# Patient Record
Sex: FEMALE | Race: WHITE | NOT HISPANIC OR LATINO | Employment: OTHER | URBAN - METROPOLITAN AREA
[De-identification: names, ages, dates, MRNs, and addresses within clinical notes are randomized per-mention and may not be internally consistent; named-entity substitution may affect disease eponyms.]

---

## 2017-01-16 ENCOUNTER — ALLSCRIPTS OFFICE VISIT (OUTPATIENT)
Dept: OTHER | Facility: OTHER | Age: 77
End: 2017-01-16

## 2018-01-13 VITALS
HEIGHT: 62 IN | TEMPERATURE: 97.4 F | SYSTOLIC BLOOD PRESSURE: 118 MMHG | DIASTOLIC BLOOD PRESSURE: 70 MMHG | WEIGHT: 195 LBS | BODY MASS INDEX: 35.88 KG/M2

## 2018-01-16 DIAGNOSIS — E55.9 VITAMIN D DEFICIENCY: ICD-10-CM

## 2018-01-16 DIAGNOSIS — E78.00 PURE HYPERCHOLESTEROLEMIA: ICD-10-CM

## 2018-01-16 DIAGNOSIS — Z00.00 ENCOUNTER FOR GENERAL ADULT MEDICAL EXAMINATION WITHOUT ABNORMAL FINDINGS: ICD-10-CM

## 2018-01-16 DIAGNOSIS — I10 ESSENTIAL (PRIMARY) HYPERTENSION: ICD-10-CM

## 2018-01-24 RX ORDER — FENOFIBRIC ACID 135 MG/1
1 CAPSULE, DELAYED RELEASE ORAL DAILY
COMMUNITY
Start: 2016-09-21 | End: 2018-04-06 | Stop reason: SDUPTHER

## 2018-01-24 RX ORDER — SPIRONOLACTONE 50 MG/1
1 TABLET, FILM COATED ORAL DAILY
COMMUNITY
Start: 2016-11-23 | End: 2018-02-05 | Stop reason: SDUPTHER

## 2018-01-24 RX ORDER — BUPROPION HYDROCHLORIDE 150 MG/1
1 TABLET ORAL DAILY
COMMUNITY
Start: 2016-08-03 | End: 2018-03-09 | Stop reason: SDUPTHER

## 2018-02-05 DIAGNOSIS — I10 ESSENTIAL HYPERTENSION: Primary | ICD-10-CM

## 2018-02-05 RX ORDER — SPIRONOLACTONE 50 MG/1
TABLET, FILM COATED ORAL
Qty: 30 TABLET | Refills: 0 | Status: SHIPPED | OUTPATIENT
Start: 2018-02-05 | End: 2018-03-09 | Stop reason: SDUPTHER

## 2018-03-06 ENCOUNTER — OFFICE VISIT (OUTPATIENT)
Dept: FAMILY MEDICINE CLINIC | Facility: CLINIC | Age: 78
End: 2018-03-06
Payer: COMMERCIAL

## 2018-03-06 VITALS
SYSTOLIC BLOOD PRESSURE: 144 MMHG | HEART RATE: 76 BPM | TEMPERATURE: 96.9 F | DIASTOLIC BLOOD PRESSURE: 84 MMHG | BODY MASS INDEX: 36.95 KG/M2 | OXYGEN SATURATION: 97 % | WEIGHT: 202 LBS

## 2018-03-06 DIAGNOSIS — Z00.00 MEDICARE ANNUAL WELLNESS VISIT, SUBSEQUENT: Primary | ICD-10-CM

## 2018-03-06 DIAGNOSIS — N64.4 BREAST PAIN, LEFT: ICD-10-CM

## 2018-03-06 DIAGNOSIS — M25.561 RIGHT KNEE PAIN, UNSPECIFIED CHRONICITY: ICD-10-CM

## 2018-03-06 DIAGNOSIS — E78.00 HYPERCHOLESTEROLEMIA: ICD-10-CM

## 2018-03-06 DIAGNOSIS — M54.41 ACUTE RIGHT-SIDED LOW BACK PAIN WITH RIGHT-SIDED SCIATICA: ICD-10-CM

## 2018-03-06 DIAGNOSIS — I10 ESSENTIAL HYPERTENSION: Primary | ICD-10-CM

## 2018-03-06 PROCEDURE — G0439 PPPS, SUBSEQ VISIT: HCPCS | Performed by: FAMILY MEDICINE

## 2018-03-06 PROCEDURE — 1101F PT FALLS ASSESS-DOCD LE1/YR: CPT | Performed by: FAMILY MEDICINE

## 2018-03-06 PROCEDURE — 99214 OFFICE O/P EST MOD 30 MIN: CPT | Performed by: FAMILY MEDICINE

## 2018-03-06 NOTE — LETTER
March 6, 2018     Patient: Santhosh Mar   YOB: 1940   Date of Visit: 3/6/2018       To Whom It May Concern:     Ms Eliu Khan is currently under my care  She has been diagnosed with an incontinence disorder  Due to her insurance she doesn't receive government aid to pay for her incontinence products  If you have any questions or concerns, please don't hesitate to call           Sincerely,        Ana Juárez DO        CC: No Recipients

## 2018-03-06 NOTE — PROGRESS NOTES
Assessment/Plan:    No problem-specific Assessment & Plan notes found for this encounter  Diagnoses and all orders for this visit:    Essential hypertension    Acute right-sided low back pain with right-sided sciatica  Comments:  pt refuses PT at this time    Breast pain, left  -     Ambulatory referral to Obstetrics / Gynecology; Future    Right knee pain, unspecified chronicity  -     XR knee 3 vw right non injury; Future    Hypercholesterolemia  Comments:  pt counseled on diet and exercise          Subjective:      Patient ID: Ismael Campos is a 69 y o  female  Pt complains of back pain radiating to the right leg to the right knee that started 1 month ago, the leg feels like it is going to give out, pt also complains of left breast burning pain, pt has a history of breast reduction, it started about 1 and 1/2 weeks ago, pt does not get mammograms and refuses them, there is no rash      Hypertension   Pertinent negatives include no chest pain, palpitations or shortness of breath  The following portions of the patient's history were reviewed and updated as appropriate: allergies, current medications, past family history, past medical history, past social history, past surgical history and problem list     Review of Systems   Constitutional: Negative for chills, fatigue and fever  HENT: Negative  Eyes: Negative  Respiratory: Negative for shortness of breath and wheezing  Cardiovascular: Negative for chest pain and palpitations  Gastrointestinal: Negative for abdominal pain, diarrhea, nausea and vomiting  Endocrine: Negative  Genitourinary: Negative for dysuria  Musculoskeletal: Negative for arthralgias and myalgias  Skin: Negative  Allergic/Immunologic: Negative  Neurological: Negative for seizures and syncope  Hematological: Negative for adenopathy  Psychiatric/Behavioral: Negative            Objective:      /84   Pulse 76   Temp (!) 96 9 °F (36 1 °C)   Wt 91 6 kg (202 lb)   SpO2 97%   BMI 36 95 kg/m²          Physical Exam   Constitutional: She is oriented to person, place, and time  She appears well-developed and well-nourished  No distress  HENT:   Head: Normocephalic and atraumatic  Right Ear: External ear normal    Left Ear: External ear normal    Nose: Nose normal    Mouth/Throat: Oropharynx is clear and moist    Eyes: Conjunctivae and EOM are normal  Pupils are equal, round, and reactive to light  No scleral icterus  Neck: Normal range of motion  Neck supple  Cardiovascular: Normal rate, regular rhythm and normal heart sounds  No murmur heard  Pulmonary/Chest: Effort normal and breath sounds normal  No stridor  No respiratory distress  She has no wheezes  She has no rales  Abdominal: Soft  Bowel sounds are normal  She exhibits no distension and no mass  There is no tenderness  There is no rebound and no guarding  Musculoskeletal: Normal range of motion  She exhibits no edema  Lymphadenopathy:     She has no cervical adenopathy  Neurological: She is alert and oriented to person, place, and time  She has normal reflexes  She exhibits normal muscle tone  Skin: Skin is warm and dry  No rash noted  She is not diaphoretic  No erythema  No pallor  Psychiatric: She has a normal mood and affect  Her behavior is normal  Judgment and thought content normal    Nursing note and vitals reviewed

## 2018-03-06 NOTE — PROGRESS NOTES
HPI:  Pascale Tavera is a 69 y o  female here for her Subsequent Wellness Visit  There is no problem list on file for this patient  Past Medical History:   Diagnosis Date    Esophageal stricture     Factor V Leiden mutation ([address] )     LAST ASSESSED: 8/3/16    Hypertension      Past Surgical History:   Procedure Laterality Date    CATARACT EXTRACTION, BILATERAL Bilateral     REDUCTION MAMMAPLASTY Bilateral     TONSILLECTOMY AND ADENOIDECTOMY      TUBAL LIGATION Bilateral      Family History   Problem Relation Age of Onset    Hypertension Mother     Cancer Father      History   Smoking Status    Former Smoker    Quit date: 1988   Smokeless Tobacco    Never Used     History   Alcohol Use No      History   Drug use: Unknown     There were no vitals taken for this visit  Current Outpatient Prescriptions   Medication Sig Dispense Refill    buPROPion (WELLBUTRIN XL) 150 mg 24 hr tablet Take 1 tablet by mouth daily      Choline Fenofibrate (FENOFIBRIC ACID) 135 MG CPDR Take 1 capsule by mouth daily      spironolactone (ALDACTONE) 50 mg tablet take 1 tablet by mouth once daily 30 tablet 0     No current facility-administered medications for this visit  Allergies   Allergen Reactions    Montelukast      Other reaction(s): Depression  Category: Adverse Reaction;     Nsaids     Pravastatin      Category: Adverse Reaction;     Simvastatin      Category:  Adverse Reaction;      Immunization History   Administered Date(s) Administered    Influenza Split High Dose Preservative Free IM 10/10/2016       Patient Care Team:  Mercedes Gomes DO as PCP - General      Medicare Screening Tests and Risk Assessments:  AWCHANDU Clinical     ISAR:       Once in a Lifetime Medicare Screening:       Medicare Screening Tests and Risk Assessment:   AAA Risk Assessment    Osteoporosis Risk Assessment    HIV Risk Assessment        Drug and Alcohol Use:   Tobacco use    Tobacco use duration    Tobacco Cessation Readiness    Alcohol use    Alcohol Treatment Readiness   Illicit Drug Use        Diet & Exercise:   Diet   How many servings a day of the following:   Exercise        Cognitive Impairment Screening:   Cognitive Impairment Screening        Functional Ability/Level of Safety:   Hearing    Hearing Impairment Assessment    Current Activities    Help needed with the folllowing:    ADL    Fall Risk   Injury History       Home Safety:   Home Safety Risk Factors       Advanced Directives:   Advanced Directives    Living Will:  No Durable POA for healthcare:  No   Advanced directive:  No    Patient's End of Life Decisions        Urinary Incontinence:       Glaucoma:            Provider Screening    No data filed      Physical Exam   Constitutional: She appears well-developed and well-nourished  No distress  Skin: She is not diaphoretic  Nursing note and vitals reviewed  No exam data present  Reviewed Updated [facility] Prior Wellness Visits:   Last Medicare wellness visit information was reviewed, patient interviewed , no change since last AWVyes  Last Medicare wellness visit information was reviewed, patient interviewed and updates made to the record today yes    Assessment and Plan:  1   Medicare annual wellness visit, subsequent         Health Maintenance Due   Topic Date Due    DTaP,Tdap,and Td Vaccines (1 - Tdap) 05/03/1948    Depression Screening PHQ-9  05/03/1953    PNEUMOCOCCAL POLYSACCHARIDE VACCINE AGE 65 AND OVER  05/03/2006    GLAUCOMA SCREENING 67+ YR  05/03/2008    INFLUENZA VACCINE  09/01/2017

## 2018-03-09 DIAGNOSIS — I10 ESSENTIAL HYPERTENSION: ICD-10-CM

## 2018-03-09 DIAGNOSIS — F32.9 SINGLE CURRENT EPISODE OF MAJOR DEPRESSIVE DISORDER, UNSPECIFIED DEPRESSION EPISODE SEVERITY: Primary | ICD-10-CM

## 2018-03-09 RX ORDER — BUPROPION HYDROCHLORIDE 150 MG/1
TABLET ORAL
Qty: 90 TABLET | Refills: 3 | Status: SHIPPED | OUTPATIENT
Start: 2018-03-09 | End: 2019-03-18 | Stop reason: SDUPTHER

## 2018-03-09 RX ORDER — SPIRONOLACTONE 50 MG/1
TABLET, FILM COATED ORAL
Qty: 30 TABLET | Refills: 0 | Status: SHIPPED | OUTPATIENT
Start: 2018-03-09 | End: 2018-04-06 | Stop reason: SDUPTHER

## 2018-03-27 ENCOUNTER — OFFICE VISIT (OUTPATIENT)
Dept: FAMILY MEDICINE CLINIC | Facility: CLINIC | Age: 78
End: 2018-03-27
Payer: COMMERCIAL

## 2018-03-27 VITALS
TEMPERATURE: 97.8 F | WEIGHT: 203 LBS | HEIGHT: 62 IN | HEART RATE: 71 BPM | OXYGEN SATURATION: 95 % | BODY MASS INDEX: 37.36 KG/M2 | DIASTOLIC BLOOD PRESSURE: 82 MMHG | SYSTOLIC BLOOD PRESSURE: 140 MMHG

## 2018-03-27 DIAGNOSIS — H57.12 EYE PAIN, LEFT: ICD-10-CM

## 2018-03-27 DIAGNOSIS — H00.015 HORDEOLUM EXTERNUM OF LEFT LOWER EYELID: ICD-10-CM

## 2018-03-27 DIAGNOSIS — M25.561 RIGHT KNEE PAIN, UNSPECIFIED CHRONICITY: Primary | ICD-10-CM

## 2018-03-27 DIAGNOSIS — N64.4 BREAST PAIN: ICD-10-CM

## 2018-03-27 PROCEDURE — 3008F BODY MASS INDEX DOCD: CPT | Performed by: FAMILY MEDICINE

## 2018-03-27 PROCEDURE — 99214 OFFICE O/P EST MOD 30 MIN: CPT | Performed by: FAMILY MEDICINE

## 2018-03-27 NOTE — PROGRESS NOTES
Assessment/Plan:    No problem-specific Assessment & Plan notes found for this encounter  Diagnoses and all orders for this visit:    Right knee pain, unspecified chronicity  Comments:  pt does not want referral to ortho to consider knee injections, the pain is improving    Eye pain, left    Breast pain  Comments:  pt opts not to have an MRI of her breast    Hordeolum externum of left lower eyelid  Comments:  warm compresses    Other orders  -     Cetirizine HCl 10 MG TBDP; Take by mouth          Subjective:      Patient ID: Elfego Benavides is a 69 y o  female  Follow up for right knee pain, pt had an x-ray, pt states the knee is improved since last visit follow up for left breast pain, pt saw the specialist who feels it is irritated nerve ending she has no mass, pt states she feels like something is in her left eye, it started yesterday, vision is slightly blurry but no redness or swelling        The following portions of the patient's history were reviewed and updated as appropriate: allergies, current medications, past family history, past medical history, past social history, past surgical history and problem list     Review of Systems   Constitutional: Negative for chills and fever  Skin: Negative for rash  Objective:      /82 (BP Location: Left arm, Patient Position: Sitting, Cuff Size: Adult)   Pulse 71   Temp 97 8 °F (36 6 °C) (Tympanic)   Ht 5' 2" (1 575 m)   Wt 92 1 kg (203 lb)   SpO2 95%   BMI 37 13 kg/m²          Physical Exam   Constitutional: She is oriented to person, place, and time  She appears well-developed and well-nourished  No distress  Eyes: Conjunctivae and EOM are normal  Pupils are equal, round, and reactive to light  Right eye exhibits no discharge  Left eye exhibits no discharge  No scleral icterus  Left lower medial lid sty   Neck: Normal range of motion  Neck supple  Cardiovascular: Normal rate, regular rhythm and normal heart sounds      No murmur heard   Pulmonary/Chest: Breath sounds normal  No respiratory distress  She has no wheezes  She has no rales  Musculoskeletal:        Right knee: She exhibits no effusion  Lymphadenopathy:     She has no cervical adenopathy  Neurological: She is alert and oriented to person, place, and time  She exhibits normal muscle tone  Skin: Skin is warm and dry  No rash noted  She is not diaphoretic  No erythema  No pallor  Psychiatric: She has a normal mood and affect  Her behavior is normal  Judgment and thought content normal    Nursing note and vitals reviewed  Right Knee Exam     Tenderness   The patient is experiencing tenderness in the medial joint line  Range of Motion   Extension: normal   Flexion: normal     Muscle Strength     The patient has normal right knee strength      Tests   Varus: negative  Valgus: negative    Other   Erythema: absent  Other tests: no effusion present

## 2018-04-06 DIAGNOSIS — E78.1 HYPERTRIGLYCERIDEMIA: Primary | ICD-10-CM

## 2018-04-06 DIAGNOSIS — I10 ESSENTIAL HYPERTENSION: ICD-10-CM

## 2018-04-06 RX ORDER — SPIRONOLACTONE 50 MG/1
TABLET, FILM COATED ORAL
Qty: 30 TABLET | Refills: 0 | Status: SHIPPED | OUTPATIENT
Start: 2018-04-06 | End: 2018-05-05 | Stop reason: SDUPTHER

## 2018-05-05 DIAGNOSIS — I10 ESSENTIAL HYPERTENSION: ICD-10-CM

## 2018-05-07 RX ORDER — SPIRONOLACTONE 50 MG/1
TABLET, FILM COATED ORAL
Qty: 30 TABLET | Refills: 0 | Status: SHIPPED | OUTPATIENT
Start: 2018-05-07 | End: 2018-06-05 | Stop reason: SDUPTHER

## 2018-06-05 DIAGNOSIS — I10 ESSENTIAL HYPERTENSION: ICD-10-CM

## 2018-06-06 RX ORDER — SPIRONOLACTONE 25 MG/1
TABLET ORAL
Qty: 60 TABLET | Refills: 0 | Status: SHIPPED | OUTPATIENT
Start: 2018-06-06 | End: 2018-07-08 | Stop reason: SDUPTHER

## 2018-07-08 DIAGNOSIS — I10 ESSENTIAL HYPERTENSION: ICD-10-CM

## 2018-07-09 RX ORDER — SPIRONOLACTONE 25 MG/1
TABLET ORAL
Qty: 60 TABLET | Refills: 0 | Status: SHIPPED | OUTPATIENT
Start: 2018-07-09 | End: 2018-08-09 | Stop reason: SDUPTHER

## 2018-08-09 DIAGNOSIS — I10 ESSENTIAL HYPERTENSION: ICD-10-CM

## 2018-08-10 RX ORDER — SPIRONOLACTONE 25 MG/1
TABLET ORAL
Qty: 60 TABLET | Refills: 0 | Status: SHIPPED | OUTPATIENT
Start: 2018-08-10 | End: 2018-09-09 | Stop reason: SDUPTHER

## 2018-09-09 DIAGNOSIS — I10 ESSENTIAL HYPERTENSION: ICD-10-CM

## 2018-09-10 RX ORDER — SPIRONOLACTONE 25 MG/1
TABLET ORAL
Qty: 60 TABLET | Refills: 0 | Status: SHIPPED | OUTPATIENT
Start: 2018-09-10 | End: 2018-10-08 | Stop reason: SDUPTHER

## 2018-10-08 DIAGNOSIS — E78.1 HYPERTRIGLYCERIDEMIA: ICD-10-CM

## 2018-10-08 DIAGNOSIS — I10 ESSENTIAL HYPERTENSION: ICD-10-CM

## 2018-10-08 RX ORDER — FENOFIBRIC ACID 135 MG/1
CAPSULE, DELAYED RELEASE ORAL
Qty: 30 CAPSULE | Refills: 5 | Status: SHIPPED | OUTPATIENT
Start: 2018-10-08 | End: 2019-04-18 | Stop reason: SDUPTHER

## 2018-10-08 RX ORDER — SPIRONOLACTONE 25 MG/1
TABLET ORAL
Qty: 60 TABLET | Refills: 0 | Status: SHIPPED | OUTPATIENT
Start: 2018-10-08 | End: 2018-11-10 | Stop reason: SDUPTHER

## 2018-10-17 ENCOUNTER — OFFICE VISIT (OUTPATIENT)
Dept: FAMILY MEDICINE CLINIC | Facility: CLINIC | Age: 78
End: 2018-10-17
Payer: COMMERCIAL

## 2018-10-17 VITALS
HEIGHT: 62 IN | DIASTOLIC BLOOD PRESSURE: 80 MMHG | SYSTOLIC BLOOD PRESSURE: 140 MMHG | BODY MASS INDEX: 36.91 KG/M2 | TEMPERATURE: 98.3 F | WEIGHT: 200.6 LBS

## 2018-10-17 DIAGNOSIS — I10 ESSENTIAL HYPERTENSION: Primary | ICD-10-CM

## 2018-10-17 DIAGNOSIS — F32.A DEPRESSION, UNSPECIFIED DEPRESSION TYPE: ICD-10-CM

## 2018-10-17 DIAGNOSIS — R32 URINARY INCONTINENCE, UNSPECIFIED TYPE: Primary | ICD-10-CM

## 2018-10-17 DIAGNOSIS — Z96.641 HISTORY OF RIGHT HIP REPLACEMENT: ICD-10-CM

## 2018-10-17 PROCEDURE — 3008F BODY MASS INDEX DOCD: CPT | Performed by: FAMILY MEDICINE

## 2018-10-17 PROCEDURE — 99213 OFFICE O/P EST LOW 20 MIN: CPT | Performed by: FAMILY MEDICINE

## 2018-10-17 PROCEDURE — 1036F TOBACCO NON-USER: CPT | Performed by: FAMILY MEDICINE

## 2018-10-17 NOTE — PROGRESS NOTES
Assessment/Plan:    No problem-specific Assessment & Plan notes found for this encounter  Diagnoses and all orders for this visit:    Essential hypertension  -     CBC and differential; Future  -     Comprehensive metabolic panel; Future  -     Lipid panel; Future  -     TSH, 3rd generation with Free T4 reflex; Future    Depression, unspecified depression type  Comments:  no change in the medication          Subjective:      Patient ID: Radha Pastor is a 69 y o  female  Follow up for depression pt is doing well on wellbutrin      Hypertension   This is a chronic problem  The current episode started more than 1 year ago  Pertinent negatives include no chest pain, headaches, palpitations or shortness of breath  There are no associated agents to hypertension  Risk factors for coronary artery disease include post-menopausal state and sedentary lifestyle  Past treatments include diuretics  The current treatment provides moderate improvement  Compliance problems include diet and exercise  The following portions of the patient's history were reviewed and updated as appropriate: allergies, current medications, past family history, past medical history, past social history, past surgical history and problem list     Review of Systems   Eyes: Negative for visual disturbance  Respiratory: Negative for shortness of breath and wheezing  Cardiovascular: Negative for chest pain and palpitations  Neurological: Negative for headaches  Objective:      /80 (BP Location: Right arm, Patient Position: Sitting, Cuff Size: Adult)   Temp 98 3 °F (36 8 °C) (Tympanic)   Ht 5' 2" (1 575 m)   Wt 91 kg (200 lb 9 6 oz)   BMI 36 69 kg/m²          Physical Exam   Constitutional: She is oriented to person, place, and time  She appears well-developed and well-nourished  No distress  HENT:   Head: Normocephalic and atraumatic  Eyes: Pupils are equal, round, and reactive to light   Conjunctivae and EOM are normal  No scleral icterus  Neck: Normal range of motion  Neck supple  Cardiovascular: Normal rate, regular rhythm and normal heart sounds  No murmur heard  Pulmonary/Chest: Effort normal and breath sounds normal  No respiratory distress  She has no wheezes  She has no rales  Abdominal: Soft  Bowel sounds are normal  She exhibits no distension and no mass  There is no tenderness  There is no rebound and no guarding  Musculoskeletal: She exhibits no edema  Lymphadenopathy:     She has no cervical adenopathy  Neurological: She is alert and oriented to person, place, and time  She exhibits normal muscle tone  Skin: Skin is warm and dry  No rash noted  She is not diaphoretic  No erythema  No pallor  Psychiatric: She has a normal mood and affect  Her behavior is normal  Judgment and thought content normal    Nursing note and vitals reviewed

## 2018-11-10 DIAGNOSIS — I10 ESSENTIAL HYPERTENSION: ICD-10-CM

## 2018-11-12 RX ORDER — SPIRONOLACTONE 25 MG/1
TABLET ORAL
Qty: 60 TABLET | Refills: 0 | Status: SHIPPED | OUTPATIENT
Start: 2018-11-12 | End: 2018-11-14 | Stop reason: SDUPTHER

## 2018-11-14 DIAGNOSIS — I10 ESSENTIAL HYPERTENSION: ICD-10-CM

## 2018-11-14 RX ORDER — SPIRONOLACTONE 25 MG/1
50 TABLET ORAL DAILY
Qty: 60 TABLET | Refills: 5 | Status: SHIPPED | OUTPATIENT
Start: 2018-11-14 | End: 2019-06-21 | Stop reason: SDUPTHER

## 2019-03-18 DIAGNOSIS — F32.9 CURRENT EPISODE OF MAJOR DEPRESSIVE DISORDER WITHOUT PRIOR EPISODE: ICD-10-CM

## 2019-03-18 RX ORDER — BUPROPION HYDROCHLORIDE 150 MG/1
TABLET ORAL
Qty: 90 TABLET | Refills: 3 | Status: SHIPPED | OUTPATIENT
Start: 2019-03-18 | End: 2020-04-07

## 2019-04-18 DIAGNOSIS — E78.1 HYPERTRIGLYCERIDEMIA: ICD-10-CM

## 2019-06-21 DIAGNOSIS — I10 ESSENTIAL HYPERTENSION: ICD-10-CM

## 2019-06-21 RX ORDER — SPIRONOLACTONE 25 MG/1
TABLET ORAL
Qty: 60 TABLET | Refills: 5 | Status: SHIPPED | OUTPATIENT
Start: 2019-06-21 | End: 2020-01-06

## 2019-10-15 ENCOUNTER — APPOINTMENT (OUTPATIENT)
Dept: LAB | Facility: HOSPITAL | Age: 79
End: 2019-10-15
Payer: COMMERCIAL

## 2019-10-15 DIAGNOSIS — I10 ESSENTIAL HYPERTENSION: ICD-10-CM

## 2019-10-15 LAB
ALBUMIN SERPL BCP-MCNC: 4.3 G/DL (ref 3.5–5)
ALP SERPL-CCNC: 53 U/L (ref 46–116)
ALT SERPL W P-5'-P-CCNC: 21 U/L (ref 12–78)
ANION GAP SERPL CALCULATED.3IONS-SCNC: 9 MMOL/L (ref 4–13)
AST SERPL W P-5'-P-CCNC: 18 U/L (ref 5–45)
BASOPHILS # BLD AUTO: 0.07 THOUSANDS/ΜL (ref 0–0.1)
BASOPHILS NFR BLD AUTO: 1 % (ref 0–1)
BILIRUB SERPL-MCNC: 0.42 MG/DL (ref 0.2–1)
BUN SERPL-MCNC: 31 MG/DL (ref 5–25)
CALCIUM SERPL-MCNC: 9.7 MG/DL (ref 8.3–10.1)
CHLORIDE SERPL-SCNC: 111 MMOL/L (ref 100–108)
CHOLEST SERPL-MCNC: 189 MG/DL (ref 50–200)
CO2 SERPL-SCNC: 24 MMOL/L (ref 21–32)
CREAT SERPL-MCNC: 1.18 MG/DL (ref 0.6–1.3)
EOSINOPHIL # BLD AUTO: 0.21 THOUSAND/ΜL (ref 0–0.61)
EOSINOPHIL NFR BLD AUTO: 3 % (ref 0–6)
ERYTHROCYTE [DISTWIDTH] IN BLOOD BY AUTOMATED COUNT: 13.5 % (ref 11.6–15.1)
GFR SERPL CREATININE-BSD FRML MDRD: 44 ML/MIN/1.73SQ M
GLUCOSE P FAST SERPL-MCNC: 94 MG/DL (ref 65–99)
HCT VFR BLD AUTO: 41.9 % (ref 34.8–46.1)
HDLC SERPL-MCNC: 71 MG/DL (ref 40–60)
HGB BLD-MCNC: 12.8 G/DL (ref 11.5–15.4)
IMM GRANULOCYTES # BLD AUTO: 0.04 THOUSAND/UL (ref 0–0.2)
IMM GRANULOCYTES NFR BLD AUTO: 1 % (ref 0–2)
LDLC SERPL CALC-MCNC: 97 MG/DL (ref 0–100)
LYMPHOCYTES # BLD AUTO: 1.64 THOUSANDS/ΜL (ref 0.6–4.47)
LYMPHOCYTES NFR BLD AUTO: 25 % (ref 14–44)
MCH RBC QN AUTO: 28.8 PG (ref 26.8–34.3)
MCHC RBC AUTO-ENTMCNC: 30.5 G/DL (ref 31.4–37.4)
MCV RBC AUTO: 94 FL (ref 82–98)
MONOCYTES # BLD AUTO: 0.54 THOUSAND/ΜL (ref 0.17–1.22)
MONOCYTES NFR BLD AUTO: 8 % (ref 4–12)
NEUTROPHILS # BLD AUTO: 4 THOUSANDS/ΜL (ref 1.85–7.62)
NEUTS SEG NFR BLD AUTO: 62 % (ref 43–75)
NONHDLC SERPL-MCNC: 118 MG/DL
NRBC BLD AUTO-RTO: 0 /100 WBCS
PLATELET # BLD AUTO: 272 THOUSANDS/UL (ref 149–390)
PMV BLD AUTO: 9.5 FL (ref 8.9–12.7)
POTASSIUM SERPL-SCNC: 4.3 MMOL/L (ref 3.5–5.3)
PROT SERPL-MCNC: 7.6 G/DL (ref 6.4–8.2)
RBC # BLD AUTO: 4.44 MILLION/UL (ref 3.81–5.12)
SODIUM SERPL-SCNC: 144 MMOL/L (ref 136–145)
TRIGL SERPL-MCNC: 106 MG/DL
TSH SERPL DL<=0.05 MIU/L-ACNC: 2.3 UIU/ML (ref 0.36–3.74)
WBC # BLD AUTO: 6.5 THOUSAND/UL (ref 4.31–10.16)

## 2019-10-15 PROCEDURE — 80061 LIPID PANEL: CPT

## 2019-10-15 PROCEDURE — 80053 COMPREHEN METABOLIC PANEL: CPT

## 2019-10-15 PROCEDURE — 85025 COMPLETE CBC W/AUTO DIFF WBC: CPT

## 2019-10-15 PROCEDURE — 84443 ASSAY THYROID STIM HORMONE: CPT

## 2019-10-15 PROCEDURE — 36415 COLL VENOUS BLD VENIPUNCTURE: CPT

## 2019-10-28 DIAGNOSIS — E78.1 HYPERTRIGLYCERIDEMIA: ICD-10-CM

## 2019-10-29 RX ORDER — FENOFIBRIC ACID 135 MG/1
CAPSULE, DELAYED RELEASE ORAL
Qty: 30 CAPSULE | Refills: 5 | Status: SHIPPED | OUTPATIENT
Start: 2019-10-29 | End: 2020-05-06

## 2019-10-31 ENCOUNTER — OFFICE VISIT (OUTPATIENT)
Dept: FAMILY MEDICINE CLINIC | Facility: CLINIC | Age: 79
End: 2019-10-31
Payer: COMMERCIAL

## 2019-10-31 VITALS
BODY MASS INDEX: 35.26 KG/M2 | SYSTOLIC BLOOD PRESSURE: 136 MMHG | DIASTOLIC BLOOD PRESSURE: 68 MMHG | WEIGHT: 199 LBS | HEIGHT: 63 IN | TEMPERATURE: 97.7 F | RESPIRATION RATE: 18 BRPM | HEART RATE: 78 BPM | OXYGEN SATURATION: 95 %

## 2019-10-31 DIAGNOSIS — E66.01 CLASS 2 SEVERE OBESITY DUE TO EXCESS CALORIES WITH SERIOUS COMORBIDITY AND BODY MASS INDEX (BMI) OF 35.0 TO 35.9 IN ADULT (HCC): ICD-10-CM

## 2019-10-31 DIAGNOSIS — Z00.00 MEDICARE ANNUAL WELLNESS VISIT, SUBSEQUENT: Primary | ICD-10-CM

## 2019-10-31 DIAGNOSIS — R94.4 DECREASED CALCULATED GFR: ICD-10-CM

## 2019-10-31 DIAGNOSIS — I10 ESSENTIAL HYPERTENSION: ICD-10-CM

## 2019-10-31 DIAGNOSIS — F32.A DEPRESSION, UNSPECIFIED DEPRESSION TYPE: ICD-10-CM

## 2019-10-31 PROCEDURE — 99214 OFFICE O/P EST MOD 30 MIN: CPT | Performed by: FAMILY MEDICINE

## 2019-10-31 PROCEDURE — 3075F SYST BP GE 130 - 139MM HG: CPT | Performed by: FAMILY MEDICINE

## 2019-10-31 PROCEDURE — 1101F PT FALLS ASSESS-DOCD LE1/YR: CPT | Performed by: FAMILY MEDICINE

## 2019-10-31 PROCEDURE — G0439 PPPS, SUBSEQ VISIT: HCPCS | Performed by: FAMILY MEDICINE

## 2019-10-31 PROCEDURE — 3078F DIAST BP <80 MM HG: CPT | Performed by: FAMILY MEDICINE

## 2019-10-31 NOTE — PROGRESS NOTES
Assessment and Plan:     Problem List Items Addressed This Visit     None      Visit Diagnoses     Medicare annual wellness visit, subsequent    -  Primary           Preventive health issues were discussed with patient, and age appropriate screening tests were ordered as noted in patient's After Visit Summary  Personalized health advice and appropriate referrals for health education or preventive services given if needed, as noted in patient's After Visit Summary  History of Present Illness:     Patient presents for Medicare Annual Wellness visit    Patient Care Team:  DO aiden Brunner PCP - General     Problem List:     There is no problem list on file for this patient  Past Medical and Surgical History:     Past Medical History:   Diagnosis Date    Esophageal stricture     Factor V Leiden mutation [address] [address] )     LAST ASSESSED: 8/3/16    Hypertension      Past Surgical History:   Procedure Laterality Date    CATARACT EXTRACTION, BILATERAL Bilateral     REDUCTION MAMMAPLASTY Bilateral     TONSILLECTOMY AND ADENOIDECTOMY      TUBAL LIGATION Bilateral       Family History:     Family History   Problem Relation Age of Onset    Hypertension Mother     Cancer Father       Social History:     Social History     Socioeconomic History    Marital status:       Spouse name: None    Number of children: 4    Years of education: None    Highest education level: None   Occupational History    Occupation: PRIOR OCCUPATION       Comment: RETIRED   Social Needs    Financial resource strain: None    Food insecurity:     Worry: None     Inability: None    Transportation needs:     Medical: None     Non-medical: None   Tobacco Use    Smoking status: Former Smoker     Last attempt to quit:      Years since quittin 8    Smokeless tobacco: Never Used   Substance and Sexual Activity    Alcohol use: No    Drug use: None    Sexual activity: None   Lifestyle    Physical activity: Days per week: None     Minutes per session: None    Stress: None   Relationships    Social connections:     Talks on phone: None     Gets together: None     Attends Yarsanism service: None     Active member of club or organization: None     Attends meetings of clubs or organizations: None     Relationship status: None    Intimate partner violence:     Fear of current or ex partner: None     Emotionally abused: None     Physically abused: None     Forced sexual activity: None   Other Topics Concern    None   Social History Narrative    None       Medications and Allergies:     Current Outpatient Medications   Medication Sig Dispense Refill    buPROPion (WELLBUTRIN XL) 150 mg 24 hr tablet take 1 tablet by mouth once daily 90 tablet 3    Cetirizine HCl 10 MG TBDP Take by mouth      Choline Fenofibrate (FENOFIBRIC ACID) 135 MG CPDR take 1 capsule by mouth once daily 30 capsule 5    spironolactone (ALDACTONE) 25 mg tablet take 2 tablets by mouth once daily 60 tablet 5     No current facility-administered medications for this visit  Allergies   Allergen Reactions    Aspirin     Montelukast      Other reaction(s): Depression  Category: Adverse Reaction; Other reaction(s): Depression  Category: Adverse Reaction;     Nsaids     Pravastatin      Category: Adverse Reaction;     Simvastatin      Category: Adverse Reaction;     Tolmetin       Immunizations:     Immunization History   Administered Date(s) Administered    INFLUENZA 10/05/2015, 10/10/2016, 10/21/2016, 09/15/2017, 10/15/2018    influenza, trivalent, adjuvanted 10/03/2019      Health Maintenance: There are no preventive care reminders to display for this patient        Topic Date Due    DTaP,Tdap,and Td Vaccines (1 - Tdap) 05/03/1962    Pneumococcal Vaccine: 65+ Years (1 of 2 - PCV13) 05/03/2006      Medicare Health Risk Assessment:     /68   Pulse 78   Temp 97 7 °F (36 5 °C) (Tympanic)   Resp 18   Ht 5' 3" (1 6 m)   Wt 90 3 kg (199 lb)   SpO2 95%   BMI 35 25 kg/m²      Anju Newman is here for her Subsequent Wellness visit  Last Medicare Wellness visit information reviewed, patient interviewed and updates made to the record today  Health Risk Assessment:   Patient rates overall health as good  Patient feels that their physical health rating is slightly better  Eyesight was rated as slightly worse  Hearing was rated as same  Patient feels that their emotional and mental health rating is slightly better  Pain experienced in the last 7 days has been some  Patient's pain rating has been 6/10  Patient states that she has experienced no weight loss or gain in last 6 months  Depression Screening:   PHQ-2 Score: 0      Fall Risk Screening: In the past year, patient has experienced: no history of falling in past year      Urinary Incontinence Screening:   Patient has leaked urine accidently in the last six months  Home Safety:  Patient has trouble with stairs inside or outside of their home  Patient has working smoke alarms and has working carbon monoxide detector  Home safety hazards include: none  Nutrition:   Current diet is Regular and Low Carb  Medications:   Patient is currently taking over-the-counter supplements  OTC medications include: multivitamin  Patient is able to manage medications  Activities of Daily Living (ADLs)/Instrumental Activities of Daily Living (IADLs):   Walk and transfer into and out of bed and chair?: Yes  Dress and groom yourself?: Yes    Bathe or shower yourself?: Yes    Feed yourself? Yes  Do your laundry/housekeeping?: Yes  Manage your money, pay your bills and track your expenses?: Yes  Make your own meals?: Yes    Do your own shopping?: Yes    Previous Hospitalizations:   Any hospitalizations or ED visits within the last 12 months?: No      Advance Care Planning:   Living will: Yes    Advanced directive: Yes    Advanced directive counseling given: Yes    Five wishes given:  Yes End of Life Decisions reviewed with patient: No    Provider agrees with end of life decisions: No      Cognitive Screening:   Provider or family/friend/caregiver concerned regarding cognition?: No    PREVENTIVE SCREENINGS      Cardiovascular Screening:    General: Screening Not Indicated and History Lipid Disorder      Diabetes Screening:     General: Screening Current      Cervical Cancer Screening:    General: Screening Not Indicated      Lung Cancer Screening:     General: Screening Not Indicated      Buffy Souza DO

## 2019-10-31 NOTE — PROGRESS NOTES
Assessment/Plan:    No problem-specific Assessment & Plan notes found for this encounter  Diagnoses and all orders for this visit:    Medicare annual wellness visit, subsequent    Depression, unspecified depression type    Essential hypertension    Class 2 severe obesity due to excess calories with serious comorbidity and body mass index (BMI) of 35 0 to 35 9 in adult [facility])  Comments:  pt counseled on diet and exercise          PHQ-9 Depression Screening    PHQ-9:    Frequency of the following problems over the past two weeks:       Little interest or pleasure in doing things:  0 - not at all  Feeling down, depressed, or hopeless:  0 - not at all  PHQ-2 Score:  0            Subjective:      Patient ID: Carl Anthony is a 67 y o  female  Hypertension   This is a chronic problem  The current episode started more than 1 year ago  The problem is unchanged  The problem is controlled  Pertinent negatives include no chest pain, palpitations or shortness of breath  There are no associated agents to hypertension  Risk factors for coronary artery disease include obesity, post-menopausal state and sedentary lifestyle  Past treatments include diuretics  The current treatment provides moderate improvement  Compliance problems include exercise and diet  The following portions of the patient's history were reviewed and updated as appropriate: allergies, current medications, past family history, past medical history, past social history, past surgical history and problem list     Review of Systems   Constitutional: Negative for chills, fatigue and fever  HENT: Negative  Eyes: Negative  Respiratory: Negative for shortness of breath and wheezing  Cardiovascular: Negative for chest pain and palpitations  Gastrointestinal: Negative for abdominal pain, blood in stool, constipation, diarrhea, nausea and vomiting  Endocrine: Negative  Genitourinary: Negative for difficulty urinating and dysuria  Musculoskeletal: Negative for arthralgias and myalgias  Skin: Negative  Allergic/Immunologic: Negative  Neurological: Negative for seizures and syncope  Hematological: Negative for adenopathy  Psychiatric/Behavioral: Negative  BMI Counseling: Body mass index is 35 25 kg/m²  The BMI is above normal  Nutrition recommendations include reducing portion sizes and 3-5 servings of fruits/vegetables daily  Exercise recommendations include moderate aerobic physical activity for 150 minutes/week and exercising 3-5 times per week  Objective:    /68   Pulse 78   Temp 97 7 °F (36 5 °C) (Tympanic)   Resp 18   Ht 5' 3" (1 6 m)   Wt 90 3 kg (199 lb)   SpO2 95%   BMI 35 25 kg/m²      Physical Exam   Constitutional: She is oriented to person, place, and time  She appears well-developed and well-nourished  HENT:   Head: Normocephalic and atraumatic  Right Ear: External ear normal    Left Ear: External ear normal    Nose: Nose normal    Mouth/Throat: Oropharynx is clear and moist    Eyes: Pupils are equal, round, and reactive to light  Conjunctivae and EOM are normal    Neck: Normal range of motion  Neck supple  Cardiovascular: Normal rate, regular rhythm and normal heart sounds  No murmur heard  Pulmonary/Chest: Effort normal and breath sounds normal  No respiratory distress  She has no wheezes  She has no rales  Abdominal: Soft  Bowel sounds are normal  She exhibits no distension and no mass  There is no tenderness  There is no rebound and no guarding  Musculoskeletal: Normal range of motion  She exhibits no edema  Neurological: She is alert and oriented to person, place, and time  She has normal reflexes  She exhibits normal muscle tone  Skin: Skin is warm and dry  Psychiatric: She has a normal mood and affect  Her behavior is normal  Judgment and thought content normal    Nursing note and vitals reviewed

## 2019-10-31 NOTE — PATIENT INSTRUCTIONS
Medicare Preventive Visit Patient Instructions  Thank you for completing your Welcome to Medicare Visit or Medicare Annual Wellness Visit today  Your next wellness visit will be due in one year (10/31/2020)  The screening/preventive services that you may require over the next 5-10 years are detailed below  Some tests may not apply to you based off risk factors and/or age  Screening tests ordered at today's visit but not completed yet may show as past due  Also, please note that scanned in results may not display below  Preventive Screenings:  Service Recommendations Previous Testing/Comments   Colorectal Cancer Screening  * Colonoscopy    * Fecal Occult Blood Test (FOBT)/Fecal Immunochemical Test (FIT)  * Fecal DNA/Cologuard Test  * Flexible Sigmoidoscopy Age: 54-65 years old   Colonoscopy: every 10 years (may be performed more frequently if at higher risk)  OR  FOBT/FIT: every 1 year  OR  Cologuard: every 3 years  OR  Sigmoidoscopy: every 5 years  Screening may be recommended earlier than age 48 if at higher risk for colorectal cancer  Also, an individualized decision between you and your healthcare provider will decide whether screening between the ages of 74-80 would be appropriate  Colonoscopy: Not on file  FOBT/FIT: Not on file  Cologuard: Not on file  Sigmoidoscopy: Not on file         Breast Cancer Screening Age: 36 years old  Frequency: every 1-2 years  Not required if history of left and right mastectomy Mammogram: Not on file       Cervical Cancer Screening Between the ages of 21-29, pap smear recommended once every 3 years  Between the ages of 33-67, can perform pap smear with HPV co-testing every 5 years     Recommendations may differ for women with a history of total hysterectomy, cervical cancer, or abnormal pap smears in past  Pap Smear: Not on file    Screening Not Indicated   Hepatitis C Screening Once for adults born [facility]  More frequently in patients at high risk for Hepatitis C Hep C Antibody: Not on file       Diabetes Screening 1-2 times per year if you're at risk for diabetes or have pre-diabetes Fasting glucose: 94 mg/dL   A1C: No results in last 5 years    Screening Current   Cholesterol Screening Once every 5 years if you don't have a lipid disorder  May order more often based on risk factors  Lipid panel: 10/15/2019    Screening Not Indicated  History Lipid Disorder     Other Preventive Screenings Covered by Medicare:  1  Abdominal Aortic Aneurysm (AAA) Screening: covered once if your at risk  You're considered to be at risk if you have a family history of AAA  2  Lung Cancer Screening: covers low dose CT scan once per year if you meet all of the following conditions: (1) Age 50-69; (2) No signs or symptoms of lung cancer; (3) Current smoker or have quit smoking within the last 15 years; (4) You have a tobacco smoking history of at least 30 pack years (packs per day multiplied by number of years you smoked); (5) You get a written order from a healthcare provider  3  Glaucoma Screening: covered annually if you're considered high risk: (1) You have diabetes OR (2) Family history of glaucoma OR (3)  aged 48 and older OR (3)  American aged 72 and older  3  Osteoporosis Screening: covered every 2 years if you meet one of the following conditions: (1) You're estrogen deficient and at risk for osteoporosis based off medical history and other findings; (2) Have a vertebral abnormality; (3) On glucocorticoid therapy for more than 3 months; (4) Have primary hyperparathyroidism; (5) On osteoporosis medications and need to assess response to drug therapy  · Last bone density test (DXA Scan): Not on file  5  HIV Screening: covered annually if you're between the age of 12-76  Also covered annually if you are younger than 13 and older than 72 with risk factors for HIV infection   For pregnant patients, it is covered up to 3 times per pregnancy  Immunizations:  Immunization Recommendations   Influenza Vaccine Annual influenza vaccination during flu season is recommended for all persons aged >= 6 months who do not have contraindications   Pneumococcal Vaccine (Prevnar and Pneumovax)  * Prevnar = PCV13  * Pneumovax = PPSV23   Adults 25-60 years old: 1-3 doses may be recommended based on certain risk factors  Adults 72 years old: Prevnar (PCV13) vaccine recommended followed by Pneumovax (PPSV23) vaccine  If already received PPSV23 since turning 65, then PCV13 recommended at least one year after PPSV23 dose  Hepatitis B Vaccine 3 dose series if at intermediate or high risk (ex: diabetes, end stage renal disease, liver disease)   Tetanus (Td) Vaccine - COST NOT COVERED BY MEDICARE PART B Following completion of primary series, a booster dose should be given every 10 years to maintain immunity against tetanus  Td may also be given as tetanus wound prophylaxis  Tdap Vaccine - COST NOT COVERED BY MEDICARE PART B Recommended at least once for all adults  For pregnant patients, recommended with each pregnancy  Shingles Vaccine (Shingrix) - COST NOT COVERED BY MEDICARE PART B  2 shot series recommended in those aged 48 and above     Health Maintenance Due:  There are no preventive care reminders to display for this patient  Immunizations Due:      Topic Date Due    DTaP,Tdap,and Td Vaccines (1 - Tdap) 05/03/1961    Pneumococcal Vaccine: 65+ Years (1 of 2 - PCV13) 05/03/2006     Advance Directives   What are advance directives? Advance directives are legal documents that state your wishes and plans for medical care  These plans are made ahead of time in case you lose your ability to make decisions for yourself  Advance directives can apply to any medical decision, such as the treatments you want, and if you want to donate organs  What are the types of advance directives?   There are many types of advance directives, and each state has rules about how to use them  You may choose a combination of any of the following:  · Living will: This is a written record of the treatment you want  You can also choose which treatments you do not want, which to limit, and which to stop at a certain time  This includes surgery, medicine, IV fluid, and tube feedings  · Durable power of  for healthcare [facility]): This is a written record that states who you want to make healthcare choices for you when you are unable to make them for yourself  This person, called a proxy, is usually a family member or a friend  You may choose more than 1 proxy  · Do not resuscitate (DNR) order:  A DNR order is used in case your heart stops beating or you stop breathing  It is a request not to have certain forms of treatment, such as CPR  A DNR order may be included in other types of advance directives  · Medical directive: This covers the care that you want if you are in a coma, near death, or unable to make decisions for yourself  You can list the treatments you want for each condition  Treatment may include pain medicine, surgery, blood transfusions, dialysis, IV or tube feedings, and a ventilator (breathing machine)  · Values history: This document has questions about your views, beliefs, and how you feel and think about life  This information can help others choose the care that you would choose  Why are advance directives important? An advance directive helps you control your care  Although spoken wishes may be used, it is better to have your wishes written down  Spoken wishes can be misunderstood, or not followed  Treatments may be given even if you do not want them  An advance directive may make it easier for your family to make difficult choices about your care  Urinary Incontinence   Urinary incontinence (UI)  is when you lose control of your bladder  UI develops because your bladder cannot store or empty urine properly   The 3 most common types of UI are stress incontinence, urge incontinence, or both  Medicines:   · May be given to help strengthen your bladder control  Report any side effects of medication to your healthcare provider  Do pelvic muscle exercises often:  Your pelvic muscles help you stop urinating  Squeeze these muscles tight for 5 seconds, then relax for 5 seconds  Gradually work up to squeezing for 10 seconds  Do 3 sets of 15 repetitions a day, or as directed  This will help strengthen your pelvic muscles and improve bladder control  Train your bladder:  Go to the bathroom at set times, such as every 2 hours, even if you do not feel the urge to go  You can also try to hold your urine when you feel the urge to go  For example, hold your urine for 5 minutes when you feel the urge to go  As that becomes easier, hold your urine for 10 minutes  Self-care:   · Keep a UI record  Write down how often you leak urine and how much you leak  Make a note of what you were doing when you leaked urine  · Drink liquids as directed  You may need to limit the amount of liquid you drink to help control your urine leakage  Do not drink any liquid right before you go to bed  Limit or do not have drinks that contain caffeine or alcohol  · Prevent constipation  Eat a variety of high-fiber foods  Good examples are high-fiber cereals, beans, vegetables, and whole-grain breads  Walking is the best way to trigger your intestines to have a bowel movement  · Exercise regularly and maintain a healthy weight  Weight loss and exercise will decrease pressure on your bladder and help you control your leakage  · Use a catheter as directed  to help empty your bladder  A catheter is a tiny, plastic tube that is put into your bladder to drain your urine  · Go to behavior therapy as directed  Behavior therapy may be used to help you learn to control your urge to urinate      Weight Management   Why it is important to manage your weight:  Being overweight increases your risk of health conditions such as heart disease, high blood pressure, type 2 diabetes, and certain types of cancer  It can also increase your risk for osteoarthritis, sleep apnea, and other respiratory problems  Aim for a slow, steady weight loss  Even a small amount of weight loss can lower your risk of health problems  How to lose weight safely:  A safe and healthy way to lose weight is to eat fewer calories and get regular exercise  You can lose up about 1 pound a week by decreasing the number of calories you eat by 500 calories each day  Healthy meal plan for weight management:  A healthy meal plan includes a variety of foods, contains fewer calories, and helps you stay healthy  A healthy meal plan includes the following:  · Eat whole-grain foods more often  A healthy meal plan should contain fiber  Fiber is the part of grains, fruits, and vegetables that is not broken down by your body  Whole-grain foods are healthy and provide extra fiber in your diet  Some examples of whole-grain foods are whole-wheat breads and pastas, oatmeal, brown rice, and bulgur  · Eat a variety of vegetables every day  Include dark, leafy greens such as spinach, kale, rodger greens, and mustard greens  Eat yellow and orange vegetables such as carrots, sweet potatoes, and winter squash  · Eat a variety of fruits every day  Choose fresh or canned fruit (canned in its own juice or light syrup) instead of juice  Fruit juice has very little or no fiber  · Eat low-fat dairy foods  Drink fat-free (skim) milk or 1% milk  Eat fat-free yogurt and low-fat cottage cheese  Try low-fat cheeses such as mozzarella and other reduced-fat cheeses  · Choose meat and other protein foods that are low in fat  Choose beans or other legumes such as split peas or lentils  Choose fish, skinless poultry (chicken or turkey), or lean cuts of red meat (beef or pork)  Before you cook meat or poultry, cut off any visible fat  · Use less fat and oil    Try baking foods instead of frying them  Add less fat, such as margarine, sour cream, regular salad dressing and mayonnaise to foods  Eat fewer high-fat foods  Some examples of high-fat foods include french fries, doughnuts, ice cream, and cakes  · Eat fewer sweets  Limit foods and drinks that are high in sugar  This includes candy, cookies, regular soda, and sweetened drinks  Exercise:  Exercise at least 30 minutes per day on most days of the week  Some examples of exercise include walking, biking, dancing, and swimming  You can also fit in more physical activity by taking the stairs instead of the elevator or parking farther away from stores  Ask your healthcare provider about the best exercise plan for you  © Copyright [employer] 2018 Information is for End User's use only and may not be sold, redistributed or otherwise used for commercial purposes   All illustrations and images included in CareNotes® are the copyrighted property of A D A M , Inc  or [address] [place] [address]

## 2019-12-12 ENCOUNTER — TELEPHONE (OUTPATIENT)
Dept: FAMILY MEDICINE CLINIC | Facility: CLINIC | Age: 79
End: 2019-12-12

## 2019-12-12 DIAGNOSIS — R26.2 AMBULATORY DYSFUNCTION: Primary | ICD-10-CM

## 2019-12-12 NOTE — TELEPHONE ENCOUNTER
PATIENT CALLED IN REQUESTING A RX FOR WHEELCHAIR   DUE TO CAN NOT WALK FAR DISTANCE              PLEASE FAX TO : [id number]

## 2020-01-02 ENCOUNTER — CONSULT (OUTPATIENT)
Dept: NEPHROLOGY | Facility: CLINIC | Age: 80
End: 2020-01-02
Payer: COMMERCIAL

## 2020-01-02 VITALS
HEART RATE: 69 BPM | HEIGHT: 62 IN | DIASTOLIC BLOOD PRESSURE: 78 MMHG | WEIGHT: 188.4 LBS | SYSTOLIC BLOOD PRESSURE: 152 MMHG | RESPIRATION RATE: 18 BRPM | BODY MASS INDEX: 34.67 KG/M2

## 2020-01-02 DIAGNOSIS — N18.30 CKD (CHRONIC KIDNEY DISEASE) STAGE 3, GFR 30-59 ML/MIN (HCC): Primary | ICD-10-CM

## 2020-01-02 DIAGNOSIS — R94.4 DECREASED CALCULATED GFR: ICD-10-CM

## 2020-01-02 DIAGNOSIS — E55.9 VITAMIN D DEFICIENCY: ICD-10-CM

## 2020-01-02 DIAGNOSIS — I12.9 BENIGN HYPERTENSION WITH CKD (CHRONIC KIDNEY DISEASE) STAGE III (HCC): ICD-10-CM

## 2020-01-02 DIAGNOSIS — N18.30 BENIGN HYPERTENSION WITH CKD (CHRONIC KIDNEY DISEASE) STAGE III (HCC): ICD-10-CM

## 2020-01-02 PROCEDURE — 99204 OFFICE O/P NEW MOD 45 MIN: CPT | Performed by: INTERNAL MEDICINE

## 2020-01-02 PROCEDURE — 1160F RVW MEDS BY RX/DR IN RCRD: CPT | Performed by: INTERNAL MEDICINE

## 2020-01-02 RX ORDER — MELATONIN
1000 DAILY
COMMUNITY
End: 2022-01-12 | Stop reason: ALTCHOICE

## 2020-01-02 NOTE — PATIENT INSTRUCTIONS
Low-Sodium Diet   WHAT YOU NEED TO KNOW:   A low-sodium diet limits foods that are high in sodium (salt)  You will need to follow a low-sodium diet if you have high blood pressure, kidney disease, or heart failure  You may also need to follow this diet if you have a condition that is causing your body to retain (hold) extra fluid  You may need to limit the amount of sodium you eat to 1,500 mg  Ask your healthcare provider how much sodium you can have each day  DISCHARGE INSTRUCTIONS:   How to use food labels to choose foods that are low in sodium:  Read food labels to find the amount of sodium they contain  The amount of sodium is listed in milligrams (mg)  The % Daily Value (DV) column tells you how much of your daily needs are met by 1 serving of the food for each nutrient listed  Choose foods that have less than 5% of the DV of sodium  These foods are considered low in sodium  Foods that have 20% or more of the DV of sodium are considered high in sodium  Some food labels may also list any of the following terms that tell you about the sodium content in the food:  · Sodium-free:  Less than 5 mg in each serving    · Very low sodium:  35 mg of sodium or less in each serving    · Low sodium:  140 mg of sodium or less in each serving    · Reduced sodium: At least 25% less sodium in each serving than the regular type    · Light in sodium:  50% less sodium in each serving    · Unsalted or no added salt:  No extra salt is added during processing (the food may still contain sodium)  Foods to avoid:  Salty foods are high in sodium   You should avoid the following:  · Processed foods:      ¨ Mixes for cornbread, biscuits, cake, and pudding     ¨ Instant foods, such as potatoes, cereals, noodles, and rice     ¨ Packaged foods, such as bread stuffing, rice and pasta mixes, snack dip mixes, and macaroni and cheese     ¨ Canned foods, such as canned vegetables, soups, broths, sauces, and vegetable or tomato juice    ¨ Snack foods, such as salted chips, popcorn, pretzels, pork rinds, salted crackers, and salted nuts    ¨ Frozen foods, such as dinners, entrees, vegetables with sauces, and breaded meats    ¨ Sauerkraut, pickled vegetables, and other foods prepared in brine    · Meats and cheeses:      ¨ Smoked or cured meat, such as corned beef, dejesus, ham, hot dogs, and sausage    ¨ Canned meats or spreads, such as potted meats, sardines, anchovies, and imitation seafood    ¨ Deli or lunch meats, such as bologna, ham, turkey, and roast beef    ¨ Processed cheese, such as American cheese and cheese spreads    · Condiments, sauces, and seasonings:      ¨ Salt (¼ teaspoon of salt contains 575 mg of sodium)    ¨ Seasonings made with salt, such as garlic salt, celery salt, onion salt, and seasoned salt    ¨ Regular soy sauce, barbecue sauce, teriyaki sauce, steak sauce, Worcestershire sauce, and most flavored vinegars    ¨ Canned gravy and mixes     ¨ Regular condiments, such as mustard, ketchup, and salad dressings    ¨ Pickles and olives    ¨ Meat tenderizers and monosodium glutamate (MSG)  Foods to include:  Read the food label to find the amount of sodium in each serving  · Bread and cereal:  Try to choose breads with less than 80 mg of sodium per serving  ¨ Bread, roll, kinga, tortilla, or unsalted crackers  ¨ Ready-to-eat cereals with less than 5% DV of sodium (examples include shredded wheat and puffed rice)    ¨ Pasta    · Vegetables and fruits:      ¨ Unsalted fresh, frozen, or canned vegetables    ¨ Fresh, frozen, or canned fruits    ¨ Fruit juice    · Dairy:  One serving has about 150 mg of sodium  ¨ Milk, all types    ¨ Yogurt    ¨ Hard cheese, such as cheddar, Swiss, [employer], or mozzarella    · Meat and other protein foods:  Some raw meats may have added sodium       ¨ Plain meats, fish, and poultry     ¨ Egg    · Other foods:      ¨ Homemade pudding    ¨ Unsalted nuts, popcorn, or pretzels    ¨ Unsalted butter or margarine  Ways to decrease sodium:   · Add spices and herbs to foods instead of salt during cooking  Use salt-free seasonings to add flavor to foods  Examples include onion powder, garlic powder, basil, manzano powder, paprika, and parsley  Try lemon or lime juice or vinegar to give foods a tart flavor  Use hot peppers, pepper, or cayenne pepper to add a spicy flavor to foods  · Do not keep a salt shaker at your kitchen table  This may help keep you from adding salt to food at the table  It may take time to get used to enjoying the natural flavor of food instead of adding salt  Talk to your healthcare provider before you use salt substitutes  Some salt substitutes have a high amount of potassium and need to be avoided if you have kidney disease  · Choose low-sodium foods at restaurants  Meals from restaurants are often high in sodium  Some restaurants have nutrition information on the menu that tells you the amount of sodium in their foods  If possible, ask for your food to be prepared with less, or no salt  · Shop for unsalted or low-sodium foods and snacks at the grocery store  Examples include unsalted or low-sodium broths, soups, and canned vegetables  Choose fresh or frozen vegetables instead  Choose unsalted nuts or seeds or fresh fruits or vegetables as snacks  Read food labels and choose salt-free, very low-sodium, or low-sodium foods  © 2017 [address] Information is for End User's use only and may not be sold, redistributed or otherwise used for commercial purposes  All illustrations and images included in CareNotes® are the copyrighted property of A D A M , Inc  or Ferny Mcclendon  The above information is an  only  It is not intended as medical advice for individual conditions or treatments  Talk to your doctor, nurse or pharmacist before following any medical regimen to see if it is safe and effective for you

## 2020-01-04 DIAGNOSIS — I10 ESSENTIAL HYPERTENSION: ICD-10-CM

## 2020-01-06 RX ORDER — SPIRONOLACTONE 25 MG/1
TABLET ORAL
Qty: 60 TABLET | Refills: 0 | Status: SHIPPED | OUTPATIENT
Start: 2020-01-06 | End: 2020-02-10

## 2020-02-07 DIAGNOSIS — I10 ESSENTIAL HYPERTENSION: ICD-10-CM

## 2020-02-10 RX ORDER — SPIRONOLACTONE 25 MG/1
TABLET ORAL
Qty: 60 TABLET | Refills: 0 | Status: SHIPPED | OUTPATIENT
Start: 2020-02-10 | End: 2020-03-11

## 2020-03-06 DIAGNOSIS — I10 ESSENTIAL HYPERTENSION: ICD-10-CM

## 2020-03-09 RX ORDER — SPIRONOLACTONE 25 MG/1
TABLET ORAL
Qty: 60 TABLET | Refills: 0 | OUTPATIENT
Start: 2020-03-09

## 2020-03-11 DIAGNOSIS — I10 ESSENTIAL HYPERTENSION: ICD-10-CM

## 2020-03-11 RX ORDER — SPIRONOLACTONE 25 MG/1
TABLET ORAL
Qty: 60 TABLET | Refills: 3 | Status: SHIPPED | OUTPATIENT
Start: 2020-03-11 | End: 2020-07-02

## 2020-04-07 DIAGNOSIS — F32.9 CURRENT EPISODE OF MAJOR DEPRESSIVE DISORDER WITHOUT PRIOR EPISODE: ICD-10-CM

## 2020-04-07 RX ORDER — BUPROPION HYDROCHLORIDE 150 MG/1
TABLET ORAL
Qty: 90 TABLET | Refills: 3 | Status: SHIPPED | OUTPATIENT
Start: 2020-04-07 | End: 2021-04-06

## 2020-05-06 DIAGNOSIS — E78.1 HYPERTRIGLYCERIDEMIA: ICD-10-CM

## 2020-05-06 RX ORDER — FENOFIBRIC ACID 135 MG/1
CAPSULE, DELAYED RELEASE ORAL
Qty: 30 CAPSULE | Refills: 5 | Status: SHIPPED | OUTPATIENT
Start: 2020-05-06 | End: 2020-11-06

## 2020-06-02 ENCOUNTER — APPOINTMENT (OUTPATIENT)
Dept: LAB | Facility: CLINIC | Age: 80
End: 2020-06-02
Payer: COMMERCIAL

## 2020-06-02 ENCOUNTER — TELEPHONE (OUTPATIENT)
Dept: NEPHROLOGY | Facility: CLINIC | Age: 80
End: 2020-06-02

## 2020-06-02 DIAGNOSIS — N18.30 CKD (CHRONIC KIDNEY DISEASE) STAGE 3, GFR 30-59 ML/MIN (HCC): ICD-10-CM

## 2020-06-02 DIAGNOSIS — E55.9 VITAMIN D DEFICIENCY: ICD-10-CM

## 2020-06-02 LAB
25(OH)D3 SERPL-MCNC: 18.9 NG/ML (ref 30–100)
ANION GAP SERPL CALCULATED.3IONS-SCNC: 4 MMOL/L (ref 4–13)
BILIRUB UR QL STRIP: NEGATIVE
BUN SERPL-MCNC: 32 MG/DL (ref 5–25)
CALCIUM SERPL-MCNC: 9.4 MG/DL (ref 8.3–10.1)
CHLORIDE SERPL-SCNC: 107 MMOL/L (ref 100–108)
CLARITY UR: CLEAR
CO2 SERPL-SCNC: 27 MMOL/L (ref 21–32)
COLOR UR: YELLOW
CREAT SERPL-MCNC: 1.1 MG/DL (ref 0.6–1.3)
CREAT UR-MCNC: 62.7 MG/DL
ERYTHROCYTE [DISTWIDTH] IN BLOOD BY AUTOMATED COUNT: 14.2 % (ref 11.6–15.1)
GFR SERPL CREATININE-BSD FRML MDRD: 48 ML/MIN/1.73SQ M
GLUCOSE P FAST SERPL-MCNC: 93 MG/DL (ref 65–99)
GLUCOSE UR STRIP-MCNC: NEGATIVE MG/DL
HCT VFR BLD AUTO: 39.9 % (ref 34.8–46.1)
HGB BLD-MCNC: 12.2 G/DL (ref 11.5–15.4)
HGB UR QL STRIP.AUTO: NEGATIVE
KETONES UR STRIP-MCNC: NEGATIVE MG/DL
LEUKOCYTE ESTERASE UR QL STRIP: NEGATIVE
MAGNESIUM SERPL-MCNC: 2.1 MG/DL (ref 1.6–2.6)
MCH RBC QN AUTO: 29 PG (ref 26.8–34.3)
MCHC RBC AUTO-ENTMCNC: 30.6 G/DL (ref 31.4–37.4)
MCV RBC AUTO: 95 FL (ref 82–98)
MICROALBUMIN UR-MCNC: 30.4 MG/L (ref 0–20)
MICROALBUMIN/CREAT 24H UR: 48 MG/G CREATININE (ref 0–30)
NITRITE UR QL STRIP: NEGATIVE
PH UR STRIP.AUTO: 6 [PH]
PHOSPHATE SERPL-MCNC: 3.2 MG/DL (ref 2.3–4.1)
PLATELET # BLD AUTO: 371 THOUSANDS/UL (ref 149–390)
PMV BLD AUTO: 9.5 FL (ref 8.9–12.7)
POTASSIUM SERPL-SCNC: 4.3 MMOL/L (ref 3.5–5.3)
PROT UR STRIP-MCNC: NEGATIVE MG/DL
RBC # BLD AUTO: 4.2 MILLION/UL (ref 3.81–5.12)
SODIUM SERPL-SCNC: 138 MMOL/L (ref 136–145)
SP GR UR STRIP.AUTO: 1.02 (ref 1–1.03)
URATE SERPL-MCNC: 5.7 MG/DL (ref 2–6.8)
UROBILINOGEN UR QL STRIP.AUTO: 0.2 E.U./DL
WBC # BLD AUTO: 6.28 THOUSAND/UL (ref 4.31–10.16)

## 2020-06-02 PROCEDURE — 83735 ASSAY OF MAGNESIUM: CPT

## 2020-06-02 PROCEDURE — 85027 COMPLETE CBC AUTOMATED: CPT

## 2020-06-02 PROCEDURE — 81003 URINALYSIS AUTO W/O SCOPE: CPT

## 2020-06-02 PROCEDURE — 84100 ASSAY OF PHOSPHORUS: CPT

## 2020-06-02 PROCEDURE — 84550 ASSAY OF BLOOD/URIC ACID: CPT

## 2020-06-02 PROCEDURE — 36415 COLL VENOUS BLD VENIPUNCTURE: CPT

## 2020-06-02 PROCEDURE — 82570 ASSAY OF URINE CREATININE: CPT

## 2020-06-02 PROCEDURE — 80048 BASIC METABOLIC PNL TOTAL CA: CPT

## 2020-06-02 PROCEDURE — 82043 UR ALBUMIN QUANTITATIVE: CPT

## 2020-06-02 PROCEDURE — 82306 VITAMIN D 25 HYDROXY: CPT

## 2020-07-02 DIAGNOSIS — I10 ESSENTIAL HYPERTENSION: ICD-10-CM

## 2020-07-02 RX ORDER — SPIRONOLACTONE 25 MG/1
TABLET ORAL
Qty: 60 TABLET | Refills: 3 | Status: SHIPPED | OUTPATIENT
Start: 2020-07-02 | End: 2020-11-06

## 2020-07-16 ENCOUNTER — OFFICE VISIT (OUTPATIENT)
Dept: FAMILY MEDICINE CLINIC | Facility: CLINIC | Age: 80
End: 2020-07-16
Payer: COMMERCIAL

## 2020-07-16 VITALS
DIASTOLIC BLOOD PRESSURE: 70 MMHG | HEIGHT: 62 IN | TEMPERATURE: 97.9 F | OXYGEN SATURATION: 98 % | BODY MASS INDEX: 35.66 KG/M2 | WEIGHT: 193.8 LBS | SYSTOLIC BLOOD PRESSURE: 110 MMHG | HEART RATE: 78 BPM

## 2020-07-16 DIAGNOSIS — N18.30 BENIGN HYPERTENSION WITH CKD (CHRONIC KIDNEY DISEASE) STAGE III (HCC): Primary | ICD-10-CM

## 2020-07-16 DIAGNOSIS — Z23 ENCOUNTER FOR IMMUNIZATION: ICD-10-CM

## 2020-07-16 DIAGNOSIS — I12.9 BENIGN HYPERTENSION WITH CKD (CHRONIC KIDNEY DISEASE) STAGE III (HCC): Primary | ICD-10-CM

## 2020-07-16 PROCEDURE — 99213 OFFICE O/P EST LOW 20 MIN: CPT | Performed by: FAMILY MEDICINE

## 2020-07-16 PROCEDURE — 3078F DIAST BP <80 MM HG: CPT | Performed by: FAMILY MEDICINE

## 2020-07-16 PROCEDURE — 1160F RVW MEDS BY RX/DR IN RCRD: CPT | Performed by: FAMILY MEDICINE

## 2020-07-16 PROCEDURE — 3074F SYST BP LT 130 MM HG: CPT | Performed by: FAMILY MEDICINE

## 2020-07-16 PROCEDURE — 1036F TOBACCO NON-USER: CPT | Performed by: FAMILY MEDICINE

## 2020-07-16 NOTE — PROGRESS NOTES
Assessment/Plan:    No problem-specific Assessment & Plan notes found for this encounter  Diagnoses and all orders for this visit:    Benign hypertension with CKD (chronic kidney disease) stage III (HCC)  -     CBC and differential; Future  -     Comprehensive metabolic panel; Future  -     Lipid panel; Future  -     TSH, 3rd generation with Free T4 reflex; Future    Encounter for immunization  -     PNEUMOCOCCAL CONJUGATE VACCINE 13-VALENT GREATER THAN 6 MONTHS          PHQ-9 Depression Screening    PHQ-9:    Frequency of the following problems over the past two weeks:       Little interest or pleasure in doing things:  0 - not at all  Feeling down, depressed, or hopeless:  0 - not at all  PHQ-2 Score:  0        BMI Counseling: Body mass index is 35 45 kg/m²  The BMI is above normal  Nutrition recommendations include reducing portion sizes and 3-5 servings of fruits/vegetables daily  Exercise recommendations include moderate aerobic physical activity for 150 minutes/week and exercising 3-5 times per week  Subjective:      Patient ID: Tim Payer is a 79 y o  female  Hypertension   This is a chronic problem  The current episode started more than 1 year ago  The problem is unchanged  The problem is controlled  Pertinent negatives include no chest pain, palpitations or shortness of breath  The following portions of the patient's history were reviewed and updated as appropriate: allergies, current medications, past family history, past medical history, past social history, past surgical history and problem list     Review of Systems   Respiratory: Negative for shortness of breath and wheezing  Cardiovascular: Negative for chest pain and palpitations  Objective:    /70   Pulse 78   Temp 97 9 °F (36 6 °C)   Ht 5' 2" (1 575 m)   Wt 87 9 kg (193 lb 12 8 oz)   SpO2 98%   BMI 35 45 kg/m²      Physical Exam   Constitutional: She is oriented to person, place, and time   She appears well-developed and well-nourished  No distress  HENT:   Head: Normocephalic and atraumatic  Eyes: Pupils are equal, round, and reactive to light  Conjunctivae and EOM are normal  No scleral icterus  Neck: Normal range of motion  Neck supple  Cardiovascular: Normal rate, regular rhythm and normal heart sounds  No murmur heard  Pulmonary/Chest: Effort normal and breath sounds normal  No respiratory distress  She has no wheezes  She has no rales  Abdominal: Soft  Bowel sounds are normal  She exhibits no distension and no mass  There is no tenderness  There is no rebound and no guarding  Musculoskeletal: She exhibits no edema  Lymphadenopathy:     She has no cervical adenopathy  Neurological: She is alert and oriented to person, place, and time  Skin: Skin is warm and dry  She is not diaphoretic  Psychiatric: She has a normal mood and affect  Her behavior is normal  Judgment and thought content normal    Nursing note and vitals reviewed

## 2020-11-06 DIAGNOSIS — E78.1 HYPERTRIGLYCERIDEMIA: ICD-10-CM

## 2020-11-06 DIAGNOSIS — I10 ESSENTIAL HYPERTENSION: ICD-10-CM

## 2020-11-06 RX ORDER — FENOFIBRIC ACID 135 MG/1
CAPSULE, DELAYED RELEASE ORAL
Qty: 30 CAPSULE | Refills: 5 | Status: SHIPPED | OUTPATIENT
Start: 2020-11-06 | End: 2021-05-10

## 2020-11-06 RX ORDER — SPIRONOLACTONE 25 MG/1
TABLET ORAL
Qty: 60 TABLET | Refills: 3 | Status: SHIPPED | OUTPATIENT
Start: 2020-11-06 | End: 2021-03-10

## 2020-12-15 ENCOUNTER — LAB (OUTPATIENT)
Dept: LAB | Facility: CLINIC | Age: 80
End: 2020-12-15
Payer: COMMERCIAL

## 2020-12-15 DIAGNOSIS — I12.9 BENIGN HYPERTENSION WITH CKD (CHRONIC KIDNEY DISEASE) STAGE III (HCC): ICD-10-CM

## 2020-12-15 DIAGNOSIS — N18.30 BENIGN HYPERTENSION WITH CKD (CHRONIC KIDNEY DISEASE) STAGE III (HCC): ICD-10-CM

## 2020-12-15 LAB
ALBUMIN SERPL BCP-MCNC: 3.9 G/DL (ref 3.5–5)
ALP SERPL-CCNC: 56 U/L (ref 46–116)
ALT SERPL W P-5'-P-CCNC: 17 U/L (ref 12–78)
ANION GAP SERPL CALCULATED.3IONS-SCNC: 6 MMOL/L (ref 4–13)
AST SERPL W P-5'-P-CCNC: 18 U/L (ref 5–45)
BASOPHILS # BLD AUTO: 0.05 THOUSANDS/ΜL (ref 0–0.1)
BASOPHILS NFR BLD AUTO: 1 % (ref 0–1)
BILIRUB SERPL-MCNC: 0.47 MG/DL (ref 0.2–1)
BUN SERPL-MCNC: 37 MG/DL (ref 5–25)
CALCIUM SERPL-MCNC: 9.8 MG/DL (ref 8.3–10.1)
CHLORIDE SERPL-SCNC: 108 MMOL/L (ref 100–108)
CHOLEST SERPL-MCNC: 196 MG/DL (ref 50–200)
CO2 SERPL-SCNC: 25 MMOL/L (ref 21–32)
CREAT SERPL-MCNC: 1.23 MG/DL (ref 0.6–1.3)
EOSINOPHIL # BLD AUTO: 0.22 THOUSAND/ΜL (ref 0–0.61)
EOSINOPHIL NFR BLD AUTO: 4 % (ref 0–6)
ERYTHROCYTE [DISTWIDTH] IN BLOOD BY AUTOMATED COUNT: 13.4 % (ref 11.6–15.1)
GFR SERPL CREATININE-BSD FRML MDRD: 42 ML/MIN/1.73SQ M
GLUCOSE P FAST SERPL-MCNC: 82 MG/DL (ref 65–99)
HCT VFR BLD AUTO: 40.4 % (ref 34.8–46.1)
HDLC SERPL-MCNC: 71 MG/DL
HGB BLD-MCNC: 12.5 G/DL (ref 11.5–15.4)
IMM GRANULOCYTES # BLD AUTO: 0.04 THOUSAND/UL (ref 0–0.2)
IMM GRANULOCYTES NFR BLD AUTO: 1 % (ref 0–2)
LDLC SERPL CALC-MCNC: 105 MG/DL (ref 0–100)
LYMPHOCYTES # BLD AUTO: 1.55 THOUSANDS/ΜL (ref 0.6–4.47)
LYMPHOCYTES NFR BLD AUTO: 25 % (ref 14–44)
MCH RBC QN AUTO: 28.9 PG (ref 26.8–34.3)
MCHC RBC AUTO-ENTMCNC: 30.9 G/DL (ref 31.4–37.4)
MCV RBC AUTO: 93 FL (ref 82–98)
MONOCYTES # BLD AUTO: 0.62 THOUSAND/ΜL (ref 0.17–1.22)
MONOCYTES NFR BLD AUTO: 10 % (ref 4–12)
NEUTROPHILS # BLD AUTO: 3.71 THOUSANDS/ΜL (ref 1.85–7.62)
NEUTS SEG NFR BLD AUTO: 59 % (ref 43–75)
NONHDLC SERPL-MCNC: 125 MG/DL
NRBC BLD AUTO-RTO: 0 /100 WBCS
PLATELET # BLD AUTO: 382 THOUSANDS/UL (ref 149–390)
PMV BLD AUTO: 9.1 FL (ref 8.9–12.7)
POTASSIUM SERPL-SCNC: 4.2 MMOL/L (ref 3.5–5.3)
PROT SERPL-MCNC: 7.4 G/DL (ref 6.4–8.2)
RBC # BLD AUTO: 4.33 MILLION/UL (ref 3.81–5.12)
SODIUM SERPL-SCNC: 139 MMOL/L (ref 136–145)
T4 FREE SERPL-MCNC: 1.17 NG/DL (ref 0.76–1.46)
TRIGL SERPL-MCNC: 101 MG/DL
TSH SERPL DL<=0.05 MIU/L-ACNC: 5.17 UIU/ML (ref 0.36–3.74)
WBC # BLD AUTO: 6.19 THOUSAND/UL (ref 4.31–10.16)

## 2020-12-15 PROCEDURE — 85025 COMPLETE CBC W/AUTO DIFF WBC: CPT

## 2020-12-15 PROCEDURE — 80061 LIPID PANEL: CPT

## 2020-12-15 PROCEDURE — 36415 COLL VENOUS BLD VENIPUNCTURE: CPT

## 2020-12-15 PROCEDURE — 80053 COMPREHEN METABOLIC PANEL: CPT

## 2020-12-15 PROCEDURE — 84443 ASSAY THYROID STIM HORMONE: CPT

## 2020-12-15 PROCEDURE — 84439 ASSAY OF FREE THYROXINE: CPT

## 2020-12-18 ENCOUNTER — OFFICE VISIT (OUTPATIENT)
Dept: FAMILY MEDICINE CLINIC | Facility: CLINIC | Age: 80
End: 2020-12-18
Payer: COMMERCIAL

## 2020-12-18 VITALS
WEIGHT: 198.4 LBS | DIASTOLIC BLOOD PRESSURE: 74 MMHG | HEIGHT: 62 IN | BODY MASS INDEX: 36.51 KG/M2 | OXYGEN SATURATION: 94 % | TEMPERATURE: 96.1 F | SYSTOLIC BLOOD PRESSURE: 128 MMHG | HEART RATE: 65 BPM

## 2020-12-18 DIAGNOSIS — E78.00 HYPERCHOLESTEROLEMIA: ICD-10-CM

## 2020-12-18 DIAGNOSIS — Z00.00 MEDICARE ANNUAL WELLNESS VISIT, SUBSEQUENT: Primary | ICD-10-CM

## 2020-12-18 DIAGNOSIS — I10 ESSENTIAL HYPERTENSION: ICD-10-CM

## 2020-12-18 DIAGNOSIS — N18.32 STAGE 3B CHRONIC KIDNEY DISEASE (HCC): ICD-10-CM

## 2020-12-18 DIAGNOSIS — R26.2 AMBULATORY DYSFUNCTION: ICD-10-CM

## 2020-12-18 DIAGNOSIS — Z23 ENCOUNTER FOR IMMUNIZATION: ICD-10-CM

## 2020-12-18 PROCEDURE — 3074F SYST BP LT 130 MM HG: CPT | Performed by: FAMILY MEDICINE

## 2020-12-18 PROCEDURE — 3725F SCREEN DEPRESSION PERFORMED: CPT | Performed by: FAMILY MEDICINE

## 2020-12-18 PROCEDURE — 3078F DIAST BP <80 MM HG: CPT | Performed by: FAMILY MEDICINE

## 2020-12-18 PROCEDURE — G0439 PPPS, SUBSEQ VISIT: HCPCS | Performed by: FAMILY MEDICINE

## 2020-12-18 PROCEDURE — 1036F TOBACCO NON-USER: CPT | Performed by: FAMILY MEDICINE

## 2020-12-18 PROCEDURE — 1170F FXNL STATUS ASSESSED: CPT | Performed by: FAMILY MEDICINE

## 2020-12-18 PROCEDURE — 99214 OFFICE O/P EST MOD 30 MIN: CPT | Performed by: FAMILY MEDICINE

## 2020-12-18 PROCEDURE — 1160F RVW MEDS BY RX/DR IN RCRD: CPT | Performed by: FAMILY MEDICINE

## 2020-12-18 PROCEDURE — 1125F AMNT PAIN NOTED PAIN PRSNT: CPT | Performed by: FAMILY MEDICINE

## 2021-01-20 DIAGNOSIS — Z23 ENCOUNTER FOR IMMUNIZATION: ICD-10-CM

## 2021-01-26 ENCOUNTER — IMMUNIZATIONS (OUTPATIENT)
Dept: FAMILY MEDICINE CLINIC | Facility: HOSPITAL | Age: 81
End: 2021-01-26

## 2021-01-26 DIAGNOSIS — Z23 ENCOUNTER FOR IMMUNIZATION: Primary | ICD-10-CM

## 2021-01-26 PROCEDURE — 91301 SARS-COV-2 / COVID-19 MRNA VACCINE (MODERNA) 100 MCG: CPT

## 2021-01-26 PROCEDURE — 0011A SARS-COV-2 / COVID-19 MRNA VACCINE (MODERNA) 100 MCG: CPT

## 2021-03-01 ENCOUNTER — IMMUNIZATIONS (OUTPATIENT)
Dept: FAMILY MEDICINE CLINIC | Facility: HOSPITAL | Age: 81
End: 2021-03-01

## 2021-03-01 DIAGNOSIS — Z23 ENCOUNTER FOR IMMUNIZATION: Primary | ICD-10-CM

## 2021-03-01 PROCEDURE — 91301 SARS-COV-2 / COVID-19 MRNA VACCINE (MODERNA) 100 MCG: CPT

## 2021-03-01 PROCEDURE — 0012A SARS-COV-2 / COVID-19 MRNA VACCINE (MODERNA) 100 MCG: CPT

## 2021-03-10 DIAGNOSIS — I10 ESSENTIAL HYPERTENSION: ICD-10-CM

## 2021-03-10 RX ORDER — SPIRONOLACTONE 25 MG/1
TABLET ORAL
Qty: 60 TABLET | Refills: 3 | Status: SHIPPED | OUTPATIENT
Start: 2021-03-10 | End: 2021-07-06

## 2021-04-02 DIAGNOSIS — F32.9 CURRENT EPISODE OF MAJOR DEPRESSIVE DISORDER WITHOUT PRIOR EPISODE: ICD-10-CM

## 2021-04-06 RX ORDER — BUPROPION HYDROCHLORIDE 150 MG/1
TABLET ORAL
Qty: 90 TABLET | Refills: 3 | Status: SHIPPED | OUTPATIENT
Start: 2021-04-06 | End: 2022-04-04 | Stop reason: SDUPTHER

## 2021-05-07 DIAGNOSIS — E78.1 HYPERTRIGLYCERIDEMIA: ICD-10-CM

## 2021-05-10 RX ORDER — FENOFIBRIC ACID 135 MG/1
CAPSULE, DELAYED RELEASE ORAL
Qty: 30 CAPSULE | Refills: 5 | Status: SHIPPED | OUTPATIENT
Start: 2021-05-10 | End: 2021-11-11 | Stop reason: SDUPTHER

## 2021-07-06 DIAGNOSIS — I10 ESSENTIAL HYPERTENSION: ICD-10-CM

## 2021-07-06 RX ORDER — SPIRONOLACTONE 25 MG/1
TABLET ORAL
Qty: 60 TABLET | Refills: 3 | Status: SHIPPED | OUTPATIENT
Start: 2021-07-06 | End: 2021-11-08

## 2021-07-22 ENCOUNTER — OFFICE VISIT (OUTPATIENT)
Dept: FAMILY MEDICINE CLINIC | Facility: CLINIC | Age: 81
End: 2021-07-22
Payer: COMMERCIAL

## 2021-07-22 VITALS
HEIGHT: 62 IN | OXYGEN SATURATION: 97 % | SYSTOLIC BLOOD PRESSURE: 122 MMHG | TEMPERATURE: 97.2 F | HEART RATE: 72 BPM | WEIGHT: 203.19 LBS | BODY MASS INDEX: 37.39 KG/M2 | DIASTOLIC BLOOD PRESSURE: 70 MMHG

## 2021-07-22 DIAGNOSIS — E66.01 CLASS 2 SEVERE OBESITY DUE TO EXCESS CALORIES WITH SERIOUS COMORBIDITY AND BODY MASS INDEX (BMI) OF 35.0 TO 35.9 IN ADULT (HCC): ICD-10-CM

## 2021-07-22 DIAGNOSIS — D68.51 FACTOR V LEIDEN MUTATION (HCC): ICD-10-CM

## 2021-07-22 DIAGNOSIS — J31.0 CHRONIC RHINITIS: ICD-10-CM

## 2021-07-22 DIAGNOSIS — N18.32 STAGE 3B CHRONIC KIDNEY DISEASE (HCC): ICD-10-CM

## 2021-07-22 DIAGNOSIS — R42 DIZZINESS: Primary | ICD-10-CM

## 2021-07-22 DIAGNOSIS — R42 VERTIGO: ICD-10-CM

## 2021-07-22 PROBLEM — E66.812 CLASS 2 SEVERE OBESITY DUE TO EXCESS CALORIES WITH SERIOUS COMORBIDITY AND BODY MASS INDEX (BMI) OF 35.0 TO 35.9 IN ADULT (HCC): Status: ACTIVE | Noted: 2021-07-22

## 2021-07-22 PROCEDURE — 3725F SCREEN DEPRESSION PERFORMED: CPT | Performed by: FAMILY MEDICINE

## 2021-07-22 PROCEDURE — 99214 OFFICE O/P EST MOD 30 MIN: CPT | Performed by: FAMILY MEDICINE

## 2021-07-22 PROCEDURE — 1160F RVW MEDS BY RX/DR IN RCRD: CPT | Performed by: FAMILY MEDICINE

## 2021-07-22 PROCEDURE — 1036F TOBACCO NON-USER: CPT | Performed by: FAMILY MEDICINE

## 2021-07-22 RX ORDER — IPRATROPIUM BROMIDE 42 UG/1
2 SPRAY, METERED NASAL 4 TIMES DAILY
Qty: 15 ML | Refills: 1 | Status: SHIPPED | OUTPATIENT
Start: 2021-07-22 | End: 2021-10-25

## 2021-07-22 NOTE — PROGRESS NOTES
Assessment/Plan:    No problem-specific Assessment & Plan notes found for this encounter  Diagnoses and all orders for this visit:    Dizziness  -     CT head wo contrast; Future  -     Ambulatory Referral to Otolaryngology; Future  -     VAS carotid complete study; Future    Chronic rhinitis  -     Ambulatory Referral to Otolaryngology; Future  -     ipratropium (ATROVENT) 0 06 % nasal spray; 2 sprays into each nostril 4 (four) times a day    Vertigo  -     Ambulatory Referral to Otolaryngology; Future  -     VAS carotid complete study; Future    Class 2 severe obesity due to excess calories with serious comorbidity and body mass index (BMI) of 35 0 to 35 9 in adult [facility])    Factor V Leiden mutation ([facility] )    Stage 3b chronic kidney disease ([facility] )        Patient Instructions   I suspect the vertiginous symptoms are all sinus related and I have referred to ENT  I will also check a CT of the brain to rule out ischemic event although unlikely based on hx and PE  There is nothing on hx of PE to suggest a cardiac etiology  Check U/S carotid arteries  F/U 2 weeks, sooner if needed, go to the ER if symptoms worsen or persist      PHQ-9 Depression Screening    PHQ-9:   Frequency of the following problems over the past two weeks:      Little interest or pleasure in doing things: 1 - several days  Feeling down, depressed, or hopeless: 1 - several days  PHQ-2 Score: 2        BMI Counseling: Body mass index is 37 16 kg/m²  The BMI is above normal  Nutrition recommendations include reducing portion sizes, decreasing overall calorie intake, 3-5 servings of fruits/vegetables daily, reducing fast food intake, consuming healthier snacks, decreasing soda and/or juice intake and moderation in carbohydrate intake  Exercise recommendations include vigorous aerobic physical activity for 75 minutes/week and exercising 3-5 times per week  Subjective:      Patient ID: Vamshi Welsh is a [de-identified] y o  female      Patient presents with complaint of dizziness - sensation of the room spinning when lying on her back and she rolls to the right  She does not experience symptoms when rolling to the right  This has occurred the last 3 nights and never prior to this  No dizziness when she stands up or changes position  She reports bad sinus congestion and headaches for several years and has seen allergy and ENT  She notes the right side of her face is always full with sinus congestion and pressure  No worse the past 3 nights  She awakens each day with significant mucus that she has to spit out  She never has any difficulty with the left side of her face  She reports being on antihistamines and every nasal spray there is  No chest pain or shortness of breath  BP and heart rate are normal   She has been on prednisone which has also not helped, singulair has not helped either  The last she was evaluated for this appears to be back in 2015  No palpitations, no syncope  The dizziness episode lasts 3-4 seconds and spontaneously resolves  No change in or worsening of sinus symptoms  No headaches, no ear pain  She rolls to the right twice and it happens both times  She has had a CT of the sinuses in 2011 and 2015  She has no difficulty with balance or gait, no symptoms with bending forward and standing up  No weakness, no drooping of the face  Patient notes by 3pm her sinus congestion and drainage is the worst and thickest  She notes 3 am and 3 pm are the worst times for the congestion and drainage  The following portions of the patient's history were reviewed and updated as appropriate: allergies, current medications, past family history, past medical history, past social history, past surgical history and problem list     Review of Systems   Constitutional: Negative for chills, fatigue and fever  HENT: Positive for congestion, postnasal drip, rhinorrhea, sinus pressure and sinus pain   Negative for ear discharge, ear pain, facial swelling, hearing loss, sneezing, sore throat, tinnitus and voice change  Eyes: Negative  Respiratory: Negative  Cardiovascular: Negative  Negative for chest pain, palpitations and leg swelling  Gastrointestinal: Negative  Negative for abdominal pain, diarrhea, nausea and vomiting  Musculoskeletal: Negative  Negative for neck pain  Allergic/Immunologic: Negative for environmental allergies  Neurological: Positive for dizziness  Negative for tremors, syncope, facial asymmetry, speech difficulty, weakness, light-headedness, numbness and headaches  Psychiatric/Behavioral: Negative for dysphoric mood  The patient is not nervous/anxious  Objective:    /70 (BP Location: Left arm, Patient Position: Sitting, Cuff Size: Standard)   Pulse 72   Temp (!) 97 2 °F (36 2 °C) (Tympanic)   Ht 5' 2" (1 575 m)   Wt 92 2 kg (203 lb 3 oz)   SpO2 97%   BMI 37 16 kg/m²      Physical Exam  Constitutional:       General: She is not in acute distress  Appearance: Normal appearance  She is not ill-appearing or toxic-appearing  HENT:      Head: Normocephalic and atraumatic  Right Ear: Tympanic membrane normal       Left Ear: Tympanic membrane and ear canal normal       Nose: Congestion and rhinorrhea present  Comments: Intranasal congestion R>L     Mouth/Throat:      Mouth: Mucous membranes are moist    Eyes:      Extraocular Movements: Extraocular movements intact  Conjunctiva/sclera: Conjunctivae normal       Pupils: Pupils are equal, round, and reactive to light  Comments: No nystagmus   Neck:      Vascular: No carotid bruit  Cardiovascular:      Rate and Rhythm: Normal rate and regular rhythm  Pulses: Normal pulses  Heart sounds: Normal heart sounds  No murmur heard  Pulmonary:      Effort: Pulmonary effort is normal       Breath sounds: Normal breath sounds  No wheezing  Abdominal:      General: Abdomen is flat  Musculoskeletal:      Cervical back: Neck supple   No rigidity  Lymphadenopathy:      Cervical: No cervical adenopathy  Neurological:      General: No focal deficit present  Mental Status: She is alert and oriented to person, place, and time  Cranial Nerves: No cranial nerve deficit  Sensory: No sensory deficit  Motor: No weakness  Gait: Gait normal       Comments: Turning head to the right while lying down reproduces dizziness   + mikey - Litzy Mason   Psychiatric:         Mood and Affect: Mood normal          Behavior: Behavior normal          Judgment: Judgment normal

## 2021-07-22 NOTE — PATIENT INSTRUCTIONS
I suspect the vertiginous symptoms are all sinus related and I have referred to ENT  I will also check a CT of the brain to rule out ischemic event although unlikely based on hx and PE  There is nothing on hx of PE to suggest a cardiac etiology    Check U/S carotid arteries  F/U 2 weeks, sooner if needed, go to the ER if symptoms worsen or persist

## 2021-07-30 ENCOUNTER — RA CDI HCC (OUTPATIENT)
Dept: OTHER | Facility: HOSPITAL | Age: 81
End: 2021-07-30

## 2021-07-30 NOTE — PROGRESS NOTES
[facility]  [facility]          Chart reviewed, no opportunity found: CHART REVIEWED, NO OPPORTUNITY FOUND                     Patients insurance company: [employer]

## 2021-08-05 ENCOUNTER — TELEPHONE (OUTPATIENT)
Dept: FAMILY MEDICINE CLINIC | Facility: CLINIC | Age: 81
End: 2021-08-05

## 2021-08-05 NOTE — TELEPHONE ENCOUNTER
Patient called reporting that Medications have been working for her, the CT was not covered so it was cancelled and now the patient is wondering if she has to proceed with the VAS study       I advised the patient that it was a different study and if it was ordered then you would more than likely still want this completed but she wanted me to ask anyway

## 2021-08-06 ENCOUNTER — HOSPITAL ENCOUNTER (OUTPATIENT)
Dept: NON INVASIVE DIAGNOSTICS | Facility: HOSPITAL | Age: 81
Discharge: HOME | End: 2021-08-06
Payer: COMMERCIAL

## 2021-08-06 ENCOUNTER — APPOINTMENT (OUTPATIENT)
Dept: CT IMAGING | Facility: HOSPITAL | Age: 81
End: 2021-08-06
Payer: COMMERCIAL

## 2021-08-06 DIAGNOSIS — R42 VERTIGO: ICD-10-CM

## 2021-08-06 DIAGNOSIS — R42 DIZZINESS: ICD-10-CM

## 2021-08-06 PROCEDURE — 93880 EXTRACRANIAL BILAT STUDY: CPT

## 2021-08-07 PROCEDURE — 93880 EXTRACRANIAL BILAT STUDY: CPT | Performed by: SURGERY

## 2021-08-12 ENCOUNTER — OFFICE VISIT (OUTPATIENT)
Dept: FAMILY MEDICINE CLINIC | Facility: CLINIC | Age: 81
End: 2021-08-12
Payer: COMMERCIAL

## 2021-08-12 VITALS
WEIGHT: 204.2 LBS | SYSTOLIC BLOOD PRESSURE: 124 MMHG | OXYGEN SATURATION: 97 % | HEIGHT: 62 IN | HEART RATE: 77 BPM | DIASTOLIC BLOOD PRESSURE: 70 MMHG | BODY MASS INDEX: 37.58 KG/M2 | TEMPERATURE: 96.6 F

## 2021-08-12 DIAGNOSIS — E55.9 VITAMIN D DEFICIENCY: ICD-10-CM

## 2021-08-12 DIAGNOSIS — R42 DIZZINESS: ICD-10-CM

## 2021-08-12 DIAGNOSIS — E78.5 HYPERLIPIDEMIA, UNSPECIFIED HYPERLIPIDEMIA TYPE: ICD-10-CM

## 2021-08-12 DIAGNOSIS — J31.0 CHRONIC RHINITIS: Primary | ICD-10-CM

## 2021-08-12 DIAGNOSIS — M72.0 DUPUYTREN'S CONTRACTURE OF BOTH HANDS: ICD-10-CM

## 2021-08-12 DIAGNOSIS — R26.2 AMBULATORY DYSFUNCTION: ICD-10-CM

## 2021-08-12 PROCEDURE — 1160F RVW MEDS BY RX/DR IN RCRD: CPT | Performed by: FAMILY MEDICINE

## 2021-08-12 PROCEDURE — 3725F SCREEN DEPRESSION PERFORMED: CPT | Performed by: FAMILY MEDICINE

## 2021-08-12 PROCEDURE — 99214 OFFICE O/P EST MOD 30 MIN: CPT | Performed by: FAMILY MEDICINE

## 2021-08-12 PROCEDURE — 1036F TOBACCO NON-USER: CPT | Performed by: FAMILY MEDICINE

## 2021-08-12 NOTE — PROGRESS NOTES
Assessment/Plan:    No problem-specific Assessment & Plan notes found for this encounter  Diagnoses and all orders for this visit:    Chronic rhinitis  Comments:  continue the atrovent nasal spray  F/U ENT as referred at last visit    Dizziness  Comments:  Symptoms resolved  Orders:  -     Comprehensive metabolic panel; Future  -     CBC and differential; Future  -     TSH, 3rd generation with Free T4 reflex; Future    Ambulatory dysfunction  Comments:  Patient requests new rolling walker    Dupuytren's contracture of both hands  Comments:  Does not wish to pursue ortho eval at this time    Hyperlipidemia, unspecified hyperlipidemia type  -     Lipid panel; Future    Vitamin D deficiency  -     Vitamin D 25 hydroxy; Future          PHQ-9 Depression Screening    PHQ-9:   Frequency of the following problems over the past two weeks:      Little interest or pleasure in doing things: 0 - not at all  Feeling down, depressed, or hopeless: 1 - several days  PHQ-2 Score: 1            Subjective:      Patient ID: Nai Peralta is a [de-identified] y o  female  Patient reports feeling better with the atrovent nasal spray and feels it's the best spray she has used thus far  She continues with symptoms of significant nasal congestion but its improved  She has not seen ENT yet as she needs a ride  Her CT of the brain was denied by insurance - they want her to be evaluated by ENT first    Her carotid U/S showed <50% stenosis bilaterally  She reports no further dizziness since starting the nasal spray  She also has contractures on the palms of both hands  She also would like a new rolling walker, she will let us know the name  The following portions of the patient's history were reviewed and updated as appropriate: allergies, current medications, past family history, past medical history, past social history, past surgical history and problem list     Review of Systems   Constitutional: Negative for chills, fatigue and fever  HENT: Positive for congestion, postnasal drip, rhinorrhea and sore throat  Eyes: Negative  Respiratory: Negative  Negative for chest tightness, shortness of breath and wheezing  Cardiovascular: Negative for chest pain, palpitations and leg swelling  Neurological: Negative for dizziness, light-headedness and headaches  Objective:    /70 (BP Location: Left arm, Patient Position: Sitting)   Pulse 77   Temp (!) 96 6 °F (35 9 °C) (Tympanic)   Ht 5' 2" (1 575 m)   Wt 92 6 kg (204 lb 3 2 oz)   SpO2 97%   BMI 37 35 kg/m²      Physical Exam  Constitutional:       General: She is not in acute distress  Appearance: Normal appearance  She is normal weight  She is not ill-appearing or toxic-appearing  HENT:      Head: Normocephalic and atraumatic  Right Ear: Tympanic membrane and ear canal normal       Left Ear: Tympanic membrane and ear canal normal       Nose: Congestion present  Mouth/Throat:      Mouth: Mucous membranes are moist    Eyes:      Conjunctiva/sclera: Conjunctivae normal       Pupils: Pupils are equal, round, and reactive to light  Cardiovascular:      Rate and Rhythm: Normal rate and regular rhythm  Pulses: Normal pulses  Heart sounds: Normal heart sounds  No murmur heard  Pulmonary:      Effort: Pulmonary effort is normal  No respiratory distress  Breath sounds: Normal breath sounds  No wheezing  Musculoskeletal:      Cervical back: Neck supple  No tenderness  Comments: Bilateral palmar contractures   Lymphadenopathy:      Cervical: No cervical adenopathy  Neurological:      General: No focal deficit present  Mental Status: She is alert and oriented to person, place, and time  Cranial Nerves: No cranial nerve deficit  Sensory: No sensory deficit  Motor: No weakness  Psychiatric:         Mood and Affect: Mood normal          Behavior: Behavior normal          Thought Content:  Thought content normal  Judgment: Judgment normal

## 2021-09-29 ENCOUNTER — TELEPHONE (OUTPATIENT)
Dept: FAMILY MEDICINE CLINIC | Facility: CLINIC | Age: 81
End: 2021-09-29

## 2021-09-29 DIAGNOSIS — R26.2 AMBULATORY DYSFUNCTION: Primary | ICD-10-CM

## 2021-09-29 NOTE — TELEPHONE ENCOUNTER
calling to check on her walker order from [facility], as hers is broken and the wheel is falling off

## 2021-10-25 DIAGNOSIS — J31.0 CHRONIC RHINITIS: ICD-10-CM

## 2021-10-25 RX ORDER — IPRATROPIUM BROMIDE 42 UG/1
SPRAY, METERED NASAL
Qty: 15 ML | Refills: 1 | Status: SHIPPED | OUTPATIENT
Start: 2021-10-25 | End: 2022-01-06

## 2021-11-11 DIAGNOSIS — E78.1 HYPERTRIGLYCERIDEMIA: ICD-10-CM

## 2021-11-11 RX ORDER — FENOFIBRIC ACID 135 MG/1
1 CAPSULE, DELAYED RELEASE ORAL DAILY
Qty: 90 CAPSULE | Refills: 0 | Status: SHIPPED | OUTPATIENT
Start: 2021-11-11 | End: 2022-02-21

## 2021-11-22 ENCOUNTER — OFFICE VISIT (OUTPATIENT)
Dept: FAMILY MEDICINE CLINIC | Facility: CLINIC | Age: 81
End: 2021-11-22
Payer: COMMERCIAL

## 2021-11-22 VITALS
HEART RATE: 76 BPM | TEMPERATURE: 97 F | WEIGHT: 204.38 LBS | SYSTOLIC BLOOD PRESSURE: 132 MMHG | HEIGHT: 62 IN | BODY MASS INDEX: 37.61 KG/M2 | OXYGEN SATURATION: 98 % | DIASTOLIC BLOOD PRESSURE: 72 MMHG

## 2021-11-22 DIAGNOSIS — M17.11 UNILATERAL PRIMARY OSTEOARTHRITIS, RIGHT KNEE: Primary | ICD-10-CM

## 2021-11-22 DIAGNOSIS — M77.02 MEDIAL EPICONDYLITIS OF LEFT ELBOW: ICD-10-CM

## 2021-11-22 DIAGNOSIS — Z15.89 HLA B27 (HLA B27 POSITIVE): ICD-10-CM

## 2021-11-22 PROCEDURE — 99213 OFFICE O/P EST LOW 20 MIN: CPT | Performed by: FAMILY MEDICINE

## 2021-11-22 PROCEDURE — 3725F SCREEN DEPRESSION PERFORMED: CPT | Performed by: FAMILY MEDICINE

## 2021-11-22 PROCEDURE — 1160F RVW MEDS BY RX/DR IN RCRD: CPT | Performed by: FAMILY MEDICINE

## 2021-11-22 PROCEDURE — 1036F TOBACCO NON-USER: CPT | Performed by: FAMILY MEDICINE

## 2021-11-22 RX ORDER — PREDNISONE 10 MG/1
TABLET ORAL
Qty: 21 TABLET | Refills: 0 | Status: SHIPPED | OUTPATIENT
Start: 2021-11-22 | End: 2022-01-11 | Stop reason: ALTCHOICE

## 2021-11-23 ENCOUNTER — APPOINTMENT (OUTPATIENT)
Dept: RADIOLOGY | Facility: CLINIC | Age: 81
End: 2021-11-23
Payer: COMMERCIAL

## 2021-11-23 ENCOUNTER — APPOINTMENT (OUTPATIENT)
Dept: LAB | Facility: CLINIC | Age: 81
End: 2021-11-23
Payer: COMMERCIAL

## 2021-11-23 DIAGNOSIS — Z15.89 HLA B27 (HLA B27 POSITIVE): ICD-10-CM

## 2021-11-23 DIAGNOSIS — M17.11 UNILATERAL PRIMARY OSTEOARTHRITIS, RIGHT KNEE: ICD-10-CM

## 2021-11-23 PROCEDURE — 36415 COLL VENOUS BLD VENIPUNCTURE: CPT

## 2021-11-23 PROCEDURE — 73562 X-RAY EXAM OF KNEE 3: CPT

## 2021-12-02 LAB — HLA-B27 QL NAA+PROBE: NEGATIVE

## 2021-12-13 ENCOUNTER — RA CDI HCC (OUTPATIENT)
Dept: OTHER | Facility: HOSPITAL | Age: 81
End: 2021-12-13

## 2021-12-20 PROBLEM — F33.9 MAJOR DEPRESSIVE DISORDER, RECURRENT, UNSPECIFIED (HCC): Status: ACTIVE | Noted: 2021-12-20

## 2021-12-30 ENCOUNTER — APPOINTMENT (OUTPATIENT)
Dept: LAB | Facility: CLINIC | Age: 81
End: 2021-12-30
Payer: COMMERCIAL

## 2021-12-30 DIAGNOSIS — E78.5 HYPERLIPIDEMIA, UNSPECIFIED HYPERLIPIDEMIA TYPE: ICD-10-CM

## 2021-12-30 DIAGNOSIS — E55.9 VITAMIN D DEFICIENCY: ICD-10-CM

## 2021-12-30 DIAGNOSIS — R42 DIZZINESS: ICD-10-CM

## 2021-12-30 LAB
25(OH)D3 SERPL-MCNC: 14.4 NG/ML (ref 30–100)
ALBUMIN SERPL BCP-MCNC: 3.7 G/DL (ref 3.5–5)
ALP SERPL-CCNC: 60 U/L (ref 46–116)
ALT SERPL W P-5'-P-CCNC: 23 U/L (ref 12–78)
ANION GAP SERPL CALCULATED.3IONS-SCNC: 4 MMOL/L (ref 4–13)
AST SERPL W P-5'-P-CCNC: 16 U/L (ref 5–45)
BASOPHILS # BLD AUTO: 0.08 THOUSANDS/ΜL (ref 0–0.1)
BASOPHILS NFR BLD AUTO: 1 % (ref 0–1)
BILIRUB SERPL-MCNC: 0.4 MG/DL (ref 0.2–1)
BUN SERPL-MCNC: 34 MG/DL (ref 5–25)
CALCIUM SERPL-MCNC: 10.2 MG/DL (ref 8.3–10.1)
CHLORIDE SERPL-SCNC: 110 MMOL/L (ref 100–108)
CHOLEST SERPL-MCNC: 218 MG/DL
CO2 SERPL-SCNC: 23 MMOL/L (ref 21–32)
CREAT SERPL-MCNC: 1.36 MG/DL (ref 0.6–1.3)
EOSINOPHIL # BLD AUTO: 0.21 THOUSAND/ΜL (ref 0–0.61)
EOSINOPHIL NFR BLD AUTO: 3 % (ref 0–6)
ERYTHROCYTE [DISTWIDTH] IN BLOOD BY AUTOMATED COUNT: 13.9 % (ref 11.6–15.1)
GFR SERPL CREATININE-BSD FRML MDRD: 36 ML/MIN/1.73SQ M
GLUCOSE P FAST SERPL-MCNC: 112 MG/DL (ref 65–99)
HCT VFR BLD AUTO: 39.6 % (ref 34.8–46.1)
HDLC SERPL-MCNC: 59 MG/DL
HGB BLD-MCNC: 12.3 G/DL (ref 11.5–15.4)
IMM GRANULOCYTES # BLD AUTO: 0.04 THOUSAND/UL (ref 0–0.2)
IMM GRANULOCYTES NFR BLD AUTO: 1 % (ref 0–2)
LDLC SERPL CALC-MCNC: 129 MG/DL (ref 0–100)
LYMPHOCYTES # BLD AUTO: 1.44 THOUSANDS/ΜL (ref 0.6–4.47)
LYMPHOCYTES NFR BLD AUTO: 23 % (ref 14–44)
MCH RBC QN AUTO: 28.9 PG (ref 26.8–34.3)
MCHC RBC AUTO-ENTMCNC: 31.1 G/DL (ref 31.4–37.4)
MCV RBC AUTO: 93 FL (ref 82–98)
MONOCYTES # BLD AUTO: 0.64 THOUSAND/ΜL (ref 0.17–1.22)
MONOCYTES NFR BLD AUTO: 10 % (ref 4–12)
NEUTROPHILS # BLD AUTO: 4 THOUSANDS/ΜL (ref 1.85–7.62)
NEUTS SEG NFR BLD AUTO: 62 % (ref 43–75)
NONHDLC SERPL-MCNC: 159 MG/DL
NRBC BLD AUTO-RTO: 0 /100 WBCS
PLATELET # BLD AUTO: 455 THOUSANDS/UL (ref 149–390)
PMV BLD AUTO: 9 FL (ref 8.9–12.7)
POTASSIUM SERPL-SCNC: 4.8 MMOL/L (ref 3.5–5.3)
PROT SERPL-MCNC: 7.5 G/DL (ref 6.4–8.2)
RBC # BLD AUTO: 4.25 MILLION/UL (ref 3.81–5.12)
SODIUM SERPL-SCNC: 137 MMOL/L (ref 136–145)
T4 FREE SERPL-MCNC: 1.2 NG/DL (ref 0.76–1.46)
TRIGL SERPL-MCNC: 152 MG/DL
TSH SERPL DL<=0.05 MIU/L-ACNC: 4.85 UIU/ML (ref 0.36–3.74)
WBC # BLD AUTO: 6.41 THOUSAND/UL (ref 4.31–10.16)

## 2021-12-30 PROCEDURE — 36415 COLL VENOUS BLD VENIPUNCTURE: CPT

## 2021-12-30 PROCEDURE — 80061 LIPID PANEL: CPT

## 2021-12-30 PROCEDURE — 84443 ASSAY THYROID STIM HORMONE: CPT

## 2021-12-30 PROCEDURE — 82306 VITAMIN D 25 HYDROXY: CPT

## 2021-12-30 PROCEDURE — 84439 ASSAY OF FREE THYROXINE: CPT

## 2021-12-30 PROCEDURE — 85025 COMPLETE CBC W/AUTO DIFF WBC: CPT

## 2021-12-30 PROCEDURE — 80053 COMPREHEN METABOLIC PANEL: CPT

## 2022-01-03 ENCOUNTER — RA CDI HCC (OUTPATIENT)
Dept: OTHER | Facility: HOSPITAL | Age: 82
End: 2022-01-03

## 2022-01-03 NOTE — PROGRESS NOTES
[facility] [employer]  coding opportunities    Did not use D47 3, E66 01, F33 9      Number of diagnosis code(s) already on the problem list added to FYI fla     Chart Reviewed * (Number of) Inbasket suggestions sent to Provider: 1            Number of suggestions used: 2      Number of suggestions NOT actually used: 3     Patients insurance Mobile Complete: [address] [place] [address]     Visit status: Patient arrived for their scheduled appointment     Provider never responded to [facility] [employer]  coding request     [facility] [employer]  coding opportunities     DX: D47 3 Essential thrombocythemia  Do platelets results indicate thrombocythemia?     DX: R32 08 Factor V Leiden mutation-on problem list  DX: N18 30 CKD (chronic kidney disease)- on problem list  DX: E66 01 Class 2 severe obesity due to excess calories with serious comorbidity and body mass index (BMI) of 35 0 to 35 9 in adult- on problem list  DX: F33 9 Major depressive disorder, recurrent, unspecified- on problem list       Number of diagnosis code(s) already on the problem list added to FYI fla     Chart Reviewed * (Number of) Inbasket suggestions sent to Provider: 1                  Patients insurance company: [address] [place] [address]

## 2022-01-06 DIAGNOSIS — J31.0 CHRONIC RHINITIS: ICD-10-CM

## 2022-01-06 RX ORDER — IPRATROPIUM BROMIDE 42 UG/1
SPRAY, METERED NASAL
Qty: 15 ML | Refills: 1 | Status: SHIPPED | OUTPATIENT
Start: 2022-01-06 | End: 2022-03-21

## 2022-01-11 PROBLEM — E78.1 HYPERTRIGLYCERIDEMIA: Status: ACTIVE | Noted: 2022-01-11

## 2022-01-12 ENCOUNTER — OFFICE VISIT (OUTPATIENT)
Dept: FAMILY MEDICINE CLINIC | Facility: CLINIC | Age: 82
End: 2022-01-12
Payer: COMMERCIAL

## 2022-01-12 VITALS
SYSTOLIC BLOOD PRESSURE: 122 MMHG | DIASTOLIC BLOOD PRESSURE: 62 MMHG | HEIGHT: 62 IN | HEART RATE: 57 BPM | BODY MASS INDEX: 37.17 KG/M2 | WEIGHT: 202 LBS | TEMPERATURE: 97.1 F | OXYGEN SATURATION: 100 %

## 2022-01-12 DIAGNOSIS — E55.9 VITAMIN D DEFICIENCY: ICD-10-CM

## 2022-01-12 DIAGNOSIS — N18.30 BENIGN HYPERTENSION WITH CKD (CHRONIC KIDNEY DISEASE) STAGE III (HCC): ICD-10-CM

## 2022-01-12 DIAGNOSIS — E78.1 HYPERTRIGLYCERIDEMIA: ICD-10-CM

## 2022-01-12 DIAGNOSIS — Z23 ENCOUNTER FOR IMMUNIZATION: ICD-10-CM

## 2022-01-12 DIAGNOSIS — D68.51 FACTOR V LEIDEN MUTATION (HCC): ICD-10-CM

## 2022-01-12 DIAGNOSIS — I12.9 BENIGN HYPERTENSION WITH CKD (CHRONIC KIDNEY DISEASE) STAGE III (HCC): ICD-10-CM

## 2022-01-12 DIAGNOSIS — E03.8 SUBCLINICAL HYPOTHYROIDISM: ICD-10-CM

## 2022-01-12 DIAGNOSIS — Z00.00 ENCOUNTER FOR SUBSEQUENT ANNUAL WELLNESS VISIT (AWV) IN MEDICARE PATIENT: Primary | ICD-10-CM

## 2022-01-12 DIAGNOSIS — J31.0 CHRONIC RHINITIS: ICD-10-CM

## 2022-01-12 PROCEDURE — G0439 PPPS, SUBSEQ VISIT: HCPCS | Performed by: FAMILY MEDICINE

## 2022-01-12 PROCEDURE — 1170F FXNL STATUS ASSESSED: CPT | Performed by: FAMILY MEDICINE

## 2022-01-12 PROCEDURE — 1160F RVW MEDS BY RX/DR IN RCRD: CPT | Performed by: FAMILY MEDICINE

## 2022-01-12 PROCEDURE — 3288F FALL RISK ASSESSMENT DOCD: CPT | Performed by: FAMILY MEDICINE

## 2022-01-12 PROCEDURE — 99214 OFFICE O/P EST MOD 30 MIN: CPT | Performed by: FAMILY MEDICINE

## 2022-01-12 PROCEDURE — 3725F SCREEN DEPRESSION PERFORMED: CPT | Performed by: FAMILY MEDICINE

## 2022-01-12 PROCEDURE — 1125F AMNT PAIN NOTED PAIN PRSNT: CPT | Performed by: FAMILY MEDICINE

## 2022-01-12 PROCEDURE — 1036F TOBACCO NON-USER: CPT | Performed by: FAMILY MEDICINE

## 2022-01-12 RX ORDER — ERGOCALCIFEROL 1.25 MG/1
50000 CAPSULE ORAL WEEKLY
Qty: 4 CAPSULE | Refills: 5 | Status: SHIPPED | OUTPATIENT
Start: 2022-01-12 | End: 2022-06-27

## 2022-01-12 NOTE — PROGRESS NOTES
Assessment and Plan:     Problem List Items Addressed This Visit        Endocrine    Subclinical hypothyroidism       Cardiovascular and Mediastinum    Benign hypertension with CKD (chronic kidney disease) stage III ([facility])       Other    Vitamin D deficiency    Hypertriglyceridemia      Other Visit Diagnoses     Encounter for subsequent annual wellness visit (AWV) in Medicare patient    -  Primary    Encounter for immunization            BMI Counseling: Body mass index is 36 95 kg/m²  The BMI is above normal  Nutrition recommendations include decreasing portion sizes, encouraging healthy choices of fruits and vegetables, decreasing fast food intake, consuming healthier snacks, limiting drinks that contain sugar and moderation in carbohydrate intake  Exercise recommendations include vigorous physical activity 75 minutes/week and exercising 3-5 times per week  Rationale for BMI follow-up plan is due to patient being overweight or obese  Preventive health issues were discussed with patient, and age appropriate screening tests were ordered as noted in patient's After Visit Summary  Personalized health advice and appropriate referrals for health education or preventive services given if needed, as noted in patient's After Visit Summary       History of Present Illness:     Patient presents for Medicare Annual Wellness visit    Patient Care Team:  Wan Bautista DO as PCP - General (Family Medicine)  Henry Lunsford DO as PCP - [address] [place] [address] (RTE)  [facility]     Problem List:     Patient Active Problem List   Diagnosis    CKD (chronic kidney disease) stage 3, GFR 30-59 ml/min ([facility])    Benign hypertension with CKD (chronic kidney disease) stage III ([facility] )    Vitamin D deficiency    Medicare annual wellness visit, subsequent    Dizziness    Chronic rhinitis    Class 2 severe obesity due to excess calories with serious comorbidity and body mass index (BMI) of 35 0 to 35 9 in adult [facility])    Factor V Leiden mutation ([facility] )    Major depressive disorder, recurrent, unspecified ([facility] )    Hypertriglyceridemia    Subclinical hypothyroidism      Past Medical and Surgical History:     Past Medical History:   Diagnosis Date    Esophageal stricture     Factor V Leiden mutation ([facility] )     LAST ASSESSED: 8/3/16    Hypertension      Past Surgical History:   Procedure Laterality Date    CATARACT EXTRACTION, BILATERAL Bilateral     REDUCTION MAMMAPLASTY Bilateral     TONSILLECTOMY AND ADENOIDECTOMY      TUBAL LIGATION Bilateral       Family History:     Family History   Problem Relation Age of Onset    Hypertension Mother     Cancer Father       Social History:     Social History     Socioeconomic History    Marital status:       Spouse name: None    Number of children: 3    Years of education: None    Highest education level: None   Occupational History    Occupation: PRIOR OCCUPATION       Comment: RETIRED   Tobacco Use    Smoking status: Former Smoker     Quit date:      Years since quittin 0    Smokeless tobacco: Never Used   Substance and Sexual Activity    Alcohol use: No    Drug use: None    Sexual activity: None   Other Topics Concern    None   Social History Narrative    None     Social Determinants of Health     Financial Resource Strain: Not on file   Food Insecurity: Not on file   Transportation Needs: Not on file   Physical Activity: Not on file   Stress: Not on file   Social Connections: Not on file   Intimate Partner Violence: Not on file   Housing Stability: Not on file      Medications and Allergies:     Current Outpatient Medications   Medication Sig Dispense Refill    buPROPion (WELLBUTRIN XL) 150 mg 24 hr tablet take 1 tablet by mouth once daily 90 tablet 3    Choline Fenofibrate (Fenofibric Acid) 135 MG CPDR Take 1 capsule (135 mg total) by mouth daily 90 capsule 0    ipratropium (ATROVENT) 0 06 % nasal spray instill 2 sprays into each nostril four times a day 15 mL 1    spironolactone (ALDACTONE) 25 mg tablet take 2 tablets by mouth daily 180 tablet 3     No current facility-administered medications for this visit  Allergies   Allergen Reactions    Aspirin     Montelukast      Other reaction(s): Depression  Category: Adverse Reaction; Other reaction(s): Depression  Category: Adverse Reaction;     Nsaids     Pravastatin      Category: Adverse Reaction;     Simvastatin      Category: Adverse Reaction;     Tolmetin       Immunizations:     Immunization History   Administered Date(s) Administered    COVID-19 MODERNA VACC 0 5 ML IM 01/26/2021, 03/01/2021    INFLUENZA 10/05/2015, 10/10/2016, 10/21/2016, 09/15/2017, 10/15/2018, 11/01/2020, 09/20/2021    influenza, trivalent, adjuvanted 10/03/2019      Health Maintenance: There are no preventive care reminders to display for this patient  Topic Date Due    DTaP,Tdap,and Td Vaccines (1 - Tdap) Never done    COVID-19 Vaccine (3 - Booster for Moderna series) 09/01/2021      Medicare Health Risk Assessment:     /62 (BP Location: Left arm, Patient Position: Sitting, Cuff Size: Standard)   Pulse 57   Temp (!) 97 1 °F (36 2 °C) (Tympanic)   Ht 5' 2" (1 575 m)   Wt 91 6 kg (202 lb)   SpO2 100%   BMI 36 95 kg/m²      Kevin Medina is here for her Subsequent Wellness visit  Health Risk Assessment:   Patient rates overall health as poor  Patient feels that their physical health rating is slightly worse  Patient is satisfied with their life  Eyesight was rated as slightly worse  Hearing was rated as slightly worse  Patient feels that their emotional and mental health rating is same  Patients states they are sometimes angry  Patient states they are never, rarely unusually tired/fatigued  Pain experienced in the last 7 days has been a lot  Patient's pain rating has been 6/10  Patient states that she has experienced no weight loss or gain in last 6 months  Depression Screening:   PHQ-9 Score: 0      Fall Risk Screening: In the past year, patient has experienced: no history of falling in past year      Urinary Incontinence Screening:   Patient has leaked urine accidently in the last six months  Home Safety:  Patient has trouble with stairs inside or outside of their home  Patient has working smoke alarms and has no working carbon monoxide detector  Home safety hazards include: none  Nutrition:   Current diet is Regular  Medications:   Patient is not currently taking any over-the-counter supplements  Patient is able to manage medications  Activities of Daily Living (ADLs)/Instrumental Activities of Daily Living (IADLs):   Walk and transfer into and out of bed and chair?: Yes  Dress and groom yourself?: Yes    Bathe or shower yourself?: Yes    Feed yourself?  Yes  Do your laundry/housekeeping?: Yes  Manage your money, pay your bills and track your expenses?: Yes  Make your own meals?: Yes    Do your own shopping?: Yes    Previous Hospitalizations:   Any hospitalizations or ED visits within the last 12 months?: No      Advance Care Planning:   Living will: No    Durable POA for healthcare: No    Five wishes given: Yes    End of Life Decisions reviewed with patient: No    Provider agrees with end of life decisions: No      Comments: Discussed need for  living will and POA    PREVENTIVE SCREENINGS      Cardiovascular Screening:    General: Screening Not Indicated and History Lipid Disorder      Diabetes Screening:     General: Screening Current      Colorectal Cancer Screening:     General: Screening Not Indicated and Screening Current      Breast Cancer Screening:     General: Screening Current      Cervical Cancer Screening:    General: Screening Not Indicated      Osteoporosis Screening:    General: Screening Not Indicated      Abdominal Aortic Aneurysm (AAA) Screening:        General: Screening Not Indicated      Lung Cancer Screening:     General: Screening Not Indicated      Hepatitis C Screening:    General: Screening Current    Screening, Brief Intervention, and Referral to Treatment (SBIRT)    Screening  Typical number of drinks in a day: 0  Typical number of drinks in a week: 0  Interpretation: Low risk drinking behavior      Single Item Drug Screening:  How often have you used an illegal drug (including marijuana) or a prescription medication for non-medical reasons in the past year? never    Single Item Drug Screen Score: 0  Interpretation: Negative screen for possible drug use disorder      Zach Winter, DO

## 2022-01-12 NOTE — PATIENT INSTRUCTIONS
Medicare Preventive Visit Patient Instructions  Thank you for completing your Welcome to Medicare Visit or Medicare Annual Wellness Visit today  Your next wellness visit will be due in one year (1/13/2023)  The screening/preventive services that you may require over the next 5-10 years are detailed below  Some tests may not apply to you based off risk factors and/or age  Screening tests ordered at today's visit but not completed yet may show as past due  Also, please note that scanned in results may not display below  Preventive Screenings:  Service Recommendations Previous Testing/Comments   Colorectal Cancer Screening  * Colonoscopy    * Fecal Occult Blood Test (FOBT)/Fecal Immunochemical Test (FIT)  * Fecal DNA/Cologuard Test  * Flexible Sigmoidoscopy Age: 54-65 years old   Colonoscopy: every 10 years (may be performed more frequently if at higher risk)  OR  FOBT/FIT: every 1 year  OR  Cologuard: every 3 years  OR  Sigmoidoscopy: every 5 years  Screening may be recommended earlier than age 48 if at higher risk for colorectal cancer  Also, an individualized decision between you and your healthcare provider will decide whether screening between the ages of 74-80 would be appropriate  Colonoscopy: Not on file  FOBT/FIT: Not on file  Cologuard: Not on file  Sigmoidoscopy: Not on file    Screening Not Indicated  Screening Current     Breast Cancer Screening Age: 36 years old  Frequency: every 1-2 years  Not required if history of left and right mastectomy Mammogram: Not on file    Screening Current   Cervical Cancer Screening Between the ages of 21-29, pap smear recommended once every 3 years  Between the ages of 33-67, can perform pap smear with HPV co-testing every 5 years     Recommendations may differ for women with a history of total hysterectomy, cervical cancer, or abnormal pap smears in past  Pap Smear: Not on file    Screening Not Indicated   Hepatitis C Screening Once for adults born between 1945 and 1965  More frequently in patients at high risk for Hepatitis C Hep C Antibody: Not on file    Screening Current   Diabetes Screening 1-2 times per year if you're at risk for diabetes or have pre-diabetes Fasting glucose: 112 mg/dL   A1C: No results in last 5 years    Screening Current   Cholesterol Screening Once every 5 years if you don't have a lipid disorder  May order more often based on risk factors  Lipid panel: 12/30/2021    Screening Not Indicated  History Lipid Disorder     Other Preventive Screenings Covered by Medicare:  1  Abdominal Aortic Aneurysm (AAA) Screening: covered once if your at risk  You're considered to be at risk if you have a family history of AAA  2  Lung Cancer Screening: covers low dose CT scan once per year if you meet all of the following conditions: (1) Age 50-69; (2) No signs or symptoms of lung cancer; (3) Current smoker or have quit smoking within the last 15 years; (4) You have a tobacco smoking history of at least 30 pack years (packs per day multiplied by number of years you smoked); (5) You get a written order from a healthcare provider  3  Glaucoma Screening: covered annually if you're considered high risk: (1) You have diabetes OR (2) Family history of glaucoma OR (3)  aged 48 and older OR (3)  American aged 72 and older  3  Osteoporosis Screening: covered every 2 years if you meet one of the following conditions: (1) You're estrogen deficient and at risk for osteoporosis based off medical history and other findings; (2) Have a vertebral abnormality; (3) On glucocorticoid therapy for more than 3 months; (4) Have primary hyperparathyroidism; (5) On osteoporosis medications and need to assess response to drug therapy  · Last bone density test (DXA Scan): Not on file  5  HIV Screening: covered annually if you're between the age of 12-76  Also covered annually if you are younger than 13 and older than 72 with risk factors for HIV infection   For pregnant patients, it is covered up to 3 times per pregnancy  Immunizations:  Immunization Recommendations   Influenza Vaccine Annual influenza vaccination during flu season is recommended for all persons aged >= 6 months who do not have contraindications   Pneumococcal Vaccine (Prevnar and Pneumovax)  * Prevnar = PCV13  * Pneumovax = PPSV23   Adults 25-60 years old: 1-3 doses may be recommended based on certain risk factors  Adults 72 years old: Prevnar (PCV13) vaccine recommended followed by Pneumovax (PPSV23) vaccine  If already received PPSV23 since turning 65, then PCV13 recommended at least one year after PPSV23 dose  Hepatitis B Vaccine 3 dose series if at intermediate or high risk (ex: diabetes, end stage renal disease, liver disease)   Tetanus (Td) Vaccine - COST NOT COVERED BY MEDICARE PART B Following completion of primary series, a booster dose should be given every 10 years to maintain immunity against tetanus  Td may also be given as tetanus wound prophylaxis  Tdap Vaccine - COST NOT COVERED BY MEDICARE PART B Recommended at least once for all adults  For pregnant patients, recommended with each pregnancy  Shingles Vaccine (Shingrix) - COST NOT COVERED BY MEDICARE PART B  2 shot series recommended in those aged 48 and above     Health Maintenance Due:  There are no preventive care reminders to display for this patient  Immunizations Due:      Topic Date Due    DTaP,Tdap,and Td Vaccines (1 - Tdap) Never done    COVID-19 Vaccine (3 - Booster for Moderna series) 09/01/2021     Advance Directives   What are advance directives? Advance directives are legal documents that state your wishes and plans for medical care  These plans are made ahead of time in case you lose your ability to make decisions for yourself  Advance directives can apply to any medical decision, such as the treatments you want, and if you want to donate organs  What are the types of advance directives?   There are many types of advance directives, and each state has rules about how to use them  You may choose a combination of any of the following:  · Living will: This is a written record of the treatment you want  You can also choose which treatments you do not want, which to limit, and which to stop at a certain time  This includes surgery, medicine, IV fluid, and tube feedings  · Durable power of  for healthcare [facility]): This is a written record that states who you want to make healthcare choices for you when you are unable to make them for yourself  This person, called a proxy, is usually a family member or a friend  You may choose more than 1 proxy  · Do not resuscitate (DNR) order:  A DNR order is used in case your heart stops beating or you stop breathing  It is a request not to have certain forms of treatment, such as CPR  A DNR order may be included in other types of advance directives  · Medical directive: This covers the care that you want if you are in a coma, near death, or unable to make decisions for yourself  You can list the treatments you want for each condition  Treatment may include pain medicine, surgery, blood transfusions, dialysis, IV or tube feedings, and a ventilator (breathing machine)  · Values history: This document has questions about your views, beliefs, and how you feel and think about life  This information can help others choose the care that you would choose  Why are advance directives important? An advance directive helps you control your care  Although spoken wishes may be used, it is better to have your wishes written down  Spoken wishes can be misunderstood, or not followed  Treatments may be given even if you do not want them  An advance directive may make it easier for your family to make difficult choices about your care  Urinary Incontinence   Urinary incontinence (UI)  is when you lose control of your bladder   UI develops because your bladder cannot store or empty urine properly  The 3 most common types of UI are stress incontinence, urge incontinence, or both  Medicines:   · May be given to help strengthen your bladder control  Report any side effects of medication to your healthcare provider  Do pelvic muscle exercises often:  Your pelvic muscles help you stop urinating  Squeeze these muscles tight for 5 seconds, then relax for 5 seconds  Gradually work up to squeezing for 10 seconds  Do 3 sets of 15 repetitions a day, or as directed  This will help strengthen your pelvic muscles and improve bladder control  Train your bladder:  Go to the bathroom at set times, such as every 2 hours, even if you do not feel the urge to go  You can also try to hold your urine when you feel the urge to go  For example, hold your urine for 5 minutes when you feel the urge to go  As that becomes easier, hold your urine for 10 minutes  Self-care:   · Keep a UI record  Write down how often you leak urine and how much you leak  Make a note of what you were doing when you leaked urine  · Drink liquids as directed  You may need to limit the amount of liquid you drink to help control your urine leakage  Do not drink any liquid right before you go to bed  Limit or do not have drinks that contain caffeine or alcohol  · Prevent constipation  Eat a variety of high-fiber foods  Good examples are high-fiber cereals, beans, vegetables, and whole-grain breads  Walking is the best way to trigger your intestines to have a bowel movement  · Exercise regularly and maintain a healthy weight  Weight loss and exercise will decrease pressure on your bladder and help you control your leakage  · Use a catheter as directed  to help empty your bladder  A catheter is a tiny, plastic tube that is put into your bladder to drain your urine  · Go to behavior therapy as directed  Behavior therapy may be used to help you learn to control your urge to urinate      Weight Management   Why it is important to manage your weight:  Being overweight increases your risk of health conditions such as heart disease, high blood pressure, type 2 diabetes, and certain types of cancer  It can also increase your risk for osteoarthritis, sleep apnea, and other respiratory problems  Aim for a slow, steady weight loss  Even a small amount of weight loss can lower your risk of health problems  How to lose weight safely:  A safe and healthy way to lose weight is to eat fewer calories and get regular exercise  You can lose up about 1 pound a week by decreasing the number of calories you eat by 500 calories each day  Healthy meal plan for weight management:  A healthy meal plan includes a variety of foods, contains fewer calories, and helps you stay healthy  A healthy meal plan includes the following:  · Eat whole-grain foods more often  A healthy meal plan should contain fiber  Fiber is the part of grains, fruits, and vegetables that is not broken down by your body  Whole-grain foods are healthy and provide extra fiber in your diet  Some examples of whole-grain foods are whole-wheat breads and pastas, oatmeal, brown rice, and bulgur  · Eat a variety of vegetables every day  Include dark, leafy greens such as spinach, kale, rodger greens, and mustard greens  Eat yellow and orange vegetables such as carrots, sweet potatoes, and winter squash  · Eat a variety of fruits every day  Choose fresh or canned fruit (canned in its own juice or light syrup) instead of juice  Fruit juice has very little or no fiber  · Eat low-fat dairy foods  Drink fat-free (skim) milk or 1% milk  Eat fat-free yogurt and low-fat cottage cheese  Try low-fat cheeses such as mozzarella and other reduced-fat cheeses  · Choose meat and other protein foods that are low in fat  Choose beans or other legumes such as split peas or lentils  Choose fish, skinless poultry (chicken or turkey), or lean cuts of red meat (beef or pork)   Before you cook meat or poultry, cut off any visible fat  · Use less fat and oil  Try baking foods instead of frying them  Add less fat, such as margarine, sour cream, regular salad dressing and mayonnaise to foods  Eat fewer high-fat foods  Some examples of high-fat foods include french fries, doughnuts, ice cream, and cakes  · Eat fewer sweets  Limit foods and drinks that are high in sugar  This includes candy, cookies, regular soda, and sweetened drinks  Exercise:  Exercise at least 30 minutes per day on most days of the week  Some examples of exercise include walking, biking, dancing, and swimming  You can also fit in more physical activity by taking the stairs instead of the elevator or parking farther away from stores  Ask your healthcare provider about the best exercise plan for you  © Copyright [employer] 2018 Information is for End User's use only and may not be sold, redistributed or otherwise used for commercial purposes  All illustrations and images included in CareNotes® are the copyrighted property of A D A M , Inc  or Domenica Garg       Will observe lipids and renal function and re-check in 4m as these elevations are not the norm for [place]   If elevated at next visit in 4m will intervene

## 2022-01-12 NOTE — PROGRESS NOTES
Assessment/Plan:    No problem-specific Assessment & Plan notes found for this encounter  Diagnoses and all orders for this visit:    Encounter for subsequent annual wellness visit (AWV) in Medicare patient    Benign hypertension with CKD (chronic kidney disease) stage III (RUST 75 )  Comments:  Slight worsening of renal function   Check urine micro/creatinine ratio  Orders:  -     Comprehensive metabolic panel; Future  -     Microalbumin / creatinine urine ratio    Vitamin D deficiency  Comments:  D/C otc Vit D 1,000 units and start 50,000 weekly  Orders:  -     ergocalciferol (VITAMIN D2) 50,000 units; Take 1 capsule (50,000 Units total) by mouth once a week    Subclinical hypothyroidism  Comments:  Asymptomatic, continue to observe    Hypertriglyceridemia  -     Comprehensive metabolic panel; Future  -     Lipid panel; Future    Encounter for immunization    Factor V Leiden mutation (RUST 75 )    Chronic rhinitis  Comments:  Improved on on an otc mucus care          PHQ-2/9 Depression Screening    Little interest or pleasure in doing things: 0 - not at all  Feeling down, depressed, or hopeless: 0 - not at all  Trouble falling or staying asleep, or sleeping too much: 0 - not at all  Feeling tired or having little energy: 0 - not at all  Poor appetite or overeatin - not at all  Feeling bad about yourself - or that you are a failure or have let yourself or your family down: 0 - not at all  Trouble concentrating on things, such as reading the newspaper or watching television: 0 - not at all  Moving or speaking so slowly that other people could have noticed  Or the opposite - being so fidgety or restless that you have been moving around a lot more than usual: 0 - not at all  Thoughts that you would be better off dead, or of hurting yourself in some way: 0 - not at all  PHQ-9 Score: 0   PHQ-9 Interpretation: No or Minimal depression             Subjective:      Patient ID: Dakota Plan is a [de-identified] y o  female  Otilia Julian presents for follow-up of her chronic conditions, overall she feels well  I did review her labs with her, her vitamin-D decreased from 18 9-14 2  She states she does admit she does not take the 1000 unit over-the-counter vitamin-D regularly  I did advise her given her low value she does need to take the higher dose once weekly  Her total cholesterol increased from 196-218, triglycerides increased from 101 - 152 despite being on fenofibrate, LDL increased from 105-129  Her fasting blood sugar is 112 which is the highest it has been for some time, I suspect these elevations are due to eating differently with increased concentrated sweets over the holidays  I would not intervene at this time until we repeat her labs in 4 months given the isolated elevations  Her renal function did worsen slightly from a creatinine of 1 23-1 36, again this will be repeated in 4 months and I will check a urine microalbumin creatinine ratio      The following portions of the patient's history were reviewed and updated as appropriate: allergies, current medications, past family history, past medical history, past social history, past surgical history and problem list     Review of Systems   Constitutional: Negative for chills, fatigue and fever  HENT: Negative  Eyes: Negative  Negative for visual disturbance  Respiratory: Negative for cough, chest tightness, shortness of breath and wheezing  Cardiovascular: Negative for chest pain, palpitations and leg swelling  Gastrointestinal: Negative for abdominal pain, blood in stool, constipation, diarrhea, nausea and vomiting  Endocrine: Negative  Negative for cold intolerance  Genitourinary: Negative for difficulty urinating, dysuria, frequency, hematuria and urgency  Musculoskeletal: Negative for arthralgias and myalgias  Skin: Negative  Allergic/Immunologic: Negative      Neurological: Negative for dizziness, seizures, syncope, light-headedness and headaches  Hematological: Negative for adenopathy  Psychiatric/Behavioral: Negative  Negative for dysphoric mood  The patient is not nervous/anxious  Objective:    /62 (BP Location: Left arm, Patient Position: Sitting, Cuff Size: Standard)   Pulse 57   Temp (!) 97 1 °F (36 2 °C) (Tympanic)   Ht 5' 2" (1 575 m)   Wt 91 6 kg (202 lb)   SpO2 100%   BMI 36 95 kg/m²      Physical Exam  Vitals and nursing note reviewed  Constitutional:       General: She is not in acute distress  Appearance: Normal appearance  She is well-developed  She is not ill-appearing, toxic-appearing or diaphoretic  HENT:      Head: Normocephalic and atraumatic  Right Ear: Tympanic membrane, ear canal and external ear normal  There is no impacted cerumen  Left Ear: Tympanic membrane, ear canal and external ear normal  There is no impacted cerumen  Nose: Nose normal  No congestion or rhinorrhea  Mouth/Throat:      Mouth: Mucous membranes are moist       Pharynx: Oropharynx is clear  No oropharyngeal exudate or posterior oropharyngeal erythema  Eyes:      General: No scleral icterus  Right eye: No discharge  Left eye: No discharge  Conjunctiva/sclera: Conjunctivae normal       Pupils: Pupils are equal, round, and reactive to light  Cardiovascular:      Rate and Rhythm: Normal rate and regular rhythm  Pulses: Normal pulses  Heart sounds: Normal heart sounds  No murmur heard  Pulmonary:      Effort: Pulmonary effort is normal  No respiratory distress  Breath sounds: Normal breath sounds  No wheezing, rhonchi or rales  Abdominal:      General: Bowel sounds are normal  There is no distension  Palpations: Abdomen is soft  There is no mass  Tenderness: There is no abdominal tenderness  There is no guarding or rebound  Musculoskeletal:         General: Normal range of motion  Cervical back: Normal range of motion and neck supple   No rigidity  Right lower leg: No edema  Left lower leg: No edema  Lymphadenopathy:      Cervical: No cervical adenopathy  Skin:     General: Skin is warm and dry  Findings: No rash  Neurological:      General: No focal deficit present  Mental Status: She is alert and oriented to person, place, and time  Sensory: No sensory deficit  Motor: No weakness or abnormal muscle tone  Coordination: Coordination normal       Gait: Gait normal       Deep Tendon Reflexes: Reflexes are normal and symmetric  Psychiatric:         Mood and Affect: Mood normal          Behavior: Behavior normal          Thought Content:  Thought content normal          Judgment: Judgment normal

## 2022-02-07 ENCOUNTER — TELEPHONE (OUTPATIENT)
Dept: FAMILY MEDICINE CLINIC | Facility: CLINIC | Age: 82
End: 2022-02-07

## 2022-02-07 NOTE — TELEPHONE ENCOUNTER
Patient called stating she is wearing a fit bit and it is showing her that her heart rate is 131, she isn't complaining of any symptoms going along with it and states that she is just sitting not exerting any excessive energy,     Please advise, would you like patient in for an appointment? Anything for her to do at home?

## 2022-02-11 NOTE — TELEPHONE ENCOUNTER
Spoke with patient she feels good, no other symptoms associated with the elevated reading and it has not happened since, told to patient if she has any further issues let us know and we will have her evaluated

## 2022-03-21 DIAGNOSIS — J31.0 CHRONIC RHINITIS: ICD-10-CM

## 2022-03-21 RX ORDER — IPRATROPIUM BROMIDE 42 UG/1
SPRAY, METERED NASAL
Qty: 15 ML | Refills: 1 | Status: SHIPPED | OUTPATIENT
Start: 2022-03-21

## 2022-04-04 DIAGNOSIS — F32.9 CURRENT EPISODE OF MAJOR DEPRESSIVE DISORDER WITHOUT PRIOR EPISODE: ICD-10-CM

## 2022-04-05 RX ORDER — BUPROPION HYDROCHLORIDE 150 MG/1
150 TABLET ORAL DAILY
Qty: 90 TABLET | Refills: 3 | Status: SHIPPED | OUTPATIENT
Start: 2022-04-05

## 2022-05-12 ENCOUNTER — RA CDI HCC (OUTPATIENT)
Dept: OTHER | Facility: HOSPITAL | Age: 82
End: 2022-05-12

## 2022-05-12 NOTE — PROGRESS NOTES
[facility] 76  coding opportunities       Chart reviewed, no opportunity found:   [address]        Patients Insurance     Medicare Insurance: [employer]

## 2022-05-13 ENCOUNTER — APPOINTMENT (OUTPATIENT)
Dept: LAB | Facility: CLINIC | Age: 82
End: 2022-05-13
Payer: COMMERCIAL

## 2022-05-13 DIAGNOSIS — I12.9 BENIGN HYPERTENSION WITH CKD (CHRONIC KIDNEY DISEASE) STAGE III (HCC): ICD-10-CM

## 2022-05-13 DIAGNOSIS — N18.30 BENIGN HYPERTENSION WITH CKD (CHRONIC KIDNEY DISEASE) STAGE III (HCC): ICD-10-CM

## 2022-05-13 DIAGNOSIS — E78.1 HYPERTRIGLYCERIDEMIA: ICD-10-CM

## 2022-05-13 LAB
ALBUMIN SERPL BCP-MCNC: 3.7 G/DL (ref 3.5–5)
ALP SERPL-CCNC: 64 U/L (ref 46–116)
ALT SERPL W P-5'-P-CCNC: 24 U/L (ref 12–78)
ANION GAP SERPL CALCULATED.3IONS-SCNC: 4 MMOL/L (ref 4–13)
AST SERPL W P-5'-P-CCNC: 21 U/L (ref 5–45)
BILIRUB SERPL-MCNC: 0.35 MG/DL (ref 0.2–1)
BUN SERPL-MCNC: 40 MG/DL (ref 5–25)
CALCIUM SERPL-MCNC: 9.9 MG/DL (ref 8.3–10.1)
CHLORIDE SERPL-SCNC: 112 MMOL/L (ref 100–108)
CHOLEST SERPL-MCNC: 187 MG/DL
CO2 SERPL-SCNC: 23 MMOL/L (ref 21–32)
CREAT SERPL-MCNC: 1.32 MG/DL (ref 0.6–1.3)
CREAT UR-MCNC: 84.8 MG/DL
GFR SERPL CREATININE-BSD FRML MDRD: 37 ML/MIN/1.73SQ M
GLUCOSE P FAST SERPL-MCNC: 104 MG/DL (ref 65–99)
HDLC SERPL-MCNC: 69 MG/DL
LDLC SERPL CALC-MCNC: 98 MG/DL (ref 0–100)
MICROALBUMIN UR-MCNC: 74.2 MG/L (ref 0–20)
MICROALBUMIN/CREAT 24H UR: 88 MG/G CREATININE (ref 0–30)
NONHDLC SERPL-MCNC: 118 MG/DL
POTASSIUM SERPL-SCNC: 4.6 MMOL/L (ref 3.5–5.3)
PROT SERPL-MCNC: 7.7 G/DL (ref 6.4–8.2)
SODIUM SERPL-SCNC: 139 MMOL/L (ref 136–145)
TRIGL SERPL-MCNC: 100 MG/DL

## 2022-05-13 PROCEDURE — 82043 UR ALBUMIN QUANTITATIVE: CPT | Performed by: FAMILY MEDICINE

## 2022-05-13 PROCEDURE — 82570 ASSAY OF URINE CREATININE: CPT | Performed by: FAMILY MEDICINE

## 2022-05-13 PROCEDURE — 80053 COMPREHEN METABOLIC PANEL: CPT

## 2022-05-13 PROCEDURE — 80061 LIPID PANEL: CPT

## 2022-05-13 PROCEDURE — 36415 COLL VENOUS BLD VENIPUNCTURE: CPT

## 2022-05-18 ENCOUNTER — OFFICE VISIT (OUTPATIENT)
Dept: FAMILY MEDICINE CLINIC | Facility: CLINIC | Age: 82
End: 2022-05-18
Payer: COMMERCIAL

## 2022-05-18 VITALS
DIASTOLIC BLOOD PRESSURE: 60 MMHG | TEMPERATURE: 98.4 F | BODY MASS INDEX: 36.83 KG/M2 | SYSTOLIC BLOOD PRESSURE: 126 MMHG | HEIGHT: 62 IN | WEIGHT: 200.13 LBS | HEART RATE: 76 BPM | OXYGEN SATURATION: 98 %

## 2022-05-18 DIAGNOSIS — E55.9 VITAMIN D DEFICIENCY: ICD-10-CM

## 2022-05-18 DIAGNOSIS — E78.1 HYPERTRIGLYCERIDEMIA: ICD-10-CM

## 2022-05-18 DIAGNOSIS — N18.30 BENIGN HYPERTENSION WITH CKD (CHRONIC KIDNEY DISEASE) STAGE III (HCC): Primary | ICD-10-CM

## 2022-05-18 DIAGNOSIS — R80.9 MICROALBUMINURIA: ICD-10-CM

## 2022-05-18 DIAGNOSIS — J30.2 SEASONAL ALLERGIC RHINITIS, UNSPECIFIED TRIGGER: ICD-10-CM

## 2022-05-18 DIAGNOSIS — I12.9 BENIGN HYPERTENSION WITH CKD (CHRONIC KIDNEY DISEASE) STAGE III (HCC): Primary | ICD-10-CM

## 2022-05-18 DIAGNOSIS — Z78.0 ASYMPTOMATIC MENOPAUSAL STATE: ICD-10-CM

## 2022-05-18 DIAGNOSIS — E03.8 SUBCLINICAL HYPOTHYROIDISM: ICD-10-CM

## 2022-05-18 PROCEDURE — 1036F TOBACCO NON-USER: CPT | Performed by: FAMILY MEDICINE

## 2022-05-18 PROCEDURE — 1160F RVW MEDS BY RX/DR IN RCRD: CPT | Performed by: FAMILY MEDICINE

## 2022-05-18 PROCEDURE — 99214 OFFICE O/P EST MOD 30 MIN: CPT | Performed by: FAMILY MEDICINE

## 2022-05-18 PROCEDURE — 3725F SCREEN DEPRESSION PERFORMED: CPT | Performed by: FAMILY MEDICINE

## 2022-05-18 RX ORDER — AZELASTINE 1 MG/ML
1 SPRAY, METERED NASAL 2 TIMES DAILY
Qty: 1 ML | Refills: 5 | Status: SHIPPED | OUTPATIENT
Start: 2022-05-18

## 2022-05-18 NOTE — PROGRESS NOTES
Assessment/Plan:    No problem-specific Assessment & Plan notes found for this encounter  Diagnoses and all orders for this visit:    Benign hypertension with CKD (chronic kidney disease) stage III ([facility] [address] )  Comments:  Referred to Nephrology for worsening micro albuminuria and worsening microalbumin to creatinine ratio with stable GFR  Orders:  -     Ambulatory Referral to Nephrology; Future  -     US kidney and bladder with pvr; Future  -     Comprehensive metabolic panel; Future    Hypertriglyceridemia  Comments:  Much improved  Orders:  -     CBC and differential; Future  -     Lipid panel; Future    Microalbuminuria  Comments:  Referred to Nephrology  Orders:  -     Ambulatory Referral to Nephrology; Future  -     US kidney and bladder with pvr; Future    Subclinical hypothyroidism  Comments:  Free T4 continues to be normal at 1 20  Orders:  -     TSH, 3rd generation with Free T4 reflex; Future    Asymptomatic menopausal state  -     DXA bone density spine hip and pelvis; Future    Vitamin D deficiency  -     Vitamin D 25 hydroxy; Future    Seasonal allergic rhinitis, unspecified trigger  Comments:  Difficulty controlling nasal secretions for years, will add astelin to atrovent  Orders:  -     azelastine (ASTELIN) 0 1 % nasal spray; 1 spray into each nostril in the morning and 1 spray in the evening  Use in each nostril as directed            PHQ-2/9 Depression Screening    Little interest or pleasure in doing things: 1 - several days  Feeling down, depressed, or hopeless: 1 - several days  Trouble falling or staying asleep, or sleeping too much: 1 - several days  Feeling tired or having little energy: 1 - several days  Poor appetite or overeatin - several days  Feeling bad about yourself - or that you are a failure or have let yourself or your family down: 0 - not at all  Trouble concentrating on things, such as reading the newspaper or watching television: 0 - not at all  Moving or speaking so slowly that other people could have noticed  Or the opposite - being so fidgety or restless that you have been moving around a lot more than usual: 0 - not at all  Thoughts that you would be better off dead, or of hurting yourself in some way: 0 - not at all  PHQ-9 Score: 5   PHQ-9 Interpretation: Mild depression             Subjective:      Patient ID: Sharon Moraels is a 81 y o  female  Patient presents for four-month follow-up, overall she feels well  I did review her labs with her, her total cholesterol is 187 down from 218, triglycerides 100 down from 152, HDL improved to 69 from 59, LDL down to 98 from 129  Her CMP shows stable electrolytes and liver functions, her creatinine is stable at 1 3 to down from 1 36, GFR is 37 was 36 last time however her urine microalbumin is 74 2 and microalbumin/creatinine ratio is 88, both these values are worse indicating worsening renal function and increasing proteinuria  Her blood pressure is stable  The following portions of the patient's history were reviewed and updated as appropriate: allergies, current medications, past family history, past medical history, past social history, past surgical history and problem list     Review of Systems   Constitutional: Negative for activity change, appetite change, chills, fatigue and fever  HENT: Positive for congestion, postnasal drip and rhinorrhea  Negative for sneezing and sore throat  Eyes: Negative for photophobia, pain, redness and visual disturbance  Respiratory: Negative for cough, chest tightness, shortness of breath and wheezing  Cardiovascular: Negative for chest pain, palpitations and leg swelling  Gastrointestinal: Negative for abdominal pain, constipation, diarrhea, nausea and vomiting  Endocrine: Negative for cold intolerance and heat intolerance  Genitourinary: Negative for dysuria, flank pain, frequency, hematuria and menstrual problem     Musculoskeletal: Negative for arthralgias, gait problem, joint swelling and myalgias  Neurological: Negative for dizziness, syncope, weakness, light-headedness and headaches  Psychiatric/Behavioral: Negative for agitation, behavioral problems, dysphoric mood and suicidal ideas  The patient is not nervous/anxious  Objective:    /60 (BP Location: Left arm, Patient Position: Sitting, Cuff Size: Large)   Pulse 76   Temp 98 4 °F (36 9 °C) (Tympanic)   Ht 5' 2" (1 575 m)   Wt 90 8 kg (200 lb 2 oz)   SpO2 98%   BMI 36 60 kg/m²      Physical Exam  Vitals and nursing note reviewed  Constitutional:       General: She is not in acute distress  Appearance: Normal appearance  She is not ill-appearing or diaphoretic  HENT:      Head: Normocephalic and atraumatic  Ears:      Comments: Soft systolic murmur  Eyes:      Conjunctiva/sclera: Conjunctivae normal    Cardiovascular:      Rate and Rhythm: Normal rate and regular rhythm  Heart sounds: Murmur heard  Pulmonary:      Effort: Pulmonary effort is normal  No respiratory distress  Breath sounds: Normal breath sounds  No wheezing, rhonchi or rales  Abdominal:      General: There is no distension  Palpations: Abdomen is soft  There is no mass  Tenderness: There is no abdominal tenderness  There is no guarding or rebound  Musculoskeletal:      Cervical back: Normal range of motion and neck supple  No rigidity  Right lower leg: No edema  Left lower leg: No edema  Lymphadenopathy:      Cervical: No cervical adenopathy  Skin:     General: Skin is warm and dry  Neurological:      General: No focal deficit present  Mental Status: She is alert and oriented to person, place, and time  Psychiatric:         Mood and Affect: Mood normal          Behavior: Behavior normal          Thought Content:  Thought content normal          Judgment: Judgment normal

## 2022-05-20 ENCOUNTER — TELEPHONE (OUTPATIENT)
Dept: NEPHROLOGY | Facility: CLINIC | Age: 82
End: 2022-05-20

## 2022-05-20 NOTE — TELEPHONE ENCOUNTER
Spoke with Patient and schedule appointment for 08/04 with Dr Naz Best in the [facility] [place]

## 2022-05-26 ENCOUNTER — TELEPHONE (OUTPATIENT)
Dept: FAMILY MEDICINE CLINIC | Facility: CLINIC | Age: 82
End: 2022-05-26

## 2022-05-26 NOTE — TELEPHONE ENCOUNTER
Pt would like to know if she should still be taking vitamin D or if she should be switching over to the OTC ones  Please advise

## 2022-06-26 DIAGNOSIS — E55.9 VITAMIN D DEFICIENCY: ICD-10-CM

## 2022-06-27 RX ORDER — ERGOCALCIFEROL 1.25 MG/1
CAPSULE ORAL
Qty: 4 CAPSULE | Refills: 5 | Status: SHIPPED | OUTPATIENT
Start: 2022-06-27

## 2022-07-25 ENCOUNTER — HOSPITAL ENCOUNTER (OUTPATIENT)
Dept: ULTRASOUND IMAGING | Facility: HOSPITAL | Age: 82
Discharge: HOME | End: 2022-07-25
Payer: COMMERCIAL

## 2022-07-25 DIAGNOSIS — N18.30 BENIGN HYPERTENSION WITH CKD (CHRONIC KIDNEY DISEASE) STAGE III (HCC): ICD-10-CM

## 2022-07-25 DIAGNOSIS — I12.9 BENIGN HYPERTENSION WITH CKD (CHRONIC KIDNEY DISEASE) STAGE III (HCC): ICD-10-CM

## 2022-07-25 DIAGNOSIS — R80.9 MICROALBUMINURIA: ICD-10-CM

## 2022-07-25 PROCEDURE — 76770 US EXAM ABDO BACK WALL COMP: CPT

## 2022-07-27 ENCOUNTER — TELEPHONE (OUTPATIENT)
Dept: NEPHROLOGY | Facility: CLINIC | Age: 82
End: 2022-07-27

## 2022-07-27 DIAGNOSIS — E55.9 VITAMIN D DEFICIENCY: ICD-10-CM

## 2022-07-27 DIAGNOSIS — N18.30 BENIGN HYPERTENSION WITH CKD (CHRONIC KIDNEY DISEASE) STAGE III (HCC): ICD-10-CM

## 2022-07-27 DIAGNOSIS — I12.9 BENIGN HYPERTENSION WITH CKD (CHRONIC KIDNEY DISEASE) STAGE III (HCC): ICD-10-CM

## 2022-07-27 NOTE — TELEPHONE ENCOUNTER
Called and spoke with [employer] to complete their bloodwork prior to their appointment on 08/04/22 with Dr Louie Hernandez at the [facility]

## 2022-08-03 ENCOUNTER — TELEPHONE (OUTPATIENT)
Dept: NEPHROLOGY | Facility: CLINIC | Age: 82
End: 2022-08-03

## 2022-08-03 NOTE — TELEPHONE ENCOUNTER
Appointment Confirmation   Person confirmed appointment with  If not patient, name of the person Patient    Date and time of appointment 8/4 9:00    Patient acknowledged and will be at appointment? yes    Did you advise the patient that they will need a urine sample if they are a new patient?  N/A    Did you advise the patient to bring their current medications for verification? (including any OTC) Yes    Additional Information

## 2022-08-04 ENCOUNTER — OFFICE VISIT (OUTPATIENT)
Dept: NEPHROLOGY | Facility: CLINIC | Age: 82
End: 2022-08-04
Payer: COMMERCIAL

## 2022-08-04 VITALS
DIASTOLIC BLOOD PRESSURE: 74 MMHG | HEIGHT: 62 IN | HEART RATE: 67 BPM | WEIGHT: 200.1 LBS | OXYGEN SATURATION: 98 % | SYSTOLIC BLOOD PRESSURE: 126 MMHG | BODY MASS INDEX: 36.82 KG/M2

## 2022-08-04 DIAGNOSIS — N18.30 BENIGN HYPERTENSION WITH CKD (CHRONIC KIDNEY DISEASE) STAGE III (HCC): Primary | ICD-10-CM

## 2022-08-04 DIAGNOSIS — R80.9 MICROALBUMINURIA: ICD-10-CM

## 2022-08-04 DIAGNOSIS — E55.9 VITAMIN D DEFICIENCY: ICD-10-CM

## 2022-08-04 DIAGNOSIS — I12.9 BENIGN HYPERTENSION WITH CKD (CHRONIC KIDNEY DISEASE) STAGE III (HCC): Primary | ICD-10-CM

## 2022-08-04 DIAGNOSIS — N18.32 STAGE 3B CHRONIC KIDNEY DISEASE (HCC): ICD-10-CM

## 2022-08-04 PROCEDURE — 1160F RVW MEDS BY RX/DR IN RCRD: CPT | Performed by: INTERNAL MEDICINE

## 2022-08-04 PROCEDURE — 99214 OFFICE O/P EST MOD 30 MIN: CPT | Performed by: INTERNAL MEDICINE

## 2022-08-04 RX ORDER — CETIRIZINE HYDROCHLORIDE 10 MG/1
10 TABLET ORAL DAILY
COMMUNITY

## 2022-08-04 NOTE — PROGRESS NOTES
NEPHROLOGY OUTPATIENT PROGRESS NOTE   Simran Barragan 81 y o  female MRN: [id number]  DATE: 8/4/2022  Reason for visit:   Chief Complaint   Patient presents with    Follow-up    Chronic Kidney Disease     ASSESSMENT and PLAN:  CKD stage IIIB, baseline creatinine 1 3 since late 2021, prior 1 1 to 1 2 going back to 2019  -last creatinine 1 3 in May 2022 relatively stable at recent baseline  May have slow progression of CKD  -CKD suspected to be secondary to long-term hypertension causing hypertensive arteriosclerosis, age-related nephron loss  -UA in 2020 bland without hematuria or proteinuria  -repeat BMP, urine microalbumin/creatinine ratio before next visit  -avoid NSAIDs  -renal ultrasound in July 2022 shows normal size kidneys, no hydronephrosis or stones  Minimal cortical thinning in both kidneys  Overall echogenicity  Also concern for lobulated renal contour on right kidney? Normal variant versus multifocal scarring   -recommended to drink plenty of water to stay hydrated     Hypertension  -she was initially started on Aldactone 50 mg daily due to hypertension along with hirsutism issues  -she remains on Aldactone for last 17 years  She does not check BP at home although BP has been well controlled at PCP office  -BP much improved on repeat check in the office today  -recommended to check BP on regular basis at home and call back if BP remains persistently greater than 135/85   -she also has significant salt intake in her diet and add extra salt  Again recommended to decrease salt intake and follow low-salt diet       Microalbuminuria, last urine microalbumin/creatinine ratio 88 mg in May 2022   -suspect in the setting of underlying hypertension, obesity can cause secondary FSGS  -continue to monitor, if worsening, may consider adding ACE-inhibitor   -recommended regular exercise, weight loss     Vitamin-D deficiency  -last vitamin-D level 14 in December 2021    -repeat vitamin-D level before next visit    currently remains on vitamin-D 62126 units weekly by PCP       Prior history of gout 15 years ago, no recent gout flare-up    Diagnoses and all orders for this visit:    Vitamin D deficiency  -     Vitamin D 25 hydroxy; Future    Benign hypertension with CKD (chronic kidney disease) stage III ([facility])  Comments:  Referred to Nephrology for worsening micro albuminuria and worsening microalbumin to creatinine ratio with stable GFR  Orders:  -     Ambulatory Referral to Nephrology  -     Basic metabolic panel; Future  -     CBC; Future  -     PTH, intact; Future  -     Phosphorus; Future  -     Microalbumin / creatinine urine ratio; Future  -     Vitamin D 25 hydroxy; Future  -     Magnesium; Future    Microalbuminuria  Comments:  Referred to Nephrology  Orders:  -     Ambulatory Referral to Nephrology  -     Microalbumin / creatinine urine ratio; Future    Stage 3b chronic kidney disease ([facility] [address] )  -     Basic metabolic panel; Future  -     CBC; Future  -     PTH, intact; Future  -     Phosphorus; Future  -     Microalbumin / creatinine urine ratio; Future  -     Vitamin D 25 hydroxy; Future  -     Magnesium; Future    Other orders  -     cetirizine (ZyrTEC) 10 mg tablet; Take 10 mg by mouth daily  -     NON FORMULARY; Osteomatrix 2x a day          SUBJECTIVE / HPI:  Duran Haider is a 81y o  year old female with medical issues of hypertension for 17 years, depression, urinary incontinence, factor five Leiden deficiency, CKD stage 3 with baseline 1 3 since late 2021, prior 1 1 to 1 2, remote history of gout who presents for regular follow-up of CKD  Patient lost follow-up for almost two years  May have some progression of CKD  Last creatinine stable 1 3  She denies any recent NSAID use  Denies any lightheadedness or dizziness  denies any new urinary complaint  Has gained about 10 lb over last two years  Denies any chest pain or shortness of breath    No nausea or vomiting      Patient denies any obvious history of kidney disease in the past   No history of autoimmune conditions as per patient  She remains on stable dose of Aldactone for more than 15 years  She has upper arm machine at home although does not check BP on regular basis    REVIEW OF SYSTEMS:  More than 10 point review of systems were obtained and discussed in length with the patient  Complete review of systems were negative / unremarkable except mentioned above  PHYSICAL EXAM:  Vitals:    08/04/22 0857 08/04/22 0922 08/04/22 0923   BP: 150/88 128/76 126/74   BP Location: Left arm     Patient Position: Sitting     Cuff Size: Adult     Pulse: 67     SpO2: 98%     Weight: 90 8 kg (200 lb 1 6 oz)     Height: 5' 2" (1 575 m)       Body mass index is 36 6 kg/m²  Physical Exam  Vitals reviewed  Constitutional:       Appearance: She is well-developed  HENT:      Head: Normocephalic and atraumatic  Right Ear: External ear normal       Left Ear: External ear normal    Eyes:      Conjunctiva/sclera: Conjunctivae normal    Cardiovascular:      Comments: S1, S2 present  Pulmonary:      Effort: Pulmonary effort is normal       Breath sounds: Normal breath sounds  No wheezing or rales  Abdominal:      General: Bowel sounds are normal  There is no distension  Palpations: Abdomen is soft  Tenderness: There is no abdominal tenderness  Musculoskeletal:         General: No deformity  Right lower leg: No edema  Left lower leg: No edema  Lymphadenopathy:      Cervical: No cervical adenopathy  Skin:     Findings: No rash  Neurological:      Mental Status: She is alert and oriented to person, place, and time     Psychiatric:         Behavior: Behavior normal          PAST MEDICAL HISTORY:  Past Medical History:   Diagnosis Date    Esophageal stricture     Factor V Leiden mutation ([facility] [address] )     LAST ASSESSED: 8/3/16    Hypertension        PAST SURGICAL HISTORY:  Past Surgical History:   Procedure Laterality Date    CATARACT EXTRACTION, BILATERAL Bilateral     REDUCTION MAMMAPLASTY Bilateral     TONSILLECTOMY AND ADENOIDECTOMY      TUBAL LIGATION Bilateral        SOCIAL HISTORY:  Social History     Substance and Sexual Activity   Alcohol Use No     Social History     Substance and Sexual Activity   Drug Use Not on file     Social History     Tobacco Use   Smoking Status Former Smoker    Quit date: 80    Years since quittin 6   Smokeless Tobacco Never Used       FAMILY HISTORY:  Family History   Problem Relation Age of Onset    Hypertension Mother     Cancer Father        MEDICATIONS:    Current Outpatient Medications:     buPROPion (WELLBUTRIN XL) 150 mg 24 hr tablet, Take 1 tablet (150 mg total) by mouth daily, Disp: 90 tablet, Rfl: 3    cetirizine (ZyrTEC) 10 mg tablet, Take 10 mg by mouth daily, Disp: , Rfl:     Choline Fenofibrate (Fenofibric Acid) 135 MG CPDR, take 1 capsule by mouth once daily, Disp: 90 capsule, Rfl: 1    ergocalciferol (VITAMIN D2) 50,000 units, take 1 capsule by mouth every week, Disp: 4 capsule, Rfl: 5    NON FORMULARY, Osteomatrix 2x a day, Disp: , Rfl:     spironolactone (ALDACTONE) 25 mg tablet, take 2 tablets by mouth daily, Disp: 180 tablet, Rfl: 3    azelastine (ASTELIN) 0 1 % nasal spray, 1 spray into each nostril in the morning and 1 spray in the evening   Use in each nostril as directed , Disp: 1 mL, Rfl: 5    ipratropium (ATROVENT) 0 06 % nasal spray, instill 2 sprays into each nostril four times a day (Patient not taking: Reported on 2022), Disp: 15 mL, Rfl: 1    Lab Results:   Results for orders placed or performed in visit on 22   Comprehensive metabolic panel   Result Value Ref Range    Sodium 139 136 - 145 mmol/L    Potassium 4 6 3 5 - 5 3 mmol/L    Chloride 112 (H) 100 - 108 mmol/L    CO2 23 21 - 32 mmol/L    ANION GAP 4 4 - 13 mmol/L    BUN 40 (H) 5 - 25 mg/dL    Creatinine 1 32 (H) 0 60 - 1 30 mg/dL    Glucose, Fasting 104 (H) 65 - 99 mg/dL    Calcium 9 9 8 3 - 10 1 mg/dL    AST 21 5 - 45 U/L    ALT 24 12 - 78 U/L    Alkaline Phosphatase 64 46 - 116 U/L    Total Protein 7 7 6 4 - 8 2 g/dL    Albumin 3 7 3 5 - 5 0 g/dL    Total Bilirubin 0 35 0 20 - 1 00 mg/dL    eGFR 37 ml/min/1 73sq m   Lipid panel   Result Value Ref Range    Cholesterol 187 See Comment mg/dL    Triglycerides 100 See Comment mg/dL    HDL, Direct 69 >=50 mg/dL    LDL Calculated 98 0 - 100 mg/dL    Non-HDL-Chol (CHOL-HDL) 118 mg/dl

## 2022-08-06 DIAGNOSIS — E78.1 HYPERTRIGLYCERIDEMIA: ICD-10-CM

## 2022-08-08 RX ORDER — FENOFIBRIC ACID 135 MG/1
CAPSULE, DELAYED RELEASE ORAL
Qty: 90 CAPSULE | Refills: 1 | Status: SHIPPED | OUTPATIENT
Start: 2022-08-08

## 2022-08-18 ENCOUNTER — OFFICE VISIT (OUTPATIENT)
Dept: URGENT CARE | Facility: CLINIC | Age: 82
End: 2022-08-18
Payer: COMMERCIAL

## 2022-08-18 ENCOUNTER — APPOINTMENT (OUTPATIENT)
Dept: RADIOLOGY | Facility: HOSPITAL | Age: 82
DRG: 309 | End: 2022-08-18
Payer: COMMERCIAL

## 2022-08-18 ENCOUNTER — APPOINTMENT (OUTPATIENT)
Dept: ULTRASOUND IMAGING | Facility: HOSPITAL | Age: 82
End: 2022-08-18
Payer: COMMERCIAL

## 2022-08-18 ENCOUNTER — HOSPITAL ENCOUNTER (OUTPATIENT)
Dept: BONE DENSITY | Facility: HOSPITAL | Age: 82
Discharge: HOME | End: 2022-08-18
Payer: COMMERCIAL

## 2022-08-18 ENCOUNTER — HOSPITAL ENCOUNTER (INPATIENT)
Facility: HOSPITAL | Age: 82
LOS: 1 days | Discharge: HOME | DRG: 309 | End: 2022-08-20
Attending: EMERGENCY MEDICINE | Admitting: INTERNAL MEDICINE
Payer: COMMERCIAL

## 2022-08-18 VITALS
HEART RATE: 130 BPM | HEIGHT: 61 IN | RESPIRATION RATE: 18 BRPM | OXYGEN SATURATION: 98 % | SYSTOLIC BLOOD PRESSURE: 133 MMHG | DIASTOLIC BLOOD PRESSURE: 95 MMHG | WEIGHT: 202.1 LBS | TEMPERATURE: 98.1 F | BODY MASS INDEX: 38.16 KG/M2

## 2022-08-18 DIAGNOSIS — R00.2 PALPITATIONS: ICD-10-CM

## 2022-08-18 DIAGNOSIS — D68.51 FACTOR V LEIDEN MUTATION (HCC): ICD-10-CM

## 2022-08-18 DIAGNOSIS — I48.91 ATRIAL FIBRILLATION WITH RVR (HCC): Primary | ICD-10-CM

## 2022-08-18 DIAGNOSIS — I48.91 NEW ONSET A-FIB (HCC): Primary | ICD-10-CM

## 2022-08-18 DIAGNOSIS — Z78.0 ASYMPTOMATIC MENOPAUSAL STATE: ICD-10-CM

## 2022-08-18 PROBLEM — E66.9 OBESITY (BMI 30-39.9): Status: ACTIVE | Noted: 2021-07-22

## 2022-08-18 LAB
2HR DELTA HS TROPONIN: -6 NG/L
4HR DELTA HS TROPONIN: 0 NG/L
ANION GAP SERPL CALCULATED.3IONS-SCNC: 11 MMOL/L (ref 4–13)
APTT PPP: 28 SECONDS (ref 23–37)
BASOPHILS # BLD AUTO: 0.06 THOUSANDS/ΜL (ref 0–0.1)
BASOPHILS NFR BLD AUTO: 1 % (ref 0–1)
BUN SERPL-MCNC: 37 MG/DL (ref 5–25)
CALCIUM SERPL-MCNC: 9.8 MG/DL (ref 8.4–10.2)
CARDIAC TROPONIN I PNL SERPL HS: 30 NG/L
CARDIAC TROPONIN I PNL SERPL HS: 36 NG/L
CARDIAC TROPONIN I PNL SERPL HS: 36 NG/L
CHLORIDE SERPL-SCNC: 103 MMOL/L (ref 96–108)
CO2 SERPL-SCNC: 23 MMOL/L (ref 21–32)
CREAT SERPL-MCNC: 1.36 MG/DL (ref 0.6–1.3)
EOSINOPHIL # BLD AUTO: 0.19 THOUSAND/ΜL (ref 0–0.61)
EOSINOPHIL NFR BLD AUTO: 3 % (ref 0–6)
ERYTHROCYTE [DISTWIDTH] IN BLOOD BY AUTOMATED COUNT: 13.3 % (ref 11.6–15.1)
GFR SERPL CREATININE-BSD FRML MDRD: 36 ML/MIN/1.73SQ M
GLUCOSE SERPL-MCNC: 108 MG/DL (ref 65–140)
HCT VFR BLD AUTO: 40.9 % (ref 34.8–46.1)
HGB BLD-MCNC: 12.6 G/DL (ref 11.5–15.4)
IMM GRANULOCYTES # BLD AUTO: 0.04 THOUSAND/UL (ref 0–0.2)
IMM GRANULOCYTES NFR BLD AUTO: 1 % (ref 0–2)
INR PPP: 1.03 (ref 0.84–1.19)
LYMPHOCYTES # BLD AUTO: 1.6 THOUSANDS/ΜL (ref 0.6–4.47)
LYMPHOCYTES NFR BLD AUTO: 21 % (ref 14–44)
MCH RBC QN AUTO: 28.8 PG (ref 26.8–34.3)
MCHC RBC AUTO-ENTMCNC: 30.8 G/DL (ref 31.4–37.4)
MCV RBC AUTO: 94 FL (ref 82–98)
MONOCYTES # BLD AUTO: 0.59 THOUSAND/ΜL (ref 0.17–1.22)
MONOCYTES NFR BLD AUTO: 8 % (ref 4–12)
NEUTROPHILS # BLD AUTO: 5.07 THOUSANDS/ΜL (ref 1.85–7.62)
NEUTS SEG NFR BLD AUTO: 66 % (ref 43–75)
NRBC BLD AUTO-RTO: 0 /100 WBCS
PLATELET # BLD AUTO: 391 THOUSANDS/UL (ref 149–390)
PMV BLD AUTO: 8.8 FL (ref 8.9–12.7)
POTASSIUM SERPL-SCNC: 4.5 MMOL/L (ref 3.5–5.3)
PROTHROMBIN TIME: 13.5 SECONDS (ref 11.6–14.5)
RBC # BLD AUTO: 4.37 MILLION/UL (ref 3.81–5.12)
SODIUM SERPL-SCNC: 137 MMOL/L (ref 135–147)
TSH SERPL DL<=0.05 MIU/L-ACNC: 3.76 UIU/ML (ref 0.45–4.5)
WBC # BLD AUTO: 7.55 THOUSAND/UL (ref 4.31–10.16)

## 2022-08-18 PROCEDURE — 99291 CRITICAL CARE FIRST HOUR: CPT | Performed by: EMERGENCY MEDICINE

## 2022-08-18 PROCEDURE — 80048 BASIC METABOLIC PNL TOTAL CA: CPT | Performed by: EMERGENCY MEDICINE

## 2022-08-18 PROCEDURE — 85730 THROMBOPLASTIN TIME PARTIAL: CPT | Performed by: EMERGENCY MEDICINE

## 2022-08-18 PROCEDURE — 77080 DXA BONE DENSITY AXIAL: CPT

## 2022-08-18 PROCEDURE — 93005 ELECTROCARDIOGRAM TRACING: CPT

## 2022-08-18 PROCEDURE — 85025 COMPLETE CBC W/AUTO DIFF WBC: CPT | Performed by: EMERGENCY MEDICINE

## 2022-08-18 PROCEDURE — 96365 THER/PROPH/DIAG IV INF INIT: CPT

## 2022-08-18 PROCEDURE — 84484 ASSAY OF TROPONIN QUANT: CPT | Performed by: INTERNAL MEDICINE

## 2022-08-18 PROCEDURE — 71045 X-RAY EXAM CHEST 1 VIEW: CPT

## 2022-08-18 PROCEDURE — 99285 EMERGENCY DEPT VISIT HI MDM: CPT

## 2022-08-18 PROCEDURE — 84484 ASSAY OF TROPONIN QUANT: CPT | Performed by: EMERGENCY MEDICINE

## 2022-08-18 PROCEDURE — 85610 PROTHROMBIN TIME: CPT | Performed by: EMERGENCY MEDICINE

## 2022-08-18 PROCEDURE — 36415 COLL VENOUS BLD VENIPUNCTURE: CPT | Performed by: EMERGENCY MEDICINE

## 2022-08-18 PROCEDURE — S9088 SERVICES PROVIDED IN URGENT: HCPCS | Performed by: PHYSICIAN ASSISTANT

## 2022-08-18 PROCEDURE — 99220 PR INITIAL OBSERVATION CARE/DAY 70 MINUTES: CPT | Performed by: INTERNAL MEDICINE

## 2022-08-18 PROCEDURE — 96374 THER/PROPH/DIAG INJ IV PUSH: CPT

## 2022-08-18 PROCEDURE — 99213 OFFICE O/P EST LOW 20 MIN: CPT | Performed by: PHYSICIAN ASSISTANT

## 2022-08-18 PROCEDURE — 84443 ASSAY THYROID STIM HORMONE: CPT | Performed by: EMERGENCY MEDICINE

## 2022-08-18 RX ORDER — ONDANSETRON 2 MG/ML
4 INJECTION INTRAMUSCULAR; INTRAVENOUS EVERY 6 HOURS PRN
Status: DISCONTINUED | OUTPATIENT
Start: 2022-08-18 | End: 2022-08-20 | Stop reason: HOSPADM

## 2022-08-18 RX ORDER — LORATADINE 10 MG/1
10 TABLET ORAL DAILY
Status: DISCONTINUED | OUTPATIENT
Start: 2022-08-19 | End: 2022-08-20 | Stop reason: HOSPADM

## 2022-08-18 RX ORDER — ENOXAPARIN SODIUM 100 MG/ML
40 INJECTION SUBCUTANEOUS DAILY
Status: DISCONTINUED | OUTPATIENT
Start: 2022-08-19 | End: 2022-08-19

## 2022-08-18 RX ORDER — ACETAMINOPHEN 325 MG/1
650 TABLET ORAL EVERY 6 HOURS PRN
Status: DISCONTINUED | OUTPATIENT
Start: 2022-08-18 | End: 2022-08-20 | Stop reason: HOSPADM

## 2022-08-18 RX ORDER — BUPROPION HYDROCHLORIDE 150 MG/1
150 TABLET ORAL DAILY
Status: DISCONTINUED | OUTPATIENT
Start: 2022-08-19 | End: 2022-08-20 | Stop reason: HOSPADM

## 2022-08-18 RX ORDER — DILTIAZEM HYDROCHLORIDE 5 MG/ML
10 INJECTION INTRAVENOUS ONCE
Status: COMPLETED | OUTPATIENT
Start: 2022-08-18 | End: 2022-08-18

## 2022-08-18 RX ADMIN — DILTIAZEM HYDROCHLORIDE 2.5 MG/HR: 5 INJECTION INTRAVENOUS at 16:51

## 2022-08-18 RX ADMIN — DILTIAZEM HYDROCHLORIDE 10 MG: 5 INJECTION, SOLUTION INTRAVENOUS at 15:42

## 2022-08-18 RX ADMIN — Medication 2.5 MG/HR: at 20:52

## 2022-08-18 RX ADMIN — METOPROLOL TARTRATE 25 MG: 25 TABLET, FILM COATED ORAL at 20:50

## 2022-08-18 RX ADMIN — METOPROLOL TARTRATE 25 MG: 25 TABLET, FILM COATED ORAL at 16:24

## 2022-08-18 NOTE — NURSING NOTE
Arrived via litter on monitor and room air accompanied by ED RN  Ambulated to bed with contact guard  Gait steady  Alert and oriented  Moves well with some limitation due to habitus  No focal deficits noted  Heart tones clear with palpable pulses  No edema  Skin is warm, dry and intact  Lungs are clear with respirations easy and unlabored  Abdomen is obese but unremarkable  Voiding clear yellow in small amounts  IV sites intact

## 2022-08-18 NOTE — ASSESSMENT & PLAN NOTE
· Patient states that she has had a tendency to bleed in the past and was told she does not need blood thinners  · No history of blood clots  · Continue outpatient follow-up with PCP and Hematology as needed

## 2022-08-18 NOTE — ED NOTES
Hold cardizem infusion at this time as hr 80-95 at this time       Ramiro Isabel, ENEIDA  08/18/22 9892

## 2022-08-18 NOTE — ED NOTES
Patient showing aflutter on monitor  EKG completed and given to Dr Maikel Brady, Inpatient tiger text sent to [facility]       Katrina Goss RN  08/18/22 1800

## 2022-08-18 NOTE — ASSESSMENT & PLAN NOTE
· Patient was at urgent care and found to be in rapid AFib, sent to the ER for further evaluation  · Started on Cardizem drip, will continue and titrate as tolerated  · Metoprolol 25 mg twice daily initiated  · Will trend troponins  · Check echo  · Cardiology consult  · Monitor in step-down level 2

## 2022-08-18 NOTE — ASSESSMENT & PLAN NOTE
· BP currently stable  · Patient is typically on spironolactone at home, will hold while patient is on Cardizem drip  · Metoprolol 25 mg twice daily added to control patient's AFib

## 2022-08-18 NOTE — H&P
[address] 1940, 81 y o  female MRN: [id number]  Unit/Bed#: ED 28 Encounter: [id number]  Primary Care Provider: Shelton Mcneil DO   Date and time admitted to hospital: 8/18/2022  3:21 PM    * Atrial fibrillation with rapid ventricular response [facility])  Assessment & Plan  · Patient was at urgent care and found to be in rapid AFib, sent to the ER for further evaluation  · Started on Cardizem drip, will continue and titrate as tolerated  · Metoprolol 25 mg twice daily initiated  · Will trend troponins  · Check echo  · Cardiology consult  · Monitor in step-down level 2    Major depressive disorder, recurrent, unspecified ([facility] [address] )  Assessment & Plan  · Continue Wellbutrin    Factor V Leiden mutation [facility])  Assessment & Plan  · Patient states that she has had a tendency to bleed in the past and was told she does not need blood thinners  · No history of blood clots  · Continue outpatient follow-up with PCP and Hematology as needed    Obesity (BMI 30-39  9)  Assessment & Plan  Continue outpatient follow-up with PCP  Possible referral to outpatient weight loss program    Chronic rhinitis  Assessment & Plan  Loratadine substituted for patient's home Zyrtec    Benign hypertension with CKD (chronic kidney disease) stage III ([facility])  Assessment & Plan  · BP currently stable  · Patient is typically on spironolactone at home, will hold while patient is on Cardizem drip  · Metoprolol 25 mg twice daily added to control patient's AFib      VTE Prophylaxis: Enoxaparin (Lovenox)  Code Status:  Full code  POLST: There is no POLST form on file for this patient (pre-hospital)  Discussion with family:  Spoke with patient's daughter at bedside regarding plan of care    Anticipated Length of Stay:  Patient will be admitted on an Observation basis with an anticipated length of stay of  < 2 midnights     Justification for Hospital Stay:  Rapid AFib    Chief Complaint:   Abnormal heart rhythm    History of Present Illness:    Shavonne Blanco is a 81 y o  female who presents with abnormal heart rhythm  Patient states that she was at home watching TV when she noticed her Apple watch flag in for heart rate in the 170s  Watch told her she may have atrial fibrillation  Patient went with her daughter to urgent care where she was found to be in rapid AFib and sent to the ER  In the ER patient was started on Cardizem drip given Cardizem bolus and oral metoprolol  Patient denies any palpitations, chest pain, or dizziness  Patient states she had an episode 8 months ago which resolved on its own patient did not seek medical attention    Review of Systems:  Review of Systems   All other systems reviewed and are negative  Past Medical and Surgical History:   Past Medical History:   Diagnosis Date    Esophageal stricture     [facility] ([facility] )     LAST ASSESSED: 8/3/16    Hypertension        Past Surgical History:   Procedure Laterality Date    CATARACT EXTRACTION, BILATERAL Bilateral     REDUCTION MAMMAPLASTY Bilateral     TONSILLECTOMY AND ADENOIDECTOMY      TUBAL LIGATION Bilateral        Meds/Allergies:  Prior to Admission medications    Medication Sig Start Date End Date Taking? Authorizing Provider   azelastine (ASTELIN) 0 1 % nasal spray 1 spray into each nostril in the morning and 1 spray in the evening  Use in each nostril as directed   5/18/22   Arlene Barragan, DO   buPROPion (WELLBUTRIN XL) 150 mg 24 hr tablet Take 1 tablet (150 mg total) by mouth daily 4/5/22   Esacecilia Barragan, DO   cetirizine (ZyrTEC) 10 mg tablet Take 10 mg by mouth daily    Historical Provider, MD Luo Fenofibrate (Fenofibric Acid) 135 MG CPDR take 1 capsule by mouth once daily 8/8/22   Esacecilia Barragan, DO   ergocalciferol (VITAMIN D2) 50,000 units take 1 capsule by mouth every week 6/27/22   Esacecilia Barragan, DO   ipratropium (ATROVENT) 0 06 % nasal spray instill 2 sprays into each nostril four times a day  Patient not taking: No sig reported 3/21/22   Sylwia Lee DO   NON FORMULARY Osteomatrix 2x a day  Patient not taking: Reported on 2022    Historical Provider, MD   spironolactone (ALDACTONE) 25 mg tablet take 2 tablets by mouth daily 21   Sylwia Lee DO     I have reviewed home medications with patient personally  Allergies: Allergies   Allergen Reactions    Aspirin     Montelukast      Other reaction(s): Depression  Category: Adverse Reaction; Other reaction(s): Depression  Category: Adverse Reaction;     Nsaids     Pravastatin      Category: Adverse Reaction;     Simvastatin      Category: Adverse Reaction;     Tolmetin        Social History:  Marital Status:    Occupation:  None  Patient Pre-hospital Living Situation:  Lives alone  Patient Pre-hospital Level of Mobility:  Ambulates with walker  Patient Pre-hospital Diet Restrictions:  None  Substance Use History:   Social History     Substance and Sexual Activity   Alcohol Use No     Social History     Tobacco Use   Smoking Status Former Smoker    Quit date:     Years since quittin 6   Smokeless Tobacco Never Used     Social History     Substance and Sexual Activity   Drug Use Never       Family History:  I have reviewed the patients family history    Physical Exam:   Vitals:   Blood Pressure: 143/67 (22 1745)  Pulse: 79 (22 1745)  Temperature: 98 6 °F (37 °C) (22 1533)  Temp Source: Tympanic (22 1533)  Respirations: 18 (22 1745)  Height: 5' 1" (154 9 cm) (22 1533)  Weight - Scale: 93 kg (205 lb) (22 1533)  SpO2: 97 % (22 1745)    Physical Exam  Constitutional:       General: She is not in acute distress  Appearance: She is obese  HENT:      Head: Normocephalic and atraumatic  Nose: Nose normal       Mouth/Throat:      Mouth: Mucous membranes are moist    Eyes:      Extraocular Movements: Extraocular movements intact        Conjunctiva/sclera: Conjunctivae normal    Cardiovascular:      Rate and Rhythm: Rhythm irregular  Pulmonary:      Effort: Pulmonary effort is normal  No respiratory distress  Abdominal:      Palpations: Abdomen is soft  Tenderness: There is no abdominal tenderness  Musculoskeletal:         General: No swelling  Normal range of motion  Cervical back: Normal range of motion and neck supple  Right lower leg: No edema  Left lower leg: No edema  Skin:     General: Skin is warm and dry  Neurological:      General: No focal deficit present  Mental Status: She is alert  Mental status is at baseline  Cranial Nerves: No cranial nerve deficit  Psychiatric:         Mood and Affect: Mood normal          Behavior: Behavior normal          Additional Data:   Lab Results: I have personally reviewed pertinent reports  Results from last 7 days   Lab Units 08/18/22  1542   WBC Thousand/uL 7 55   HEMOGLOBIN g/dL 12 6   HEMATOCRIT % 40 9   PLATELETS Thousands/uL 391*   NEUTROS PCT % 66   LYMPHS PCT % 21   MONOS PCT % 8   EOS PCT % 3     Results from last 7 days   Lab Units 08/18/22  1542   SODIUM mmol/L 137   POTASSIUM mmol/L 4 5   CHLORIDE mmol/L 103   CO2 mmol/L 23   BUN mg/dL 37*   CREATININE mg/dL 1 36*   CALCIUM mg/dL 9 8     Results from last 7 days   Lab Units 08/18/22  1542   INR  1 03               Imaging: I have personally reviewed pertinent reports  XR chest 1 view portable    (Results Pending)     Epic Records Reviewed: Yes     ** Please Note: This note has been constructed using a voice recognition system   **

## 2022-08-18 NOTE — PROGRESS NOTES
[address] [facility]      NAME: Bienvenido Conner is a 81 y o  female  : 1940    MRN: [id number]  DATE: 2022  TIME: 2:59 PM    Assessment and Plan   New onset a-fib ([address] ) [I48 91]  1  New onset a-fib ([address] )     2  Palpitations  ECG 12 lead       Patient Instructions   EKG showing Afib with rapid ventricular response  Recommended ambulance transfer but patient's daughter wants to drive via private vehicle  Reviewed risks of driving via private vehicle with patient and she would still like to defer ambulance  Patient's daughter to drive to [place] ER for further tx  Copy of EKG provided to patient to give to ER staff  Proceed to ER  Chief Complaint     Chief Complaint   Patient presents with    Heart Problem     Heart palpitations started this morning  History of Present Illness   Bienvenido Conner presents to the clinic with daughter c/o    Denies any chest pain or SOB  Not on any blood thinner  She reports her watch told her that her pulse was 177 this AM and has been high all morning  NO numbness or tingling  Heart Problem  This is a new (occassional palpitations) problem  The current episode started today (this AM)  The problem occurs constantly  The problem has been unchanged  Pertinent negatives include no abdominal pain, chest pain, chills, congestion, coughing, diaphoresis, fatigue, fever, headaches or rash  Nothing aggravates the symptoms  She has tried nothing for the symptoms  The treatment provided no relief  Review of Systems   Review of Systems   Constitutional: Negative for chills, diaphoresis, fatigue and fever  HENT: Negative for congestion, ear discharge, ear pain and facial swelling  Eyes: Negative for photophobia, pain, discharge, redness, itching and visual disturbance  Respiratory: Negative for apnea, cough, chest tightness, shortness of breath and wheezing  Cardiovascular: Positive for palpitations  Negative for chest pain     Gastrointestinal: Negative for abdominal pain  Skin: Negative for color change, rash and wound  Neurological: Negative for dizziness and headaches  Hematological: Negative for adenopathy  Current Medications     Long-Term Medications   Medication Sig Dispense Refill    buPROPion (WELLBUTRIN XL) 150 mg 24 hr tablet Take 1 tablet (150 mg total) by mouth daily 90 tablet 3    Choline Fenofibrate (Fenofibric Acid) 135 MG CPDR take 1 capsule by mouth once daily 90 capsule 1    ergocalciferol (VITAMIN D2) 50,000 units take 1 capsule by mouth every week 4 capsule 5    spironolactone (ALDACTONE) 25 mg tablet take 2 tablets by mouth daily 180 tablet 3    azelastine (ASTELIN) 0 1 % nasal spray 1 spray into each nostril in the morning and 1 spray in the evening  Use in each nostril as directed  1 mL 5    ipratropium (ATROVENT) 0 06 % nasal spray instill 2 sprays into each nostril four times a day (Patient not taking: No sig reported) 15 mL 1       Current Allergies     Allergies as of 08/18/2022 - Reviewed 08/18/2022   Allergen Reaction Noted    Aspirin  03/20/2018    Montelukast  03/25/2016    Nsaids  03/25/2016    Pravastatin  03/25/2016    Simvastatin  03/25/2016    Tolmetin  03/25/2016            The following portions of the patient's history were reviewed and updated as appropriate: allergies, current medications, past family history, past medical history, past social history, past surgical history and problem list   Past Medical History:   Diagnosis Date    Esophageal stricture     Factor V Leiden mutation ([facility] [address] )     LAST ASSESSED: 8/3/16    Hypertension      Past Surgical History:   Procedure Laterality Date    CATARACT EXTRACTION, BILATERAL Bilateral     REDUCTION MAMMAPLASTY Bilateral     TONSILLECTOMY AND ADENOIDECTOMY      TUBAL LIGATION Bilateral      Social History     Socioeconomic History    Marital status:       Spouse name: Not on file    Number of children: 4    Years of education: Not on file    Highest education level: Not on file   Occupational History    Occupation: PRIOR OCCUPATION       Comment: RETIRED   Tobacco Use    Smoking status: Former Smoker     Quit date:      Years since quittin 6    Smokeless tobacco: Never Used   Vaping Use    Vaping Use: Never used   Substance and Sexual Activity    Alcohol use: No    Drug use: Never    Sexual activity: Not on file   Other Topics Concern    Not on file   Social History Narrative    Not on file     Social Determinants of Health     Financial Resource Strain: Not on file   Food Insecurity: Not on file   Transportation Needs: Not on file   Physical Activity: Not on file   Stress: Not on file   Social Connections: Not on file   Intimate Partner Violence: Not on file   Housing Stability: Not on file       Objective   /95   Pulse (!) 130   Temp 98 1 °F (36 7 °C)   Resp 18   Ht 5' 1" (1 549 m)   Wt 91 7 kg (202 lb 1 6 oz)   SpO2 98%   BMI 38 19 kg/m²      Physical Exam     Physical Exam  Vitals and nursing note reviewed  Constitutional:       General: She is not in acute distress  Appearance: She is well-developed  She is not diaphoretic  HENT:      Head: Normocephalic and atraumatic  Right Ear: External ear normal       Left Ear: External ear normal       Nose: Nose normal    Eyes:      General: No scleral icterus  Right eye: No discharge  Left eye: No discharge  Conjunctiva/sclera: Conjunctivae normal    Cardiovascular:      Rate and Rhythm: Tachycardia present  Rhythm irregularly irregular  Heart sounds: Normal heart sounds  No murmur heard  No friction rub  No gallop  Pulmonary:      Effort: Pulmonary effort is normal  No respiratory distress  Breath sounds: Normal breath sounds  No decreased breath sounds, wheezing, rhonchi or rales  Skin:     General: Skin is warm and dry  Coloration: Skin is not pale  Findings: No erythema or rash  Neurological:      Mental Status: She is alert and oriented to person, place, and time  Psychiatric:         Behavior: Behavior normal          Thought Content:  Thought content normal          Judgment: Judgment normal          Charlie Bravo PA-C

## 2022-08-18 NOTE — ED PROVIDER NOTES
History  Chief Complaint   Patient presents with    Abnormal ECG     80 yo female presenting to the ed for new onset Afib  States she feels well and noticed her apple watch was saying her HR was 177  States she went to urgent care where they did an ekg where she was noted to be in afib  Denies recent illness, fevers, sore throat, cough, CP, sob, abdominal pain, N/V/D/C, dysuria, hematuria, medication changes  Denies any history of atrial fibrillation in the past            Prior to Admission Medications   Prescriptions Last Dose Informant Patient Reported? Taking? Choline Fenofibrate (Fenofibric Acid) 135 MG CPDR   No No   Sig: take 1 capsule by mouth once daily   NON FORMULARY   Yes No   Sig: Osteomatrix 2x a day   Patient not taking: Reported on 2022   azelastine (ASTELIN) 0 1 % nasal spray   No No   Si spray into each nostril in the morning and 1 spray in the evening  Use in each nostril as directed     buPROPion (WELLBUTRIN XL) 150 mg 24 hr tablet   No No   Sig: Take 1 tablet (150 mg total) by mouth daily   cetirizine (ZyrTEC) 10 mg tablet   Yes No   Sig: Take 10 mg by mouth daily   ergocalciferol (VITAMIN D2) 50,000 units   No No   Sig: take 1 capsule by mouth every week   ipratropium (ATROVENT) 0 06 % nasal spray   No No   Sig: instill 2 sprays into each nostril four times a day   Patient not taking: No sig reported   spironolactone (ALDACTONE) 25 mg tablet  Self No No   Sig: take 2 tablets by mouth daily      Facility-Administered Medications: None       Past Medical History:   Diagnosis Date    Esophageal stricture     Factor V Leiden mutation ([facility] [address] )     LAST ASSESSED: 8/3/16    Hypertension        Past Surgical History:   Procedure Laterality Date    CATARACT EXTRACTION, BILATERAL Bilateral     REDUCTION MAMMAPLASTY Bilateral     TONSILLECTOMY AND ADENOIDECTOMY      TUBAL LIGATION Bilateral        Family History   Problem Relation Age of Onset    Hypertension Mother     Cancer Father I have reviewed and agree with the history as documented  E-Cigarette/Vaping    E-Cigarette Use Never User      E-Cigarette/Vaping Substances    Nicotine No     THC No     CBD No     Flavoring No     Other No     Unknown No      Social History     Tobacco Use    Smoking status: Former Smoker     Quit date:      Years since quittin 6    Smokeless tobacco: Never Used   Vaping Use    Vaping Use: Never used   Substance Use Topics    Alcohol use: No    Drug use: Never       Review of Systems   Constitutional: Negative for activity change, appetite change, chills, diaphoresis, fatigue and fever  HENT: Negative for congestion, nosebleeds, postnasal drip, rhinorrhea, sinus pressure, sinus pain, sneezing and sore throat  Eyes: Negative for redness and visual disturbance  Respiratory: Negative for apnea, cough, chest tightness, shortness of breath, wheezing and stridor  Cardiovascular: Negative for chest pain, palpitations and leg swelling  Gastrointestinal: Negative for abdominal distention, abdominal pain, blood in stool, constipation, diarrhea, nausea and vomiting  Genitourinary: Negative for difficulty urinating, dysuria, flank pain, frequency, hematuria and urgency  Musculoskeletal: Negative for arthralgias, back pain, gait problem, joint swelling, myalgias, neck pain and neck stiffness  Skin: Negative for color change, pallor, rash and wound  Neurological: Negative for dizziness, syncope, facial asymmetry, weakness, light-headedness, numbness and headaches  Psychiatric/Behavioral: Negative for confusion and decreased concentration  The patient is not nervous/anxious  Physical Exam  Physical Exam  Vitals and nursing note reviewed  Constitutional:       General: She is not in acute distress  Appearance: She is well-developed  She is not diaphoretic  HENT:      Head: Normocephalic and atraumatic        Right Ear: External ear normal       Left Ear: External ear normal       Nose: Nose normal    Eyes:      General: No scleral icterus  Right eye: No discharge  Left eye: No discharge  Conjunctiva/sclera: Conjunctivae normal       Pupils: Pupils are equal, round, and reactive to light  Neck:      Vascular: No JVD  Trachea: No tracheal deviation  Cardiovascular:      Rate and Rhythm: Tachycardia present  Rhythm irregular  Heart sounds: Normal heart sounds  No murmur heard  No friction rub  No gallop  Pulmonary:      Effort: Pulmonary effort is normal  No respiratory distress  Breath sounds: Normal breath sounds  No stridor  No wheezing or rales  Abdominal:      General: Bowel sounds are normal  There is no distension  Palpations: Abdomen is soft  There is no mass  Tenderness: There is no abdominal tenderness  There is no guarding  Musculoskeletal:         General: No tenderness or deformity  Normal range of motion  Cervical back: Normal range of motion and neck supple  Skin:     General: Skin is warm and dry  Coloration: Skin is not pale  Findings: No erythema or rash  Neurological:      Mental Status: She is alert and oriented to person, place, and time  Cranial Nerves: No cranial nerve deficit  Sensory: No sensory deficit  Motor: No abnormal muscle tone  Psychiatric:         Behavior: Behavior normal          Thought Content:  Thought content normal          Judgment: Judgment normal          Vital Signs  ED Triage Vitals [08/18/22 1533]   Temperature Pulse Respirations Blood Pressure SpO2   98 6 °F (37 °C) (!) 152 18 146/87 98 %      Temp Source Heart Rate Source Patient Position - Orthostatic VS BP Location FiO2 (%)   Tympanic Monitor Sitting Left arm --      Pain Score       No Pain           Vitals:    08/18/22 1715 08/18/22 1729 08/18/22 1730 08/18/22 1745   BP:  151/73  143/67   Pulse: 80 81 83 79   Patient Position - Orthostatic VS:  Sitting  Sitting         Visual Acuity      ED Medications  Medications   buPROPion (WELLBUTRIN XL) 24 hr tablet 150 mg (has no administration in time range)   loratadine (CLARITIN) tablet 10 mg (has no administration in time range)   acetaminophen (TYLENOL) tablet 650 mg (has no administration in time range)   ondansetron (ZOFRAN) injection 4 mg (has no administration in time range)   enoxaparin (LOVENOX) subcutaneous injection 40 mg (has no administration in time range)   metoprolol tartrate (LOPRESSOR) tablet 25 mg (has no administration in time range)   diltiazem (CARDIZEM) 125 mg in sodium chloride 0 9 % 125 mL infusion (2 5 mg/hr Intravenous New Bag 8/18/22 1651)   diltiazem (CARDIZEM) injection 10 mg (10 mg Intravenous Given 8/18/22 1542)   metoprolol tartrate (LOPRESSOR) tablet 25 mg (25 mg Oral Given 8/18/22 1624)       Diagnostic Studies  Results Reviewed     Procedure Component Value Units Date/Time    HS Troponin I 2hr [[id number]] Collected: 08/18/22 1739    Lab Status:  In process Specimen: Blood from Arm, Right Updated: 08/18/22 4923    Basic metabolic panel [[id number]]  (Abnormal) Collected: 08/18/22 1542    Lab Status: Final result Specimen: Blood from Arm, Right Updated: 08/18/22 1625     Sodium 137 mmol/L      Potassium 4 5 mmol/L      Chloride 103 mmol/L      CO2 23 mmol/L      ANION GAP 11 mmol/L      BUN 37 mg/dL      Creatinine 1 36 mg/dL      Glucose 108 mg/dL      Calcium 9 8 mg/dL      eGFR 36 ml/min/1 73sq m     Narrative:      Isai guidelines for Chronic Kidney Disease (CKD):     Stage 1 with normal or high GFR (GFR > 90 mL/min/1 73 square meters)    Stage 2 Mild CKD (GFR = 60-89 mL/min/1 73 square meters)    Stage 3A Moderate CKD (GFR = 45-59 mL/min/1 73 square meters)    Stage 3B Moderate CKD (GFR = 30-44 mL/min/1 73 square meters)    Stage 4 Severe CKD (GFR = 15-29 mL/min/1 73 square meters)    Stage 5 End Stage CKD (GFR <15 mL/min/1 73 square meters)  Note: GFR calculation is accurate only with a steady state creatinine    TSH, 3rd generation with Free T4 reflex [[id number]]  (Normal) Collected: 08/18/22 1542    Lab Status: Final result Specimen: Blood from Arm, Right Updated: 08/18/22 1625     TSH 3RD GENERATON 3 759 uIU/mL     Narrative:      Patients undergoing fluorescein dye angiography may retain small amounts of fluorescein in the body for 48-72 hours post procedure  Samples containing fluorescein can produce falsely depressed TSH values  If the patient had this procedure,a specimen should be resubmitted post fluorescein clearance        HS Troponin 0hr (reflex protocol) [[id number]]  (Normal) Collected: 08/18/22 1542    Lab Status: Final result Specimen: Blood from Arm, Right Updated: 08/18/22 1617     hs TnI 0hr 36 ng/L     HS Troponin I 4hr [[id number]]     Lab Status: No result Specimen: Blood     Protime-INR [[id number]]  (Normal) Collected: 08/18/22 1542    Lab Status: Final result Specimen: Blood from Arm, Right Updated: 08/18/22 1606     Protime 13 5 seconds      INR 1 03    APTT [[id number]]  (Normal) Collected: 08/18/22 1542    Lab Status: Final result Specimen: Blood from Arm, Right Updated: 08/18/22 1606     PTT 28 seconds     CBC and differential [[id number]]  (Abnormal) Collected: 08/18/22 1542    Lab Status: Final result Specimen: Blood from Arm, Right Updated: 08/18/22 1553     WBC 7 55 Thousand/uL      RBC 4 37 Million/uL      Hemoglobin 12 6 g/dL      Hematocrit 40 9 %      MCV 94 fL      MCH 28 8 pg      MCHC 30 8 g/dL      RDW 13 3 %      MPV 8 8 fL      Platelets 000 Thousands/uL      nRBC 0 /100 WBCs      Neutrophils Relative 66 %      Immat GRANS % 1 %      Lymphocytes Relative 21 %      Monocytes Relative 8 %      Eosinophils Relative 3 %      Basophils Relative 1 %      Neutrophils Absolute 5 07 Thousands/µL      Immature Grans Absolute 0 04 Thousand/uL      Lymphocytes Absolute 1 60 Thousands/µL      Monocytes Absolute 0 59 Thousand/µL      Eosinophils Absolute 0 19 Thousand/µL      Basophils Absolute 0 06 Thousands/µL                  XR chest 1 view portable    (Results Pending)              Procedures  ECG 12 Lead Documentation Only    Date/Time: 8/18/2022 4:04 PM  Performed by: Tala Starks DO  Authorized by: Tala Starks DO     Interpretation:     Interpretation: abnormal    Rate:     ECG rate:  129    ECG rate assessment: tachycardic    Rhythm:     Rhythm: atrial fibrillation    Ectopy:     Ectopy: none    QRS:     QRS axis:  Normal    QRS intervals:  Normal  Conduction:     Conduction: normal    ST segments:     ST segments:  Normal  T waves:     T waves: normal    CriticalCare Time  Performed by: Tala Starks DO  Authorized by: Tala Starks DO     Critical care provider statement:     Critical care time (minutes):  45    Critical care time was exclusive of:  Separately billable procedures and treating other patients    Critical care was necessary to treat or prevent imminent or life-threatening deterioration of the following conditions:  Cardiac failure and circulatory failure    Critical care was time spent personally by me on the following activities:  Blood draw for specimens, obtaining history from patient or surrogate, development of treatment plan with patient or surrogate, discussions with consultants, evaluation of patient's response to treatment, examination of patient, review of old charts, re-evaluation of patient's condition, ordering and review of radiographic studies, ordering and review of laboratory studies, ordering and performing treatments and interventions and interpretation of cardiac output measurements    I assumed direction of critical care for this patient from another provider in my specialty: no               ED Course  ED Course as of 08/18/22 1754   Thu Aug 18, 2022   1624 Discussed case and EKG with Dr Abimbola Lorenzo of Cardiology who is recommending 25 mg Metoprolol PO at this time in addition to Cardizem IV  HEART Risk Score    Flowsheet Row Most Recent Value   Heart Score Risk Calculator    History 0 Filed at: 08/18/2022 1753   ECG 1 Filed at: 08/18/2022 1753   Age 2 Filed at: 08/18/2022 1753   Risk Factors 1 Filed at: 08/18/2022 1753   Troponin 2 Filed at: 08/18/2022 1753   HEART Score 6 Filed at: 08/18/2022 1753                        SBIRT 22yo+    Flowsheet Row Most Recent Value   SBIRT (25 yo +)    In order to provide better care to our patients, we are screening all of our patients for alcohol and drug use  Would it be okay to ask you these screening questions? Yes Filed at: 08/18/2022 1535   Initial Alcohol Screen: US AUDIT-C     1  How often do you have a drink containing alcohol? 0 Filed at: 08/18/2022 1535   2  How many drinks containing alcohol do you have on a typical day you are drinking? 0 Filed at: 08/18/2022 1535   3a  Male UNDER 65: How often do you have five or more drinks on one occasion? 0 Filed at: 08/18/2022 1535   3b  FEMALE Any Age, or MALE 65+: How often do you have 4 or more drinks on one occassion? 0 Filed at: 08/18/2022 1535   Audit-C Score 0 Filed at: 08/18/2022 1535   MELVIN: How many times in the past year have you    Used an illegal drug or used a prescription medication for non-medical reasons? Never Filed at: 08/18/2022 1535                    MDM  Number of Diagnoses or Management Options  [facility] )  Diagnosis management comments: 80 yo female presenting to the ed for new onset atrial fibrillation with RVR with Hrs 150s-170s  Cardizem 10mg bolus  After 10 mg bolus patient's heart rate began to come back up  Started on Cardizem infusion  Also placed on p o  25 mg metoprolol tartrate at the recommendation of Cardiology  Patient admitted with Cardiology consultation        Disposition  Final diagnoses:   Atrial fibrillation with RVR ([facility] [address] )     Time reflects when diagnosis was documented in both MDM as applicable and the Disposition within this note     Time User Action Codes Description Comment    8/18/2022  4:23 PM Ivin Chew Add [T19 08] Atrial fibrillation with RVR [facility])       ED Disposition     ED Disposition   Admit    Condition   Stable    Date/Time   u Aug 18, 2022  5:29 PM    Comment   Case was discussed with GIOVANNI and the patient's admission status was agreed to be Admission Status: observation status to the service of Dr Rosy Chapa  Follow-up Information    None         Patient's Medications   Discharge Prescriptions    No medications on file       No discharge procedures on file      PDMP Review     None          ED Provider  Electronically Signed by           Celena Tinsley DO  08/18/22 1750 65.7 56.6

## 2022-08-19 ENCOUNTER — APPOINTMENT (OUTPATIENT)
Dept: NON INVASIVE DIAGNOSTICS | Facility: HOSPITAL | Age: 82
DRG: 309 | End: 2022-08-19
Payer: COMMERCIAL

## 2022-08-19 LAB
ALBUMIN SERPL BCP-MCNC: 3.9 G/DL (ref 3.5–5)
ALP SERPL-CCNC: 43 U/L (ref 34–104)
ALT SERPL W P-5'-P-CCNC: 9 U/L (ref 7–52)
ANION GAP SERPL CALCULATED.3IONS-SCNC: 9 MMOL/L (ref 4–13)
AORTIC ROOT: 3.6 CM
AORTIC VALVE MEAN VELOCITY: 6.3 M/S
APICAL FOUR CHAMBER EJECTION FRACTION: 54 %
AST SERPL W P-5'-P-CCNC: 13 U/L (ref 13–39)
AV LVOT MEAN GRADIENT: 1 MMHG
AV LVOT PEAK GRADIENT: 2 MMHG
AV MEAN GRADIENT: 2 MMHG
AV PEAK GRADIENT: 4 MMHG
AV REGURGITATION PRESSURE HALF TIME: 731 MS
AV VELOCITY RATIO: 0.73
BILIRUB SERPL-MCNC: 0.39 MG/DL (ref 0.2–1)
BUN SERPL-MCNC: 31 MG/DL (ref 5–25)
CA-I BLD-SCNC: 1.15 MMOL/L (ref 1.12–1.32)
CALCIUM SERPL-MCNC: 9.3 MG/DL (ref 8.4–10.2)
CHLORIDE SERPL-SCNC: 106 MMOL/L (ref 96–108)
CO2 SERPL-SCNC: 24 MMOL/L (ref 21–32)
CREAT SERPL-MCNC: 1.21 MG/DL (ref 0.6–1.3)
DOP CALC AO PEAK VEL: 0.95 M/S
DOP CALC AO VTI: 22.43 CM
DOP CALC LVOT PEAK VEL VTI: 14 CM
DOP CALC LVOT PEAK VEL: 0.69 M/S
DOP CALC RVOT PEAK VEL: 0.68 M/S
DOP CALC RVOT VTI: 16.38 CM
ERYTHROCYTE [DISTWIDTH] IN BLOOD BY AUTOMATED COUNT: 13.3 % (ref 11.6–15.1)
FRACTIONAL SHORTENING: 36 (ref 28–44)
GFR SERPL CREATININE-BSD FRML MDRD: 42 ML/MIN/1.73SQ M
GLUCOSE SERPL-MCNC: 104 MG/DL (ref 65–140)
HCT VFR BLD AUTO: 38.5 % (ref 34.8–46.1)
HGB BLD-MCNC: 12.4 G/DL (ref 11.5–15.4)
INTERVENTRICULAR SEPTUM IN DIASTOLE (PARASTERNAL SHORT AXIS VIEW): 0.8 CM
INTERVENTRICULAR SEPTUM: 0.8 CM (ref 0.6–1.1)
LAAS-AP2: 22.8 CM2
LAAS-AP4: 21.7 CM2
LEFT ATRIUM SIZE: 3.5 CM
LEFT INTERNAL DIMENSION IN SYSTOLE: 3.2 CM (ref 2.1–4)
LEFT VENTRICULAR INTERNAL DIMENSION IN DIASTOLE: 5 CM (ref 3.5–6)
LEFT VENTRICULAR POSTERIOR WALL IN END DIASTOLE: 0.9 CM
LEFT VENTRICULAR STROKE VOLUME: 75 ML
LVSV (TEICH): 75 ML
MAGNESIUM SERPL-MCNC: 1.7 MG/DL (ref 1.9–2.7)
MCH RBC QN AUTO: 29.8 PG (ref 26.8–34.3)
MCHC RBC AUTO-ENTMCNC: 32.2 G/DL (ref 31.4–37.4)
MCV RBC AUTO: 93 FL (ref 82–98)
MV E'TISSUE VEL-SEP: 11 CM/S
PHOSPHATE SERPL-MCNC: 3.4 MG/DL (ref 2.3–4.1)
PLATELET # BLD AUTO: 375 THOUSANDS/UL (ref 149–390)
PMV BLD AUTO: 9 FL (ref 8.9–12.7)
POTASSIUM SERPL-SCNC: 4.1 MMOL/L (ref 3.5–5.3)
PROT SERPL-MCNC: 6.7 G/DL (ref 6.4–8.4)
RBC # BLD AUTO: 4.16 MILLION/UL (ref 3.81–5.12)
RIGHT VENTRICLE ID DIMENSION: 2.7 CM
SL CV AV DECELERATION TIME RETROGRADE: 2519 MS
SL CV AV PEAK GRADIENT RETROGRADE: 54 MMHG
SL CV LEFT ATRIUM LENGTH A2C: 5.5 CM
SL CV LV EF: 55
SL CV PED ECHO LEFT VENTRICLE DIASTOLIC VOLUME (MOD BIPLANE) 2D: 117 ML
SL CV PED ECHO LEFT VENTRICLE SYSTOLIC VOLUME (MOD BIPLANE) 2D: 42 ML
SL CV RVOT MAX GRADIENT: 2 MMHG
SL CV RVOT MEAN GRADIENT: 1 MMHG
SL CV RVOT VMEAN: 0.49 M/S
SODIUM SERPL-SCNC: 139 MMOL/L (ref 135–147)
TR MAX PG: 24 MMHG
TR PEAK VELOCITY: 2.4 M/S
TRICUSPID VALVE PEAK REGURGITATION VELOCITY: 2.43 M/S
WBC # BLD AUTO: 6.84 THOUSAND/UL (ref 4.31–10.16)

## 2022-08-19 PROCEDURE — 85027 COMPLETE CBC AUTOMATED: CPT | Performed by: NURSE PRACTITIONER

## 2022-08-19 PROCEDURE — 99232 SBSQ HOSP IP/OBS MODERATE 35: CPT | Performed by: INTERNAL MEDICINE

## 2022-08-19 PROCEDURE — 80053 COMPREHEN METABOLIC PANEL: CPT | Performed by: NURSE PRACTITIONER

## 2022-08-19 PROCEDURE — 93306 TTE W/DOPPLER COMPLETE: CPT

## 2022-08-19 PROCEDURE — 84100 ASSAY OF PHOSPHORUS: CPT | Performed by: NURSE PRACTITIONER

## 2022-08-19 PROCEDURE — 83735 ASSAY OF MAGNESIUM: CPT | Performed by: NURSE PRACTITIONER

## 2022-08-19 PROCEDURE — 99222 1ST HOSP IP/OBS MODERATE 55: CPT | Performed by: INTERNAL MEDICINE

## 2022-08-19 PROCEDURE — 82330 ASSAY OF CALCIUM: CPT | Performed by: NURSE PRACTITIONER

## 2022-08-19 PROCEDURE — 93306 TTE W/DOPPLER COMPLETE: CPT | Performed by: INTERNAL MEDICINE

## 2022-08-19 RX ORDER — DILTIAZEM HYDROCHLORIDE 180 MG/1
180 CAPSULE, COATED, EXTENDED RELEASE ORAL DAILY
Status: DISCONTINUED | OUTPATIENT
Start: 2022-08-19 | End: 2022-08-20 | Stop reason: HOSPADM

## 2022-08-19 RX ORDER — MAGNESIUM SULFATE HEPTAHYDRATE 40 MG/ML
2 INJECTION, SOLUTION INTRAVENOUS ONCE
Status: COMPLETED | OUTPATIENT
Start: 2022-08-19 | End: 2022-08-19

## 2022-08-19 RX ADMIN — BUPROPION HYDROCHLORIDE 150 MG: 150 TABLET, EXTENDED RELEASE ORAL at 09:38

## 2022-08-19 RX ADMIN — APIXABAN 5 MG: 5 TABLET, FILM COATED ORAL at 10:53

## 2022-08-19 RX ADMIN — MAGNESIUM SULFATE HEPTAHYDRATE 2 G: 40 INJECTION, SOLUTION INTRAVENOUS at 12:17

## 2022-08-19 RX ADMIN — LORATADINE 10 MG: 10 TABLET ORAL at 09:38

## 2022-08-19 RX ADMIN — DILTIAZEM HYDROCHLORIDE 180 MG: 180 CAPSULE, COATED, EXTENDED RELEASE ORAL at 09:39

## 2022-08-19 RX ADMIN — APIXABAN 5 MG: 5 TABLET, FILM COATED ORAL at 16:52

## 2022-08-19 NOTE — ASSESSMENT & PLAN NOTE
· Patient states that she has had a tendency to bleed in the past and was told she does not need blood thinners  · No history of blood clots  · Eliquis initiated for patient's Afib  · Outpatient follow-up with Hematology  · Will monitor for any signs of bleeding

## 2022-08-19 NOTE — UTILIZATION REVIEW
HELGA 8/18/22 @ 1 CONVERTED TO INPATIENT [email or web address] DUE TO NEW ONSET AFIB WITH RVR REQUIRING MEDICATION ADJUSTMENT, CARDIOLOGY CONSULTATION   Initial Clinical Review    Admission: Date/Time/Statement:   Admission Orders (From admission, onward)     Ordered        08/19/22 1540  Inpatient Admission  Once                      Orders Placed This Encounter   Procedures    Inpatient Admission     Standing Status:   Standing     Number of Occurrences:   1     Order Specific Question:   Level of Care     Answer:   Med Surg [16]     Order Specific Question:   Estimated length of stay     Answer:   More than 2 Midnights     Order Specific Question:   Certification     Answer:   I certify that inpatient services are medically necessary for this patient for a duration of greater than two midnights  See H&P and MD Progress Notes for additional information about the patient's course of treatment  ED Arrival Information     Expected   -    Arrival   8/18/2022 15:07    Acuity   Emergent            Means of arrival   Walk-In    Escorted by   Family Member    Service   Hospitalist    Admission type   Emergency            Arrival complaint   afib,            Chief Complaint   Patient presents with    Abnormal ECG       Initial Presentation: 81 y o  female to the ED from urgent care with complaints of palpitations, found to be in afib with RVR  Admitted to CRITICAL CARE step down under observation then converted to inpatient  for afib with RVR  Arrives with heart rate in 150s  Lungs are clear  GIven Cardizem IV bolus and started on Cardizem drip in the ED  Cardiology consult  Check ECHO  Date: 8/19   Day 2:     Cardiology consult: New onset afib with RVR  Rate appears to be controlled currently  Start on po cardizem  DC metoprolol  Starte Eliquis  MOnitor tele closely  8/20 Update:  Heart rate improved  Titrated off Cardizem drip  Continue with Eliquis        ED Triage Vitals [08/18/22 1533] Temperature Pulse Respirations Blood Pressure SpO2   98 6 °F (37 °C) (!) 152 18 146/87 98 %      Temp Source Heart Rate Source Patient Position - Orthostatic VS BP Location FiO2 (%)   Tympanic Monitor Sitting Left arm --      Pain Score       No Pain          Wt Readings from Last 1 Encounters:   08/20/22 88 kg (194 lb 0 1 oz)     Additional Vital Signs:   Date/Time Temp Pulse Resp BP MAP (mmHg) SpO2 O2 Device Patient Position - Orthostatic VS   08/20/22 0929 -- -- -- 142/66 -- -- -- --   08/20/22 0700 96 8 °F (36 °C) Abnormal  92 46 Abnormal  124/59 85 97 % None (Room air) Lying   08/20/22 0600 -- 108 Abnormal  43 Abnormal  -- -- 97 % -- --   08/20/22 0500 -- 81 18 -- -- 93 % -- --   08/20/22 0400 -- 83 24 Abnormal  -- -- 96 % -- --   08/20/22 0300 -- 78 31 Abnormal  -- -- 96 % -- --   08/20/22 0200 -- 81 23 Abnormal  -- -- 97 % -- --   08/20/22 0100 -- 77 21 -- -- 97 % -- --   08/20/22 0000 -- 82 23 Abnormal  -- -- 96 %         Date/Time Temp Pulse Resp BP MAP (mmHg) SpO2 O2 Device Patient Position - Orthostatic VS   08/19/22 0742 -- -- -- 141/65 93 -- -- Lying   08/19/22 0701 97 9 °F (36 6 °C) 88 23 Abnormal  128/70 -- 97 % -- --   08/19/22 0600 -- 77 20 -- -- 96 % -- --   08/19/22 0500 -- 75 28 Abnormal  -- -- 95 % -- --   08/19/22 0400 96 3 °F (35 7 °C) Abnormal  80 23 Abnormal  136/64 92 94 % -- --   08/19/22 0300 -- 75 25 Abnormal  -- -- 97 % -- --   08/19/22 0200 -- 78 26 Abnormal  -- -- 96 % -- --   08/19/22 0100 -- 75 31 Abnormal  -- -- 97 % -- --   08/19/22 0000 96 4 °F (35 8 °C) Abnormal  75 20 134/60 86 97 % -- --   08/18/22 2300 -- 75 32 Abnormal  -- -- 96 % -- --   08/18/22 2200 -- 77 25 Abnormal  -- -- 96 % -- --   08/18/22 2100 -- 80 29 Abnormal  -- -- 96 % -- --   08/18/22 2050 -- 89 -- 119/57 -- -- -- --   08/18/22 2000 96 7 °F (35 9 °C) Abnormal  78 27 Abnormal  119/57 81 98 % -- --   08/18/22 1900 -- 82 34 Abnormal  125/78 94 96 % -- --   08/18/22 1804 -- 79 18 131/60 86 98 % None (Room air) Sitting   08/18/22 1800 -- 82 18 143/67 96 98 % None (Room air) Sitting   08/18/22 1745 -- 79 18 143/67 -- 97 % None (Room air) Sitting   08/18/22 1730 -- 83 -- -- -- 96 % -- --   08/18/22 1729 -- 81 18 151/73 -- 98 % None (Room air) Sitting   08/18/22 1715 -- 80 -- -- -- 97 % -- --   08/18/22 1702 -- 85 18 -- -- 98 % None (Room air) Sitting   08/18/22 1700 -- 90 18 154/67 97 98 % -- --   08/18/22 1659 -- 104 18 -- -- 97 % -- --   08/18/22 1651 -- 115 Abnormal  18 137/62 -- 98 % None (Room air) Sitting   08/18/22 1645 -- 119 Abnormal  -- -- -- -- -- --   08/18/22 1644 -- 105 -- -- -- -- -- --   08/18/22 1630 -- 98 18 -- -- 98 % -- --   08/18/22 1619 -- 90 18 131/75 -- 98 % None (Room air) Sitting   08/18/22 1604 -- 95 18 135/64 89 98 % None (Room air) Sitting   08/18/22 1600 -- 86 18 146/64 92 99 % None (Room air) Sitting   08/18/22 1548 -- 88 18 -- -- 97 % None (Room air) Sitting   08/18/22 1533 98 6 °F (37 °C) 152 Abnormal  18 146/87 -- 98 % None (Room air)        Pertinent Labs/Diagnostic Test Results:   8/19 ECHO:   Left Ventricle: Left ventricular cavity size is normal  Wall thickness is mildly increased  The left ventricular ejection fraction is 55%  Systolic function is normal  Wall motion is normal  Unable to assess diastolic function due to atrial fibrillation    Left Atrium: The atrium is mildly dilated    Aortic Valve: There is mild to moderate regurgitation  There is aortic sclerosis    Mitral Valve: There is mild to moderate regurgitation    Tricuspid Valve: There is mild to moderate regurgitation    8/18 EKG:  Interpretation:     Interpretation: abnormal     Rate:     ECG rate:  129     ECG rate assessment: tachycardic     Rhythm:     Rhythm: atrial fibrillation     Ectopy:     Ectopy: none     QRS:     QRS axis:  Normal     QRS intervals:  Normal   Conduction:     Conduction: normal     ST segments:     ST segments:  Normal   T waves:     T waves: normal      XR chest 1 view portable   Final Result by Mandi Falk MD (08/18 2142)      No acute cardiopulmonary disease                    Workstation performed: [id number]               Results from last 7 days   Lab Units 08/19/22  0751 08/18/22  1542   WBC Thousand/uL 6 84 7 55   HEMOGLOBIN g/dL 12 4 12 6   HEMATOCRIT % 38 5 40 9   PLATELETS Thousands/uL 375 391*   NEUTROS ABS Thousands/µL  --  5 07         Results from last 7 days   Lab Units 08/19/22  0751 08/18/22  1542   SODIUM mmol/L 139 137   POTASSIUM mmol/L 4 1 4 5   CHLORIDE mmol/L 106 103   CO2 mmol/L 24 23   ANION GAP mmol/L 9 11   BUN mg/dL 31* 37*   CREATININE mg/dL 1 21 1 36*   EGFR ml/min/1 73sq m 42 36   CALCIUM mg/dL 9 3 9 8   CALCIUM, IONIZED mmol/L 1 15  --    MAGNESIUM mg/dL 1 7*  --    PHOSPHORUS mg/dL 3 4  --      Results from last 7 days   Lab Units 08/19/22  0751   AST U/L 13   ALT U/L 9   ALK PHOS U/L 43   TOTAL PROTEIN g/dL 6 7   ALBUMIN g/dL 3 9   TOTAL BILIRUBIN mg/dL 0 39         Results from last 7 days   Lab Units 08/19/22  0751 08/18/22  1542   GLUCOSE RANDOM mg/dL 104 108         Results from last 7 days   Lab Units 08/18/22 2005 08/18/22  1739 08/18/22  1542   HS TNI 0HR ng/L  --   --  36   HS TNI 2HR ng/L  --  30  --    HSTNI D2 ng/L  --  -6  --    HS TNI 4HR ng/L 36  --   --    HSTNI D4 ng/L 0  --   --          Results from last 7 days   Lab Units 08/18/22  1542   PROTIME seconds 13 5   INR  1 03   PTT seconds 28     Results from last 7 days   Lab Units 08/18/22  1542   TSH 3RD GENERATON uIU/mL 3 759       ED Treatment:   Medication Administration from 08/18/2022 1507 to 08/18/2022 1818       Date/Time Order Dose Route Action     08/18/2022 1651 diltiazem (CARDIZEM) 125 mg in sodium chloride 0 9 % 125 mL infusion 2 5 mg/hr Intravenous New Bag     08/18/2022 1542 diltiazem (CARDIZEM) injection 10 mg 10 mg Intravenous Given     08/18/2022 1624 metoprolol tartrate (LOPRESSOR) tablet 25 mg 25 mg Oral Given        Past Medical History:   Diagnosis Date    Esophageal stricture     Factor V Leiden mutation ([facility] [address] )     LAST ASSESSED: 8/3/16    Hypertension        Admitting Diagnosis: Abnormal ECG [R94 31]  Atrial fibrillation with RVR (HCC) [I48 91]  Age/Sex: 81 y o  female  Admission Orders:  Ambulate  SCDs  Tele   Up with assist    Scheduled Medications:  Last 24 Hours Medication List:          Medication Dose Route Frequency    acetaminophen  650 mg Oral Q6H PRN    apixaban  5 mg Oral BID    buPROPion  150 mg Oral Daily    diltiazem  180 mg Oral Daily    loratadine  10 mg Oral Daily    ondansetron  4 mg Intravenous Q6H PRN        IP CONSULT TO CARDIOLOGY  IP CONSULT TO CARDIOLOGY    Network Utilization Review Department  ATTENTION: Please call with any questions or concerns to [phone] and carefully listen to the prompts so that you are directed to the right person  All voicemails are confidential   Cheryl Wood all requests for admission clinical reviews, approved or denied determinations and any other requests to dedicated fax number below belonging to the campus where the patient is receiving treatment   List of dedicated fax numbers for the Facilities:  [address] [place] [address] DENIALS (Administrative/Medical Necessity) [phone]   [address] (Maternity/NICU/Pediatrics) [phone]   [address]  [address] [facility] [place] [address] [id number] [place] [address] [address] [facility] [facility] [phone]

## 2022-08-19 NOTE — NURSING NOTE
Alert and oriented  Denies pain  Moves fair with some difficulty bending at the hip  Ambulates with contact guard and rolling walker  Tolerates activity well  Heart tones clear with palpable pulses  Lungs are decreased with few scattered crackles to bases that clear after being out of bed  Abdomen large and soft with good bowel sounds  No BM  Voids clear yellow in small amounts  IV sites intact

## 2022-08-19 NOTE — CASE MANAGEMENT
Case Management Assessment & Discharge Planning Note    Patient name Cristhian Carrasco  Location ICU 12/ICU  MRN [id number]  : 1940 Date 2022       Current Admission Date: 2022  Current Admission Diagnosis:Atrial fibrillation with rapid ventricular response [facility])   Patient Active Problem List    Diagnosis Date Noted    Atrial fibrillation with rapid ventricular response ([facility] ) 2022    Microalbuminuria 2022    Subclinical hypothyroidism 2022    Hypertriglyceridemia 2022    Major depressive disorder, recurrent, unspecified ([place] [address] ) 2021    Dizziness 2021    Chronic rhinitis 2021    Obesity (BMI 30-39 9) 2021    Factor V Leiden mutation ([place] [address] ) 2021    Medicare annual wellness visit, subsequent 2020    CKD (chronic kidney disease) stage 3, GFR 30-59 ml/min ([place] [address] ) 2020    Benign hypertension with CKD (chronic kidney disease) stage III ([place] [address] ) 2020    Vitamin D deficiency 2020      LOS (days): 0  Geometric Mean LOS (GMLOS) (days):   Days to GMLOS:     OBJECTIVE:     Current admission status: Observation    Preferred Pharmacy:   [address] [place] [address]  , [place] [address] [place] [address]  Phone: [phone] Fax: [address] [place] [address]  Phone: [phone] Fax: [phone]    Primary Care Provider: Roseanna Pickens DO    Primary Insurance: Nixon Rizzo [facility]  Secondary Insurance:     ASSESSMENT:  Taty Landa Proxies    There are no active Health Care Proxies on file         Advance Directives  Does patient have a Health Care POA?: No  Was patient offered paperwork?: Yes (Has paperwork at home)  Does patient currently have a Health Care decision maker?: Yes, please see Health Care Proxy section  Does patient have Advance Directives?: No  Was patient offered paperwork?: Yes (Has paperwork at home)  Primary Contact: Elyse Rey [address]    Readmission Root Cause  30 Day Readmission: No    Patient Information  Admitted from[de-identified] Home  Mental Status: Alert  During Assessment patient was accompanied by: Not accompanied during assessment  Assessment information provided by[de-identified] Patient  Primary Caregiver: Self  Support Systems: Daughter  [place] of Residence: [address] do you live in?: [address] entry access options   Select all that apply : Ramp, Elevator  Type of Current Residence: Apartment  Floor Level: 1  Upon entering residence, is there a bedroom on the main floor (no further steps)?: Yes  Upon entering residence, is there a bathroom on the main floor (no further steps)?: Yes  In the last 12 months, was there a time when you were not able to pay the mortgage or rent on time?: No  In the last 12 months, how many places have you lived?: 1  In the last 12 months, was there a time when you did not have a steady place to sleep or slept in a shelter (including now)?: No  Homeless/housing insecurity resource given?: N/A  Living Arrangements: Lives Alone  Is patient a ?: No    Activities of Daily Living Prior to Admission  Functional Status: Independent  Completes ADLs independently?: Yes  Ambulates independently?: Yes  Does patient use assisted devices?: Yes  Assisted Devices (DME) used: Rollator  Does patient currently own DME?: Yes  What DME does the patient currently own?: Bedside Commode, Straight Cane, Rollator, Other (Comment) (Life alert)  Does patient have a history of Outpatient Therapy (PT/OT)?: No  Does the patient have a history of Short-Term Rehab?: No  Does patient have a history of HHC?: Yes  Does patient currently have [address]?: No    Patient Information Continued  Income Source: Pension/[employer]  Does patient have prescription coverage?: Yes  Within the past 12 months, you worried that your food would run out before you got the money to buy more : Never true  Within the past 12 months, the food you bought just didn't last and you didn't have money to get more : Never true  Food insecurity resource given?: N/A  Does patient receive dialysis treatments?: No  Does patient have a history of substance abuse?: No  Does patient have a history of Mental Health Diagnosis?: No    PHQ 2/9 Screening   Reviewed PHQ 2/9 Depression Screening Score?: No    Means of Transportation  Means of Transport to Appts[de-identified] Family transport  In the past 12 months, has lack of transportation kept you from medical appointments or from getting medications?: No  In the past 12 months, has lack of transportation kept you from meetings, work, or from getting things needed for daily living?: No  Was application for public transport provided?: N/A    DISCHARGE DETAILS:    Discharge planning discussed with[de-identified] [facility]         Is the patient interested in [facility] at discharge?: No    DME Referral Provided  Referral made for DME?: No  Would you like to participate in our [address] service program?  : No - Declined    Treatment Team Recommendation: Home  Discharge Destination Plan[de-identified] Home  Transport at Discharge : Family

## 2022-08-19 NOTE — ASSESSMENT & PLAN NOTE
Lab Results   Component Value Date    EGFR 42 08/19/2022    EGFR 36 08/18/2022    EGFR 37 05/13/2022    CREATININE 1 21 08/19/2022    CREATININE 1 36 (H) 08/18/2022    CREATININE 1 32 (H) 05/13/2022     -continue manage on current medical therapy with uptitration  -if patient's blood pressure is not controlled with diltiazem could consider re-initiation of low-dose spironolactone if patient's renal function tolerates

## 2022-08-19 NOTE — ASSESSMENT & PLAN NOTE
· BP currently stable  · Patient is typically on spironolactone at home, will resume as tolerated  · Cardizem 180 mg daily

## 2022-08-19 NOTE — PROGRESS NOTES
[address]  Progress Note - Mahad Handley 1940, 81 y o  female MRN: [id number]  Unit/Bed#: ICU 12-01 Encounter: [id number]  Primary Care Provider: Gato Chappell DO   Date and time admitted to hospital: 8/18/2022  3:21 PM    * Atrial fibrillation with rapid ventricular response ([facility] )  Assessment & Plan  · Patient has been titrated off Cardizem drip  · Cardizem 180 mg added today per Cardiology  · Echo performed, please see results  · Cardiology following  · Eliquis started for stroke prophylaxis  · If patient remains rate controlled likely discharge home tomorrow    Major depressive disorder, recurrent, unspecified ([facility] )  Assessment & Plan  · Continue Wellbutrin    Factor V Leiden mutation ([facility] )  Assessment & Plan  · Patient states that she has had a tendency to bleed in the past and was told she does not need blood thinners  · No history of blood clots  · Eliquis initiated for patient's Afib  · Outpatient follow-up with Hematology  · Will monitor for any signs of bleeding    Obesity (BMI 30-39  9)  Assessment & Plan  Continue outpatient follow-up with PCP  Possible referral to outpatient weight loss program    Chronic rhinitis  Assessment & Plan  Loratadine substituted for patient's home Zyrtec    Benign hypertension with CKD (chronic kidney disease) stage III ([facility])  Assessment & Plan  · BP currently stable  · Patient is typically on spironolactone at home, will resume as tolerated  · Cardizem 180 mg daily      VTE Prophylaxis:  Apixaban (Eliquis)    Patient Centered Rounds: I have performed bedside rounds with nursing staff today      Discussions with Specialists or Other Care Team Provider: yes  Education and Discussions with Family / Patient:  Spoke with family at bedside regarding plan of care    Current Length of Stay: 0 day(s)    Current Patient Status: Inpatient   Certification Statement: The patient will continue to require additional inpatient hospital stay due to Atrial fibrillation    Discharge Plan:  Likely discharge home tomorrow    Code Status: Level 1 - Full Code    Subjective:   No overnight events noted  Patient titrated off Cardizem drip  Denies any chest pain or shortness of breath  Patient ambulating well  Tolerating diet    Objective:     Vitals:   Temp (24hrs), Av 8 °F (36 °C), Min:96 3 °F (35 7 °C), Max:97 9 °F (36 6 °C)    Temp:  [96 3 °F (35 7 °C)-97 9 °F (36 6 °C)] 97 9 °F (36 6 °C)  HR:  [] 98  Resp:  [18-42] 39  BP: (119-156)/(55-78) 130/69  SpO2:  [94 %-99 %] 96 %  Body mass index is 36 66 kg/m²  Input and Output Summary (last 24 hours): Intake/Output Summary (Last 24 hours) at 2022 1548  Last data filed at 2022 1000  Gross per 24 hour   Intake 337 88 ml   Output 700 ml   Net -362 12 ml       Physical Exam:   Physical Exam  Constitutional:       General: She is not in acute distress  Appearance: She is obese  HENT:      Head: Normocephalic and atraumatic  Nose: Nose normal       Mouth/Throat:      Mouth: Mucous membranes are moist    Eyes:      Extraocular Movements: Extraocular movements intact  Conjunctiva/sclera: Conjunctivae normal    Cardiovascular:      Rate and Rhythm: Rhythm irregular  Pulmonary:      Effort: Pulmonary effort is normal  No respiratory distress  Abdominal:      Palpations: Abdomen is soft  Tenderness: There is no abdominal tenderness  Musculoskeletal:         General: Normal range of motion  Cervical back: Normal range of motion and neck supple  Skin:     General: Skin is warm and dry  Neurological:      General: No focal deficit present  Mental Status: She is alert  Mental status is at baseline  Cranial Nerves: No cranial nerve deficit     Psychiatric:         Mood and Affect: Mood normal          Behavior: Behavior normal          Additional Data:     Labs:    Results from last 7 days   Lab Units 22  0751 22  1542   WBC Thousand/uL 6 84 7 55 HEMOGLOBIN g/dL 12 4 12 6   HEMATOCRIT % 38 5 40 9   PLATELETS Thousands/uL 375 391*   NEUTROS PCT %  --  66   LYMPHS PCT %  --  21   MONOS PCT %  --  8   EOS PCT %  --  3     Results from last 7 days   Lab Units 08/19/22  0751   SODIUM mmol/L 139   POTASSIUM mmol/L 4 1   CHLORIDE mmol/L 106   CO2 mmol/L 24   BUN mg/dL 31*   CREATININE mg/dL 1 21   CALCIUM mg/dL 9 3   ALK PHOS U/L 43   ALT U/L 9   AST U/L 13     Results from last 7 days   Lab Units 08/18/22  1542   INR  1 03               * I Have Reviewed All Lab Data Listed Above  * Additional Pertinent Lab Tests Reviewed: [facility]  Reviewed    Imaging:  Imaging Reports Reviewed Today Include:  No new imaging    Recent Cultures (last 7 days):           Last 24 Hours Medication List:   Current Facility-Administered Medications   Medication Dose Route Frequency Provider Last Rate    acetaminophen  650 mg Oral Q6H PRN Delfin Kumar MD      apixaban  5 mg Oral BID Mark Metcalf,       buPROPion  150 mg Oral Daily Delfin Kumar MD      diltiazem  180 mg Oral Daily Mark Metcalf DO      loratadine  10 mg Oral Daily Delfin Kumar MD      ondansetron  4 mg Intravenous Q6H PRN Delfin Kumar MD          Today, Patient Was Seen By: Delfin Kumar MD    ** Please Note: Dictation voice to text software may have been used in the creation of this document   **

## 2022-08-19 NOTE — ASSESSMENT & PLAN NOTE
--counseled patient on dietary lifestyle modifications including weight loss with BMI goal less than 29

## 2022-08-19 NOTE — ASSESSMENT & PLAN NOTE
-newly diagnosed  -she appears rate controlled at this time will transition patient to diltiazem 180 mg daily  -will discontinue metoprolol tartrate to avoid distress significant bradycardia multiple AV kaila blocking agents as patient tolerated and appear better rate controlled on calcium channel blockers   -after discussion with patient and daughter in room patient is agreeable to initiate oral anticoagulation Eliquis 5 mg daily in the setting of elevated CHADS2 Vasc score of 4 despite history of epistaxis    Will also recommend Hematology weighing in on overall long-term safety as patient had noted issues with factor 5 line in bleeding with NSAID therapy however had tolerated Lovenox in the past   -as patient snores will need outpatient sleep evaluation for evaluation of obstructive sleep apnea  -counseled patient on dietary lifestyle modifications including weight loss with goal BMI less than 29

## 2022-08-19 NOTE — CONSULTS
[address] [facility] 1940, 81 y o  female MRN: [id number]  Unit/Bed#: ICU 12-01 Encounter: [id number]  Primary Care Provider: Aretha Guidry DO   Date and time admitted to hospital: 8/18/2022  3:21 PM    Inpatient consult to Cardiology  Consult performed by: Cecilia Perez DO  Consult ordered by: Tobe Kayser, MD    Inpatient consult to Cardiology  Consult performed by: Cecilia Perez DO  Consult ordered by: Tobe Kayser, MD          Factor V Leiden mutation [facility])  Assessment & Plan  -recommend Hematology evaluation    Obesity (BMI 30-39  9)  Assessment & Plan  --counseled patient on dietary lifestyle modifications including weight loss with BMI goal less than 29    Benign hypertension with CKD (chronic kidney disease) stage III [facility])  Assessment & Plan  Lab Results   Component Value Date    EGFR 42 08/19/2022    EGFR 36 08/18/2022    EGFR 37 05/13/2022    CREATININE 1 21 08/19/2022    CREATININE 1 36 (H) 08/18/2022    CREATININE 1 32 (H) 05/13/2022     -continue manage on current medical therapy with uptitration  -if patient's blood pressure is not controlled with diltiazem could consider re-initiation of low-dose spironolactone if patient's renal function tolerates  * Atrial fibrillation with rapid ventricular response (H[facility])  Assessment & Plan  -newly diagnosed  -she appears rate controlled at this time will transition patient to diltiazem 180 mg daily  -will discontinue metoprolol tartrate to avoid distress significant bradycardia multiple AV kaila blocking agents as patient tolerated and appear better rate controlled on calcium channel blockers   -after discussion with patient and daughter in room patient is agreeable to initiate oral anticoagulation Eliquis 5 mg daily in the setting of elevated CHADS2 Vasc score of 4 despite history of epistaxis    Will also recommend Hematology weighing in on overall long-term safety as patient had noted issues with factor 5 line in bleeding with NSAID therapy however had tolerated Lovenox in the past   -as patient snores will need outpatient sleep evaluation for evaluation of obstructive sleep apnea  -counseled patient on dietary lifestyle modifications including weight loss with goal BMI less than 29      Other summary comments:   -will attempt rate control strategy as patient denies any active symptoms and is currently rate controlled on telemetry  Will transition patient to oral diltiazem will discontinue metoprolol therapy along with discontinuation of diltiazem infusion  Patient notes she has not had significant nose bleeds apart from being on ibuprofen in the past in large quantity and states that she had tolerated post knee repair Lovenox injections at home  Will initiate Eliquis 5 mg twice daily  - continue monitor patient on telemetry at this time to determine if patient is rate controlled on oral regimen   -would recommend electrolyte monitoring repletion with goal potassium 4 0, magnesium 2 0  -patient will need outpatient monitor and sleep medicine evaluation  HPI: Saray Tang is a 81y o  year old female with obesity, hypertension, chronic rhinitis, major depressive disorder, factor 5 Leiden mutation who presented to the hospital 08/18/2022 her Apple watch telling her she was in atrial fibrillation rapid ventricular response heart rates up to as high as 170 beats per minute  Patient was relatively asymptomatic from this  She was seen evaluated in the emergency department started on IV Cardizem infusion with or metoprolol and rates became better controlled  -currently this morning she denies any chest pain, palpitations, lightheadedness or dizziness, loss of consciousness or shortness of breath and overall is feeling well        EKG:   -rate controlled atrial fibrillation on telemetry    MOST  RECENT CARDIAC IMAGING:   -transthoracic echocardiogram 08/19/2022 showing left ventricular systolic function normal estimated LVEF 55% with mild-to-moderate aortic regurgitation, aortic sclerosis, mild-to-moderate mitral regurgitation, mild-to-moderate tricuspid regurgitation  Review of Systems:   Review of Systems   Constitutional: Negative for chills, diaphoresis, fatigue and fever  HENT: Negative for trouble swallowing and voice change  Eyes: Negative for pain and redness  Respiratory: Negative for shortness of breath and wheezing  Cardiovascular: Negative for chest pain, palpitations and leg swelling  Gastrointestinal: Negative for abdominal pain, anal bleeding, blood in stool, constipation, diarrhea, nausea and vomiting  Genitourinary: Negative for dysuria  Musculoskeletal: Negative for neck pain and neck stiffness  Skin: Negative for rash  Neurological: Negative for dizziness, syncope, light-headedness and headaches  Psychiatric/Behavioral: Negative for agitation and confusion  All other systems reviewed and are negative          Historical Information   Past Medical History:   Diagnosis Date    Esophageal stricture     [facility]     LAST ASSESSED: 8/3/16    Hypertension      Past Surgical History:   Procedure Laterality Date    CATARACT EXTRACTION, BILATERAL Bilateral     REDUCTION MAMMAPLASTY Bilateral     TONSILLECTOMY AND ADENOIDECTOMY      TUBAL LIGATION Bilateral      Social History     Substance and Sexual Activity   Alcohol Use Never     Social History     Substance and Sexual Activity   Drug Use Never     Social History     Tobacco Use   Smoking Status Former Smoker    Quit date:     Years since quittin 6   Smokeless Tobacco Never Used       Family History:   Family History   Problem Relation Age of Onset    Hypertension Mother     Cancer Father        Meds/Allergies   all current active meds have been reviewed  Medications Prior to Admission   Medication    buPROPion (WELLBUTRIN XL) 150 mg 24 hr tablet    cetirizine (ZyrTEC) 10 mg tablet    Choline Fenofibrate (Fenofibric Acid) 135 MG CPDR    ergocalciferol (VITAMIN D2) 50,000 units    spironolactone (ALDACTONE) 25 mg tablet    azelastine (ASTELIN) 0 1 % nasal spray    ipratropium (ATROVENT) 0 06 % nasal spray    NON FORMULARY       Allergies   Allergen Reactions    Aspirin     Montelukast      Other reaction(s): Depression  Category: Adverse Reaction; Other reaction(s): Depression  Category: Adverse Reaction;     Nsaids     Pravastatin      Category: Adverse Reaction;     Simvastatin      Category: Adverse Reaction;     Tolmetin        Objective   Vitals: Blood pressure 156/55, pulse 88, temperature 97 9 °F (36 6 °C), temperature source Tympanic, resp  rate (!) 23, height 5' 1" (1 549 m), weight 88 kg (194 lb), SpO2 97 %  , Body mass index is 36 66 kg/m² ,   Orthostatic Blood Pressures    Flowsheet Row Most Recent Value   Blood Pressure 156/55 filed at 08/19/2022 1056   Patient Position - Orthostatic VS Lying filed at 08/19/2022 6377          Systolic (29ELL), LOO:528 , Min:119 , QJB:209     Diastolic (66CAQ), EAY:16, Min:55, Max:95        Physical Exam:  Physical Exam  Vitals reviewed  Constitutional:       General: She is not in acute distress  Appearance: She is obese  She is not diaphoretic  HENT:      Head: Normocephalic and atraumatic  Eyes:      General:         Right eye: No discharge  Left eye: No discharge  Neck:      Comments: Trachea midline, neck obese difficult to assess JVD  Cardiovascular:      Heart sounds: No friction rub  Comments: Irregularly irregular rate and rhythm  Pulmonary:      Effort: Pulmonary effort is normal  No respiratory distress  Breath sounds: No wheezing or rhonchi  Chest:      Chest wall: No tenderness  Abdominal:      General: Bowel sounds are normal       Palpations: Abdomen is soft  Tenderness: There is no abdominal tenderness  There is no rebound     Musculoskeletal:      Right lower leg: No edema  Left lower leg: No edema  Skin:     General: Skin is warm and dry  Neurological:      Mental Status: She is alert  Comments: Awake, alert, able to answer questions appropriately, able move extremities bilaterally     Psychiatric:         Mood and Affect: Mood normal          Behavior: Behavior normal            Lab Results:     Troponins:    Results from last 7 days   Lab Units 08/18/22 2005 08/18/22  1739 08/18/22  1542   HS TNI 0HR ng/L  --   --  36   HS TNI 2HR ng/L  --  30  --    HSTNI D2 ng/L  --  -6  --    HS TNI 4HR ng/L 36  --   --    HSTNI D4 ng/L 0  --   --      BNP:       CBC :   Results from last 7 days   Lab Units 08/19/22  0751 08/18/22  1542   WBC Thousand/uL 6 84 7 55   HEMOGLOBIN g/dL 12 4 12 6   HEMATOCRIT % 38 5 40 9   MCV fL 93 94   PLATELETS Thousands/uL 375 391*     TSH:     CMP:   Results from last 7 days   Lab Units 08/19/22  0751 08/18/22  1542   POTASSIUM mmol/L 4 1 4 5   CHLORIDE mmol/L 106 103   CO2 mmol/L 24 23   BUN mg/dL 31* 37*   CREATININE mg/dL 1 21 1 36*   AST U/L 13  --    ALT U/L 9  --    EGFR ml/min/1 73sq m 42 36     Lipid Profile:     Coags:   Results from last 7 days   Lab Units 08/18/22  1542   INR  1 03

## 2022-08-19 NOTE — PLAN OF CARE
Problem: Potential for Falls  Goal: Patient will remain free of falls  Description: INTERVENTIONS:  - Educate patient/family on patient safety including physical limitations  - Instruct patient to call for assistance with activity   - Consult OT/PT to assist with strengthening/mobility   - Keep Call bell within reach  - Keep bed low and locked with side rails adjusted as appropriate  - Keep care items and personal belongings within reach  - Initiate and maintain comfort rounds  - Make Fall Risk Sign visible to staff  - Offer Toileting every 2 Hours, in advance of need  - Apply yellow socks and bracelet for high fall risk patients  - Consider moving patient to room near nurses station  Outcome: Progressing     Problem: MOBILITY - ADULT  Goal: Maintain or return to baseline ADL function  Description: INTERVENTIONS:  -  Assess patient's ability to carry out ADLs; assess patient's baseline for ADL function and identify physical deficits which impact ability to perform ADLs (bathing, care of mouth/teeth, toileting, grooming, dressing, etc )  - Assess/evaluate cause of self-care deficits   - Assess range of motion  - Assess patient's mobility; develop plan if impaired  - Assess patient's need for assistive devices and provide as appropriate  - Encourage maximum independence but intervene and supervise when necessary  - Involve family in performance of ADLs  - Assess for home care needs following discharge   - Consider OT consult to assist with ADL evaluation and planning for discharge  - Provide patient education as appropriate  Outcome: Progressing  Goal: Maintains/Returns to pre admission functional level  Description: INTERVENTIONS:  - Perform BMAT or MOVE assessment daily    - Set and communicate daily mobility goal to care team and patient/family/caregiver  - Collaborate with rehabilitation services on mobility goals if consulted  - Perform Range of Motion 3 times a day  - Reposition patient every 3 hours    - Dangle patient 3 times a day  - Stand patient 3 times a day  - Ambulate patient 3 times a day  - Out of bed to chair 3 times a day   - Out of bed for meals 3 times a day  - Out of bed for toileting  - Record patient progress and toleration of activity level   Outcome: Progressing     Problem: Prexisting or High Potential for Compromised Skin Integrity  Goal: Skin integrity is maintained or improved  Description: INTERVENTIONS:  - Identify patients at risk for skin breakdown  - Assess and monitor skin integrity  - Assess and monitor nutrition and hydration status  - Monitor labs   - Assess for incontinence   - Turn and reposition patient  - Assist with mobility/ambulation  - Relieve pressure over bony prominences  - Avoid friction and shearing  - Provide appropriate hygiene as needed including keeping skin clean and dry  - Evaluate need for skin moisturizer/barrier cream  - Collaborate with interdisciplinary team   - Patient/family teaching  - Consider wound care consult   Outcome: Progressing     Problem: CARDIOVASCULAR - ADULT  Goal: Maintains optimal cardiac output and hemodynamic stability  Description: INTERVENTIONS:  - Monitor I/O, vital signs and rhythm  - Monitor for S/S and trends of decreased cardiac output  - Administer and titrate ordered vasoactive medications to optimize hemodynamic stability  - Assess quality of pulses, skin color and temperature  - Assess for signs of decreased coronary artery perfusion  - Instruct patient to report change in severity of symptoms  Outcome: Progressing  Goal: Absence of cardiac dysrhythmias or at baseline rhythm  Description: INTERVENTIONS:  - Continuous cardiac monitoring, vital signs, obtain 12 lead EKG if ordered  - Administer antiarrhythmic and heart rate control medications as ordered  - Monitor electrolytes and administer replacement therapy as ordered  Outcome: Progressing

## 2022-08-19 NOTE — NURSING NOTE
Pt c/o sudden bleeding to right forearm IV site  Cannula removed and pressure applied for 2 minutes with no further bleeding  Dressing applied  Will monitor

## 2022-08-20 VITALS
OXYGEN SATURATION: 97 % | BODY MASS INDEX: 36.63 KG/M2 | HEART RATE: 92 BPM | HEIGHT: 61 IN | DIASTOLIC BLOOD PRESSURE: 66 MMHG | RESPIRATION RATE: 46 BRPM | SYSTOLIC BLOOD PRESSURE: 142 MMHG | TEMPERATURE: 96.8 F | WEIGHT: 194 LBS

## 2022-08-20 LAB
ATRIAL RATE: 300 BPM
ATRIAL RATE: 340 BPM
ATRIAL RATE: 65 BPM
QRS AXIS: -10 DEGREES
QRS AXIS: -11 DEGREES
QRS AXIS: -14 DEGREES
QRSD INTERVAL: 76 MS
QRSD INTERVAL: 78 MS
QRSD INTERVAL: 80 MS
QT INTERVAL: 296 MS
QT INTERVAL: 300 MS
QT INTERVAL: 354 MS
QTC INTERVAL: 403 MS
QTC INTERVAL: 433 MS
QTC INTERVAL: 439 MS
T WAVE AXIS: 24 DEGREES
T WAVE AXIS: 41 DEGREES
T WAVE AXIS: 47 DEGREES
VENTRICULAR RATE: 129 BPM
VENTRICULAR RATE: 129 BPM
VENTRICULAR RATE: 78 BPM

## 2022-08-20 PROCEDURE — 93010 ELECTROCARDIOGRAM REPORT: CPT | Performed by: INTERNAL MEDICINE

## 2022-08-20 PROCEDURE — 99239 HOSP IP/OBS DSCHRG MGMT >30: CPT | Performed by: INTERNAL MEDICINE

## 2022-08-20 RX ORDER — DILTIAZEM HYDROCHLORIDE 180 MG/1
180 CAPSULE, COATED, EXTENDED RELEASE ORAL DAILY
Qty: 30 CAPSULE | Refills: 0 | Status: SHIPPED | OUTPATIENT
Start: 2022-08-21 | End: 2022-09-19 | Stop reason: SDUPTHER

## 2022-08-20 RX ADMIN — BUPROPION HYDROCHLORIDE 150 MG: 150 TABLET, EXTENDED RELEASE ORAL at 09:29

## 2022-08-20 RX ADMIN — APIXABAN 5 MG: 5 TABLET, FILM COATED ORAL at 09:29

## 2022-08-20 RX ADMIN — LORATADINE 10 MG: 10 TABLET ORAL at 09:29

## 2022-08-20 RX ADMIN — DILTIAZEM HYDROCHLORIDE 180 MG: 180 CAPSULE, COATED, EXTENDED RELEASE ORAL at 09:29

## 2022-08-20 NOTE — ASSESSMENT & PLAN NOTE
Continue outpatient follow-up with PCP    Possible referral to outpatient weight loss program   Outpatient sleep study

## 2022-08-20 NOTE — PROGRESS NOTES
Cardiology Progress Note - Catalina Boyce 81 y o  female MRN: [id number]    Unit/Bed#: ICU 12-01 Encounter: [id number]          Subjective:   Patient seen and examined  No overnight events or complaints this AM     Hospital Course: Catalina Boyce is a 81y o  year old female with a history of obesity, HTN, MDD who presented to [facility] on 8/18/22 as her Apple iWatch was telling her she was possibly in Atrial Fibrillation with RVR (HR in the 170's)  Patient was fairly asymptomatic however  Rates controlled in the ED with IV metoprolol and Cardizem ggt  Vitals: Blood pressure 124/59, pulse 92, temperature (!) 96 8 °F (36 °C), temperature source Tympanic, resp  rate (!) 46, height 5' 1" (1 549 m), weight 88 kg (194 lb 0 1 oz), SpO2 97 %  , Body mass index is 36 66 kg/m² ,   Orthostatic Blood Pressures    Flowsheet Row Most Recent Value   Blood Pressure 124/59 filed at 08/20/2022 0700   Patient Position - Orthostatic VS Lying filed at 08/20/2022 0700            Intake/Output Summary (Last 24 hours) at 8/20/2022 0902  Last data filed at 8/20/2022 0601  Gross per 24 hour   Intake 140 ml   Output 2 ml   Net 138 ml       Review of System:  Review of system was conducted and was negative except for as stated in the subjective course      Physical Exam:    GEN: Catalina Boyce appears well, alert and oriented x 3, pleasant and cooperative   HEENT:  Normocephalic, atraumatic, anicteric, moist mucous membranes  NECK: no JVD  HEART: irreg rhythm, irreg rate, normal S1 and S2, no murmurs, clicks, gallops or rubs   LUNGS: Clear to auscultation bilaterally; no wheezes, rales, or rhonchi; respiration nonlabored on RA  ABDOMEN:  Normoactive bowel sounds, soft, no tenderness, no distention  EXTREMITIES: peripheral pulses palpable; no edema  NEURO: no gross focal findings; cranial nerves grossly intact   SKIN:  Dry, intact, warm to touch      Current Facility-Administered Medications:     acetaminophen (TYLENOL) tablet 650 mg, 650 mg, Oral, Q6H PRN, Latrice Amado MD    apixaban [facility]) tablet 5 mg, 5 mg, Oral, BID, Arvil Susana, DO, 5 mg at 08/19/22 1652    buPROPion (WELLBUTRIN XL) 24 hr tablet 150 mg, 150 mg, Oral, Daily, Latrice Amado MD, 150 mg at 08/19/22 0233    diltiazem (CARDIZEM CD) 24 hr capsule 180 mg, 180 mg, Oral, Daily, Arvil Susana, DO, 180 mg at 08/19/22 5901    loratadine (CLARITIN) tablet 10 mg, 10 mg, Oral, Daily, Latrice Amado MD, 10 mg at 08/19/22 0938    ondansetron (ZOFRAN) injection 4 mg, 4 mg, Intravenous, Q6H PRN, Latrice Amado MD    Labs & Results:  Results from last 7 days   Lab Units 08/18/22 2005 08/18/22  1739 08/18/22  1542   HS TNI 0HR ng/L  --   --  36   HS TNI 2HR ng/L  --  30  --    HS TNI 4HR ng/L 36  --   --          Results from last 7 days   Lab Units 08/19/22  0751 08/18/22  1542   POTASSIUM mmol/L 4 1 4 5   CO2 mmol/L 24 23   CHLORIDE mmol/L 106 103   BUN mg/dL 31* 37*   CREATININE mg/dL 1 21 1 36*     Results from last 7 days   Lab Units 08/19/22  0751 08/18/22  1542   HEMOGLOBIN g/dL 12 4 12 6   HEMATOCRIT % 38 5 40 9   PLATELETS Thousands/uL 375 391*           Telemetry:   Personally reviewed by Julisa Grider MD:     VTE Prophylaxis: Heparin        Assessment:  Principal Problem:    Atrial fibrillation with rapid ventricular response (HCC)  Active Problems:    Benign hypertension with CKD (chronic kidney disease) stage III (HCC)    Chronic rhinitis    Obesity (BMI 30-39  9)    Factor V Leiden mutation ([facility] )    Major depressive disorder, recurrent, unspecified ([facility] )        1  New Onset Atrial Fibrillation  --> [id number]: At least 4  --> TTE (8/16/22): EF 55%  [facility]  Mild LA dilation  Moderate AI/MR/TR  2  Hypertension  3  History of Factor V Leiden Mutation with prior epistaxis episodes  4   Obesity (BMI 37)    Plan:  - c/w Eliquis 5mg PO BID  - c/w Cardizem 180mg PO Daily  - Outpatient Zio Patch  - Outpatient sleep study  - Can be discharged from Cardiology perspective, will arrange o/p follow up and have office contact patient with date and time  Thank you for involving us in the care of your patient  Elle Payan MD  Cardiology      [facility] Everywhere records reviewed:     ** Please Note: Fluency DirectDictation voice to text software may have been used in the creation of this document   **

## 2022-08-20 NOTE — CASE MANAGEMENT
Case Management Progress Note    Patient name Darshan Blackwell  Location ICU 12/ICU  MRN [id number]  : 1940 Date 2022       LOS (days): 1  Geometric Mean LOS (GMLOS) (days):   Days to GMLOS:        OBJECTIVE:        Current admission status: Inpatient  Preferred Pharmacy:   [id number] [address], [address]  [address] [facility] [address]  Phone: [phone] Fax: [phone] [address] [facility] [address] [address] [place] [address]  Phone: [phone] Fax: [phone]    Primary Care Provider: Nelson Ferreira DO    Primary Insurance: Clarice Khan [facility]  Secondary Insurance:     PROGRESS NOTE:    Call to [facility] to price check Eliquis and not able to get anyone in the pharmacy  Met with patient and dtr at bedside and discussed same  Provided with Eliquis 30 day free coupon

## 2022-08-20 NOTE — DISCHARGE SUMMARY
[address]  Discharge- Bienvenido Conner 1940, 81 y o  female MRN: [id number]  Unit/Bed#: ICU 12-01 Encounter: [id number]  Primary Care Provider: Antonio Yates DO   Date and time admitted to hospital: 8/18/2022  3:21 PM    * Atrial fibrillation with rapid ventricular response ([facility] )  Assessment & Plan  · Patient has been titrated off Cardizem drip  · Currently rate controlled  · Cardizem 180 mg daily, prescription sent to pharmacy  · Echo performed, please see results  · Cardiology following  · Eliquis started for stroke prophylaxis  · Patient stable for discharge  Outpatient follow-up with cardiology    Major depressive disorder, recurrent, unspecified ([facility] )  Assessment & Plan  · Continue Wellbutrin    Factor V Leiden mutation [facility])  Assessment & Plan  · Patient states that she has had a tendency to bleed in the past and was told she does not need blood thinners  · No history of blood clots  · Eliquis initiated for patient's Afib  · Outpatient follow-up with Hematology  · Patient has been bleeding at sites of IV insertion however no spontaneous bleeding  Patient counseled on risks of bleeding while on blood thinner  Patient and daughter understand    Obesity (BMI 30-39  9)  Assessment & Plan  Continue outpatient follow-up with PCP    Possible referral to outpatient weight loss program   Outpatient sleep study    Chronic rhinitis  Assessment & Plan  Loratadine substituted for patient's home Zyrtec    Benign hypertension with CKD (chronic kidney disease) stage III ([facility])  Assessment & Plan  · BP currently stable  · Patient is typically on spironolactone at home, resume on discharge  · Cardizem 180 mg daily      Discharging Physician / Practitioner: Mini Rios MD  PCP: Antonio Yates DO  Admission Date:   Admission Orders (From admission, onward)     Ordered        08/19/22 1540  Inpatient Admission  Once            08/18/22 1728  Place in Observation  Once Discharge Date: 08/20/22    Medical Problems             Resolved Problems  Date Reviewed: 8/4/2022   None                 Consultations During Hospital Stay:  · Cardiology    Procedures Performed:   · None    Significant Findings / Test Results:   · Rapid AFib    Incidental Findings:   · None     Test Results Pending at Discharge (will require follow up): · None     Outpatient Tests Requested:  · Routine labs with PCP as outpatient  Possible referral for outpatient sleep study    Complications:     None    Reason for Admission:  Rapid AFib    Hospital Course: Indu Delvalle is a 81 y o  female patient who originally presented to the hospital on 8/18/2022 due to abnormal heart rhythm  Patient found to be in rapid AFib in the ER  Patient given Cardizem bolus and oral metoprolol  Cardiology was consulted  Patient was initiated on Cardizem drip and admitted to step-down level 2  Patient remained stable and was titrated off Cardizem drip and initiated on oral Cardizem per Cardiology recommendation  Eliquis was also initiated per Cardiology  Patient remained stable off Cardizem drip and on oral Cardizem  Patient denies any chest pain, shortness a breath, or palpitations today  Patient tolerating diet  Patient discharged home with outpatient cardiology follow-up  Also referred to Hematology for history of factor 5 Leiden    Please see above list of diagnoses and related plan for additional information       Condition at Discharge: stable     Discharge Day Visit / Exam:     Subjective:  No complaints at this time    Vitals: Blood Pressure: 142/66 (08/20/22 0929)  Pulse: 92 (08/20/22 0700)  Temperature: (!) 96 8 °F (36 °C) (08/20/22 0700)  Temp Source: Tympanic (08/20/22 0700)  Respirations:  20 (08/20/22 0700)  Height: 5' 1" (154 9 cm) (08/19/22 0701)  Weight - Scale: 88 kg (194 lb 0 1 oz) (08/20/22 0600)  SpO2: 97 % (08/20/22 0700)     Exam:   Physical Exam  Constitutional:       General: She is not in acute distress  Appearance: She is obese  HENT:      Head: Normocephalic and atraumatic  Nose: Nose normal       Mouth/Throat:      Mouth: Mucous membranes are moist    Eyes:      Extraocular Movements: Extraocular movements intact  Conjunctiva/sclera: Conjunctivae normal    Cardiovascular:      Rate and Rhythm: Rhythm irregular  Pulmonary:      Effort: Pulmonary effort is normal  No respiratory distress  Abdominal:      Palpations: Abdomen is soft  Tenderness: There is no abdominal tenderness  Musculoskeletal:         General: Normal range of motion  Cervical back: Normal range of motion and neck supple  Skin:     General: Skin is warm and dry  Neurological:      General: No focal deficit present  Mental Status: She is alert  Cranial Nerves: No cranial nerve deficit  Psychiatric:         Mood and Affect: Mood normal          Behavior: Behavior normal          Discussion with Family:  Spoke with patient's daughter at bedside regarding plan of care/discharge plan    Discharge instructions/Information to patient and family:   See after visit summary for information provided to patient and family  Provisions for Follow-Up Care:  See after visit summary for information related to follow-up care and any pertinent home health orders  Disposition:     Home    Planned Readmission:    no     Discharge Statement:  I spent 35 minutes discharging the patient  This time was spent on the day of discharge  I had direct contact with the patient on the day of discharge  Greater than 50% of the total time was spent examining patient, answering all patient questions, arranging and discussing plan of care with patient as well as directly providing post-discharge instructions  Additional time then spent on discharge activities  Discharge Medications:  See after visit summary for reconciled discharge medications provided to patient and family        ** Please Note: This note has been constructed using a voice recognition system **

## 2022-08-20 NOTE — ASSESSMENT & PLAN NOTE
· Patient has been titrated off Cardizem drip  · Currently rate controlled  · Cardizem 180 mg daily, prescription sent to pharmacy  · Echo performed, please see results  · Cardiology following  · Eliquis started for stroke prophylaxis  · Patient stable for discharge    Outpatient follow-up with cardiology

## 2022-08-20 NOTE — ASSESSMENT & PLAN NOTE
· BP currently stable  · Patient is typically on spironolactone at home, resume on discharge  · Cardizem 180 mg daily

## 2022-08-20 NOTE — ASSESSMENT & PLAN NOTE
· Patient states that she has had a tendency to bleed in the past and was told she does not need blood thinners  · No history of blood clots  · Eliquis initiated for patient's Afib  · Outpatient follow-up with Hematology  · Patient has been bleeding at sites of IV insertion however no spontaneous bleeding  Patient counseled on risks of bleeding while on blood thinner    Patient and daughter understand

## 2022-08-20 NOTE — PLAN OF CARE
Problem: Potential for Falls  Goal: Patient will remain free of falls  Description: INTERVENTIONS:  - Educate patient/family on patient safety including physical limitations  - Instruct patient to call for assistance with activity   - Consult OT/PT to assist with strengthening/mobility   - Keep Call bell within reach  - Keep bed low and locked with side rails adjusted as appropriate  - Keep care items and personal belongings within reach  - Initiate and maintain comfort rounds  - Make Fall Risk Sign visible to staff  - Apply yellow socks and bracelet for high fall risk patients  - Consider moving patient to room near nurses station  Outcome: Progressing     Problem: MOBILITY - ADULT  Goal: Maintain or return to baseline ADL function  Description: INTERVENTIONS:  -  Assess patient's ability to carry out ADLs; assess patient's baseline for ADL function and identify physical deficits which impact ability to perform ADLs (bathing, care of mouth/teeth, toileting, grooming, dressing, etc )  - Assess/evaluate cause of self-care deficits   - Assess range of motion  - Assess patient's mobility; develop plan if impaired  - Assess patient's need for assistive devices and provide as appropriate  - Encourage maximum independence but intervene and supervise when necessary  - Involve family in performance of ADLs  - Assess for home care needs following discharge   - Consider OT consult to assist with ADL evaluation and planning for discharge  - Provide patient education as appropriate  Outcome: Progressing  Goal: Maintains/Returns to pre admission functional level  Description: INTERVENTIONS:  - Perform BMAT or MOVE assessment daily    - Set and communicate daily mobility goal to care team and patient/family/caregiver     - Collaborate with rehabilitation services on mobility goals if consulted  - Out of bed for toileting  - Record patient progress and toleration of activity level   Outcome: Progressing     Problem: Prexisting or High Potential for Compromised Skin Integrity  Goal: Skin integrity is maintained or improved  Description: INTERVENTIONS:  - Identify patients at risk for skin breakdown  - Assess and monitor skin integrity  - Assess and monitor nutrition and hydration status  - Monitor labs   - Assess for incontinence   - Turn and reposition patient  - Assist with mobility/ambulation  - Relieve pressure over bony prominences  - Avoid friction and shearing  - Provide appropriate hygiene as needed including keeping skin clean and dry  - Evaluate need for skin moisturizer/barrier cream  - Collaborate with interdisciplinary team   - Patient/family teaching  - Consider wound care consult   Outcome: Progressing     Problem: CARDIOVASCULAR - ADULT  Goal: Maintains optimal cardiac output and hemodynamic stability  Description: INTERVENTIONS:  - Monitor I/O, vital signs and rhythm  - Monitor for S/S and trends of decreased cardiac output  - Administer and titrate ordered vasoactive medications to optimize hemodynamic stability  - Assess quality of pulses, skin color and temperature  - Assess for signs of decreased coronary artery perfusion  - Instruct patient to report change in severity of symptoms  Outcome: Progressing  Goal: Absence of cardiac dysrhythmias or at baseline rhythm  Description: INTERVENTIONS:  - Continuous cardiac monitoring, vital signs, obtain 12 lead EKG if ordered  - Administer antiarrhythmic and heart rate control medications as ordered  - Monitor electrolytes and administer replacement therapy as ordered  Outcome: Progressing

## 2022-08-22 ENCOUNTER — TRANSITIONAL CARE MANAGEMENT (OUTPATIENT)
Dept: FAMILY MEDICINE CLINIC | Facility: CLINIC | Age: 82
End: 2022-08-22

## 2022-08-23 NOTE — UTILIZATION REVIEW
Inpatient Admission Authorization Request   NOTIFICATION OF INPATIENT ADMISSION/INPATIENT AUTHORIZATION REQUEST   SERVICING FACILITY:   [place] [address]  [address], [address]  Tax ID: [id number]  NPI: [id number]  Place of Service: Inpatient [facility]  [address]  Place of Service Code: 24     ATTENDING PROVIDER:  Attending Name and NPI#: [place], [place] [[id number]]  Address: [address] [place] [address], [address]  Phone: [phone]     UTILIZATION REVIEW CONTACT:  Edmundo Jurado Utilization   Network Utilization Review Department  Phone: [phone]  Fax [phone]  Email: [email or web address]     PHYSICIAN ADVISORY SERVICES:  FOR POLL-EM-TEPV REVIEW - MEDICAL NECESSITY DENIAL  Phone: [phone]  Fax: [phone]  Email: [email or web address]     TYPE OF REQUEST:  Inpatient Status     ADMISSION INFORMATION:  ADMISSION DATE/TIME: 8/19/22  3:40 PM  PATIENT DIAGNOSIS CODE/DESCRIPTION:  Abnormal ECG [R94 31]  Atrial fibrillation with RVR ([address] ) [I48 91]  DISCHARGE DATE/TIME: 8/20/2022 10:42 AM   IMPORTANT INFORMATION:  Please contact the Edmundo Jurado directly with any questions or concerns regarding this request  Department voicemails are confidential     Send requests for admission clinical reviews, concurrent reviews, approvals, and administrative denials due to lack of clinical to fax [phone]

## 2022-08-26 ENCOUNTER — RA CDI HCC (OUTPATIENT)
Dept: OTHER | Facility: HOSPITAL | Age: 82
End: 2022-08-26

## 2022-08-26 PROBLEM — N18.32 STAGE 3B CHRONIC KIDNEY DISEASE (HCC): Status: ACTIVE | Noted: 2020-01-02

## 2022-08-26 NOTE — PROGRESS NOTES
[facility]  coding opportunities     D47 3     Chart Reviewed number of suggestions sent to Provider: 1     Patients Insurance     Medicare Insurance: [address] [place] [id number]

## 2022-08-31 ENCOUNTER — OFFICE VISIT (OUTPATIENT)
Dept: FAMILY MEDICINE CLINIC | Facility: CLINIC | Age: 82
End: 2022-08-31
Payer: COMMERCIAL

## 2022-08-31 VITALS
SYSTOLIC BLOOD PRESSURE: 132 MMHG | DIASTOLIC BLOOD PRESSURE: 70 MMHG | BODY MASS INDEX: 37.34 KG/M2 | HEART RATE: 75 BPM | WEIGHT: 197.8 LBS | TEMPERATURE: 96.4 F | OXYGEN SATURATION: 98 % | HEIGHT: 61 IN

## 2022-08-31 DIAGNOSIS — I48.91 ATRIAL FIBRILLATION WITH RAPID VENTRICULAR RESPONSE (HCC): Primary | ICD-10-CM

## 2022-08-31 DIAGNOSIS — D68.51 FACTOR V LEIDEN MUTATION (HCC): ICD-10-CM

## 2022-08-31 PROCEDURE — 1111F DSCHRG MED/CURRENT MED MERGE: CPT | Performed by: FAMILY MEDICINE

## 2022-08-31 PROCEDURE — 99496 TRANSJ CARE MGMT HIGH F2F 7D: CPT | Performed by: FAMILY MEDICINE

## 2022-08-31 NOTE — PROGRESS NOTES
Assessment/Plan:     No problem-specific Assessment & Plan notes found for this encounter  Diagnoses and all orders for this visit:    Atrial fibrillation with rapid ventricular response ([facility] [address] )  Comments: On eliquis, HR stable, Follow up Cardiology    Factor V Leiden mutation [facility])  Comments: On eliquis, advised by hospitalist to see H/O, no hx DVT         Subjective:     Patient ID: Duran Haider is a 81 y o  female  Patient presents for transition of care management following hospitalization from August 18th August 20th, patient noted on her Apple watch that she had an abnormal heart rhythm, she presented to urgent care where an EKG revealed she was in AFib with rapid ventricular response, she was hospitalized to started on a Cardizem drip and transferred oral Cardizem, cardiology evaluation reviewed echocardiogram demonstrated aortic sclerosis and aortic and mitral regurgitation  She has felt well since discharge, no palpitations, lightheadedness or syncope  No chest pain or shortness of breath  Review of Systems   Constitutional: Negative for chills, fatigue and fever  HENT: Negative  Eyes: Negative  Negative for visual disturbance  Respiratory: Negative for cough, chest tightness, shortness of breath and wheezing  Cardiovascular: Negative for chest pain and palpitations  Gastrointestinal: Negative for abdominal pain, blood in stool, constipation, diarrhea, nausea and vomiting  Endocrine: Negative  Genitourinary: Negative for difficulty urinating and dysuria  Musculoskeletal: Negative for arthralgias and myalgias  Skin: Negative  Allergic/Immunologic: Negative  Neurological: Negative for dizziness, seizures, syncope, weakness, light-headedness and headaches  Hematological: Negative for adenopathy  Psychiatric/Behavioral: Negative  Negative for dysphoric mood  The patient is not nervous/anxious            Objective:     Physical Exam  Vitals and nursing note reviewed  Constitutional:       General: She is not in acute distress  Appearance: Normal appearance  She is well-developed  She is not ill-appearing, toxic-appearing or diaphoretic  HENT:      Head: Normocephalic and atraumatic  Right Ear: Tympanic membrane, ear canal and external ear normal  There is no impacted cerumen  Left Ear: Tympanic membrane, ear canal and external ear normal  There is no impacted cerumen  Nose: Nose normal  No congestion or rhinorrhea  Mouth/Throat:      Mouth: Mucous membranes are moist       Pharynx: Oropharynx is clear  No oropharyngeal exudate or posterior oropharyngeal erythema  Eyes:      General: No scleral icterus  Right eye: No discharge  Left eye: No discharge  Conjunctiva/sclera: Conjunctivae normal       Pupils: Pupils are equal, round, and reactive to light  Cardiovascular:      Rate and Rhythm: Normal rate and regular rhythm  Pulses: Normal pulses  Heart sounds: Normal heart sounds  No murmur heard  Pulmonary:      Effort: Pulmonary effort is normal  No respiratory distress  Breath sounds: Normal breath sounds  No wheezing, rhonchi or rales  Abdominal:      General: Bowel sounds are normal  There is no distension  Palpations: Abdomen is soft  There is no mass  Tenderness: There is no abdominal tenderness  There is no guarding or rebound  Musculoskeletal:         General: Normal range of motion  Cervical back: Normal range of motion and neck supple  No rigidity  Right lower leg: No edema  Left lower leg: No edema  Lymphadenopathy:      Cervical: No cervical adenopathy  Skin:     General: Skin is warm and dry  Findings: No rash  Neurological:      General: No focal deficit present  Mental Status: She is alert and oriented to person, place, and time  Sensory: No sensory deficit  Motor: No weakness or abnormal muscle tone        Coordination: Coordination normal       Gait: Gait normal       Deep Tendon Reflexes: Reflexes are normal and symmetric  Psychiatric:         Mood and Affect: Mood normal          Behavior: Behavior normal          Thought Content: Thought content normal          Judgment: Judgment normal            Vitals:    08/31/22 1445   BP: 132/70   BP Location: Left arm   Patient Position: Sitting   Pulse: 75   Temp: (!) 96 4 °F (35 8 °C)   TempSrc: Tympanic   SpO2: 98%   Weight: 89 7 kg (197 lb 12 8 oz)   Height: 5' 1" (1 549 m)       Transitional Care Management Review:  Selene Coto is a 81 y o  female here for TCM follow up  During the TCM phone call patient stated:    TCM Call     Date and time call was made  8/22/2022  7:21 AM    Hospital care reviewed  Records reviewed    Patient was hospitialized at  [address]    Date of Admission  08/18/22    Date of discharge  08/20/22    Disposition  Home      TCM Call     Scheduled for follow up?   Yes    Patients specialists  Cardiologist; Other (comment)    Other specialists contcat #  oncology    Did you obtain your prescribed medications  Yes    Do you need help managing your prescriptions or medications  No    Is transportation to your appointment needed  No    I have advised the patient to call PCP with any new or worsening symptoms  vincent Wood, DO

## 2022-09-08 ENCOUNTER — CONSULT (OUTPATIENT)
Dept: HEMATOLOGY ONCOLOGY | Facility: CLINIC | Age: 82
End: 2022-09-08
Payer: COMMERCIAL

## 2022-09-08 VITALS
BODY MASS INDEX: 37.29 KG/M2 | TEMPERATURE: 97.1 F | OXYGEN SATURATION: 97 % | WEIGHT: 197.5 LBS | RESPIRATION RATE: 17 BRPM | SYSTOLIC BLOOD PRESSURE: 130 MMHG | DIASTOLIC BLOOD PRESSURE: 80 MMHG | HEART RATE: 71 BPM | HEIGHT: 61 IN

## 2022-09-08 DIAGNOSIS — D68.51 FACTOR V LEIDEN MUTATION (HCC): ICD-10-CM

## 2022-09-08 PROCEDURE — 99204 OFFICE O/P NEW MOD 45 MIN: CPT | Performed by: PHYSICIAN ASSISTANT

## 2022-09-08 NOTE — PROGRESS NOTES
Hematology/Oncology Outpatient Consult  Santhosh Mar 81 y o  female 1940 [id number]    Date:  9/8/2022      Assessment and Plan:  1  Factor V Leiden mutation [facility])  81year old female presents per referral from hospital medicine  She was admitted due to new dx of a fib, started on Eliquis  Patient was referred due to history of factor V and concern of relationship with this and bleeding  Reviewed Factor V is not associated with bleeding; this increases risk of clotting not bleeding  She was given general education of a fib and why with this condition it is important to take blood thinner to prevent stroke  She knows she is not to take NSAIDs or aspirin with Eliquis  She is to f/u with cardiology  For prevention of nosebleeds, saline nasal spray multiple times a day is recommended as well as moisturizer of the nose with, for example, vasleline; she states she already uses Vicks  Follow up PRN  - Ambulatory Referral to Hematology / Oncology      HPI:  81year old female presents for consult for history of factor V leiden  Per review of hospital ist note from recent admission concern for Factor V contributing to bleeding  Patient states she was tested for factor V due to family member being positive  Patient does not have any history of thromboses  She states she had frequent nose bleeds before being placed on Eliquis for a fib in recent hospitalization  Prior to hospitalization she was taking aspirin and NSAIDs  She states she d/c both, nosebleeds have resolved  ROS: Review of Systems   Constitutional: Negative for fatigue  HENT: Negative for nosebleeds  Respiratory: Negative for cough and shortness of breath  Cardiovascular: Negative for chest pain and leg swelling  Sometimes can feel the a fib, describes as fluttering sensation     Gastrointestinal: Negative for abdominal pain, constipation, diarrhea, nausea and vomiting     Genitourinary: Negative for difficulty urinating, dysuria and hematuria  Musculoskeletal: Negative for arthralgias  Skin: Negative  Neurological: Negative for dizziness, weakness, light-headedness, numbness and headaches  Hematological: Negative  Psychiatric/Behavioral: Negative  Past Medical History:   Diagnosis Date    Esophageal stricture     Factor V Leiden mutation ([facility] [address] )     LAST ASSESSED: 8/3/16    Hypertension        Past Surgical History:   Procedure Laterality Date    CATARACT EXTRACTION, BILATERAL Bilateral     REDUCTION MAMMAPLASTY Bilateral     TONSILLECTOMY AND ADENOIDECTOMY      TUBAL LIGATION Bilateral        Social History     Socioeconomic History    Marital status:      Spouse name: Not on file    Number of children: 3    Years of education: Not on file    Highest education level: Not on file   Occupational History    Occupation: PRIOR OCCUPATION       Comment: RETIRED   Tobacco Use    Smoking status: Former Smoker     Quit date:      Years since quittin 7    Smokeless tobacco: Never Used   Vaping Use    Vaping Use: Never used   Substance and Sexual Activity    Alcohol use: Never    Drug use: Never    Sexual activity: Not Currently   Other Topics Concern    Not on file   Social History Narrative    Not on file     Social Determinants of Health     Financial Resource Strain: Not on file   Food Insecurity: No Food Insecurity    Worried About Running Out of Food in the Last Year: Never true    [address] in the Last Year: Never true   Transportation Needs: No Transportation Needs    Lack of Transportation (Medical): No    Lack of Transportation (Non-Medical):  No   Physical Activity: Not on file   Stress: Not on file   Social Connections: Not on file   Intimate Partner Violence: Not on file   Housing Stability: Low Risk     Unable to Pay for Housing in the Last Year: No    Number of Places Lived in the Last Year: 1    Unstable Housing in the Last Year: No Family History   Problem Relation Age of Onset    Hypertension Mother     Cancer Father        Allergies   Allergen Reactions    Aspirin     Montelukast      Other reaction(s): Depression  Category: Adverse Reaction; Other reaction(s): Depression  Category: Adverse Reaction;     Nsaids     Pravastatin      Category: Adverse Reaction;     Simvastatin      Category: Adverse Reaction;     Tolmetin          Current Outpatient Medications:     apixaban (ELIQUIS) 5 mg, Take 1 tablet (5 mg total) by mouth 2 (two) times a day, Disp: 60 tablet, Rfl: 0    buPROPion (WELLBUTRIN XL) 150 mg 24 hr tablet, Take 1 tablet (150 mg total) by mouth daily, Disp: 90 tablet, Rfl: 3    cetirizine (ZyrTEC) 10 mg tablet, Take 10 mg by mouth daily, Disp: , Rfl:     Choline Fenofibrate (Fenofibric Acid) 135 MG CPDR, take 1 capsule by mouth once daily, Disp: 90 capsule, Rfl: 1    diltiazem (CARDIZEM CD) 180 mg 24 hr capsule, Take 1 capsule (180 mg total) by mouth daily, Disp: 30 capsule, Rfl: 0    ergocalciferol (VITAMIN D2) 50,000 units, take 1 capsule by mouth every week, Disp: 4 capsule, Rfl: 5    spironolactone (ALDACTONE) 25 mg tablet, take 2 tablets by mouth daily, Disp: 180 tablet, Rfl: 3      Physical Exam:  There were no vitals taken for this visit  Physical Exam  Vitals reviewed  Constitutional:       General: She is not in acute distress  Appearance: She is well-developed  HENT:      Head: Normocephalic and atraumatic  Eyes:      General: No scleral icterus  Conjunctiva/sclera: Conjunctivae normal    Cardiovascular:      Rate and Rhythm: Normal rate and regular rhythm  Heart sounds: Normal heart sounds  No murmur heard  Pulmonary:      Effort: Pulmonary effort is normal  No respiratory distress  Breath sounds: Normal breath sounds  Abdominal:      Palpations: Abdomen is soft  Tenderness: There is no abdominal tenderness  Musculoskeletal:         General: No tenderness  Normal range of motion  Cervical back: Normal range of motion and neck supple  Right lower leg: No edema  Left lower leg: No edema  Lymphadenopathy:      Cervical: No cervical adenopathy  Skin:     General: Skin is warm and dry  Neurological:      Mental Status: She is alert and oriented to person, place, and time  Cranial Nerves: No cranial nerve deficit  Psychiatric:         Mood and Affect: Mood normal          Behavior: Behavior normal        Labs:  Lab Results   Component Value Date    WBC 6 84 08/19/2022    HGB 12 4 08/19/2022    HCT 38 5 08/19/2022    MCV 93 08/19/2022     08/19/2022     Lab Results   Component Value Date    K 4 1 08/19/2022     08/19/2022    CO2 24 08/19/2022    BUN 31 (H) 08/19/2022    CREATININE 1 21 08/19/2022    GLUF 104 (H) 05/13/2022    CALCIUM 9 3 08/19/2022    AST 13 08/19/2022    ALT 9 08/19/2022    ALKPHOS 43 08/19/2022    EGFR 42 08/19/2022       Patient voiced understanding and agreement in the above discussion  Aware to contact our office with questions/symptoms in the interim  This note has been generated by voice recognition software system  Therefore, there may be spelling, grammar, and or syntax errors  Please contact if questions arise

## 2022-09-15 ENCOUNTER — CONSULT (OUTPATIENT)
Dept: CARDIOLOGY CLINIC | Facility: CLINIC | Age: 82
End: 2022-09-15
Payer: COMMERCIAL

## 2022-09-15 VITALS
BODY MASS INDEX: 37 KG/M2 | HEIGHT: 61 IN | DIASTOLIC BLOOD PRESSURE: 60 MMHG | HEART RATE: 76 BPM | SYSTOLIC BLOOD PRESSURE: 108 MMHG | WEIGHT: 196 LBS

## 2022-09-15 DIAGNOSIS — I48.91 ATRIAL FIBRILLATION WITH RVR (HCC): Primary | ICD-10-CM

## 2022-09-15 DIAGNOSIS — D68.51 FACTOR V LEIDEN MUTATION (HCC): ICD-10-CM

## 2022-09-15 DIAGNOSIS — I10 PRIMARY HYPERTENSION: ICD-10-CM

## 2022-09-15 DIAGNOSIS — E66.9 OBESITY (BMI 30-39.9): ICD-10-CM

## 2022-09-15 PROCEDURE — 99214 OFFICE O/P EST MOD 30 MIN: CPT | Performed by: INTERNAL MEDICINE

## 2022-09-15 NOTE — PROGRESS NOTES
Cardiology Follow up    Pascale Tavera  [id number]  1940  [facility] [address] [id number]      1  Atrial fibrillation with [facility])  Ambulatory referral to Cardiology   2  Obesity (BMI 30-39 9)     3  Factor V Leiden mutation ([address] )     4  Primary hypertension         Discussion/Summary:  1  Paroxysmal atrial fibrillation on oral anticoagulation  2  Hypertension  3  Aortic regurgitation  4  Mitral regurgitation  5  Obesity    -transthoracic echocardiogram 08/19/2022 showing left ventricular systolic function normal estimated LVEF 55% with mild-to-moderate aortic regurgitation, aortic sclerosis, mild-to-moderate mitral regurgitation, mild-to-moderate tricuspid regurgitation  -patient notes blood pressures and heart rates at home are well controlled and denies any significant issues with current medical therapy therefore will continue Eliquis 5 mg twice daily along with diltiazem 180 mg daily and spironolactone 25 mg daily  -patient will continue to monitor on her Rayetta Cera enriqueta and let us know if there are any significant issues  -patient counseled on dietary lifestyle modifications including following a low-salt, low-fat, heart healthy diet with continued careful physical activity and weight loss as goal BMI should be less than 29    -I will see patient in 3 months or sooner if necessary  -patient counseled if she were to have any warning or alarm type symptoms she should seek emergency medical care immediately  History of Present Illness:  - patient is a 67-year-old female with hypertension, chronic rhinitis, obesity, factor 5 Leiden who was hospitalized in August of 2022 found to be in atrial fibrillation rapid ventricular response after being told on 08/18/2022 by her Apple watch    She was initiated on oral anticoagulation and rate control strategy and did well and was able to be discharged from the hospital   -she was seen and evaluated by Hematology who agreed with oral anticoagulation for the patient and presents to the office today for scheduled follow-up   -she denies any chest pain, palpitations, lightheadedness or dizziness, loss of consciousness, shortness of breath or lower extremity edema  She does note that since initiating on blood thinner she does bruise easier as she has had this problem in the past but has not had any significant recurrence of her nose bleeding  Patient Active Problem List   Diagnosis    Stage 3b chronic kidney disease ([place] [address] )    Benign hypertension with CKD (chronic kidney disease) stage III ([place] [address] )    Vitamin D deficiency    Medicare annual wellness visit, subsequent    Dizziness    Chronic rhinitis    Obesity (BMI 30-39  9)    Factor V Leiden mutation ([place] [address] )    Major depressive disorder, recurrent, unspecified ([place] [address] )    Hypertriglyceridemia    Subclinical hypothyroidism    Microalbuminuria    Atrial fibrillation with rapid ventricular response (HCC)     Past Medical History:   Diagnosis Date    Atrial fibrillation ([place] [address] )     Esophageal stricture     Factor V Leiden mutation ([place] [address] )     LAST ASSESSED: 8/3/16    Hypertension      Social History     Socioeconomic History    Marital status:       Spouse name: Not on file    Number of children: 3    Years of education: Not on file    Highest education level: Not on file   Occupational History    Occupation: PRIOR OCCUPATION       Comment: RETIRED   Tobacco Use    Smoking status: Former Smoker     Quit date:      Years since quittin 7    Smokeless tobacco: Never Used   Vaping Use    Vaping Use: Never used   Substance and Sexual Activity    Alcohol use: Never    Drug use: Never    Sexual activity: Not Currently   Other Topics Concern    Not on file   Social History Narrative    Not on file     Social Determinants of Health Financial Resource Strain: Not on file   Food Insecurity: No Food Insecurity    Worried About Running Out of Food in the Last Year: Never true    Jessica of Food in the Last Year: Never true   Transportation Needs: No Transportation Needs    Lack of Transportation (Medical): No    Lack of Transportation (Non-Medical): No   Physical Activity: Not on file   Stress: Not on file   Social Connections: Not on file   Intimate Partner Violence: Not on file   Housing Stability: Low Risk     Unable to Pay for Housing in the Last Year: No    Number of Places Lived in the Last Year: 1    Unstable Housing in the Last Year: No      Family History   Problem Relation Age of Onset    Hypertension Mother     Cancer Father      Past Surgical History:   Procedure Laterality Date    CATARACT EXTRACTION, BILATERAL Bilateral     REDUCTION MAMMAPLASTY Bilateral     TONSILLECTOMY AND ADENOIDECTOMY      TUBAL LIGATION Bilateral        Current Outpatient Medications:     apixaban (ELIQUIS) 5 mg, Take 1 tablet (5 mg total) by mouth 2 (two) times a day, Disp: 60 tablet, Rfl: 0    buPROPion (WELLBUTRIN XL) 150 mg 24 hr tablet, Take 1 tablet (150 mg total) by mouth daily, Disp: 90 tablet, Rfl: 3    cetirizine (ZyrTEC) 10 mg tablet, Take 10 mg by mouth daily, Disp: , Rfl:     Choline Fenofibrate (Fenofibric Acid) 135 MG CPDR, take 1 capsule by mouth once daily, Disp: 90 capsule, Rfl: 1    diltiazem (CARDIZEM CD) 180 mg 24 hr capsule, Take 1 capsule (180 mg total) by mouth daily, Disp: 30 capsule, Rfl: 0    ergocalciferol (VITAMIN D2) 50,000 units, take 1 capsule by mouth every week, Disp: 4 capsule, Rfl: 5    spironolactone (ALDACTONE) 25 mg tablet, take 2 tablets by mouth daily, Disp: 180 tablet, Rfl: 3  Allergies   Allergen Reactions    Aspirin     Montelukast      Other reaction(s): Depression  Category: Adverse Reaction; Other reaction(s): Depression  Category:  Adverse Reaction;     Nsaids     Pravastatin Category: Adverse Reaction;     Simvastatin      Category:  Adverse Reaction;     Tolmetin          Labs:  Admission on 08/18/2022, Discharged on 08/20/2022   Component Date Value    WBC 08/18/2022 7 55     RBC 08/18/2022 4 37     Hemoglobin 08/18/2022 12 6     Hematocrit 08/18/2022 40 9     MCV 08/18/2022 94     MCH 08/18/2022 28 8     MCHC 08/18/2022 30 8 (A)    RDW 08/18/2022 13 3     MPV 08/18/2022 8 8 (A)    Platelets 08/09/0278 391 (A)    nRBC 08/18/2022 0     Neutrophils Relative 08/18/2022 66     Immat GRANS % 08/18/2022 1     Lymphocytes Relative 08/18/2022 21     Monocytes Relative 08/18/2022 8     Eosinophils Relative 08/18/2022 3     Basophils Relative 08/18/2022 1     Neutrophils Absolute 08/18/2022 5 07     Immature Grans Absolute 08/18/2022 0 04     Lymphocytes Absolute 08/18/2022 1 60     Monocytes Absolute 08/18/2022 0 59     Eosinophils Absolute 08/18/2022 0 19     Basophils Absolute 08/18/2022 0 06     Sodium 08/18/2022 137     Potassium 08/18/2022 4 5     Chloride 08/18/2022 103     CO2 08/18/2022 23     ANION GAP 08/18/2022 11     BUN 08/18/2022 37 (A)    Creatinine 08/18/2022 1 36 (A)    Glucose 08/18/2022 108     Calcium 08/18/2022 9 8     eGFR 08/18/2022 36     Protime 08/18/2022 13 5     INR 08/18/2022 1 03     PTT 08/18/2022 28     TSH 3RD GENERATON 08/18/2022 3 759     hs TnI 0hr 08/18/2022 36     hs TnI 2hr 08/18/2022 30     Delta 2hr hsTnI 08/18/2022 -6     hs TnI 4hr 08/18/2022 36     Delta 4hr hsTnI 08/18/2022 0     A4C EF 08/19/2022 54     LVIDd 08/19/2022 5 00     LVIDS 08/19/2022 3 20     IVSd 08/19/2022 0 80     LVPWd 08/19/2022 0 90     FS 08/19/2022 36     MV E' Tissue Velocity Se* 08/19/2022 11     AV LVOT peak gradient 08/19/2022 2     LVOT peak VTI 08/19/2022 14     LVOT peak bobby 08/19/2022 0 69     RVOT peak VTI 08/19/2022 16 38     RVID d 08/19/2022 2 7     RVOT VMEAN 08/19/2022 0 49     RVOT PMEAN 08/19/2022 1     RVOT PMAX 08/19/2022 2     LA size 08/19/2022 3 5     LA length (A2C) 08/19/2022 5 50     Aortic valve peak veloci* 08/19/2022 0 95     Ao VTI 08/19/2022 22 43     AV mean gradient 08/19/2022 2     LVOT mn grad 08/19/2022 1 0     AV peak gradient 08/19/2022 4     AV Deceleration Time 08/19/2022 2,519     AV regurgitation pressur* 08/19/2022 731     TR Peak Zak 08/19/2022 2 4     Triscuspid Valve Regurgi* 08/19/2022 24 0     RVOT peak zak 08/19/2022 0 68     Ao root 08/19/2022 3 60     AV peak gradient 08/19/2022 54     Aortic valve mean veloci* 08/19/2022 6 30     Tricuspid valve peak reg* 08/19/2022 2 43     Left ventricular stroke * 08/19/2022 75 00     IVS 08/19/2022 0 8     LEFT VENTRICLE SYSTOLIC * 72/22/8697 42     LV DIASTOLIC VOLUME (MOD* 75/81/8350 117     Left Atrium Area-systoli* 08/19/2022 21 7     Left Atrium Area-systoli* 08/19/2022 22 8     LVSV, 2D 08/19/2022 75     LV EF 08/19/2022 55     Dimensionless velociy in* 08/19/2022 0 73     Sodium 08/19/2022 139     Potassium 08/19/2022 4 1     Chloride 08/19/2022 106     CO2 08/19/2022 24     ANION GAP 08/19/2022 9     BUN 08/19/2022 31 (A)    Creatinine 08/19/2022 1 21     Glucose 08/19/2022 104     Calcium 08/19/2022 9 3     AST 08/19/2022 13     ALT 08/19/2022 9     Alkaline Phosphatase 08/19/2022 43     Total Protein 08/19/2022 6 7     Albumin 08/19/2022 3 9     Total Bilirubin 08/19/2022 0 39     eGFR 08/19/2022 42     WBC 08/19/2022 6 84     RBC 08/19/2022 4 16     Hemoglobin 08/19/2022 12 4     Hematocrit 08/19/2022 38 5     MCV 08/19/2022 93     MCH 08/19/2022 29 8     MCHC 08/19/2022 32 2     RDW 08/19/2022 13 3     Platelets 86/82/1092 375     MPV 08/19/2022 9 0     Magnesium 08/19/2022 1 7 (A)    Phosphorus 08/19/2022 3 4     Calcium, Ionized 08/19/2022 1 15     Ventricular Rate 08/18/2022 78     Atrial Rate 08/18/2022 340     QRSD Interval 08/18/2022 78     QT Interval 08/18/2022 354  QTC Interval 08/18/2022 403     QRS Axis 08/18/2022 -10     T Wave Axis 08/18/2022 41     Ventricular Rate 08/18/2022 129     Atrial Rate 08/18/2022 300     QRSD Interval 08/18/2022 76     QT Interval 08/18/2022 300     QTC Interval 08/18/2022 439     QRS Axis 08/18/2022 -11     T Wave Axis 08/18/2022 47     Ventricular Rate 08/18/2022 129     Atrial Rate 08/18/2022 65     QRSD Interval 08/18/2022 80     QT Interval 08/18/2022 296     QTC Interval 08/18/2022 433     QRS Axis 08/18/2022 -15     T Wave Axis 08/18/2022 24         Imaging: XR chest 1 view portable    Result Date: 8/18/2022  Narrative: CHEST INDICATION: Atrial fibrillation COMPARISON:  None EXAM PERFORMED/VIEWS:  XR CHEST PORTABLE FINDINGS: Cardiomegaly  No pleural effusion  Pulmonary vascular congestion  No pulmonary interstitial edema  The lungs are clear  No pneumothorax or pleural effusion  No lytic or blastic lesion  Impression: No acute cardiopulmonary disease  Workstation performed: [id number]     DXA bone density spine hip and pelvis    Result Date: 8/19/2022  Narrative: CENTRAL  DXA SCAN CLINICAL HISTORY:  61-year-old postmenopausal female  OTHER RISK FACTORS:  None  PHARMACOLOGIC THERAPY FOR OSTEOPOROSIS:  None  TECHNIQUE: Bone densitometry was performed using a GE Lunar Prodigy  bone densitometer  Regions of interest appear properly placed  COMPARISON: 12/15/2016  RESULTS: LUMBAR SPINE Level: L1-L4 : BMD:  1 292  gm/cm2 T-score: 0 8 LEFT TOTAL HIP: BMD: 0 792  gm/cm2 T-score: -1 7 LEFT FEMORAL NECK: BMD: 0 792  gm/cm2 T score: -1 8     Impression: 1  Low bone mass (osteopenia)  2   Since a DXA study from 12/15/2016, there has been: No statistically significant change in bone mineral density at any of the evaluated skeletal sites   3   The 10 year risk of hip fracture is 3 5% with the 10 year risk of major osteoporotic fracture being 13 2% as calculated by the [facility]/WHO fracture risk assessment tool (FRAX)  4   The current NOF guidelines recommend treating patients with a T-score of -2 5 or less in the lumbar spine or hips, or in post-menopausal women and men over the age of 48 with low bone mass (osteopenia) and a FRAX 10 year risk score of >3% for hip fracture and/or >20% for major osteoporotic fracture  5   The NOF recommends follow-up DXA in 1-2 years after initiating therapy for osteoporosis and every 2 years thereafter  More frequent evaluation is appropriate for patients with conditions associated with rapid bone loss, such as glucocorticoid therapy  The interval between DXA screenings may be longer for individuals without major risk factors and initial T-score in the normal or upper low bone mass range  The FRAX algorithm has certain limitations: -FRAX has not been validated in patients currently or previously treated with pharmacotherapy for osteoporosis  In such patients, clinical judgment must be exercised in interpreting FRAX scores  -Prior hip, vertebral and humeral fragility fractures appear to confer greater risk of subsequent fracture than fractures at other sites (this is especially true for individuals with severe vertebral fractures), but quantification of this incremental risk is not possible with FRAX  -FRAX underestimates fracture risk in patients with history of multiple fragility fractures  -FRAX may underestimate fracture risk in patients with history of frequent falls  -It is not appropriate to use FRAX to monitor treatment response  WHO CLASSIFICATION: Normal (a T-score of -1 0 or higher) Low bone mineral density (a T-score of less than -1 0 but higher than -2 5) Osteoporosis (a T-score of -2 5 or less) Severe osteoporosis (a T-score of -2 5 or less with a fragility fracture) LEAST SIGNIFICANT CHANGE AT 95% C I: Lumbar spine: 0 030 gm/cm2 (2 5%)  Total hip: 0 015 gm/cm2 (1 7%)  Forearm: 0 034 gm/cm2 (5 2%)    Workstation performed: [id number]     Echo complete w/ contrast if indicated    Result Date: 8/19/2022  Narrative: Cami Santos  Left Ventricle: Left ventricular cavity size is normal  Wall thickness is mildly increased  The left ventricular ejection fraction is 55%  Systolic function is normal  Wall motion is normal  Unable to assess diastolic function due to atrial fibrillation    Left Atrium: The atrium is mildly dilated    Aortic Valve: There is mild to moderate regurgitation  There is aortic sclerosis    Mitral Valve: There is mild to moderate regurgitation    Tricuspid Valve: There is mild to moderate regurgitation  Review of Systems:  Review of Systems   Constitutional: Negative for chills, diaphoresis, fatigue and fever  HENT: Negative for trouble swallowing and voice change  Eyes: Negative for pain and redness  Respiratory: Negative for shortness of breath and wheezing  Cardiovascular: Negative for chest pain, palpitations and leg swelling  Gastrointestinal: Negative for abdominal pain, constipation, diarrhea, nausea and vomiting  Genitourinary: Negative for dysuria  Musculoskeletal: Positive for arthralgias  Negative for neck pain and neck stiffness  Neurological: Negative for dizziness, syncope, light-headedness and headaches  Psychiatric/Behavioral: Negative for agitation and confusion  All other systems reviewed and are negative  Vitals:    09/15/22 1419   BP: 108/60   Pulse: 76   Weight: 88 9 kg (196 lb)   Height: 5' 1" (1 549 m)     Vitals:    09/15/22 1419   Weight: 88 9 kg (196 lb)     Height: 5' 1" (154 9 cm)     Physical Exam:  Physical Exam  Vitals reviewed  Constitutional:       General: She is not in acute distress  Appearance: She is obese  She is not diaphoretic  HENT:      Head: Normocephalic and atraumatic  Eyes:      General:         Right eye: No discharge  Left eye: No discharge  Neck:      Comments: Trachea midline, no JVD present  Cardiovascular:      Heart sounds: No friction rub        Comments: Irregularly irregular rate and rhythm  Pulmonary:      Effort: No respiratory distress  Breath sounds: No wheezing or rhonchi  Chest:      Chest wall: No tenderness  Abdominal:      General: Bowel sounds are normal       Palpations: Abdomen is soft  Tenderness: There is no abdominal tenderness  There is no rebound  Musculoskeletal:      Right lower leg: No edema  Left lower leg: No edema  Skin:     General: Skin is warm and dry  Neurological:      Mental Status: She is alert  Comments: Awake, alert, able to answer questions appropriately, able move extremities bilaterally     Psychiatric:         Mood and Affect: Mood normal          Behavior: Behavior normal

## 2022-09-16 ENCOUNTER — APPOINTMENT (OUTPATIENT)
Dept: LAB | Facility: CLINIC | Age: 82
End: 2022-09-16
Payer: COMMERCIAL

## 2022-09-16 DIAGNOSIS — E55.9 VITAMIN D DEFICIENCY: ICD-10-CM

## 2022-09-16 DIAGNOSIS — E03.8 SUBCLINICAL HYPOTHYROIDISM: ICD-10-CM

## 2022-09-16 DIAGNOSIS — N18.30 BENIGN HYPERTENSION WITH CKD (CHRONIC KIDNEY DISEASE) STAGE III (HCC): ICD-10-CM

## 2022-09-16 DIAGNOSIS — I12.9 BENIGN HYPERTENSION WITH CKD (CHRONIC KIDNEY DISEASE) STAGE III (HCC): ICD-10-CM

## 2022-09-16 DIAGNOSIS — E78.1 HYPERTRIGLYCERIDEMIA: ICD-10-CM

## 2022-09-16 LAB
25(OH)D3 SERPL-MCNC: 34.1 NG/ML (ref 30–100)
ALBUMIN SERPL BCP-MCNC: 3.8 G/DL (ref 3.5–5)
ALP SERPL-CCNC: 59 U/L (ref 46–116)
ALT SERPL W P-5'-P-CCNC: 25 U/L (ref 12–78)
ANION GAP SERPL CALCULATED.3IONS-SCNC: 6 MMOL/L (ref 4–13)
AST SERPL W P-5'-P-CCNC: 22 U/L (ref 5–45)
BASOPHILS # BLD AUTO: 0.09 THOUSANDS/ΜL (ref 0–0.1)
BASOPHILS NFR BLD AUTO: 1 % (ref 0–1)
BILIRUB SERPL-MCNC: 0.4 MG/DL (ref 0.2–1)
BUN SERPL-MCNC: 53 MG/DL (ref 5–25)
CALCIUM SERPL-MCNC: 9.7 MG/DL (ref 8.3–10.1)
CHLORIDE SERPL-SCNC: 108 MMOL/L (ref 96–108)
CHOLEST SERPL-MCNC: 177 MG/DL
CO2 SERPL-SCNC: 23 MMOL/L (ref 21–32)
CREAT SERPL-MCNC: 1.66 MG/DL (ref 0.6–1.3)
EOSINOPHIL # BLD AUTO: 0.28 THOUSAND/ΜL (ref 0–0.61)
EOSINOPHIL NFR BLD AUTO: 4 % (ref 0–6)
ERYTHROCYTE [DISTWIDTH] IN BLOOD BY AUTOMATED COUNT: 13.7 % (ref 11.6–15.1)
GFR SERPL CREATININE-BSD FRML MDRD: 28 ML/MIN/1.73SQ M
GLUCOSE P FAST SERPL-MCNC: 104 MG/DL (ref 65–99)
HCT VFR BLD AUTO: 40.8 % (ref 34.8–46.1)
HDLC SERPL-MCNC: 71 MG/DL
HGB BLD-MCNC: 12.7 G/DL (ref 11.5–15.4)
IMM GRANULOCYTES # BLD AUTO: 0.04 THOUSAND/UL (ref 0–0.2)
IMM GRANULOCYTES NFR BLD AUTO: 1 % (ref 0–2)
LDLC SERPL CALC-MCNC: 87 MG/DL (ref 0–100)
LYMPHOCYTES # BLD AUTO: 1.77 THOUSANDS/ΜL (ref 0.6–4.47)
LYMPHOCYTES NFR BLD AUTO: 23 % (ref 14–44)
MCH RBC QN AUTO: 29 PG (ref 26.8–34.3)
MCHC RBC AUTO-ENTMCNC: 31.1 G/DL (ref 31.4–37.4)
MCV RBC AUTO: 93 FL (ref 82–98)
MONOCYTES # BLD AUTO: 0.59 THOUSAND/ΜL (ref 0.17–1.22)
MONOCYTES NFR BLD AUTO: 8 % (ref 4–12)
NEUTROPHILS # BLD AUTO: 4.82 THOUSANDS/ΜL (ref 1.85–7.62)
NEUTS SEG NFR BLD AUTO: 63 % (ref 43–75)
NONHDLC SERPL-MCNC: 106 MG/DL
NRBC BLD AUTO-RTO: 0 /100 WBCS
PLATELET # BLD AUTO: 428 THOUSANDS/UL (ref 149–390)
PMV BLD AUTO: 9 FL (ref 8.9–12.7)
POTASSIUM SERPL-SCNC: 4.7 MMOL/L (ref 3.5–5.3)
PROT SERPL-MCNC: 7.7 G/DL (ref 6.4–8.4)
RBC # BLD AUTO: 4.38 MILLION/UL (ref 3.81–5.12)
SODIUM SERPL-SCNC: 137 MMOL/L (ref 135–147)
T4 FREE SERPL-MCNC: 1.12 NG/DL (ref 0.76–1.46)
TRIGL SERPL-MCNC: 96 MG/DL
TSH SERPL DL<=0.05 MIU/L-ACNC: 4.65 UIU/ML (ref 0.45–4.5)
WBC # BLD AUTO: 7.59 THOUSAND/UL (ref 4.31–10.16)

## 2022-09-16 PROCEDURE — 82306 VITAMIN D 25 HYDROXY: CPT

## 2022-09-16 PROCEDURE — 80061 LIPID PANEL: CPT

## 2022-09-16 PROCEDURE — 36415 COLL VENOUS BLD VENIPUNCTURE: CPT

## 2022-09-16 PROCEDURE — 85025 COMPLETE CBC W/AUTO DIFF WBC: CPT

## 2022-09-16 PROCEDURE — 84443 ASSAY THYROID STIM HORMONE: CPT

## 2022-09-16 PROCEDURE — 84439 ASSAY OF FREE THYROXINE: CPT

## 2022-09-16 PROCEDURE — 80053 COMPREHEN METABOLIC PANEL: CPT

## 2022-09-19 DIAGNOSIS — I48.91 ATRIAL FIBRILLATION WITH RVR (HCC): ICD-10-CM

## 2022-09-19 RX ORDER — DILTIAZEM HYDROCHLORIDE 180 MG/1
180 CAPSULE, COATED, EXTENDED RELEASE ORAL DAILY
Qty: 90 CAPSULE | Refills: 3 | Status: SHIPPED | OUTPATIENT
Start: 2022-09-19

## 2022-09-20 ENCOUNTER — OFFICE VISIT (OUTPATIENT)
Dept: FAMILY MEDICINE CLINIC | Facility: CLINIC | Age: 82
End: 2022-09-20
Payer: COMMERCIAL

## 2022-09-20 VITALS
TEMPERATURE: 97.9 F | BODY MASS INDEX: 36.97 KG/M2 | WEIGHT: 195.8 LBS | DIASTOLIC BLOOD PRESSURE: 62 MMHG | HEIGHT: 61 IN | SYSTOLIC BLOOD PRESSURE: 110 MMHG | OXYGEN SATURATION: 98 % | HEART RATE: 68 BPM

## 2022-09-20 DIAGNOSIS — N18.4 CHRONIC RENAL DISEASE, STAGE IV (HCC): ICD-10-CM

## 2022-09-20 DIAGNOSIS — D68.51 FACTOR V LEIDEN MUTATION (HCC): ICD-10-CM

## 2022-09-20 DIAGNOSIS — Z23 ENCOUNTER FOR IMMUNIZATION: ICD-10-CM

## 2022-09-20 DIAGNOSIS — E66.01 CLASS 2 SEVERE OBESITY DUE TO EXCESS CALORIES WITH SERIOUS COMORBIDITY AND BODY MASS INDEX (BMI) OF 35.0 TO 35.9 IN ADULT (HCC): ICD-10-CM

## 2022-09-20 DIAGNOSIS — E03.8 SUBCLINICAL HYPOTHYROIDISM: Primary | ICD-10-CM

## 2022-09-20 DIAGNOSIS — M85.80 OSTEOPENIA, UNSPECIFIED LOCATION: ICD-10-CM

## 2022-09-20 DIAGNOSIS — I48.91 ATRIAL FIBRILLATION WITH RAPID VENTRICULAR RESPONSE (HCC): ICD-10-CM

## 2022-09-20 DIAGNOSIS — E55.9 VITAMIN D DEFICIENCY: ICD-10-CM

## 2022-09-20 DIAGNOSIS — E78.1 HYPERTRIGLYCERIDEMIA: ICD-10-CM

## 2022-09-20 PROBLEM — E66.812 CLASS 2 SEVERE OBESITY DUE TO EXCESS CALORIES WITH SERIOUS COMORBIDITY AND BODY MASS INDEX (BMI) OF 35.0 TO 35.9 IN ADULT (HCC): Status: ACTIVE | Noted: 2022-09-20

## 2022-09-20 PROCEDURE — 3725F SCREEN DEPRESSION PERFORMED: CPT | Performed by: FAMILY MEDICINE

## 2022-09-20 PROCEDURE — 90662 IIV NO PRSV INCREASED AG IM: CPT

## 2022-09-20 PROCEDURE — 99214 OFFICE O/P EST MOD 30 MIN: CPT | Performed by: FAMILY MEDICINE

## 2022-09-20 PROCEDURE — G0008 ADMIN INFLUENZA VIRUS VAC: HCPCS

## 2022-09-20 NOTE — PROGRESS NOTES
Assessment/Plan:    No problem-specific Assessment & Plan notes found for this encounter  Diagnoses and all orders for this visit:    Subclinical hypothyroidism  -     TSH, 3rd generation with Free T4 reflex; Future    Atrial fibrillation with rapid ventricular response (HCC)  -     Comprehensive metabolic panel; Future  -     TSH, 3rd generation with Free T4 reflex; Future    Hypertriglyceridemia  -     CBC and differential; Future  -     Comprehensive metabolic panel; Future  -     Lipid panel; Future    Vitamin D deficiency  -     Vitamin D 25 hydroxy; Future    Factor V Leiden mutation ([facility] [address] )    Osteopenia, unspecified location    Encounter for immunization  -     influenza vaccine, high-dose, PF 0 7 mL (FLUZONE HIGH-DOSE)    Chronic renal disease, stage IV (HCC)    Class 2 severe obesity due to excess calories with serious comorbidity and body mass index (BMI) of 35 0 to 35 9 in [facility])          PHQ-2/9 Depression Screening    Little interest or pleasure in doing things: 0 - not at all  Feeling down, depressed, or hopeless: 0 - not at all  Trouble falling or staying asleep, or sleeping too much: 0 - not at all  Feeling tired or having little energy: 0 - not at all  Poor appetite or overeatin - not at all  Feeling bad about yourself - or that you are a failure or have let yourself or your family down: 0 - not at all  Trouble concentrating on things, such as reading the newspaper or watching television: 0 - not at all  Moving or speaking so slowly that other people could have noticed  Or the opposite - being so fidgety or restless that you have been moving around a lot more than usual: 0 - not at all  Thoughts that you would be better off dead, or of hurting yourself in some way: 0 - not at all  PHQ-9 Score: 0   PHQ-9 Interpretation: No or Minimal depression             Subjective:      Patient ID: Pascale Tavera is a 81 y o  female      Ric Hernandez presents for 4 month follow up of her chronic conditions, she is accompanied by her daughter and reports feeling well overall  The following portions of the patient's history were reviewed and updated as appropriate: allergies, current medications, past family history, past medical history, past social history, past surgical history and problem list     Review of Systems   Constitutional: Negative for chills, fatigue and fever  HENT: Negative  Negative for hearing loss  Eyes: Negative  Negative for visual disturbance  Respiratory: Negative for shortness of breath and wheezing  Cardiovascular: Negative for chest pain and palpitations  Gastrointestinal: Negative for abdominal pain, blood in stool, constipation, diarrhea, nausea and vomiting  Endocrine: Negative  Genitourinary: Negative for difficulty urinating and dysuria  Musculoskeletal: Negative for arthralgias and myalgias  Skin: Negative  Allergic/Immunologic: Negative  Neurological: Negative for seizures and syncope  Hematological: Negative for adenopathy  Psychiatric/Behavioral: Negative  Objective:    /62 (BP Location: Left arm, Patient Position: Sitting)   Pulse 68   Temp 97 9 °F (36 6 °C) (Tympanic)   Ht 5' 1" (1 549 m)   Wt 88 8 kg (195 lb 12 8 oz)   SpO2 98%   BMI 37 00 kg/m²      Physical Exam  Vitals and nursing note reviewed  Constitutional:       General: She is not in acute distress  Appearance: Normal appearance  She is not ill-appearing or diaphoretic  HENT:      Head: Normocephalic and atraumatic  Eyes:      Conjunctiva/sclera: Conjunctivae normal    Cardiovascular:      Rate and Rhythm: Normal rate and regular rhythm  Heart sounds: Normal heart sounds  No murmur heard  Pulmonary:      Effort: Pulmonary effort is normal  No respiratory distress  Breath sounds: Normal breath sounds  No wheezing, rhonchi or rales  Abdominal:      General: There is no distension  Palpations: Abdomen is soft  There is no mass  Tenderness: There is no abdominal tenderness  There is no guarding or rebound  Musculoskeletal:      Cervical back: Normal range of motion and neck supple  No rigidity  Right lower leg: No edema  Left lower leg: No edema  Lymphadenopathy:      Cervical: No cervical adenopathy  Neurological:      Mental Status: She is alert and oriented to person, place, and time  Psychiatric:         Mood and Affect: Mood normal          Behavior: Behavior normal          Thought Content:  Thought content normal          Judgment: Judgment normal

## 2022-09-26 ENCOUNTER — APPOINTMENT (OUTPATIENT)
Dept: RADIOLOGY | Facility: HOSPITAL | Age: 82
End: 2022-09-26
Payer: COMMERCIAL

## 2022-09-26 ENCOUNTER — TELEPHONE (OUTPATIENT)
Dept: FAMILY MEDICINE CLINIC | Facility: CLINIC | Age: 82
End: 2022-09-26

## 2022-09-26 ENCOUNTER — APPOINTMENT (EMERGENCY)
Dept: CT IMAGING | Facility: HOSPITAL | Age: 82
End: 2022-09-26
Payer: COMMERCIAL

## 2022-09-26 ENCOUNTER — HOSPITAL ENCOUNTER (EMERGENCY)
Facility: HOSPITAL | Age: 82
Discharge: HOME | End: 2022-09-26
Attending: EMERGENCY MEDICINE
Payer: COMMERCIAL

## 2022-09-26 VITALS
SYSTOLIC BLOOD PRESSURE: 127 MMHG | DIASTOLIC BLOOD PRESSURE: 59 MMHG | OXYGEN SATURATION: 99 % | RESPIRATION RATE: 35 BRPM | WEIGHT: 194 LBS | BODY MASS INDEX: 36.66 KG/M2 | TEMPERATURE: 97.3 F | HEART RATE: 70 BPM

## 2022-09-26 DIAGNOSIS — S22.39XA RIB FRACTURE: ICD-10-CM

## 2022-09-26 DIAGNOSIS — S92.919A TOE FRACTURE: ICD-10-CM
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
## 2022-09-26 PROCEDURE — 99284 EMERGENCY DEPT VISIT MOD MDM: CPT

## 2022-09-26 PROCEDURE — 70450 CT HEAD/BRAIN W/O DYE: CPT

## 2022-09-26 PROCEDURE — 99284 EMERGENCY DEPT VISIT MOD MDM: CPT | Performed by: EMERGENCY MEDICINE

## 2022-09-26 PROCEDURE — 73660 X-RAY EXAM OF TOE(S): CPT

## 2022-09-26 PROCEDURE — 71260 CT THORAX DX C+: CPT

## 2022-09-26 PROCEDURE — 96361 HYDRATE IV INFUSION ADD-ON: CPT

## 2022-09-26 PROCEDURE — 72170 X-RAY EXAM OF PELVIS: CPT

## 2022-09-26 PROCEDURE — 93005 ELECTROCARDIOGRAM TRACING: CPT

## 2022-09-26 PROCEDURE — 71045 X-RAY EXAM CHEST 1 VIEW: CPT

## 2022-09-26 PROCEDURE — 74177 CT ABD & PELVIS W/CONTRAST: CPT

## 2022-09-26 PROCEDURE — 96374 THER/PROPH/DIAG INJ IV PUSH: CPT

## 2022-09-26 RX ORDER — FENTANYL CITRATE 50 UG/ML
50 INJECTION, SOLUTION INTRAMUSCULAR; INTRAVENOUS ONCE
Status: COMPLETED | OUTPATIENT
Start: 2022-09-26 | End: 2022-09-26

## 2022-09-26 RX ORDER — LIDOCAINE 50 MG/G
1 PATCH TOPICAL DAILY
Qty: 6 PATCH | Refills: 0 | Status: SHIPPED | OUTPATIENT
Start: 2022-09-26

## 2022-09-26 RX ORDER — LIDOCAINE 50 MG/G
1 PATCH TOPICAL DAILY
Qty: 6 PATCH | Refills: 0 | Status: SHIPPED | OUTPATIENT
Start: 2022-09-26 | End: 2022-09-26

## 2022-09-26 RX ORDER — OXYCODONE HYDROCHLORIDE AND ACETAMINOPHEN 5; 325 MG/1; MG/1
1 TABLET ORAL EVERY 6 HOURS PRN
Qty: 5 TABLET | Refills: 0 | Status: SHIPPED | OUTPATIENT
Start: 2022-09-26 | End: 2022-09-29

## 2022-09-26 RX ORDER — LIDOCAINE 50 MG/G
1 PATCH TOPICAL ONCE
Status: DISCONTINUED | OUTPATIENT
Start: 2022-09-26 | End: 2022-09-26 | Stop reason: HOSPADM

## 2022-09-26 RX ADMIN — LIDOCAINE 1 PATCH: 50 PATCH TOPICAL at 18:35

## 2022-09-26 RX ADMIN — FENTANYL CITRATE 50 MCG: 50 INJECTION, SOLUTION INTRAMUSCULAR; INTRAVENOUS at 17:44

## 2022-09-26 RX ADMIN — IOHEXOL 100 ML: 350 INJECTION, SOLUTION INTRAVENOUS at 17:29

## 2022-09-26 RX ADMIN — SODIUM CHLORIDE 500 ML: 0.9 INJECTION, SOLUTION INTRAVENOUS at 17:44

## 2022-09-26 NOTE — DISCHARGE INSTRUCTIONS
Use the incentive spirometer as instructed  Take tylenol, lidoderm for pain  Take the percocet for pain not controlled by these medications  Some level of pain is expected  Follow up with your PCP for the rib fracture for reassessment, as well as podiatry for the toe fracture  Keep toe taped together and wear hard sole shoe when walking  Follow up with your PCP regarding the following finding:  3 mm right upper lobe nodule medially  Based on current Fleischner Society 2017 Guidelines on incidental pulmonary nodule, no routine follow-up is needed if the patient is low risk  If the patient is high risk, optional follow-up chest CT at 12 , months can be considered

## 2022-09-26 NOTE — TELEPHONE ENCOUNTER
Pt states that she fell out of her truck today and is very sore  She states she can not come in for an appt and would like to know if something can be sent over to the pharmacy

## 2022-09-26 NOTE — ED PROVIDER NOTES
Emergency Department Trauma Note  Bienvenido Conner 81 y o  female MRN: [id number]  Unit/Bed#: ED 02/ED 02 Encounter: [id number]      Trauma Alert: Trauma Acuity: Trauma Evaluation  Model of Arrival: Mode of Arrival: Direct from scene via    Trauma Team: Current Providers  Attending Provider: DO Rocio Alicia Nurse: Lashon Grijalva, RN  Registered Nurse: Claudine Fisher RN  Consultants:     None      History of Present Illness     Chief Complaint:   Chief Complaint   Patient presents with    Fall     HPI:  Bienvenido Conner is a 81 y o  female who presents with left-sided lower back and hip pain after a fall  Patient states she was unable to lift her leg up thigh and a fall getting out of shower this morning  Fall onto her left side  Patient denies head strike or LOC, when questioned she states "I do not think so" but does not give a very definitive answer  For this reason trauma evaluation was called, mostly based on discretion  Mechanism:Details of Incident: Layton Bowens getting into the tub this morning around 0730; Has pain down most of her left side  Injury Date: 09/26/22 Injury Time: 0730 Injury Occurence Location - [address] [place] [address] Way: carbon    76-year-old female presents after fall  Complaining of left rib pain, flank pain, and left great toe pain  Denies head injury or LOC  Review of Systems   Constitutional: Negative for chills and fever  HENT: Negative for congestion, nosebleeds, rhinorrhea and sore throat  Eyes: Negative for pain and visual disturbance  Respiratory: Negative for cough and wheezing  Cardiovascular: Positive for chest pain  Negative for leg swelling  Gastrointestinal: Negative for abdominal distention, abdominal pain, diarrhea, nausea and vomiting  Genitourinary: Negative for dysuria and frequency  Musculoskeletal: Positive for arthralgias and back pain  Negative for joint swelling  Skin: Positive for wound  Negative for rash  Neurological: Negative for weakness and numbness  Psychiatric/Behavioral: Negative for decreased concentration and suicidal ideas  Historical Information     Immunizations:   Immunization History   Administered Date(s) Administered    COVID-19 MODERNA VACC 0 5 ML IM 2021, 2021    INFLUENZA 10/05/2015, 10/10/2016, 10/21/2016, 09/15/2017, 10/15/2018, 2020, 2021    Influenza, high dose seasonal 0 7 mL 2022    influenza, trivalent, adjuvanted 10/03/2019       Past Medical History:   Diagnosis Date    Atrial fibrillation ([facility] [address] )     Esophageal stricture     Factor V Leiden mutation ([facility] )     LAST ASSESSED: 8/3/16    Hypertension        Family History   Problem Relation Age of Onset    Hypertension Mother     Cancer Father      Past Surgical History:   Procedure Laterality Date    CATARACT EXTRACTION, BILATERAL Bilateral     REDUCTION MAMMAPLASTY Bilateral     TONSILLECTOMY AND ADENOIDECTOMY      TUBAL LIGATION Bilateral      Social History     Tobacco Use    Smoking status: Former Smoker     Quit date:      Years since quittin 7    Smokeless tobacco: Never Used   Vaping Use    Vaping Use: Never used   Substance Use Topics    Alcohol use: Never    Drug use: Never     E-Cigarette/Vaping    E-Cigarette Use Never User      E-Cigarette/Vaping Substances    Nicotine No     THC No     CBD No     Flavoring No     Other No     Unknown No        Family History:   Family History   Problem Relation Age of Onset    Hypertension Mother     Cancer Father        Meds/Allergies   Prior to Admission Medications   Prescriptions Last Dose Informant Patient Reported? Taking?    Choline Fenofibrate (Fenofibric Acid) 135 MG CPDR 2022 at Unknown time Self No Yes   Sig: take 1 capsule by mouth once daily   apixaban (ELIQUIS) 5 mg 2022 at Unknown time  No Yes   Sig: Take 1 tablet (5 mg total) by mouth 2 (two) times a day   buPROPion (WELLBUTRIN XL) 150 mg 24 hr tablet 9/25/2022 at Unknown time Self No Yes   Sig: Take 1 tablet (150 mg total) by mouth daily   cetirizine (ZyrTEC) 10 mg tablet 9/25/2022 at Unknown time Self Yes Yes   Sig: Take 10 mg by mouth daily   diltiazem (CARDIZEM CD) 180 mg 24 hr capsule 9/26/2022 at Unknown time  No Yes   Sig: Take 1 capsule (180 mg total) by mouth daily   ergocalciferol (VITAMIN D2) 50,000 units 9/25/2022 at Unknown time Self No Yes   Sig: take 1 capsule by mouth every week   spironolactone (ALDACTONE) 25 mg tablet 9/25/2022 at Unknown time Self No Yes   Sig: take 2 tablets by mouth daily      Facility-Administered Medications: None       Allergies   Allergen Reactions    Aspirin     Montelukast      Other reaction(s): Depression  Category: Adverse Reaction; Other reaction(s): Depression  Category: Adverse Reaction;     Nsaids     Pravastatin      Category: Adverse Reaction;     Simvastatin      Category: Adverse Reaction;     Tolmetin        PHYSICAL EXAM    PE limited by: none    Objective   Vitals:   First set: Temperature: (!) 97 3 °F (36 3 °C) (09/26/22 1703)  Pulse: 75 (09/26/22 1703)  Respirations: 18 (09/26/22 1703)  Blood Pressure: (!) 180/72 (09/26/22 1703)  SpO2: 96 % (09/26/22 1703)    Primary Survey:   (A) Airway: None  (B) Breathing: None  (C) Circulation: Pulses:   normal  (D) Disabliity:  GCS Total:  15  (E) Expose:  Completed    Secondary Survey: (Click on Physical Exam tab above)  Physical Exam  Vitals and nursing note reviewed  Constitutional:       Appearance: She is well-developed  HENT:      Head: Normocephalic and atraumatic  Eyes:      Conjunctiva/sclera: Conjunctivae normal       Pupils: Pupils are equal, round, and reactive to light  Neck:      Trachea: No tracheal deviation  Cardiovascular:      Rate and Rhythm: Normal rate and regular rhythm  Heart sounds: Normal heart sounds  No murmur heard  Pulmonary:      Effort: Pulmonary effort is normal  No respiratory distress        Breath sounds: Normal breath sounds  No wheezing or rales  Abdominal:      General: Bowel sounds are normal  There is no distension  Palpations: Abdomen is soft  Tenderness: There is no abdominal tenderness  Musculoskeletal:         General: No deformity  Cervical back: Normal range of motion and neck supple  Comments: Tenderness left ribs near 10th rib,  Skin tear right hand, well approximated  Tenderness medial left great toe   Skin:     General: Skin is warm and dry  Capillary Refill: Capillary refill takes less than 2 seconds  Neurological:      Mental Status: She is alert and oriented to person, place, and time  Sensory: No sensory deficit  Psychiatric:         Judgment: Judgment normal          Cervical spine cleared by clinical criteria? Yes     Invasive Devices  Report    None                 Lab Results:   Results Reviewed     None                 Imaging Studies:   Direct to CT: No  XR toe great min 2 views LEFT   ED Interpretation by Reyes Urban DO (09/26 6449)   Minimally displaced fracture of distal phalynx      TRAUMA - CT head wo contrast   Final Result by [place] Cornejo MD (09/26 1759)      No acute intracranial abnormality  The study was marked in [facility] for immediate notification as per trauma protocol  Workstation performed: [id number]         TRAUMA - CT chest abdomen pelvis w contrast   Final Result by [place] Cornejo MD (09/26 1820)   Addendum 1 of 1 by [place] Cornejo MD (09/26 1820)   ADDENDUM:      Upon further review there is a slightly displaced left lateral 10th rib    fracture  Final      1  Slight angulation at the left anterior fifth and sixth ribs could represent nondisplaced fractures  Correlate with site of pain  2   Otherwise no acute traumatic injury to the chest, abdomen and pelvis  3   3 mm right upper lobe nodule medially   Based on current Fleischner Society 2017 Guidelines on incidental pulmonary nodule, no routine follow-up is needed if the patient is low risk  If the patient is high risk, optional follow-up chest CT at 12    months can be considered  The study was marked in [facility] for immediate notification as per trauma protocol  Workstation performed: [id number]         XR Trauma chest portable    (Results Pending)   XR Trauma pelvis ap only 1 or 2 vw    (Results Pending)         Procedures  Procedures         ED Course  ED Course as of 09/26/22 1911   Mon Sep 26, 2022   1740 L rib fracture, no bleed, no obvious intrabdominal hemorrhage on my view   1755 Patient instructed about rib fracture, incentive spirometer  Patient comfortable with discharge, return precautions           MDM  Number of Diagnoses or Management Options  Fall, initial encounter: new and requires workup  Rib fracture: new and requires workup  Toe fracture: new and requires workup  Diagnosis management comments: Patient describes a mechanical fall from standing  Does not appear to have head strike or head strike equivalents but her answer to these questions is not definitive so trauma evaluation called  Exam remarkable for left flank/lower back tenderness without stepoffs  Skin tear of right hand, superficial abrasion of left knee       Amount and/or Complexity of Data Reviewed  Review and summarize past medical records: yes  Independent visualization of images, tracings, or specimens: yes    Risk of Complications, Morbidity, and/or Mortality  Presenting problems: high  Diagnostic procedures: minimal  Management options: high            Disposition  [facility] Transfer: No  Final diagnoses:   Fall, initial encounter   Rib fracture   Toe fracture     Time reflects when diagnosis was documented in both MDM as applicable and the Disposition within this note     Time User Action Codes Description Comment    9/26/2022  6:08 PM Claudia Chery [R75  YIWM] Fall, initial encounter     9/26/2022  6:08 PM Claudia Barahona Rib fracture     9/26/2022  6:13 PM Earnest Safe Add [M54 [id number]] Toe fracture       ED Disposition     ED Disposition   Discharge    Condition   Stable    Date/Time   Mon Sep 26, 2022  6:08 PM    Comment   Marimar Jackson discharge to home/self care                 Follow-up Information     Follow up With Specialties Details Why Contact Info    Fide Maynard DPM Podiatry, Wound Care Schedule an appointment as soon as possible for a visit   [address] [facility] [address]  [phone]          Discharge Medication List as of 9/26/2022  6:19 PM      START taking these medications    Details   oxyCODONE-acetaminophen (Percocet) 5-325 mg per tablet Take 1 tablet by mouth every 6 (six) hours as needed for moderate pain for up to 3 days Max Daily Amount: 4 tablets, Starting Mon 9/26/2022, Until Thu 9/29/2022 at 2359, Normal      lidocaine (Lidoderm) 5 % Apply 1 patch topically daily Remove & Discard patch within 12 hours or as directed by MD, Starting Mon 9/26/2022, Normal         CONTINUE these medications which have NOT CHANGED    Details   apixaban (ELIQUIS) 5 mg Take 1 tablet (5 mg total) by mouth 2 (two) times a day, Starting Mon 9/19/2022, Normal      buPROPion (WELLBUTRIN XL) 150 mg 24 hr tablet Take 1 tablet (150 mg total) by mouth daily, Starting Tue 4/5/2022, Normal      cetirizine (ZyrTEC) 10 mg tablet Take 10 mg by mouth daily, Historical Med      Choline Fenofibrate (Fenofibric Acid) 135 MG CPDR take 1 capsule by mouth once daily, Normal      diltiazem (CARDIZEM CD) 180 mg 24 hr capsule Take 1 capsule (180 mg total) by mouth daily, Starting Mon 9/19/2022, Normal      ergocalciferol (VITAMIN D2) 50,000 units take 1 capsule by mouth every week, Normal      spironolactone (ALDACTONE) 25 mg tablet take 2 tablets by mouth daily, Normal               PDMP Review       Value Time User    PDMP Reviewed  Yes 9/26/2022  6:13 PM Ceasar Franklin DO          ED Provider  Electronically Signed by         Seda Cobb DO  09/26/22 1913

## 2022-09-27 LAB
ATRIAL RATE: 69 BPM
P AXIS: 72 DEGREES
PR INTERVAL: 156 MS
QRS AXIS: 29 DEGREES
QRSD INTERVAL: 86 MS
QT INTERVAL: 374 MS
QTC INTERVAL: 400 MS
T WAVE AXIS: 59 DEGREES
VENTRICULAR RATE: 69 BPM

## 2022-09-27 PROCEDURE — 93010 ELECTROCARDIOGRAM REPORT: CPT | Performed by: INTERNAL MEDICINE

## 2022-09-29 ENCOUNTER — OFFICE VISIT (OUTPATIENT)
Dept: FAMILY MEDICINE CLINIC | Facility: CLINIC | Age: 82
End: 2022-09-29
Payer: COMMERCIAL

## 2022-09-29 VITALS
HEIGHT: 61 IN | WEIGHT: 191 LBS | TEMPERATURE: 97.2 F | OXYGEN SATURATION: 98 % | SYSTOLIC BLOOD PRESSURE: 134 MMHG | HEART RATE: 67 BPM | BODY MASS INDEX: 36.06 KG/M2 | DIASTOLIC BLOOD PRESSURE: 80 MMHG
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
## 2022-09-29 DIAGNOSIS — S22.42XD CLOSED FRACTURE OF MULTIPLE RIBS OF LEFT SIDE WITH ROUTINE HEALING, SUBSEQUENT ENCOUNTER: ICD-10-CM

## 2022-09-29 DIAGNOSIS — L72.3 SEBACEOUS CYST: ICD-10-CM

## 2022-09-29 DIAGNOSIS — M62.830 BACK MUSCLE SPASM: ICD-10-CM

## 2022-09-29 DIAGNOSIS — S61.411D SKIN TEAR OF RIGHT HAND WITHOUT COMPLICATION, SUBSEQUENT ENCOUNTER: ICD-10-CM

## 2022-09-29 DIAGNOSIS — S92.422A DISPLACED FRACTURE OF DISTAL PHALANX OF LEFT GREAT TOE, INITIAL ENCOUNTER FOR CLOSED FRACTURE: ICD-10-CM

## 2022-09-29 DIAGNOSIS — R04.0 EPISTAXIS: ICD-10-CM
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
## 2022-09-29 PROBLEM — S22.42XA MULTIPLE CLOSED FRACTURES OF RIBS OF LEFT SIDE: Status: ACTIVE | Noted: 2022-09-29

## 2022-09-29 PROBLEM — S61.411A SKIN TEAR OF RIGHT HAND WITHOUT COMPLICATION: Status: ACTIVE | Noted: 2022-09-29

## 2022-09-29 PROCEDURE — 99215 OFFICE O/P EST HI 40 MIN: CPT | Performed by: FAMILY MEDICINE

## 2022-09-29 PROCEDURE — 1160F RVW MEDS BY RX/DR IN RCRD: CPT | Performed by: FAMILY MEDICINE

## 2022-09-29 RX ORDER — METHOCARBAMOL 500 MG/1
500 TABLET, FILM COATED ORAL 2 TIMES DAILY PRN
Qty: 30 TABLET | Refills: 0 | Status: SHIPPED | OUTPATIENT
Start: 2022-09-29

## 2022-09-29 NOTE — PROGRESS NOTES
Assessment/Plan:    No problem-specific Assessment & Plan notes found for this encounter  Diagnoses and all orders for this visit:    Fall in home, subsequent encounter    Closed fracture of multiple ribs of left side with routine healing, subsequent encounter    Displaced fracture of distal phalanx of left great toe, initial encounter for closed fracture  Comments:  Buddy taping toe, follow up podiatry    Back muscle spasm  Comments:  Trial robaxin 1/2 tablet twice daily  Orders:  -     methocarbamol (ROBAXIN) 500 mg tablet; Take 1 tablet (500 mg total) by mouth 2 (two) times a day as needed for muscle spasms    Skin tear of right hand without complication, subsequent encounter  Comments:  Keep dressed for the next 5 days    Epistaxis  Comments:  Continue saline nasal spray    Sebaceous cyst  Comments:  warm compresses, general surgery for removal          PHQ-2/9 Depression Screening              Subjective:      Patient ID: Suad Kumar is a 81 y o  female  Patient presents for follow-up after emergency room visit for a fall at home while getting out of the shower she fell on her left side and complained of left-sided low back pain hip pain  EKG showed normal sinus rhythm with low-voltage QRS compared to EKG done August 18th she is no longer in AFib  Chest x-ray negative, pelvis x-rays negative, CT of the head is normal, CT of the chest abdomen and pelvis shows slight angulation of the left anterior 5th and 6th ribs suggesting rib fractures, she also has a left 1st toe acute slightly displaced fracture in the proximal lateral aspect of the distal phalanx  She was referred to podiatry from the ER  Aminah Mcmanus reports hitting the tub with her toe then fell into the tub  She is having back spasms, percocet and lidoderm patches are not helping  She also sustained a skin tear as a result of the fall on the right right medial aspect of the thumb  She also had nosebleeds twice, Sunday and today   She is using saline spray  She denies having difficulty with balance or gait  Patient is accompanied by her daughter  The following portions of the patient's history were reviewed and updated as appropriate: allergies, current medications, past family history, past medical history, past social history, past surgical history and problem list     Review of Systems   Constitutional: Negative for chills, fatigue and fever  HENT: Positive for nosebleeds  Eyes: Negative  Negative for visual disturbance  Respiratory: Negative for cough, chest tightness, shortness of breath and wheezing  Cardiovascular: Negative for chest pain and palpitations  Gastrointestinal: Negative for abdominal pain, blood in stool, constipation, diarrhea, nausea and vomiting  Endocrine: Negative  Genitourinary: Negative for difficulty urinating, dysuria, frequency and urgency  Musculoskeletal: Positive for arthralgias and back pain  Negative for gait problem and myalgias  Skin: Negative  Allergic/Immunologic: Negative  Neurological: Negative for dizziness, seizures, syncope and light-headedness  Hematological: Negative for adenopathy  Psychiatric/Behavioral: Negative  Objective:    /80 (BP Location: Right arm, Patient Position: Sitting, Cuff Size: Large)   Pulse 67   Temp (!) 97 2 °F (36 2 °C) (Tympanic)   Ht 5' 1" (1 549 m)   Wt 86 6 kg (191 lb)   SpO2 98%   BMI 36 09 kg/m²      Physical Exam  Vitals and nursing note reviewed  Constitutional:       General: She is not in acute distress  Appearance: Normal appearance  She is not ill-appearing or diaphoretic  HENT:      Head: Normocephalic and atraumatic  Eyes:      Conjunctiva/sclera: Conjunctivae normal    Cardiovascular:      Rate and Rhythm: Normal rate and regular rhythm  Heart sounds: Normal heart sounds  No murmur heard  Pulmonary:      Effort: Pulmonary effort is normal  No respiratory distress        Breath sounds: Normal breath sounds  No wheezing, rhonchi or rales  Abdominal:      General: There is no distension  Palpations: Abdomen is soft  There is no mass  Tenderness: There is no abdominal tenderness  There is no guarding or rebound  Musculoskeletal:         General: Tenderness present  Cervical back: Normal range of motion and neck supple  No rigidity  Right lower leg: No edema  Left lower leg: No edema  Comments: TTP left posterolateral ribs and PVM mid-thoracic to lumbar spine   Lymphadenopathy:      Cervical: No cervical adenopathy  Skin:     Findings: Bruising present  Comments: L flank   Neurological:      General: No focal deficit present  Mental Status: She is alert and oriented to person, place, and time  Psychiatric:         Mood and Affect: Mood normal          Behavior: Behavior normal          Thought Content:  Thought content normal          Judgment: Judgment normal

## 2022-10-03 ENCOUNTER — TELEPHONE (OUTPATIENT)
Dept: FAMILY MEDICINE CLINIC | Facility: CLINIC | Age: 82
End: 2022-10-03

## 2022-10-03 NOTE — TELEPHONE ENCOUNTER
Patient called stating she was in the other day after a fall, has broken ribs and broken toes  She was given muscle relaxers and states when she woke up this morning she is experiencing significant swelling in her foot  Would you consider this to be a normal side effect, or would you like the patient in for a follow up visit?

## 2022-10-12 PROBLEM — Z00.00 MEDICARE ANNUAL WELLNESS VISIT, SUBSEQUENT: Status: RESOLVED | Noted: 2020-12-18 | Resolved: 2022-10-12

## 2022-10-26 ENCOUNTER — TELEPHONE (OUTPATIENT)
Dept: CARDIOLOGY CLINIC | Facility: CLINIC | Age: 82
End: 2022-10-26

## 2022-10-26 NOTE — TELEPHONE ENCOUNTER
I was able to call and speak with patient  For monitoring I can attest that her [employer] enriqueta will be helpful and that this is important but that the patient had [employer] watch before seeing me in the office so I would not be able to comment on that device for patient  Also the [employer] enriqueta was recommended as patient did not want implantable loop recorder

## 2022-10-26 NOTE — TELEPHONE ENCOUNTER
Danny Manuela wants to know if you can write a letter stating it is medical necessary for  Her to have her apple watch to detect her afib and also the [employer] enriqueta to do an EKG so she can submit to her housing

## 2023-01-03 DIAGNOSIS — E55.9 VITAMIN D DEFICIENCY: ICD-10-CM

## 2023-01-04 ENCOUNTER — CLINICAL SUPPORT (OUTPATIENT)
Dept: CARDIOLOGY CLINIC | Facility: CLINIC | Age: 83
End: 2023-01-04

## 2023-01-04 ENCOUNTER — OFFICE VISIT (OUTPATIENT)
Dept: CARDIOLOGY CLINIC | Facility: CLINIC | Age: 83
End: 2023-01-04

## 2023-01-04 VITALS
SYSTOLIC BLOOD PRESSURE: 118 MMHG | BODY MASS INDEX: 37.19 KG/M2 | HEIGHT: 61 IN | HEART RATE: 80 BPM | WEIGHT: 197 LBS | DIASTOLIC BLOOD PRESSURE: 54 MMHG

## 2023-01-04 DIAGNOSIS — I48.0 PAF (PAROXYSMAL ATRIAL FIBRILLATION) (HCC): Primary | ICD-10-CM

## 2023-01-04 DIAGNOSIS — I34.0 MITRAL VALVE INSUFFICIENCY, UNSPECIFIED ETIOLOGY: ICD-10-CM

## 2023-01-04 DIAGNOSIS — I48.91 ATRIAL FIBRILLATION WITH RAPID VENTRICULAR RESPONSE (HCC): ICD-10-CM

## 2023-01-04 DIAGNOSIS — I10 PRIMARY HYPERTENSION: ICD-10-CM

## 2023-01-04 DIAGNOSIS — E66.09 CLASS 2 OBESITY DUE TO EXCESS CALORIES WITH BODY MASS INDEX (BMI) OF 37.0 TO 37.9 IN ADULT, UNSPECIFIED WHETHER SERIOUS COMORBIDITY PRESENT: ICD-10-CM

## 2023-01-04 DIAGNOSIS — E66.01 CLASS 2 SEVERE OBESITY DUE TO EXCESS CALORIES WITH SERIOUS COMORBIDITY AND BODY MASS INDEX (BMI) OF 35.0 TO 35.9 IN ADULT (HCC): ICD-10-CM

## 2023-01-04 DIAGNOSIS — N18.4 CHRONIC RENAL DISEASE, STAGE IV (HCC): ICD-10-CM

## 2023-01-04 NOTE — PROGRESS NOTES
Cardiology Follow up    Deandra Hidalgo  [id number]  1940   [facility] [address] [address]      1  PAF (paroxysmal atrial fibrillation) ([address] )        2  Mitral valve insufficiency, unspecified etiology        3  Class 2 obesity due to excess calories with body mass index (BMI) of 37 0 to 37 9 in adult, unspecified whether serious comorbidity present        4  Primary hypertension            Discussion/Summary:  1  Paroxysmal atrial fibrillation on oral anticoagulation   2  Hypertension  3  Obesity  4  Intermittent palpitations  5    Mitral regurgitation    -Transthoracic echocardiogram 8/19/2022 showing left ventricular systolic function normal estimated LVEF 55% with mild to moderate aortic regurgitation, aortic sclerosis, mild to moderate mitral regurgitation, mild to moderate tricuspid regurgitation   -We will check 1 week Zio patch monitor to evaluate overall burden of ectopic activity however in the meantime as patient otherwise notes doing well we will continue Eliquis 5 mg twice daily, diltiazem 180 mg daily and spironolactone 25 mg daily   -Patient has laboratory studies pending and we will follow-up results  -We will follow-up with patient in 1 month or sooner if necessary once testing is completed  -Patient counseled if she were to have any warning or alarm type symptoms she is to seek emergency medical care immediately  -Patient counseled on dietary and lifestyle modifications including following a low-salt, low-fat, heart healthy diet with weight loss his goal BMI is less than 29 in the setting of atrial fibrillation    History of Present Illness:  -Patient is a an 81-year-old female with hypertension, chronic rhinitis, factor V Leiden, obesity, who was hospitalized in August 2022 found to have atrial fibrillation rapid ventricular response after being told in August on 8/18/2022 by her apple watch  She was initiated on oral anticoagulation and rate control strategy and did well and was able to be discharged from the hospital   She was even evaluated and seen by hematology who agreed with oral anticoagulation and presents to the office today for scheduled follow-up  -Currently in the office today she denies any active chest pain, palpitations, lightheadedness or dizziness, loss of consciousness, shortness of breath or lower extremity edema  She states that since having a little bit of a head cold for the past week and taking some over-the-counter cold medication she has noticed some intermittent palpitations but when she checks her heart rate on her watch it is usually less than 100 and apart from some intermittent palpitations denies any significant concerning symptoms   -She has not been using her [employer] enriqueta but will be more diligent with this in the future  Patient Active Problem List   Diagnosis   • Stage 3b chronic kidney disease ([facility] )   • Benign hypertension with CKD (chronic kidney disease) stage III ([facility])   • Vitamin D deficiency   • Dizziness   • Chronic rhinitis   • Obesity (BMI 30-39  9)   • Factor V Leiden mutation ([facility] )   • Major depressive disorder, recurrent, unspecified ([facility] )   • Hypertriglyceridemia   • Subclinical hypothyroidism   • Microalbuminuria   • Atrial fibrillation with rapid ventricular response ([facility])   • Chronic renal disease, stage IV ([facility])   • Class 2 severe obesity due to excess calories with serious comorbidity and body mass index (BMI) of 35 0 to 35 9 in adult [facility])   • Multiple closed fractures of ribs of left side   • Fall at home   • Displaced fracture of distal phalanx of left great toe, initial encounter for closed fracture   • Back muscle spasm   • Skin tear of right hand without complication   • Epistaxis     Past Medical History:   Diagnosis Date   • Atrial fibrillation [facility])    • Esophageal stricture    • Factor V Leiden mutation ([facility] )     LAST ASSESSED: 8/3/16   • Hypertension      Social History     Socioeconomic History   • Marital status:      Spouse name: Not on file   • Number of children: 4   • Years of education: Not on file   • Highest education level: Not on file   Occupational History   • Occupation: PRIOR OCCUPATION       Comment: RETIRED   Tobacco Use   • Smoking status: Former     Types: Cigarettes     Quit date:      Years since quittin 0   • Smokeless tobacco: Never   Vaping Use   • Vaping Use: Never used   Substance and Sexual Activity   • Alcohol use: Never   • Drug use: Never   • Sexual activity: Not Currently   Other Topics Concern   • Not on file   Social History Narrative   • Not on file     Social Determinants of Health     Financial Resource Strain: Not on file   Food Insecurity: No Food Insecurity   • Worried About Running Out of Food in the Last Year: Never true   • Ran Out of Food in the Last Year: Never true   Transportation Needs: No Transportation Needs   • Lack of Transportation (Medical): No   • Lack of Transportation (Non-Medical):  No   Physical Activity: Not on file   Stress: Not on file   Social Connections: Not on file   Intimate Partner Violence: Not on file   Housing Stability: Low Risk    • Unable to Pay for Housing in the Last Year: No   • Number of Places Lived in the Last Year: 1   • Unstable Housing in the Last Year: No      Family History   Problem Relation Age of Onset   • Hypertension Mother    • Cancer Father      Past Surgical History:   Procedure Laterality Date   • CATARACT EXTRACTION, BILATERAL Bilateral    • REDUCTION MAMMAPLASTY Bilateral    • TONSILLECTOMY AND ADENOIDECTOMY     • TUBAL LIGATION Bilateral        Current Outpatient Medications:   •  apixaban (ELIQUIS) 5 mg, Take 1 tablet (5 mg total) by mouth 2 (two) times a day, Disp: 180 tablet, Rfl: 3  •  buPROPion (WELLBUTRIN XL) 150 mg 24 hr tablet, Take 1 tablet (150 mg total) by mouth daily, Disp: 90 tablet, Rfl: 3  •  cetirizine (ZyrTEC) 10 mg tablet, Take 10 mg by mouth daily, Disp: , Rfl:   •  Choline Fenofibrate (Fenofibric Acid) 135 MG CPDR, take 1 capsule by mouth once daily, Disp: 90 capsule, Rfl: 1  •  diltiazem (CARDIZEM CD) 180 mg 24 hr capsule, Take 1 capsule (180 mg total) by mouth daily, Disp: 90 capsule, Rfl: 3  •  ergocalciferol (VITAMIN D2) 50,000 units, take 1 capsule by mouth every week, Disp: 4 capsule, Rfl: 5  •  spironolactone (ALDACTONE) 25 mg tablet, take 2 tablets by mouth daily, Disp: 180 tablet, Rfl: 0  Allergies   Allergen Reactions   • Aspirin    • Montelukast      Other reaction(s): Depression  Category: Adverse Reaction; Other reaction(s): Depression  Category: Adverse Reaction;    • Nsaids    • Pravastatin      Category: Adverse Reaction;    • Simvastatin      Category: Adverse Reaction;    • Tolmetin          Labs:  No visits with results within 2 Month(s) from this visit  Latest known visit with results is:   Admission on 09/26/2022, Discharged on 09/26/2022   Component Date Value   • Ventricular Rate 09/26/2022 69    • Atrial Rate 09/26/2022 69    • MD Interval 09/26/2022 156    • QRSD Interval 09/26/2022 86    • QT Interval 09/26/2022 374    • QTC Interval 09/26/2022 400    • P Axis 09/26/2022 72    • QRS Axis 09/26/2022 29    • T Wave Axis 09/26/2022 59         Imaging: No results found  Review of Systems:  Review of Systems   Constitutional: Negative for chills, diaphoresis, fatigue and fever  HENT: Positive for postnasal drip and rhinorrhea  Negative for trouble swallowing and voice change  Eyes: Negative for pain and redness  Respiratory: Negative for shortness of breath and wheezing  Cardiovascular: Negative for chest pain, palpitations and leg swelling  Gastrointestinal: Negative for abdominal pain, constipation, diarrhea, nausea and vomiting  Genitourinary: Negative for dysuria     Musculoskeletal: Positive for arthralgias  Negative for neck pain and neck stiffness  Neurological: Negative for dizziness, syncope, light-headedness and headaches  Psychiatric/Behavioral: Negative for agitation and confusion  All other systems reviewed and are negative  Vitals:    01/04/23 1403   BP: 118/54   Pulse: 80   Weight: 89 4 kg (197 lb)   Height: 5' 1" (1 549 m)     Vitals:    01/04/23 1403   Weight: 89 4 kg (197 lb)     Height: 5' 1" (154 9 cm)     Physical Exam:  Physical Exam  Vitals reviewed  Constitutional:       General: She is not in acute distress  Appearance: She is obese  She is not diaphoretic  HENT:      Head: Normocephalic and atraumatic  Eyes:      General:         Right eye: No discharge  Left eye: No discharge  Neck:      Comments: Trachea midline, no JVD present  Cardiovascular:      Rate and Rhythm: Normal rate and regular rhythm  Heart sounds: No friction rub  Pulmonary:      Effort: Pulmonary effort is normal  No respiratory distress  Breath sounds: No wheezing  Chest:      Chest wall: No tenderness  Abdominal:      General: Bowel sounds are normal       Palpations: Abdomen is soft  Tenderness: There is no abdominal tenderness  There is no rebound  Musculoskeletal:      Right lower leg: No edema  Left lower leg: No edema  Skin:     General: Skin is warm and dry  Neurological:      Mental Status: She is alert  Comments: Awake, alert, able to answer questions appropriately, able to move extremities bilaterally     Psychiatric:         Mood and Affect: Mood normal          Behavior: Behavior normal

## 2023-01-09 ENCOUNTER — TELEPHONE (OUTPATIENT)
Dept: NEPHROLOGY | Facility: CLINIC | Age: 83
End: 2023-01-09

## 2023-01-13 ENCOUNTER — APPOINTMENT (OUTPATIENT)
Dept: LAB | Facility: CLINIC | Age: 83
End: 2023-01-13

## 2023-01-13 ENCOUNTER — CLINICAL SUPPORT (OUTPATIENT)
Dept: CARDIOLOGY CLINIC | Facility: CLINIC | Age: 83
End: 2023-01-13

## 2023-01-13 DIAGNOSIS — I48.91 ATRIAL FIBRILLATION WITH RAPID VENTRICULAR RESPONSE (HCC): ICD-10-CM

## 2023-01-13 DIAGNOSIS — E03.8 SUBCLINICAL HYPOTHYROIDISM: ICD-10-CM

## 2023-01-13 DIAGNOSIS — I12.9 BENIGN HYPERTENSION WITH CKD (CHRONIC KIDNEY DISEASE) STAGE III (HCC): ICD-10-CM

## 2023-01-13 DIAGNOSIS — I48.0 PAF (PAROXYSMAL ATRIAL FIBRILLATION) (HCC): ICD-10-CM

## 2023-01-13 DIAGNOSIS — R80.9 MICROALBUMINURIA: ICD-10-CM

## 2023-01-13 DIAGNOSIS — E78.1 HYPERTRIGLYCERIDEMIA: ICD-10-CM

## 2023-01-13 DIAGNOSIS — N18.30 BENIGN HYPERTENSION WITH CKD (CHRONIC KIDNEY DISEASE) STAGE III (HCC): ICD-10-CM

## 2023-01-13 DIAGNOSIS — N18.32 STAGE 3B CHRONIC KIDNEY DISEASE (HCC): ICD-10-CM

## 2023-01-13 DIAGNOSIS — E55.9 VITAMIN D DEFICIENCY: ICD-10-CM

## 2023-01-13 LAB
25(OH)D3 SERPL-MCNC: 29 NG/ML (ref 30–100)
ALBUMIN SERPL BCP-MCNC: 3.7 G/DL (ref 3.5–5)
ALP SERPL-CCNC: 61 U/L (ref 46–116)
ALT SERPL W P-5'-P-CCNC: 22 U/L (ref 12–78)
ANION GAP SERPL CALCULATED.3IONS-SCNC: 7 MMOL/L (ref 4–13)
AST SERPL W P-5'-P-CCNC: 18 U/L (ref 5–45)
BACTERIA UR QL AUTO: ABNORMAL /HPF
BASOPHILS # BLD AUTO: 0.08 THOUSANDS/ÂΜL (ref 0–0.1)
BASOPHILS NFR BLD AUTO: 1 % (ref 0–1)
BILIRUB SERPL-MCNC: 0.39 MG/DL (ref 0.2–1)
BILIRUB UR QL STRIP: NEGATIVE
BUN SERPL-MCNC: 39 MG/DL (ref 5–25)
CALCIUM SERPL-MCNC: 9.4 MG/DL (ref 8.3–10.1)
CHLORIDE SERPL-SCNC: 107 MMOL/L (ref 96–108)
CHOLEST SERPL-MCNC: 204 MG/DL
CLARITY UR: CLEAR
CO2 SERPL-SCNC: 24 MMOL/L (ref 21–32)
COLOR UR: ABNORMAL
CREAT SERPL-MCNC: 1.35 MG/DL (ref 0.6–1.3)
CREAT UR-MCNC: 85 MG/DL
EOSINOPHIL # BLD AUTO: 0.24 THOUSAND/ÂΜL (ref 0–0.61)
EOSINOPHIL NFR BLD AUTO: 3 % (ref 0–6)
ERYTHROCYTE [DISTWIDTH] IN BLOOD BY AUTOMATED COUNT: 13.1 % (ref 11.6–15.1)
GFR SERPL CREATININE-BSD FRML MDRD: 36 ML/MIN/1.73SQ M
GLUCOSE P FAST SERPL-MCNC: 98 MG/DL (ref 65–99)
GLUCOSE UR STRIP-MCNC: NEGATIVE MG/DL
HCT VFR BLD AUTO: 37.9 % (ref 34.8–46.1)
HDLC SERPL-MCNC: 68 MG/DL
HGB BLD-MCNC: 11.7 G/DL (ref 11.5–15.4)
HGB UR QL STRIP.AUTO: NEGATIVE
HYALINE CASTS #/AREA URNS LPF: ABNORMAL /LPF
IMM GRANULOCYTES # BLD AUTO: 0.05 THOUSAND/UL (ref 0–0.2)
IMM GRANULOCYTES NFR BLD AUTO: 1 % (ref 0–2)
KETONES UR STRIP-MCNC: NEGATIVE MG/DL
LDLC SERPL CALC-MCNC: 111 MG/DL (ref 0–100)
LEUKOCYTE ESTERASE UR QL STRIP: NEGATIVE
LYMPHOCYTES # BLD AUTO: 1.43 THOUSANDS/ÂΜL (ref 0.6–4.47)
LYMPHOCYTES NFR BLD AUTO: 19 % (ref 14–44)
MAGNESIUM SERPL-MCNC: 1.8 MG/DL (ref 1.6–2.6)
MCH RBC QN AUTO: 28.6 PG (ref 26.8–34.3)
MCHC RBC AUTO-ENTMCNC: 30.9 G/DL (ref 31.4–37.4)
MCV RBC AUTO: 93 FL (ref 82–98)
MICROALBUMIN UR-MCNC: 40.9 MG/L (ref 0–20)
MICROALBUMIN/CREAT 24H UR: 48 MG/G CREATININE (ref 0–30)
MONOCYTES # BLD AUTO: 0.65 THOUSAND/ÂΜL (ref 0.17–1.22)
MONOCYTES NFR BLD AUTO: 9 % (ref 4–12)
MUCOUS THREADS UR QL AUTO: ABNORMAL
NEUTROPHILS # BLD AUTO: 5.19 THOUSANDS/ÂΜL (ref 1.85–7.62)
NEUTS SEG NFR BLD AUTO: 67 % (ref 43–75)
NITRITE UR QL STRIP: NEGATIVE
NON-SQ EPI CELLS URNS QL MICRO: ABNORMAL /HPF
NONHDLC SERPL-MCNC: 136 MG/DL
NRBC BLD AUTO-RTO: 0 /100 WBCS
PH UR STRIP.AUTO: 5.5 [PH]
PHOSPHATE SERPL-MCNC: 3.6 MG/DL (ref 2.3–4.1)
PLATELET # BLD AUTO: 452 THOUSANDS/UL (ref 149–390)
PMV BLD AUTO: 9.1 FL (ref 8.9–12.7)
POTASSIUM SERPL-SCNC: 4.2 MMOL/L (ref 3.5–5.3)
PROT SERPL-MCNC: 7.6 G/DL (ref 6.4–8.4)
PROT UR STRIP-MCNC: NEGATIVE MG/DL
PTH-INTACT SERPL-MCNC: 38.6 PG/ML (ref 18.4–80.1)
RBC # BLD AUTO: 4.09 MILLION/UL (ref 3.81–5.12)
RBC #/AREA URNS AUTO: ABNORMAL /HPF
SODIUM SERPL-SCNC: 138 MMOL/L (ref 135–147)
SP GR UR STRIP.AUTO: 1.02 (ref 1–1.03)
TRIGL SERPL-MCNC: 123 MG/DL
TSH SERPL DL<=0.05 MIU/L-ACNC: 4.03 UIU/ML (ref 0.45–4.5)
URATE SERPL-MCNC: 7.2 MG/DL (ref 2–7.5)
UROBILINOGEN UR STRIP-ACNC: <2 MG/DL
WBC # BLD AUTO: 7.64 THOUSAND/UL (ref 4.31–10.16)
WBC #/AREA URNS AUTO: ABNORMAL /HPF

## 2023-01-17 ENCOUNTER — OFFICE VISIT (OUTPATIENT)
Dept: NEPHROLOGY | Facility: CLINIC | Age: 83
End: 2023-01-17

## 2023-01-17 VITALS
WEIGHT: 195 LBS | HEIGHT: 61 IN | SYSTOLIC BLOOD PRESSURE: 144 MMHG | DIASTOLIC BLOOD PRESSURE: 78 MMHG | BODY MASS INDEX: 36.82 KG/M2

## 2023-01-17 DIAGNOSIS — N18.30 BENIGN HYPERTENSION WITH CKD (CHRONIC KIDNEY DISEASE) STAGE III (HCC): Primary | ICD-10-CM

## 2023-01-17 DIAGNOSIS — R80.9 MICROALBUMINURIA: ICD-10-CM

## 2023-01-17 DIAGNOSIS — I12.9 BENIGN HYPERTENSION WITH CKD (CHRONIC KIDNEY DISEASE) STAGE III (HCC): Primary | ICD-10-CM

## 2023-01-17 DIAGNOSIS — E55.9 VITAMIN D DEFICIENCY: ICD-10-CM

## 2023-01-17 DIAGNOSIS — N18.32 STAGE 3B CHRONIC KIDNEY DISEASE (HCC): ICD-10-CM

## 2023-01-17 RX ORDER — ERGOCALCIFEROL 1.25 MG/1
50000 CAPSULE ORAL WEEKLY
Qty: 4 CAPSULE | Refills: 3 | Status: SHIPPED | OUTPATIENT
Start: 2023-01-17

## 2023-01-17 NOTE — PROGRESS NOTES
NEPHROLOGY OUTPATIENT PROGRESS NOTE   Annalise Puente 81 y o  female MRN: [id number]  DATE: 1/17/2023  Reason for visit:   Chief Complaint   Patient presents with   • Follow-up   • Chronic Kidney Disease     ASSESSMENT and PLAN:  CKD stage IIIB, baseline creatinine 1 3 since late 2021, prior 1 1 to 1 2 going back to 2019  -last creatinine 1 3 in January 2023 overall stable at baseline  May have slow progression of CKD  -CKD suspected to be secondary to long-term hypertension causing hypertensive arteriosclerosis, age-related nephron loss  -UA in January 2023 bland without hematuria or proteinuria  -repeat BMP, urine microalbumin/creatinine ratio before next visit  -avoid NSAIDs  -renal ultrasound in July 2022 shows normal size kidneys, no hydronephrosis or stones  Minimal cortical thinning in both kidneys  Overall echogenicity  Also concern for lobulated renal contour on right kidney? Normal variant versus multifocal scarring   -recommended to drink plenty of water to stay hydrated     Hypertension  -BP above goal in the office today although her BP has been better controlled at different providers office visits   -Remains on Aldactone 50 mg daily due to hypertension along with hirsutism issues  -recommended to check BP on regular basis at home and call back if BP remains persistently greater than 135/85   -She claims to be more compliant following low-salt diet      Microalbuminuria, last urine microalbumin/creatinine ratio overall stable and improving 48 mg in January 2023    -suspect in the setting of underlying hypertension, obesity can cause secondary FSGS  -continue to monitor, if worsening, may consider adding ACE-inhibitor   -recommended regular exercise, weight loss     Vitamin-D deficiency  -last vitamin-D level  29 in January 2023    She was on vitamin D 50,000 units weekly for long time by PCP although now remains off     -Given dropping vitamin D level, will restart vitamin D 50,000 units weekly  Prior history of gout 15 years ago, no recent gout flare-up    Diagnoses and all orders for this visit:    Benign hypertension with CKD (chronic kidney disease) stage III ([facility] )  -     Basic metabolic panel; Future  -     Vitamin D 25 hydroxy; Future  -     Phosphorus; Future  -     PTH, intact; Future  -     Microalbumin / creatinine urine ratio; Future  -     CBC; Future    Stage 3b chronic kidney disease ([facility] )  -     Basic metabolic panel; Future  -     Vitamin D 25 hydroxy; Future  -     Phosphorus; Future  -     PTH, intact; Future  -     Microalbumin / creatinine urine ratio; Future  -     CBC; Future    Vitamin D deficiency  Comments:  D/C otc Vit D 1,000 units and start 50,000 weekly  Orders:  -     ergocalciferol (VITAMIN D2) 50,000 units; Take 1 capsule (50,000 Units total) by mouth once a week  -     Vitamin D 25 hydroxy; Future          SUBJECTIVE / HPI:  Cat EDMONDSON 38 y o  year old female with medical issues of hypertension for 17 years, depression, urinary incontinence, factor five Leiden deficiency, CKD stage 3 with baseline 1 3 since late 2021, prior 1 1 to 1 2, remote history of gout who presents for regular follow-up of CKD  May have some progression of CKD  Last creatinine stable at baseline  Overall feels well  She denies any recent NSAID use   Denies any lightheadedness or dizziness  denies any new urinary complaint  Denies any chest pain or shortness of breath   No nausea or vomiting      Patient denies any obvious history of kidney disease in the past   No history of autoimmune conditions as per patient  Anuja Suarez remains on stable dose of Aldactone for more than 15 years  REVIEW OF SYSTEMS:  More than 10 point review of systems were obtained and discussed in length with the patient  Complete review of systems were negative / unremarkable except mentioned above       PHYSICAL EXAM:  Vitals:    01/17/23 1534 01/17/23 1618   BP: 158/61 144/78   BP Location: Left arm Patient Position: Sitting    Cuff Size: Standard    Weight: 88 5 kg (195 lb)    Height: 5' 1" (1 549 m)      Body mass index is 36 84 kg/m²  Physical Exam  Vitals reviewed  Constitutional:       Appearance: She is well-developed  HENT:      Head: Normocephalic and atraumatic  Right Ear: External ear normal       Left Ear: External ear normal    Eyes:      Conjunctiva/sclera: Conjunctivae normal    Cardiovascular:      Comments: No significant edema in legs  Pulmonary:      Effort: Pulmonary effort is normal       Breath sounds: Normal breath sounds  No wheezing or rales  Abdominal:      General: Bowel sounds are normal  There is no distension  Palpations: Abdomen is soft  Tenderness: There is no abdominal tenderness  Musculoskeletal:         General: No deformity  Lymphadenopathy:      Cervical: No cervical adenopathy  Skin:     Findings: No rash  Neurological:      Mental Status: She is alert and oriented to person, place, and time     Psychiatric:         Behavior: Behavior normal          PAST MEDICAL HISTORY:  Past Medical History:   Diagnosis Date   • Atrial fibrillation (HCC)    • Esophageal stricture    • Factor V Leiden mutation ([facility] )     LAST ASSESSED: 8/3/16   • Hypertension        PAST SURGICAL HISTORY:  Past Surgical History:   Procedure Laterality Date   • CATARACT EXTRACTION, BILATERAL Bilateral    • REDUCTION MAMMAPLASTY Bilateral    • TONSILLECTOMY AND ADENOIDECTOMY     • TUBAL LIGATION Bilateral        SOCIAL HISTORY:  Social History     Substance and Sexual Activity   Alcohol Use Never     Social History     Substance and Sexual Activity   Drug Use Never     Social History     Tobacco Use   Smoking Status Former   • Types: Cigarettes   • Quit date:    • Years since quittin 0   Smokeless Tobacco Never       FAMILY HISTORY:  Family History   Problem Relation Age of Onset   • Hypertension Mother    • Cancer Father        MEDICATIONS:    Current Outpatient Medications:   •  apixaban (ELIQUIS) 5 mg, Take 1 tablet (5 mg total) by mouth 2 (two) times a day, Disp: 180 tablet, Rfl: 3  •  buPROPion (WELLBUTRIN XL) 150 mg 24 hr tablet, Take 1 tablet (150 mg total) by mouth daily, Disp: 90 tablet, Rfl: 3  •  cetirizine (ZyrTEC) 10 mg tablet, Take 10 mg by mouth daily, Disp: , Rfl:   •  Choline Fenofibrate (Fenofibric Acid) 135 MG CPDR, take 1 capsule by mouth once daily, Disp: 90 capsule, Rfl: 1  •  diltiazem (CARDIZEM CD) 180 mg 24 hr capsule, Take 1 capsule (180 mg total) by mouth daily, Disp: 90 capsule, Rfl: 3  •  ergocalciferol (VITAMIN D2) 50,000 units, Take 1 capsule (50,000 Units total) by mouth once a week, Disp: 4 capsule, Rfl: 3  •  spironolactone (ALDACTONE) 25 mg tablet, take 2 tablets by mouth daily, Disp: 180 tablet, Rfl: 0    Lab Results:   Results for orders placed or performed in visit on 01/13/23   PTH, intact   Result Value Ref Range    PTH 38 6 18 4 - 80 1 pg/mL   CBC and differential   Result Value Ref Range    WBC 7 64 4 31 - 10 16 Thousand/uL    RBC 4 09 3 81 - 5 12 Million/uL    Hemoglobin 11 7 11 5 - 15 4 g/dL    Hematocrit 37 9 34 8 - 46 1 %    MCV 93 82 - 98 fL    MCH 28 6 26 8 - 34 3 pg    MCHC 30 9 (L) 31 4 - 37 4 g/dL    RDW 13 1 11 6 - 15 1 %    MPV 9 1 8 9 - 12 7 fL    Platelets 931 (H) 097 - 390 Thousands/uL    nRBC 0 /100 WBCs    Neutrophils Relative 67 43 - 75 %    Immat GRANS % 1 0 - 2 %    Lymphocytes Relative 19 14 - 44 %    Monocytes Relative 9 4 - 12 %    Eosinophils Relative 3 0 - 6 %    Basophils Relative 1 0 - 1 %    Neutrophils Absolute 5 19 1 85 - 7 62 Thousands/µL    Immature Grans Absolute 0 05 0 00 - 0 20 Thousand/uL    Lymphocytes Absolute 1 43 0 60 - 4 47 Thousands/µL    Monocytes Absolute 0 65 0 17 - 1 22 Thousand/µL    Eosinophils Absolute 0 24 0 00 - 0 61 Thousand/µL    Basophils Absolute 0 08 0 00 - 0 10 Thousands/µL   Comprehensive metabolic panel   Result Value Ref Range    Sodium 138 135 - 147 mmol/L    Potassium 4 2 3 5 - 5 3 mmol/L    Chloride 107 96 - 108 mmol/L    CO2 24 21 - 32 mmol/L    ANION GAP 7 4 - 13 mmol/L    BUN 39 (H) 5 - 25 mg/dL    Creatinine 1 35 (H) 0 60 - 1 30 mg/dL    Glucose, Fasting 98 65 - 99 mg/dL    Calcium 9 4 8 3 - 10 1 mg/dL    AST 18 5 - 45 U/L    ALT 22 12 - 78 U/L    Alkaline Phosphatase 61 46 - 116 U/L    Total Protein 7 6 6 4 - 8 4 g/dL    Albumin 3 7 3 5 - 5 0 g/dL    Total Bilirubin 0 39 0 20 - 1 00 mg/dL    eGFR 36 ml/min/1 73sq m   Lipid panel   Result Value Ref Range    Cholesterol 204 (H) See Comment mg/dL    Triglycerides 123 See Comment mg/dL    HDL, Direct 68 >=50 mg/dL    LDL Calculated 111 (H) 0 - 100 mg/dL    Non-HDL-Chol (CHOL-HDL) 136 mg/dl   TSH, 3rd generation with Free T4 reflex   Result Value Ref Range    TSH 3RD GENERATON 4 030 0 450 - 4 500 uIU/mL

## 2023-01-23 DIAGNOSIS — E78.1 HYPERTRIGLYCERIDEMIA: ICD-10-CM

## 2023-01-23 RX ORDER — FENOFIBRIC ACID 135 MG/1
1 CAPSULE, DELAYED RELEASE ORAL DAILY
Qty: 90 CAPSULE | Refills: 1 | Status: SHIPPED | OUTPATIENT
Start: 2023-01-23

## 2023-01-27 ENCOUNTER — OFFICE VISIT (OUTPATIENT)
Dept: FAMILY MEDICINE CLINIC | Facility: CLINIC | Age: 83
End: 2023-01-27

## 2023-01-27 VITALS
OXYGEN SATURATION: 99 % | HEART RATE: 74 BPM | SYSTOLIC BLOOD PRESSURE: 122 MMHG | DIASTOLIC BLOOD PRESSURE: 70 MMHG | BODY MASS INDEX: 36.67 KG/M2 | HEIGHT: 61 IN | WEIGHT: 194.2 LBS | TEMPERATURE: 96 F

## 2023-01-27 DIAGNOSIS — Z00.00 MEDICARE ANNUAL WELLNESS VISIT, SUBSEQUENT: Primary | ICD-10-CM

## 2023-01-27 DIAGNOSIS — D68.51 FACTOR V LEIDEN MUTATION (HCC): ICD-10-CM

## 2023-01-27 DIAGNOSIS — Z23 ENCOUNTER FOR IMMUNIZATION: ICD-10-CM

## 2023-01-27 DIAGNOSIS — F33.9 RECURRENT MAJOR DEPRESSIVE DISORDER, REMISSION STATUS UNSPECIFIED (HCC): ICD-10-CM

## 2023-01-27 DIAGNOSIS — R32 URINARY INCONTINENCE, UNSPECIFIED TYPE: ICD-10-CM

## 2023-01-27 DIAGNOSIS — H10.12 CONJUNCTIVITIS, ALLERGIC, LEFT: ICD-10-CM

## 2023-01-27 RX ORDER — OLOPATADINE HYDROCHLORIDE 1 MG/ML
1 SOLUTION/ DROPS OPHTHALMIC 2 TIMES DAILY
Qty: 5 ML | Refills: 3 | Status: SHIPPED | OUTPATIENT
Start: 2023-01-27

## 2023-01-27 NOTE — PATIENT INSTRUCTIONS
Medicare Preventive Visit Patient Instructions  Thank you for completing your Welcome to Medicare Visit or Medicare Annual Wellness Visit today  Your next wellness visit will be due in one year (1/28/2024)  The screening/preventive services that you may require over the next 5-10 years are detailed below  Some tests may not apply to you based off risk factors and/or age  Screening tests ordered at today's visit but not completed yet may show as past due  Also, please note that scanned in results may not display below  Preventive Screenings:  Service Recommendations Previous Testing/Comments   Colorectal Cancer Screening  * Colonoscopy    * Fecal Occult Blood Test (FOBT)/Fecal Immunochemical Test (FIT)  * Fecal DNA/Cologuard Test  * Flexible Sigmoidoscopy Age: 39-70 years old   Colonoscopy: every 10 years (may be performed more frequently if at higher risk)  OR  FOBT/FIT: every 1 year  OR  Cologuard: every 3 years  OR  Sigmoidoscopy: every 5 years  Screening may be recommended earlier than age 39 if at higher risk for colorectal cancer  Also, an individualized decision between you and your healthcare provider will decide whether screening between the ages of 74-80 would be appropriate  Colonoscopy: Not on file  FOBT/FIT: Not on file  Cologuard: Not on file  Sigmoidoscopy: Not on file    Screening Not Indicated     Breast Cancer Screening Age: 36 years old  Frequency: every 1-2 years  Not required if history of left and right mastectomy Mammogram: Not on file    Screening Not Indicated   Cervical Cancer Screening Between the ages of 21-29, pap smear recommended once every 3 years  Between the ages of 33-67, can perform pap smear with HPV co-testing every 5 years     Recommendations may differ for women with a history of total hysterectomy, cervical cancer, or abnormal pap smears in past  Pap Smear: Not on file    Screening Not Indicated   Hepatitis C Screening Once for adults born between [facility] frequently in patients at high risk for Hepatitis C Hep C Antibody: Not on file    Screening Not Indicated   Diabetes Screening 1-2 times per year if you're at risk for diabetes or have pre-diabetes Fasting glucose: 98 mg/dL (1/13/2023)  A1C: No results in last 5 years (No results in last 5 years)  Screening Current   Cholesterol Screening Once every 5 years if you don't have a lipid disorder  May order more often based on risk factors  Lipid panel: 01/13/2023    Screening Not Indicated  History Lipid Disorder     Other Preventive Screenings Covered by Medicare:  1  Abdominal Aortic Aneurysm (AAA) Screening: covered once if your at risk  You're considered to be at risk if you have a family history of AAA  2  Lung Cancer Screening: covers low dose CT scan once per year if you meet all of the following conditions: (1) Age 50-69; (2) No signs or symptoms of lung cancer; (3) Current smoker or have quit smoking within the last 15 years; (4) You have a tobacco smoking history of at least 20 pack years (packs per day multiplied by number of years you smoked); (5) You get a written order from a healthcare provider  3  Glaucoma Screening: covered annually if you're considered high risk: (1) You have diabetes OR (2) Family history of glaucoma OR (3)  aged 48 and older OR (3)  American aged 72 and older  3  Osteoporosis Screening: covered every 2 years if you meet one of the following conditions: (1) You're estrogen deficient and at risk for osteoporosis based off medical history and other findings; (2) Have a vertebral abnormality; (3) On glucocorticoid therapy for more than 3 months; (4) Have primary hyperparathyroidism; (5) On osteoporosis medications and need to assess response to drug therapy  · Last bone density test (DXA Scan): 08/18/2022   5  HIV Screening: covered annually if you're between the age of 15-65   Also covered annually if you are younger than 13 and older than 72 with risk factors for HIV infection  For pregnant patients, it is covered up to 3 times per pregnancy  Immunizations:  Immunization Recommendations   Influenza Vaccine Annual influenza vaccination during flu season is recommended for all persons aged >= 6 months who do not have contraindications   Pneumococcal Vaccine   * Pneumococcal conjugate vaccine = PCV13 (Prevnar 13), PCV15 (Vaxneuvance), PCV20 (Prevnar 20)  * Pneumococcal polysaccharide vaccine = PPSV23 (Pneumovax) Adults 25-60 years old: 1-3 doses may be recommended based on certain risk factors  Adults 72 years old: 1-2 doses may be recommended based off what pneumonia vaccine you previously received   Hepatitis B Vaccine 3 dose series if at intermediate or high risk (ex: diabetes, end stage renal disease, liver disease)   Tetanus (Td) Vaccine - COST NOT COVERED BY MEDICARE PART B Following completion of primary series, a booster dose should be given every 10 years to maintain immunity against tetanus  Td may also be given as tetanus wound prophylaxis  Tdap Vaccine - COST NOT COVERED BY MEDICARE PART B Recommended at least once for all adults  For pregnant patients, recommended with each pregnancy  Shingles Vaccine (Shingrix) - COST NOT COVERED BY MEDICARE PART B  2 shot series recommended in those aged 48 and above     Health Maintenance Due:  There are no preventive care reminders to display for this patient  Immunizations Due:      Topic Date Due   • Pneumococcal Vaccine: 65+ Years (1 - PCV) Never done   • COVID-19 Vaccine (3 - Booster for Moderna series) 04/26/2021     Advance Directives   What are advance directives? Advance directives are legal documents that state your wishes and plans for medical care  These plans are made ahead of time in case you lose your ability to make decisions for yourself  Advance directives can apply to any medical decision, such as the treatments you want, and if you want to donate organs     What are the types of advance directives? There are many types of advance directives, and each state has rules about how to use them  You may choose a combination of any of the following:  · Living will: This is a written record of the treatment you want  You can also choose which treatments you do not want, which to limit, and which to stop at a certain time  This includes surgery, medicine, IV fluid, and tube feedings  · Durable power of  for healthcare [facility]): This is a written record that states who you want to make healthcare choices for you when you are unable to make them for yourself  This person, called a proxy, is usually a family member or a friend  You may choose more than 1 proxy  · Do not resuscitate (DNR) order:  A DNR order is used in case your heart stops beating or you stop breathing  It is a request not to have certain forms of treatment, such as CPR  A DNR order may be included in other types of advance directives  · Medical directive: This covers the care that you want if you are in a coma, near death, or unable to make decisions for yourself  You can list the treatments you want for each condition  Treatment may include pain medicine, surgery, blood transfusions, dialysis, IV or tube feedings, and a ventilator (breathing machine)  · Values history: This document has questions about your views, beliefs, and how you feel and think about life  This information can help others choose the care that you would choose  Why are advance directives important? An advance directive helps you control your care  Although spoken wishes may be used, it is better to have your wishes written down  Spoken wishes can be misunderstood, or not followed  Treatments may be given even if you do not want them  An advance directive may make it easier for your family to make difficult choices about your care  Fall Prevention    Fall prevention  includes ways to make your home and other areas safer   It also includes ways you can move more carefully to prevent a fall  Health conditions that cause changes in your blood pressure, vision, or muscle strength and coordination may increase your risk for falls  Medicines may also increase your risk for falls if they make you dizzy, weak, or sleepy  Fall prevention tips:   · Stand or sit up slowly  · Use assistive devices as directed  · Wear shoes that fit well and have soles that   · Wear a personal alarm  · Stay active  · Manage your medical conditions  Home Safety Tips:  · Add items to prevent falls in the bathroom  · Keep paths clear  · Install bright lights in your home  · Keep items you use often on shelves within reach  · Paint or place reflective tape on the edges of your stairs  Urinary Incontinence   Urinary incontinence (UI)  is when you lose control of your bladder  UI develops because your bladder cannot store or empty urine properly  The 3 most common types of UI are stress incontinence, urge incontinence, or both  Medicines:   · May be given to help strengthen your bladder control  Report any side effects of medication to your healthcare provider  Do pelvic muscle exercises often:  Your pelvic muscles help you stop urinating  Squeeze these muscles tight for 5 seconds, then relax for 5 seconds  Gradually work up to squeezing for 10 seconds  Do 3 sets of 15 repetitions a day, or as directed  This will help strengthen your pelvic muscles and improve bladder control  Train your bladder:  Go to the bathroom at set times, such as every 2 hours, even if you do not feel the urge to go  You can also try to hold your urine when you feel the urge to go  For example, hold your urine for 5 minutes when you feel the urge to go  As that becomes easier, hold your urine for 10 minutes  Self-care:   · Keep a UI record  Write down how often you leak urine and how much you leak  Make a note of what you were doing when you leaked urine    · Drink liquids as directed  You may need to limit the amount of liquid you drink to help control your urine leakage  Do not drink any liquid right before you go to bed  Limit or do not have drinks that contain caffeine or alcohol  · Prevent constipation  Eat a variety of high-fiber foods  Good examples are high-fiber cereals, beans, vegetables, and whole-grain breads  Walking is the best way to trigger your intestines to have a bowel movement  · Exercise regularly and maintain a healthy weight  Weight loss and exercise will decrease pressure on your bladder and help you control your leakage  · Use a catheter as directed  to help empty your bladder  A catheter is a tiny, plastic tube that is put into your bladder to drain your urine  · Go to behavior therapy as directed  Behavior therapy may be used to help you learn to control your urge to urinate  Weight Management   Why it is important to manage your weight:  Being overweight increases your risk of health conditions such as heart disease, high blood pressure, type 2 diabetes, and certain types of cancer  It can also increase your risk for osteoarthritis, sleep apnea, and other respiratory problems  Aim for a slow, steady weight loss  Even a small amount of weight loss can lower your risk of health problems  How to lose weight safely:  A safe and healthy way to lose weight is to eat fewer calories and get regular exercise  You can lose up about 1 pound a week by decreasing the number of calories you eat by 500 calories each day  Healthy meal plan for weight management:  A healthy meal plan includes a variety of foods, contains fewer calories, and helps you stay healthy  A healthy meal plan includes the following:  · Eat whole-grain foods more often  A healthy meal plan should contain fiber  Fiber is the part of grains, fruits, and vegetables that is not broken down by your body  Whole-grain foods are healthy and provide extra fiber in your diet   Some examples of whole-grain foods are whole-wheat breads and pastas, oatmeal, brown rice, and bulgur  · Eat a variety of vegetables every day  Include dark, leafy greens such as spinach, kale, rodger greens, and mustard greens  Eat yellow and orange vegetables such as carrots, sweet potatoes, and winter squash  · Eat a variety of fruits every day  Choose fresh or canned fruit (canned in its own juice or light syrup) instead of juice  Fruit juice has very little or no fiber  · Eat low-fat dairy foods  Drink fat-free (skim) milk or 1% milk  Eat fat-free yogurt and low-fat cottage cheese  Try low-fat cheeses such as mozzarella and other reduced-fat cheeses  · Choose meat and other protein foods that are low in fat  Choose beans or other legumes such as split peas or lentils  Choose fish, skinless poultry (chicken or turkey), or lean cuts of red meat (beef or pork)  Before you cook meat or poultry, cut off any visible fat  · Use less fat and oil  Try baking foods instead of frying them  Add less fat, such as margarine, sour cream, regular salad dressing and mayonnaise to foods  Eat fewer high-fat foods  Some examples of high-fat foods include french fries, doughnuts, ice cream, and cakes  · Eat fewer sweets  Limit foods and drinks that are high in sugar  This includes candy, cookies, regular soda, and sweetened drinks  Exercise:  Exercise at least 30 minutes per day on most days of the week  Some examples of exercise include walking, biking, dancing, and swimming  You can also fit in more physical activity by taking the stairs instead of the elevator or parking farther away from stores  Ask your healthcare provider about the best exercise plan for you  © Copyright [employer] 2018 Information is for End User's use only and may not be sold, redistributed or otherwise used for commercial purposes   All illustrations and images included in CareNotes® are the copyrighted property of [employer]  or [employer]

## 2023-01-31 ENCOUNTER — DOCUMENTATION (OUTPATIENT)
Dept: NEPHROLOGY | Facility: CLINIC | Age: 83
End: 2023-01-31

## 2023-01-31 NOTE — PROGRESS NOTES
Sent recall card to schedule August follow up appointment with Dr Anthony Aguiar in [place]  This is the 1st attempt

## 2023-02-06 ENCOUNTER — TELEPHONE (OUTPATIENT)
Dept: NEPHROLOGY | Facility: CLINIC | Age: 83
End: 2023-02-06

## 2023-02-08 ENCOUNTER — OFFICE VISIT (OUTPATIENT)
Dept: CARDIOLOGY CLINIC | Facility: CLINIC | Age: 83
End: 2023-02-08

## 2023-02-08 VITALS
HEIGHT: 65 IN | WEIGHT: 193.6 LBS | SYSTOLIC BLOOD PRESSURE: 118 MMHG | OXYGEN SATURATION: 98 % | HEART RATE: 92 BPM | TEMPERATURE: 97.2 F | BODY MASS INDEX: 32.26 KG/M2 | DIASTOLIC BLOOD PRESSURE: 64 MMHG

## 2023-02-08 DIAGNOSIS — E66.09 CLASS 1 OBESITY DUE TO EXCESS CALORIES WITH BODY MASS INDEX (BMI) OF 32.0 TO 32.9 IN ADULT, UNSPECIFIED WHETHER SERIOUS COMORBIDITY PRESENT: ICD-10-CM

## 2023-02-08 DIAGNOSIS — I10 PRIMARY HYPERTENSION: ICD-10-CM

## 2023-02-08 DIAGNOSIS — I34.0 MITRAL VALVE INSUFFICIENCY, UNSPECIFIED ETIOLOGY: ICD-10-CM

## 2023-02-08 DIAGNOSIS — R06.83 SNORING: ICD-10-CM

## 2023-02-08 DIAGNOSIS — I48.0 PAF (PAROXYSMAL ATRIAL FIBRILLATION) (HCC): Primary | ICD-10-CM

## 2023-02-08 NOTE — PROGRESS NOTES
Cardiology Follow Up    Reji Farr  [id number]  1940  [facility] [address]  [phone]      1  PAF (paroxysmal atrial fibrillation) [facility])  Ambulatory Referral to Sleep Medicine    Echo complete w/ contrast if indicated      2  Primary hypertension  Echo complete w/ contrast if indicated      3  Class 1 obesity due to excess calories with body mass index (BMI) of 32 0 to 32 9 in adult, unspecified whether serious comorbidity present        4  Snoring  Ambulatory Referral to Sleep Medicine      5  Mitral valve insufficiency, unspecified etiology  Echo complete w/ contrast if indicated          Discussion/Summary:  1  Paroxysmal atrial fibrillation on oral anticoagulation  2  Hypertension  3  Obesity  4  Snoring  5  Intermittent palpitations  6    Mitral regurgitation       -Transthoracic echocardiogram 8/19/2022 showing left-ventricular systolic function normal estimated LVEF 55% with mildly dilated left atrium, mild to moderate aortic regurgitation, mild to moderate mitral regurgitation, mild to moderate tricuspid regurgitation   -AlphaSmarto patch monitor 1/13/2023 showing predominantly sinus rhythm average heart rate 76 bpm with occasional supraventricular ectopic activity but no significant degree of atrial fibrillation appreciated  -Patient will continue to monitor with [employer] mobile enriqueta  -As blood pressures appear well controlled we will continue current medical therapy with diltiazem 180 mg daily and spironolactone 50 mg daily  -In setting of her CKD she is continually followed by nephrology and just recently saw them in January 2023  -We will check transthoracic echocardiogram in 6 months prior to next office visit  -In the setting of snoring, obesity, and paroxysmal atrial fibrillation we will give patient referral to sleep medicine for evaluation of possible obstructive sleep apnea  -Patient counseled on dietary and lifestyle modifications including following a low-salt, low-fat, heart healthy diet with weight loss his goal BMI is less than 29  -Patient can continue Eliquis 5 mg twice daily as weight is greater than 60 kg and creatinine less than 1 5 her most recent laboratory studies  -We will continue to monitor patient clinically at this time patient counseled if she were to have any warning or alarm type symptoms she is to seek emergency medical care immediately  History of Present Illness:  -Patient is an 81-year-old female with hypertension, chronic rhinitis, factor V Leiden, obesity who was hospitalized in August 2022 found at that time to have atrial fibrillation with rapid ventricular response after initially being alerted to this by her Apple Watch 8/18/2022  She was initiated on oral anticoagulation with rate control strategy and did well and was eventually able to be discharged to outpatient follow-up she has also been seen and evaluated by hematology who agreed with oral anticoagulation and presents to the office today for scheduled follow-up with her daughter   -Currently in the office patient denies any chest pain, palpitations, lightheadedness or dizziness, loss of consciousness, shortness of breath, lower extremity edema, orthopnea or bendopnea  She does note that she snores at night and has never been evaluated for obstructive sleep apnea but has very often attributed some of this to her chronic rhinitis  She notes that overall she feels well and that blood pressures at home are well controlled on current medical therapy  Patient Active Problem List   Diagnosis   • Stage 3b chronic kidney disease ([facility] )   • Benign hypertension with CKD (chronic kidney disease) stage III (HCC)   • Vitamin D deficiency   • Dizziness   • Chronic rhinitis   • Obesity (BMI 30-39  9)   • Factor V Leiden mutation ([facility] )   • Major depressive disorder, recurrent, unspecified ([place] [address] )   • Hypertriglyceridemia   • Subclinical hypothyroidism   • Microalbuminuria   • Atrial fibrillation with rapid ventricular response (HCC)   • Chronic renal disease, stage IV (HCC)   • Class 2 severe obesity due to excess calories with serious comorbidity and body mass index (BMI) of 35 0 to 35 9 in adult [facility])   • Multiple closed fractures of ribs of left side   • Fall at home   • Displaced fracture of distal phalanx of left great toe, initial encounter for closed fracture   • Back muscle spasm   • Skin tear of right hand without complication   • Epistaxis     Past Medical History:   Diagnosis Date   • Atrial fibrillation ([place] [address] )    • Esophageal stricture    • Factor V Leiden mutation ([place] [address] )     LAST ASSESSED: 8/3/16   • Hypertension      Social History     Socioeconomic History   • Marital status:      Spouse name: Not on file   • Number of children: 4   • Years of education: Not on file   • Highest education level: Not on file   Occupational History   • Occupation: PRIOR OCCUPATION [employer]      Comment: RETIRED   Tobacco Use   • Smoking status: Former     Types: Cigarettes     Quit date:      Years since quittin 1   • Smokeless tobacco: Never   Vaping Use   • Vaping Use: Never used   Substance and Sexual Activity   • Alcohol use: Never   • Drug use: Never   • Sexual activity: Not Currently   Other Topics Concern   • Not on file   Social History Narrative   • Not on file     Social Determinants of Health     Financial Resource Strain: Low Risk    • Difficulty of Paying Living Expenses: Not hard at all   Food Insecurity: No Food Insecurity   • Worried About Running Out of Food in the Last Year: Never true   • Ran Out of Food in the Last Year: Never true   Transportation Needs: No Transportation Needs   • Lack of Transportation (Medical): No   • Lack of Transportation (Non-Medical):  No   Physical Activity: Not on file   Stress: Not on file   Social Connections: Not on file Intimate Partner Violence: Not on file   Housing Stability: Low Risk    • Unable to Pay for Housing in the Last Year: No   • Number of Places Lived in the Last Year: 1   • Unstable Housing in the Last Year: No      Family History   Problem Relation Age of Onset   • Hypertension Mother    • Cancer Father      Past Surgical History:   Procedure Laterality Date   • CATARACT EXTRACTION, BILATERAL Bilateral    • REDUCTION MAMMAPLASTY Bilateral    • TONSILLECTOMY AND ADENOIDECTOMY     • TUBAL LIGATION Bilateral        Current Outpatient Medications:   •  apixaban (ELIQUIS) 5 mg, Take 1 tablet (5 mg total) by mouth 2 (two) times a day, Disp: 180 tablet, Rfl: 3  •  buPROPion (WELLBUTRIN XL) 150 mg 24 hr tablet, Take 1 tablet (150 mg total) by mouth daily, Disp: 90 tablet, Rfl: 3  •  cetirizine (ZyrTEC) 10 mg tablet, Take 10 mg by mouth daily, Disp: , Rfl:   •  Choline Fenofibrate (Fenofibric Acid) 135 MG CPDR, Take 1 capsule (135 mg total) by mouth daily, Disp: 90 capsule, Rfl: 1  •  diltiazem (CARDIZEM CD) 180 mg 24 hr capsule, Take 1 capsule (180 mg total) by mouth daily, Disp: 90 capsule, Rfl: 3  •  ergocalciferol (VITAMIN D2) 50,000 units, Take 1 capsule (50,000 Units total) by mouth once a week, Disp: 4 capsule, Rfl: 3  •  olopatadine (PATANOL) 0 1 % ophthalmic solution, Administer 1 drop into the left eye 2 (two) times a day, Disp: 5 mL, Rfl: 3  •  spironolactone (ALDACTONE) 25 mg tablet, take 2 tablets by mouth daily, Disp: 180 tablet, Rfl: 0  Allergies   Allergen Reactions   • Aspirin    • Montelukast      Other reaction(s): Depression  Category: Adverse Reaction; Other reaction(s): Depression  Category: Adverse Reaction;    • Nsaids    • Pravastatin      Category: Adverse Reaction;    • Simvastatin      Category:  Adverse Reaction;    • Tolmetin          Labs:  Appointment on 01/13/2023   Component Date Value   • PTH 01/13/2023 38 6    • WBC 01/13/2023 7 64    • RBC 01/13/2023 4 09    • Hemoglobin 01/13/2023 11 7    • Hematocrit 01/13/2023 37 9    • MCV 01/13/2023 93    • MCH 01/13/2023 28 6    • MCHC 01/13/2023 30 9 (L)    • RDW 01/13/2023 13 1    • MPV 01/13/2023 9 1    • Platelets 03/39/3853 452 (H)    • nRBC 01/13/2023 0    • Neutrophils Relative 01/13/2023 67    • Immat GRANS % 01/13/2023 1    • Lymphocytes Relative 01/13/2023 19    • Monocytes Relative 01/13/2023 9    • Eosinophils Relative 01/13/2023 3    • Basophils Relative 01/13/2023 1    • Neutrophils Absolute 01/13/2023 5 19    • Immature Grans Absolute 01/13/2023 0 05    • Lymphocytes Absolute 01/13/2023 1 43    • Monocytes Absolute 01/13/2023 0 65    • Eosinophils Absolute 01/13/2023 0 24    • Basophils Absolute 01/13/2023 0 08    • Sodium 01/13/2023 138    • Potassium 01/13/2023 4 2    • Chloride 01/13/2023 107    • CO2 01/13/2023 24    • ANION GAP 01/13/2023 7    • BUN 01/13/2023 39 (H)    • Creatinine 01/13/2023 1 35 (H)    • Glucose, Fasting 01/13/2023 98    • Calcium 01/13/2023 9 4    • AST 01/13/2023 18    • ALT 01/13/2023 22    • Alkaline Phosphatase 01/13/2023 61    • Total Protein 01/13/2023 7 6    • Albumin 01/13/2023 3 7    • Total Bilirubin 01/13/2023 0 39    • eGFR 01/13/2023 36    • Cholesterol 01/13/2023 204 (H)    • Triglycerides 01/13/2023 123    • HDL, Direct 01/13/2023 68    • LDL Calculated 01/13/2023 111 (H)    • Non-HDL-Chol (CHOL-HDL) 01/13/2023 136    • TSH 3RD GENERATON 01/13/2023 4 030         Imaging: No results found  Review of Systems:  Review of Systems   Constitutional: Negative for chills, diaphoresis, fatigue and fever  HENT: Negative for trouble swallowing and voice change  Eyes: Negative for pain and redness  Respiratory: Negative for shortness of breath and wheezing  Cardiovascular: Negative for chest pain, palpitations and leg swelling  Gastrointestinal: Negative for abdominal pain, constipation, diarrhea, nausea and vomiting  Genitourinary: Negative for dysuria     Musculoskeletal: Positive for arthralgias  Negative for neck pain and neck stiffness  Skin: Negative for rash  Neurological: Negative for dizziness, syncope, light-headedness and headaches  Psychiatric/Behavioral: Negative for agitation and confusion  All other systems reviewed and are negative  Vitals:    02/08/23 0845   BP: 118/64   BP Location: Left arm   Patient Position: Sitting   Cuff Size: Standard   Pulse: 92   Temp: (!) 97 2 °F (36 2 °C)   TempSrc: Temporal   SpO2: 98%   Weight: 87 8 kg (193 lb 9 6 oz)   Height: 5' 5" (1 651 m)     Vitals:    02/08/23 0845   Weight: 87 8 kg (193 lb 9 6 oz)     Height: 5' 5" (165 1 cm)     Physical Exam:  Physical Exam  Vitals reviewed  Constitutional:       General: She is not in acute distress  Appearance: She is obese  She is not diaphoretic  HENT:      Head: Normocephalic and atraumatic  Eyes:      General:         Right eye: No discharge  Left eye: No discharge  Neck:      Comments: Trachea midline, no JVD present  Cardiovascular:      Rate and Rhythm: Normal rate and regular rhythm  Heart sounds: Murmur (AMADA) heard  No friction rub  Pulmonary:      Effort: Pulmonary effort is normal  No respiratory distress  Breath sounds: No wheezing  Chest:      Chest wall: No tenderness  Abdominal:      General: Bowel sounds are normal       Palpations: Abdomen is soft  Tenderness: There is no abdominal tenderness  There is no rebound  Musculoskeletal:      Right lower leg: No edema  Left lower leg: No edema  Skin:     General: Skin is warm and dry  Neurological:      Mental Status: She is alert  Comments: Awake, alert, able to answer questions appropriately, able to move extremities bilaterally     Psychiatric:         Mood and Affect: Mood normal          Behavior: Behavior normal

## 2023-02-13 DIAGNOSIS — I10 ESSENTIAL HYPERTENSION: ICD-10-CM

## 2023-02-13 RX ORDER — SPIRONOLACTONE 25 MG/1
50 TABLET ORAL DAILY
Qty: 180 TABLET | Refills: 0 | Status: SHIPPED | OUTPATIENT
Start: 2023-02-13

## 2023-02-14 ENCOUNTER — TELEPHONE (OUTPATIENT)
Dept: NEPHROLOGY | Facility: CLINIC | Age: 83
End: 2023-02-14

## 2023-02-14 NOTE — TELEPHONE ENCOUNTER
Spoke with Patient and schedule appointment for 8/21 with Dr Nery Nevarez in the [facility]

## 2023-02-15 DIAGNOSIS — E78.1 HYPERTRIGLYCERIDEMIA: ICD-10-CM

## 2023-02-16 RX ORDER — FENOFIBRIC ACID 135 MG/1
1 CAPSULE, DELAYED RELEASE ORAL DAILY
Qty: 90 CAPSULE | Refills: 1 | OUTPATIENT
Start: 2023-02-16

## 2023-03-08 DIAGNOSIS — I48.91 ATRIAL FIBRILLATION WITH RVR (HCC): ICD-10-CM

## 2023-03-08 DIAGNOSIS — F32.9 CURRENT EPISODE OF MAJOR DEPRESSIVE DISORDER WITHOUT PRIOR EPISODE: ICD-10-CM

## 2023-03-09 RX ORDER — BUPROPION HYDROCHLORIDE 150 MG/1
150 TABLET ORAL DAILY
Qty: 90 TABLET | Refills: 3 | Status: SHIPPED | OUTPATIENT
Start: 2023-03-09

## 2023-03-09 RX ORDER — DILTIAZEM HYDROCHLORIDE 180 MG/1
180 CAPSULE, COATED, EXTENDED RELEASE ORAL DAILY
Qty: 90 CAPSULE | Refills: 3 | OUTPATIENT
Start: 2023-03-09

## 2023-05-03 DIAGNOSIS — E55.9 VITAMIN D DEFICIENCY: ICD-10-CM

## 2023-05-04 RX ORDER — ERGOCALCIFEROL 1.25 MG/1
50000 CAPSULE ORAL WEEKLY
Qty: 16 CAPSULE | Refills: 0 | Status: SHIPPED | OUTPATIENT
Start: 2023-05-04

## 2023-05-19 DIAGNOSIS — I10 ESSENTIAL HYPERTENSION: ICD-10-CM

## 2023-05-22 RX ORDER — SPIRONOLACTONE 25 MG/1
TABLET ORAL
Qty: 180 TABLET | Refills: 0 | Status: SHIPPED | OUTPATIENT
Start: 2023-05-22

## 2023-07-02 ENCOUNTER — HOSPITAL ENCOUNTER (EMERGENCY)
Facility: HOSPITAL | Age: 83
Discharge: HOME | End: 2023-07-03
Attending: EMERGENCY MEDICINE
Payer: COMMERCIAL

## 2023-07-02 ENCOUNTER — APPOINTMENT (EMERGENCY)
Dept: CT IMAGING | Facility: HOSPITAL | Age: 83
End: 2023-07-02
Payer: COMMERCIAL

## 2023-07-02 ENCOUNTER — APPOINTMENT (EMERGENCY)
Dept: RADIOLOGY | Facility: HOSPITAL | Age: 83
End: 2023-07-02
Payer: COMMERCIAL
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
## 2023-07-02 DIAGNOSIS — S09.90XA CLOSED HEAD INJURY, INITIAL ENCOUNTER: ICD-10-CM

## 2023-07-02 DIAGNOSIS — R91.8 PULMONARY NODULES: ICD-10-CM

## 2023-07-02 DIAGNOSIS — S01.21XA LACERATION OF NOSE, INITIAL ENCOUNTER: ICD-10-CM

## 2023-07-02 PROCEDURE — 72125 CT NECK SPINE W/O DYE: CPT

## 2023-07-02 PROCEDURE — 72170 X-RAY EXAM OF PELVIS: CPT

## 2023-07-02 PROCEDURE — 74177 CT ABD & PELVIS W/CONTRAST: CPT

## 2023-07-02 PROCEDURE — 71260 CT THORAX DX C+: CPT

## 2023-07-02 PROCEDURE — 70486 CT MAXILLOFACIAL W/O DYE: CPT

## 2023-07-02 PROCEDURE — 71045 X-RAY EXAM CHEST 1 VIEW: CPT

## 2023-07-02 PROCEDURE — 70450 CT HEAD/BRAIN W/O DYE: CPT

## 2023-07-02 PROCEDURE — 99284 EMERGENCY DEPT VISIT MOD MDM: CPT

## 2023-07-02 RX ORDER — LIDOCAINE 40 MG/G
CREAM TOPICAL ONCE
Status: COMPLETED | OUTPATIENT
Start: 2023-07-02 | End: 2023-07-02

## 2023-07-02 RX ADMIN — IOHEXOL 100 ML: 350 INJECTION, SOLUTION INTRAVENOUS at 23:41

## 2023-07-02 RX ADMIN — LIDOCAINE: 40 CREAM TOPICAL at 23:11

## 2023-07-03 VITALS
TEMPERATURE: 98.2 F | DIASTOLIC BLOOD PRESSURE: 67 MMHG | HEART RATE: 63 BPM | SYSTOLIC BLOOD PRESSURE: 156 MMHG | RESPIRATION RATE: 18 BRPM | OXYGEN SATURATION: 97 %

## 2023-07-03 RX ORDER — LIDOCAINE HYDROCHLORIDE AND EPINEPHRINE 10; 10 MG/ML; UG/ML
10 INJECTION, SOLUTION INFILTRATION; PERINEURAL ONCE
Status: COMPLETED | OUTPATIENT
Start: 2023-07-03 | End: 2023-07-03

## 2023-07-03 RX ORDER — GINSENG 100 MG
1 CAPSULE ORAL ONCE
Status: COMPLETED | OUTPATIENT
Start: 2023-07-03 | End: 2023-07-03

## 2023-07-03 RX ADMIN — BACITRACIN 1 SMALL APPLICATION: 500 OINTMENT TOPICAL at 02:54

## 2023-07-03 RX ADMIN — LIDOCAINE HYDROCHLORIDE,EPINEPHRINE BITARTRATE 10 ML: 10; .01 INJECTION, SOLUTION INFILTRATION; PERINEURAL at 01:20

## 2023-07-03 NOTE — ED PROVIDER NOTES
Emergency Department Trauma Note  Jv Quintanilla 81 y.o. female MRN: [id number]  Unit/Bed#: TR 03/TR 03 Encounter: [id number]      Trauma Alert: Trauma Acuity: Trauma Evaluation  Model of Arrival: Mode of Arrival: BLS via    Trauma Team: Current Providers  Attending Provider: Keyon Callejas MD  Registered Nurse: Misti Wells RN  Consultants:     None      History of Present Illness     Chief Complaint:   Chief Complaint   Patient presents with   • Fall     HPI:  Jv Quintanilla is a 81 y.o. female who presents with fall from stand, CHI on blood thinners, no LOC. Mechanism:Details of Incident: pt tripped over [email or web address] to shut off alarm, fell on face, denies loc, on eliquis Injury Date: 07/02/23          80 YO Female    Fall from standing, mechanical fall  Fell straight not face  No LOC    No other complaints    On cp/sob/pleurisy  C/o vague abdominal pain     No drugs/etoh       History provided by:  Patient  Fall - Major  Severity:  Moderate  Onset quality:  Sudden  Associated symptoms: abdominal pain    Associated symptoms: no chest pain, no congestion, no cough, no diarrhea, no ear pain, no fatigue, no fever, no headaches, no loss of consciousness, no myalgias, no nausea, no rash, no rhinorrhea, no shortness of breath, no sore throat, no vomiting and no wheezing      Review of Systems   Constitutional: Negative for chills, diaphoresis, fatigue and fever. HENT: Negative for congestion, ear pain, rhinorrhea, sinus pressure, sinus pain, sneezing, sore throat, trouble swallowing and voice change. Respiratory: Negative for cough, shortness of breath, wheezing and stridor. Cardiovascular: Negative for chest pain, palpitations and leg swelling. Gastrointestinal: Positive for abdominal pain. Negative for blood in stool, diarrhea, nausea and vomiting. Genitourinary: Negative for difficulty urinating, dysuria, flank pain, frequency and hematuria.    Musculoskeletal: Negative for arthralgias, back pain, gait problem, joint swelling, myalgias, neck pain and neck stiffness. Skin: Positive for wound (nasal bridge laceration ). Negative for rash. Neurological: Negative for dizziness, loss of consciousness, syncope, speech difficulty, light-headedness, numbness and headaches. All other systems reviewed and are negative. Historical Information     Immunizations:   Immunization History   Administered Date(s) Administered   • COVID-19 MODERNA VACC 0.5 ML IM 2021, 2021   • INFLUENZA 10/05/2015, 10/10/2016, 10/21/2016, 09/15/2017, 10/15/2018, 2020, 2021   • Influenza, high dose seasonal 0.7 mL 2022   • influenza, trivalent, adjuvanted 10/03/2019       Past Medical History:   Diagnosis Date   • Atrial fibrillation ([address])    • Esophageal stricture    • Factor V Leiden mutation ([address])     LAST ASSESSED: 8/3/16   • Hypertension        Family History   Problem Relation Age of Onset   • Hypertension Mother    • Cancer Father      Past Surgical History:   Procedure Laterality Date   • CATARACT EXTRACTION, BILATERAL Bilateral    • REDUCTION MAMMAPLASTY Bilateral    • TONSILLECTOMY AND ADENOIDECTOMY     • TUBAL LIGATION Bilateral      Social History     Tobacco Use   • Smoking status: Former     Types: Cigarettes     Quit date:      Years since quittin.5   • Smokeless tobacco: Never   Vaping Use   • Vaping Use: Never used   Substance Use Topics   • Alcohol use: Never   • Drug use: Never     E-Cigarette/Vaping   • E-Cigarette Use Never User      E-Cigarette/Vaping Substances   • Nicotine No    • THC No    • CBD No    • Flavoring No    • Other No    • Unknown No        Family History: non-contributory    Meds/Allergies   Prior to Admission Medications   Prescriptions Last Dose Informant Patient Reported? Taking?    Choline Fenofibrate (Fenofibric Acid) 135 MG CPDR   No No   Sig: Take 1 capsule (135 mg total) by mouth daily   apixaban (ELIQUIS) 5 mg   No No   Sig: Take 1 tablet (5 mg total) by mouth 2 (two) times a day   buPROPion (WELLBUTRIN XL) 150 mg 24 hr tablet   No No   Sig: Take 1 tablet (150 mg total) by mouth daily   cetirizine (ZyrTEC) 10 mg tablet  Self Yes No   Sig: Take 10 mg by mouth daily   diltiazem (CARDIZEM CD) 180 mg 24 hr capsule   No No   Sig: Take 1 capsule (180 mg total) by mouth daily   ergocalciferol (VITAMIN D2) 50,000 units   No No   Sig: Take 1 capsule (50,000 Units total) by mouth once a week   olopatadine (PATANOL) 0.1 % ophthalmic solution   No No   Sig: Administer 1 drop into the left eye 2 (two) times a day   spironolactone (ALDACTONE) 25 mg tablet   No No   Sig: take 2 tablets by mouth once daily      Facility-Administered Medications: None       Allergies   Allergen Reactions   • Aspirin    • Montelukast      Other reaction(s): Depression  Category: Adverse Reaction; Other reaction(s): Depression  Category: Adverse Reaction;    • Nsaids    • Pravastatin      Category: Adverse Reaction;    • Simvastatin      Category: Adverse Reaction;    • Tolmetin        PHYSICAL EXAM    PE limited by: None    Objective   Vitals:   First set: Temperature: 98 °F (36.7 °C) (07/02/23 2303)  Pulse: 64 (07/02/23 2303)  Respirations: 17 (07/02/23 2303)  Blood Pressure: 147/65 (07/02/23 2303)  SpO2: 97 % (07/02/23 2303)    Primary Survey:   (A) Airway: patent, WNL  (B) Breathing: ctab, wnl  (C) Circulation: Pulses:   normal  (D) Disabliity:  GCS Total:  15  (E) Expose:  Completed    Secondary Survey: (Click on Physical Exam tab above)  Physical Exam  Constitutional:       General: She is not in acute distress. Appearance: Normal appearance. She is well-developed. She is not ill-appearing, toxic-appearing or diaphoretic. HENT:      Head: Normocephalic. Comments: 1cm laceration, nasal bridge, clean, no FB     Right Ear: Tympanic membrane, ear canal and external ear normal. There is no impacted cerumen.       Left Ear: Tympanic membrane, ear canal and external ear normal. There is no impacted cerumen. Nose: Nose normal. No congestion or rhinorrhea. Mouth/Throat:      Pharynx: No oropharyngeal exudate or posterior oropharyngeal erythema. Eyes:      General: No scleral icterus. Right eye: No discharge. Left eye: No discharge. Extraocular Movements: Extraocular movements intact. Conjunctiva/sclera: Conjunctivae normal.      Pupils: Pupils are equal, round, and reactive to light. Neck:      Vascular: No JVD. Trachea: No tracheal deviation. Cardiovascular:      Rate and Rhythm: Normal rate and regular rhythm. Pulses: Normal pulses. Heart sounds: Normal heart sounds. No murmur heard. No friction rub. No gallop. Pulmonary:      Effort: Pulmonary effort is normal. No respiratory distress. Breath sounds: Normal breath sounds. No stridor. No wheezing, rhonchi or rales. Chest:      Chest wall: No tenderness. Abdominal:      General: Bowel sounds are normal. There is no distension. Palpations: Abdomen is soft. There is no mass. Tenderness: There is abdominal tenderness. There is no right CVA tenderness, left CVA tenderness, guarding or rebound. Hernia: No hernia is present. Musculoskeletal:         General: No swelling, tenderness or deformity. Normal range of motion. Cervical back: Normal range of motion and neck supple. No rigidity or tenderness. Right lower leg: No edema. Left lower leg: No edema. Lymphadenopathy:      Cervical: No cervical adenopathy. Skin:     General: Skin is warm. Capillary Refill: Capillary refill takes less than 2 seconds. Coloration: Skin is not jaundiced or pale. Findings: No bruising, erythema, lesion or rash. Neurological:      General: No focal deficit present. Mental Status: She is alert and oriented to person, place, and time. Mental status is at baseline. Cranial Nerves: No cranial nerve deficit. Sensory: No sensory deficit. Motor: No weakness or abnormal muscle tone. Coordination: Coordination normal.   Psychiatric:         Mood and Affect: Mood normal.         Behavior: Behavior normal.         Thought Content: Thought content normal.         Judgment: Judgment normal.         Cervical spine cleared by clinical criteria? No (imaging required)      Invasive Devices     None                 Lab Results:   Results Reviewed     None                 Imaging Studies:   Direct to CT: No  TRAUMA - CT chest abdomen pelvis w contrast   Final Result by Jasper Esquivel MD (07/03 4163)      Right hip arthroplasty results in streak artifact limiting evaluation of the pelvis. Right distal ureter and UVJ not well seen. Left renal artery stent; patency is challenging to is determine as this is not an angiographic study. .      3 mm right upper lobe nodule again noted. Previous recommendation for 12-month follow-up stands unchanged. The study was marked in [facility] for immediate notification. Workstation performed: [id number]         TRAUMA - CT facial bones wo contrast   Final Result by Jasper Esquivel MD (07/03 0015)      No fracture or dislocation visualized on noncontrast CT of the facial bones. Workstation performed: [id number]         TRAUMA - CT spine cervical wo contrast   Final Result by Jasper Esquivel MD (07/03 0000)      No cervical spine fracture or traumatic malalignment. Workstation performed: [id number]         TRAUMA - CT head wo contrast   Final Result by Jasper Esquivel MD (07/02 2356)      No acute intracranial abnormality. Workstation performed: [id number]         XR Trauma chest portable   Final Result by Conchita Burns MD (07/03 0576)      No acute cardiopulmonary disease. Workstation performed: [id number]         XR Trauma pelvis ap only 1 or 2 vw   Final Result by Conchita Burns MD (07/03 6015)      No acute osseous abnormality.       Please see same-day CT chest abdomen pelvis with contrast for further evaluation. Workstation performed: [id number]               Procedures  Universal Protocol:  Risks and benefits: risks, benefits and alternatives were discussed  Consent given by: patient  Time out: Immediately prior to procedure a "time out" was called to verify the correct patient, procedure, equipment, support staff and site/side marked as required. Patient understanding: patient states understanding of the procedure being performed  Patient consent: the patient's understanding of the procedure matches consent given    Laceration repair    Date/Time: 7/3/2023 12:16 AM    Performed by: Marta Wilson MD  Authorized by: Marta Wilson MD  Body area: head/neck (nasal bridge)  Laceration length: 1 cm  Contaminated: none. Foreign bodies: no foreign bodies  Tendon involvement: none  Nerve involvement: none  Vascular damage: no  Anesthesia: local infiltration    Anesthesia:  Local Anesthetic: lidocaine 1% with epinephrine  Anesthetic total: 6 mL    Wound Dehiscence:  Superficial Wound Dehiscence: simple closure      Procedure Details:  Preparation: Patient was prepped and draped in the usual sterile fashion.   Irrigation solution: saline  Amount of cleaning: standard  Debridement: none  Degree of undermining: none  Skin closure: Ethilon (5-0)  Number of sutures: 4  Approximation: close  Approximation difficulty: simple  Dressing: 4x4 sterile gauze and antibiotic ointment  Patient tolerance: patient tolerated the procedure well with no immediate complications    CriticalCare Time    Date/Time: 7/5/2023 5:39 AM    Performed by: Marta Wilson MD  Authorized by: Marta Wilson MD    Critical care provider statement:     Critical care time (minutes):  45    Critical care start time:  7/2/2023 11:10 PM    Critical care end time:  7/3/2023 12:00 AM    Critical care time was exclusive of:  Separately billable procedures and treating other patients Critical care was necessary to treat or prevent imminent or life-threatening deterioration of the following conditions:  Trauma    Critical care was time spent personally by me on the following activities:  Examination of patient, evaluation of patient's response to treatment, re-evaluation of patient's condition, ordering and review of laboratory studies and ordering and performing treatments and interventions             ED Course  ED Course as of 07/05/23 0538   Sun Jul 02, 2023   2307 Pt seen and evaluated    CHI on Eliquis  No LOC  Facial contusion after falling over walker     Pt stable  No cp or sob  No headache or dizziness or n/v  Moving all extremities w/o complaint    She fell onto her Right side, c/o right flank pain  Pt will need full trauma scans    2315 Efast negative    Mon Jul 03, 2023   0016 CT BRAIN - WITHOUT CONTRAST     INDICATION:   TRAUMA.     COMPARISON:  9/26/2022.     TECHNIQUE:  CT examination of the brain was performed. Multiplanar 2D reformatted images were created from the source data.     Radiation dose length product (DLP) for this visit:  791.32 mGy-cm . This examination, like all CT scans performed in the [facility], was performed utilizing techniques to minimize radiation dose exposure, including the use of iterative   reconstruction and automated exposure control.     IMAGE QUALITY:  Diagnostic.     FINDINGS:     PARENCHYMA: Decreased attenuation is noted in periventricular and subcortical white matter demonstrating an appearance that is statistically most likely to represent mild microangiopathic change.     No CT signs of acute infarction. No intracranial mass, mass effect or midline shift.   No acute parenchymal hemorrhage.     VENTRICLES AND EXTRA-AXIAL SPACES:  Normal for the patient's age.     VISUALIZED ORBITS: Normal visualized orbits.     PARANASAL SINUSES: Normal visualized paranasal sinuses.     CALVARIUM AND EXTRACRANIAL SOFT TISSUES: Normal.     IMPRESSION:     No acute intracranial abnormality.      0016 CT CERVICAL SPINE - WITHOUT CONTRAST     INDICATION:   TRAUMA.     COMPARISON:  None.     TECHNIQUE:  CT examination of the cervical spine was performed without intravenous contrast.  Contiguous axial images were obtained. Multiplanar 2D reformatted images were created from the source data.     Radiation dose length product (DLP) for this visit:  407.74 mGy-cm . This examination, like all CT scans performed in the [facility], was performed utilizing techniques to minimize radiation dose exposure, including the use of iterative   reconstruction and automated exposure control.     IMAGE QUALITY:  Diagnostic.     FINDINGS:     ALIGNMENT:  Normal alignment of the cervical spine. No subluxation.     VERTEBRAE:  No fracture.     DEGENERATIVE CHANGES:  No significant cervical degenerative changes are noted.     PREVERTEBRAL AND PARASPINAL SOFT TISSUES: Unremarkable     THORACIC INLET:  Normal.     IMPRESSION:     No cervical spine fracture or traumatic malalignment.         0037 CT FACIAL BONES WITHOUT INTRAVENOUS CONTRAST     INDICATION:   TRAUMA.     COMPARISON: 11/12/2015.     TECHNIQUE:  Axial CT images were obtained through the facial bones with additional sagittal and coronal reconstructions.     Radiation dose length product (DLP) for this visit:  308.07 mGy-cm . This examination, like all CT scans performed in the [facility], was performed utilizing techniques to minimize radiation dose exposure, including the use of iterative   reconstruction and automated exposure control.     IMAGE QUALITY:  Diagnostic.     FINDINGS:     FACIAL BONES:   No facial bone fracture identified. Normal alignment of the temporomandibular joints.   No lytic or blastic lesion.     ORBITS:  Orbital globes, optic nerves, and extraocular muscles appear symmetric and normal. There is no evidence of retrobulbar mass, abscess, or hematoma.     SINUSES:  Normal.     SOFT TISSUES:  Normal.     IMPRESSION:     No fracture or dislocation visualized on noncontrast CT of the facial bones.         0038 CT CHEST, ABDOMEN AND PELVIS WITH IV CONTRAST        IMPRESSION:     Right hip arthroplasty results in streak artifact limiting evaluation of the pelvis. Right distal ureter and UVJ not well seen.     Left renal artery stent; patency is challenging to is determine as this is not an angiographic study. .     3 mm right upper lobe nodule again noted. Previous recommendation for 12-month follow-up stands unchanged. 0221 Following verbal consent, Nasal laceration closed after copious cleansing  4 x 5-0 Ethilon  Pt tolerated very well           Medical Decision Making    Patient with history as above presented with Patient presents with:  Fall, CHI, nasal laceration     History obtained from patient, daughter, EMS    Patient was nontoxic, stable. Ambulatory. Exam as above. Differential diagnosis considered. Overall presentation is consistent with mechanical fall, CHI, nasal laceration. Low suspicion for ICH, sepsis, intraabdominal or intrathoracic injury     Patient was treated with Lidocaine with improvement in symptoms. No Consideration at all was given for admission, as the patient was stable for outpatient management. Disposition:   Discussed need to follow up   Discharged with instructions to obtain outpatient follow up of patient's symptoms and findings, with strict return precautions if patient develops new or worsening symptoms. This medical documentation was created using an electronic medical record system with [facility] Modal voice recognition. Although this document has been carefully reviewed, there may still be some phonetic and typographical errors. These errors are purely typographical and due to imperfections of the software program, do not reflect any compromise in the patient's medical care.       Closed head injury, initial encounter: acute illness or injury  Fall, initial encounter: acute illness or injury  Laceration of nose, initial encounter: acute illness or injury  Pulmonary nodules: acute illness or injury  Amount and/or Complexity of Data Reviewed  Labs: ordered. Decision-making details documented in ED Course. Radiology: ordered. Risk  OTC drugs. Prescription drug management. Disposition  [employer] [facility] Transfer: No  Final diagnoses:   Fall, initial encounter   Closed head injury, initial encounter   Laceration of nose, initial encounter   Pulmonary nodules     Time reflects when diagnosis was documented in both MDM as applicable and the Disposition within this note     Time User Action Codes Description Comment    7/3/2023  2:21 AM Divine Embs Add [C85. RBYA] Fall, initial encounter     7/3/2023  2:21 AM Divine Embs Add [S09.90XA] Closed head injury, initial encounter     7/3/2023  2:21 AM Divine Embs Add [V60.52PE] Laceration of nose, initial encounter     7/3/2023  2:21 AM Divine Embs Add [R91.8] Pulmonary nodules       ED Disposition     ED Disposition   Discharge    Condition   Stable    Date/Time   Mon Jul 3, 2023  2:22 AM    Comment   Houston Roberts discharge to home/self care. Follow-up Information     Follow up With Specialties Details Why Contact Info    Vijaya Fournier, [address] [facility], Nurse Practitioner Call today  [address]   [address] [facility] [address] [phone]          Discharge Medication List as of 7/3/2023  2:34 AM      CONTINUE these medications which have NOT CHANGED    Details   apixaban (ELIQUIS) 5 mg Take 1 tablet (5 mg total) by mouth 2 (two) times a day, Starting Mon 9/19/2022, Normal      buPROPion (WELLBUTRIN XL) 150 mg 24 hr tablet Take 1 tablet (150 mg total) by mouth daily, Starting Thu 3/9/2023, Normal      cetirizine (ZyrTEC) 10 mg tablet Take 10 mg by mouth daily, Historical Med      Choline Fenofibrate (Fenofibric Acid) 135 MG CPDR Take 1 capsule (135 mg total) by mouth daily, Starting Mon 1/23/2023, Normal      diltiazem (CARDIZEM CD) 180 mg 24 hr capsule Take 1 capsule (180 mg total) by mouth daily, Starting Mon 9/19/2022, Normal      ergocalciferol (VITAMIN D2) 50,000 units Take 1 capsule (50,000 Units total) by mouth once a week, Starting u 5/4/2023, Normal      olopatadine (PATANOL) 0.1 % ophthalmic solution Administer 1 drop into the left eye 2 (two) times a day, Starting Fri 1/27/2023, Normal      spironolactone (ALDACTONE) 25 mg tablet take 2 tablets by mouth once daily, Normal           No discharge procedures on file.     PDMP Review       Value Time User    PDMP Reviewed  Yes 9/26/2022  6:13 PM Yue Stevenson DO          ED Provider  Electronically Signed by         Hannah Zuniga MD  07/05/23 5831

## 2023-07-03 NOTE — DISCHARGE INSTRUCTIONS
Return if:   Headache or vomiting  Increased swelling or redness of the laceration repair   Fever or flu like symptoms  Or any new and concerning symptoms    By Tomorrow night you can take the ER dressing off and begin to perform dressing changes every day. Please apply triple antibiotic cream every 12 hours.   During this application please examine the wound for any signs of infection: redness and tenderness are concerning signs, as well as any discharge from the wound - if any of these signs preset, please have urgent wound reevaluation      Please have sutures removed in 7-10 days

## 2023-07-05 ENCOUNTER — VBI (OUTPATIENT)
Dept: ADMINISTRATIVE | Facility: OTHER | Age: 83
End: 2023-07-05

## 2023-07-05 NOTE — TELEPHONE ENCOUNTER
Vincenzo Whitley    ED Visit Information     Ed visit date: 7-2-23  Diagnosis Description: Fall, initial encounter; Closed head injury, initial encounter; Laceration of nose, initial encounter; Pulmonary nodules  In Network? Yes [place]  Discharge status: Home  Discharged with meds ? No  Number of ED visits to date: 1  ED Severity:N/A     Outreach Information    Outreach successful: Yes 1  Date letter mailed:0  Date Finalized:7-5-23    Care Coordination    Follow up appointment with pcp: no declined  Transportation issues ? No    Value [employer] type: 7 Day Outreach  Emergent necessity warranted by diagnosis: Yes  [facility] PCP: Yes  Transportation: Ambulance Transport  Ambulance - Was Pt given choice of of ED: Yes  If able to choose an ED. Would you have chosen [facility]: Yes  Called PCP first?: No  Feels able to call PCP for urgent problems ?: Yes  Understands what emergencies can be handled by PCP ?: Yes  Ever any problems getting appointment with PCP for minor emergency/urgency problems?: No  Practice Contacted Patient ?: No  Pt had ED follow up with pcp/staff ?: No    Seen for follow-up out of network ?: No  Reason Patient went to ED instead of Urgent Care or PCP?: PCP instructions  Urgent care Education?: No  07/05/2023 09:18 AM EDT by Brain Large 07/05/2023 09:18 AM EDT by Brain Large  JEFFI Vincenzo Whitley (Self) [phone] (DQAS)[phone] (Home)  [phone] (Home) Remove  - Left MessageCommunicated - Att x1    07/05/2023 10:09 AM EDT by Brain Large 07/05/2023 10:09 AM EDT by Brain Large  Incoming Vincenzo Whitley (Self) [phone] (Home)    Personal communication with patient regarding recent ED visit on 6-2-23. Patient states that she is well but is sore.  Patient declines PCP follow-up at this time. Patient states she is going to go urgent care to get sutures removed.

## 2023-07-10 ENCOUNTER — OFFICE VISIT (OUTPATIENT)
Dept: URGENT CARE | Facility: CLINIC | Age: 83
End: 2023-07-10
Payer: COMMERCIAL

## 2023-07-10 VITALS
DIASTOLIC BLOOD PRESSURE: 67 MMHG | TEMPERATURE: 98.4 F | SYSTOLIC BLOOD PRESSURE: 149 MMHG | RESPIRATION RATE: 18 BRPM | OXYGEN SATURATION: 99 % | HEART RATE: 72 BPM

## 2023-07-10 DIAGNOSIS — Z48.02 ENCOUNTER FOR REMOVAL OF SUTURES: Primary | ICD-10-CM

## 2023-07-10 PROCEDURE — 99213 OFFICE O/P EST LOW 20 MIN: CPT

## 2023-07-10 PROCEDURE — S9088 SERVICES PROVIDED IN URGENT: HCPCS

## 2023-07-10 NOTE — PROGRESS NOTES
[facility]        NAME: Jose Antonio Wiley is a 81 y.o. female  : 1940    MRN: [id number]  DATE: July 10, 2023  TIME: 10:09 AM    Assessment and Plan   Encounter for removal of sutures [Z48.02]  1. Encounter for removal of sutures          All 4 sutures removed with no complications. Patient Instructions     Monitor for any signs of infection as discussed  Follow up with PCP in 3-5 days. Proceed to  ER if symptoms worsen. Chief Complaint     Chief Complaint   Patient presents with   • Suture / Staple Removal     Patient presents for suture removal to nose. History of Present Illness       Patient is presenting for suture removal to her nose after a fall on  where she was seen in the ER. She had x-rays done that showed no fractures. 4 sutures were placed. She was not put on any antibiotics. Suture / Staple Removal  Treated in ED: 8 days ago () There has been no drainage from the wound. There is no redness present. There is no swelling present. There is no pain present. Review of Systems   Review of Systems   Constitutional: Negative. Eyes: Negative. Respiratory: Negative. Cardiovascular: Negative. Musculoskeletal: Negative. Skin: Positive for wound (4 sutures in place on bridge of nose. ). Negative for color change. Neurological: Negative.           Current Medications       Current Outpatient Medications:   •  apixaban (ELIQUIS) 5 mg, Take 1 tablet (5 mg total) by mouth 2 (two) times a day, Disp: 180 tablet, Rfl: 3  •  buPROPion (WELLBUTRIN XL) 150 mg 24 hr tablet, Take 1 tablet (150 mg total) by mouth daily, Disp: 90 tablet, Rfl: 3  •  cetirizine (ZyrTEC) 10 mg tablet, Take 10 mg by mouth daily, Disp: , Rfl:   •  Choline Fenofibrate (Fenofibric Acid) 135 MG CPDR, Take 1 capsule (135 mg total) by mouth daily, Disp: 90 capsule, Rfl: 1  •  diltiazem (CARDIZEM CD) 180 mg 24 hr capsule, Take 1 capsule (180 mg total) by mouth daily, Disp: 90 capsule, Rfl: 3  •  ergocalciferol (VITAMIN D2) 50,000 units, Take 1 capsule (50,000 Units total) by mouth once a week, Disp: 16 capsule, Rfl: 0  •  olopatadine (PATANOL) 0.1 % ophthalmic solution, Administer 1 drop into the left eye 2 (two) times a day, Disp: 5 mL, Rfl: 3  •  spironolactone (ALDACTONE) 25 mg tablet, take 2 tablets by mouth once daily, Disp: 180 tablet, Rfl: 0    Current Allergies     Allergies as of 07/10/2023 - Reviewed 07/10/2023   Allergen Reaction Noted   • Aspirin  03/20/2018   • Montelukast  03/25/2016   • Nsaids  03/25/2016   • Pravastatin  03/25/2016   • Simvastatin  03/25/2016   • Tolmetin  03/25/2016            The following portions of the patient's history were reviewed and updated as appropriate: allergies, current medications, past family history, past medical history, past social history, past surgical history and problem list.     Past Medical History:   Diagnosis Date   • Atrial fibrillation ([address])    • Esophageal stricture    • Factor V Leiden mutation ([address])     LAST ASSESSED: 8/3/16   • Hypertension        Past Surgical History:   Procedure Laterality Date   • CATARACT EXTRACTION, BILATERAL Bilateral    • REDUCTION MAMMAPLASTY Bilateral    • TONSILLECTOMY AND ADENOIDECTOMY     • TUBAL LIGATION Bilateral        Family History   Problem Relation Age of Onset   • Hypertension Mother    • Cancer Father          Medications have been verified. Objective   /67   Pulse 72   Temp 98.4 °F (36.9 °C) (Temporal)   Resp 18   SpO2 99%        Physical Exam     Physical Exam  Constitutional:       Appearance: Normal appearance. Eyes:      Extraocular Movements: Extraocular movements intact. Pupils: Pupils are equal, round, and reactive to light. Cardiovascular:      Rate and Rhythm: Normal rate and regular rhythm. Pulses: Normal pulses. Heart sounds: Normal heart sounds. Pulmonary:      Effort: Pulmonary effort is normal.      Breath sounds: Normal breath sounds.    Skin: General: Skin is warm and dry. Findings: Lesion (4 sutures on place on bridge of nose. Area is clean, dry and intact. No signs of infection.) present. Neurological:      General: No focal deficit present. Mental Status: She is alert and oriented to person, place, and time. Mental status is at baseline. Suture removal    Date/Time: 7/10/2023 9:25 AM    Performed by: Romaine Phillip PA-C  Authorized by: Romaine Phillip PA-C  Universal Protocol:  Consent: Verbal consent obtained. Risks and benefits: risks, benefits and alternatives were discussed  Consent given by: patient        Patient location:  Bedside  Location:     Location:  Head/neck    Head/neck location:  Nose  Procedure details: Tools used:  Suture removal kit    Wound appearance:  No sign(s) of infection    Number of sutures removed:  4  Post-procedure details:     Post-removal:  No dressing applied    Patient tolerance of procedure:   Tolerated well, no immediate complications

## 2023-07-10 NOTE — PATIENT INSTRUCTIONS
Monitor for any signs of infection as discussed  Follow up with PCP in 3-5 days. Proceed to  ER if symptoms worsen.

## 2023-07-14 DIAGNOSIS — E78.1 HYPERTRIGLYCERIDEMIA: ICD-10-CM

## 2023-07-14 RX ORDER — FENOFIBRIC ACID 135 MG/1
CAPSULE, DELAYED RELEASE ORAL
Qty: 90 CAPSULE | Refills: 0 | Status: SHIPPED | OUTPATIENT
Start: 2023-07-14

## 2023-07-26 ENCOUNTER — RA CDI HCC (OUTPATIENT)
Dept: OTHER | Facility: HOSPITAL | Age: 83
End: 2023-07-26

## 2023-07-27 ENCOUNTER — OFFICE VISIT (OUTPATIENT)
Dept: FAMILY MEDICINE CLINIC | Facility: CLINIC | Age: 83
End: 2023-07-27

## 2023-07-27 VITALS
SYSTOLIC BLOOD PRESSURE: 140 MMHG | OXYGEN SATURATION: 98 % | BODY MASS INDEX: 31.49 KG/M2 | HEIGHT: 65 IN | TEMPERATURE: 98 F | WEIGHT: 189 LBS | HEART RATE: 87 BPM | DIASTOLIC BLOOD PRESSURE: 62 MMHG

## 2023-07-27 DIAGNOSIS — H57.11 PAIN OF RIGHT EYE: ICD-10-CM

## 2023-07-27 DIAGNOSIS — H04.129 DRYNESS OF EYE: ICD-10-CM

## 2023-07-27 DIAGNOSIS — Z13.1 SCREENING FOR DIABETES MELLITUS: ICD-10-CM

## 2023-07-27 DIAGNOSIS — Z23 ENCOUNTER FOR IMMUNIZATION: ICD-10-CM

## 2023-07-27 DIAGNOSIS — K76.0 HEPATIC STEATOSIS: ICD-10-CM

## 2023-07-27 DIAGNOSIS — L85.3 DRY SKIN: ICD-10-CM

## 2023-07-27 DIAGNOSIS — R91.1 LUNG NODULE: Primary | ICD-10-CM

## 2023-07-27 NOTE — PROGRESS NOTES
Name: Vincenzo Whitley      : 1940      MRN: [id number]  Encounter Provider: NANCY Roldan  Encounter Date: 2023   Encounter department: [address] [place] [address]     1. Lung nodule  -     CT lung nodule follow-up; Future; Expected date: 2024    2. Encounter for immunization    3. Pain of right eye  -     Ambulatory Referral to Ophthalmology; Future    4. Dryness of eye  -     Sjogren's Antibodies; Future    5. Dry skin  -     TSH, 3rd generation; Future  -     T4, free; Future  -     NING Screen w/ Reflex to Titer/Pattern; Future  -     HEMOGLOBIN A1C W/ EAG ESTIMATION; Future    6. Screening for diabetes mellitus  -     HEMOGLOBIN A1C W/ EAG ESTIMATION; Future    7. Hepatic steatosis           Subjective      Presents for 6-month follow-up. Patient unfortunately did call had a fall at the beginning of the month and went to the ER due to epistaxis. She had a CAT scan of her facial bones which was normal but she continues to have pain in the right eye and states that she feels like she is seeing a line when she looks certain ways. Patient instructed to call ophthalmology for follow-up as soon as possible. She verbalized understanding. No other falls since this visit. Did review CAT scan with patient and she has a 3 mm pulmonary nodule with recommended 1 year follow-up on this. CAT scan ordered for next July. Also states that she has a renal stent but patient is not aware of this and neither is her daughter. We will have radiology reevaluate CAT scan to determine if there truly is a renal stent. She verbalized understanding. Does have moderate hepatic steatosis noted. Has concerns that her eyes are excessively dry and she does not sweat. She also does not tolerate being in the heat anymore. Ongoing for a while.   She states that she goes to aerobics class and everybody else is sweating and she is not even though she is doing a very good hard workout and getting her heart rate up. Review of Systems   Constitutional: Negative for appetite change, fatigue and unexpected weight change. HENT: Negative for congestion, rhinorrhea, sneezing and sore throat. Eyes: Positive for pain. Negative for discharge, itching and visual disturbance. Respiratory: Negative for cough, chest tightness, shortness of breath and wheezing. Cardiovascular: Negative for chest pain, palpitations and leg swelling. Gastrointestinal: Negative for abdominal pain, blood in stool, constipation, diarrhea, nausea and vomiting. Endocrine: Positive for heat intolerance. Negative for cold intolerance, polydipsia, polyphagia and polyuria. Genitourinary: Negative for difficulty urinating, dysuria and urgency. Musculoskeletal: Negative for arthralgias, back pain and joint swelling. Skin: Negative for color change and rash. Neurological: Negative for dizziness, weakness and headaches. Hematological: Negative for adenopathy. Does not bruise/bleed easily.        Current Outpatient Medications on File Prior to Visit   Medication Sig   • apixaban (ELIQUIS) 5 mg Take 1 tablet (5 mg total) by mouth 2 (two) times a day   • buPROPion (WELLBUTRIN XL) 150 mg 24 hr tablet Take 1 tablet (150 mg total) by mouth daily   • cetirizine (ZyrTEC) 10 mg tablet Take 10 mg by mouth daily   • Choline Fenofibrate (Fenofibric Acid) 135 MG CPDR take 1 capsule by mouth once daily   • diltiazem (CARDIZEM CD) 180 mg 24 hr capsule Take 1 capsule (180 mg total) by mouth daily   • ergocalciferol (VITAMIN D2) 50,000 units Take 1 capsule (50,000 Units total) by mouth once a week   • olopatadine (PATANOL) 0.1 % ophthalmic solution Administer 1 drop into the left eye 2 (two) times a day   • spironolactone (ALDACTONE) 25 mg tablet take 2 tablets by mouth once daily       Objective     /62   Pulse 87   Temp 98 °F (36.7 °C)   Ht 5' 5" (1.651 m)   Wt 85.7 kg (189 lb)   SpO2 98%   BMI 31.45 kg/m² Physical Exam  Vitals and nursing note reviewed. Constitutional:       General: She is not in acute distress. Appearance: Normal appearance. She is not ill-appearing or toxic-appearing. Eyes:      General: No scleral icterus. Right eye: No discharge. Left eye: No discharge. Extraocular Movements: Extraocular movements intact. Conjunctiva/sclera: Conjunctivae normal.      Pupils: Pupils are equal, round, and reactive to light. Cardiovascular:      Rate and Rhythm: Normal rate and regular rhythm. Pulses: Normal pulses. Heart sounds: Normal heart sounds. No murmur heard. No friction rub. No gallop. Pulmonary:      Effort: Pulmonary effort is normal. No respiratory distress. Breath sounds: Normal breath sounds. No stridor. No wheezing or rales. Abdominal:      General: Abdomen is flat. Bowel sounds are normal.      Palpations: Abdomen is soft. Musculoskeletal:      Cervical back: Normal range of motion. No rigidity. Right lower leg: No edema. Left lower leg: No edema. Lymphadenopathy:      Cervical: No cervical adenopathy. Skin:     General: Skin is warm and dry. Coloration: Skin is not jaundiced. Findings: No rash. Neurological:      General: No focal deficit present. Mental Status: She is alert and oriented to person, place, and time. Mental status is at baseline. Motor: No weakness. Gait: Gait normal.   Psychiatric:         Mood and Affect: Mood normal.         Behavior: Behavior normal.         Thought Content:  Thought content normal.         Judgment: Judgment normal.       NANCY Rodrigues

## 2023-07-27 NOTE — LETTER
July 27, 2023     Patient: Duane Acosta  YOB: 1940  Date of Visit: 7/27/2023      To Whom it May Concern:    Katey Mishra is under my professional care. Teresa Lee was seen in my office on 7/27/2023. Teresa Lee needs a lift chair. If you have any questions or concerns, please don't hesitate to call.          Sincerely,          NANCY Zarco        CC: No Recipients

## 2023-07-27 NOTE — LETTER
July 27, 2023     Patient: Bruce Vieira  YOB: 1940  Date of Visit: 7/27/2023      To Whom it May Concern:    Micky Dodd is under my professional care. Dieudonne was seen in my office on 7/27/2023. Dieudonne needs a few faucet in her apartment due to difficulty turning the knob due to Dupuytren's contractures in hands. If you have any questions or concerns, please don't hesitate to call.          Sincerely,          NANCY Ram        CC: No Recipients

## 2023-07-27 NOTE — PROGRESS NOTES
[facility] coding opportunities       Chart reviewed, no opportunity found: CHART REVIEWED, [address]    Patients Insurance     Medicare Insurance: [employer]

## 2023-07-27 NOTE — LETTER
July 27, 2023     Patient: Avelino Roberts  YOB: 1940  Date of Visit: 7/27/2023      To Whom it May Concern:    Gabrielle Muñoz is under my professional care. Sofy Keen was seen in my office on 7/27/2023. Sofy Keen needs a ceiling fan due to heat intolerance and epistaxis. If you have any questions or concerns, please don't hesitate to call.          Sincerely,          NANCY Alvarado        CC: No Recipients

## 2023-08-07 DIAGNOSIS — H10.12 CONJUNCTIVITIS, ALLERGIC, LEFT: ICD-10-CM

## 2023-08-07 DIAGNOSIS — I10 ESSENTIAL HYPERTENSION: ICD-10-CM

## 2023-08-07 RX ORDER — OLOPATADINE HYDROCHLORIDE 1 MG/ML
SOLUTION/ DROPS OPHTHALMIC
Qty: 5 ML | Refills: 3 | Status: SHIPPED | OUTPATIENT
Start: 2023-08-07

## 2023-08-08 RX ORDER — SPIRONOLACTONE 25 MG/1
TABLET ORAL
Qty: 180 TABLET | Refills: 0 | Status: SHIPPED | OUTPATIENT
Start: 2023-08-08

## 2023-08-09 ENCOUNTER — HOSPITAL ENCOUNTER (OUTPATIENT)
Dept: NON INVASIVE DIAGNOSTICS | Facility: HOSPITAL | Age: 83
Discharge: HOME | End: 2023-08-09
Payer: COMMERCIAL

## 2023-08-09 VITALS
SYSTOLIC BLOOD PRESSURE: 140 MMHG | BODY MASS INDEX: 31.49 KG/M2 | WEIGHT: 189 LBS | DIASTOLIC BLOOD PRESSURE: 62 MMHG | HEIGHT: 65 IN | HEART RATE: 69 BPM

## 2023-08-09 DIAGNOSIS — I48.0 PAF (PAROXYSMAL ATRIAL FIBRILLATION) (HCC): ICD-10-CM

## 2023-08-09 DIAGNOSIS — I10 PRIMARY HYPERTENSION: ICD-10-CM

## 2023-08-09 DIAGNOSIS — I34.0 MITRAL VALVE INSUFFICIENCY, UNSPECIFIED ETIOLOGY: ICD-10-CM

## 2023-08-09 LAB
AORTIC ROOT: 3 CM
AORTIC VALVE MEAN VELOCITY: 7.8 M/S
APICAL FOUR CHAMBER EJECTION FRACTION: 63 %
ASCENDING AORTA: 3.2 CM
AV AREA BY CONTINUOUS VTI: 2.5 CM2
AV AREA PEAK VELOCITY: 2.4 CM2
AV LVOT MEAN GRADIENT: 2 MMHG
AV LVOT PEAK GRADIENT: 4 MMHG
AV MEAN GRADIENT: 3 MMHG
AV PEAK GRADIENT: 7 MMHG
AV REGURGITATION PRESSURE HALF TIME: 411 MS
AV VALVE AREA: 2.55 CM2
AV VELOCITY RATIO: 0.76
DOP CALC AO PEAK VEL: 1.34 M/S
DOP CALC AO VTI: 29.24 CM
DOP CALC LVOT AREA: 3.14 CM2
DOP CALC LVOT CARDIAC INDEX: 2.61 L/MIN/M2
DOP CALC LVOT CARDIAC OUTPUT: 5.03 L/MIN
DOP CALC LVOT DIAMETER: 2 CM
DOP CALC LVOT PEAK VEL VTI: 23.7 CM
DOP CALC LVOT PEAK VEL: 1.02 M/S
DOP CALC LVOT STROKE INDEX: 39.4 ML/M2
DOP CALC LVOT STROKE VOLUME: 74.42
E WAVE DECELERATION TIME: 253 MS
FRACTIONAL SHORTENING: 29 (ref 28–44)
INTERVENTRICULAR SEPTUM IN DIASTOLE (PARASTERNAL SHORT AXIS VIEW): 1.3 CM
INTERVENTRICULAR SEPTUM: 1.3 CM (ref 0.6–1.1)
LAAS-AP2: 22.3 CM2
LAAS-AP4: 19.2 CM2
LEFT ATRIUM SIZE: 3.4 CM
LEFT ATRIUM VOLUME (MOD BIPLANE): 64 ML
LEFT INTERNAL DIMENSION IN SYSTOLE: 3.2 CM (ref 2.1–4)
LEFT VENTRICULAR INTERNAL DIMENSION IN DIASTOLE: 4.5 CM (ref 3.5–6)
LEFT VENTRICULAR POSTERIOR WALL IN END DIASTOLE: 1.1 CM
LEFT VENTRICULAR STROKE VOLUME: 53 ML
LVSV (TEICH): 53 ML
MAIN PULMONARY ARTERY: 1.7 CM
MV E'TISSUE VEL-SEP: 11 CM/S
MV PEAK A VEL: 0.7 M/S
MV PEAK E VEL: 94 CM/S
MV STENOSIS PRESSURE HALF TIME: 74 MS
MV VALVE AREA P 1/2 METHOD: 2.97
RIGHT ATRIUM AREA SYSTOLE A4C: 11.4 CM2
RIGHT VENTRICLE ID DIMENSION: 2.9 CM
SL CV AV DECELERATION TIME RETROGRADE: 1418 MS
SL CV AV PEAK GRADIENT RETROGRADE: 81 MMHG
SL CV LEFT ATRIUM LENGTH A2C: 5.1 CM
SL CV LV EF: 60
SL CV PED ECHO LEFT VENTRICLE DIASTOLIC VOLUME (MOD BIPLANE) 2D: 95 ML
SL CV PED ECHO LEFT VENTRICLE SYSTOLIC VOLUME (MOD BIPLANE) 2D: 42 ML
TR MAX PG: 25 MMHG
TR PEAK VELOCITY: 2.5 M/S
TRICUSPID ANNULAR PLANE SYSTOLIC EXCURSION: 2.1 CM
TRICUSPID VALVE PEAK REGURGITATION VELOCITY: 2.48 M/S

## 2023-08-09 PROCEDURE — 93306 TTE W/DOPPLER COMPLETE: CPT

## 2023-08-09 PROCEDURE — 93306 TTE W/DOPPLER COMPLETE: CPT | Performed by: INTERNAL MEDICINE

## 2023-08-17 ENCOUNTER — APPOINTMENT (OUTPATIENT)
Dept: LAB | Facility: CLINIC | Age: 83
End: 2023-08-17
Payer: COMMERCIAL

## 2023-08-17 DIAGNOSIS — N18.32 STAGE 3B CHRONIC KIDNEY DISEASE (HCC): ICD-10-CM

## 2023-08-17 DIAGNOSIS — Z13.1 SCREENING FOR DIABETES MELLITUS: ICD-10-CM

## 2023-08-17 DIAGNOSIS — H04.129 DRYNESS OF EYE: ICD-10-CM

## 2023-08-17 DIAGNOSIS — E55.9 VITAMIN D DEFICIENCY: ICD-10-CM

## 2023-08-17 DIAGNOSIS — N18.30 BENIGN HYPERTENSION WITH CKD (CHRONIC KIDNEY DISEASE) STAGE III (HCC): ICD-10-CM

## 2023-08-17 DIAGNOSIS — L85.3 DRY SKIN: ICD-10-CM

## 2023-08-17 DIAGNOSIS — I12.9 BENIGN HYPERTENSION WITH CKD (CHRONIC KIDNEY DISEASE) STAGE III (HCC): ICD-10-CM

## 2023-08-17 LAB
25(OH)D3 SERPL-MCNC: 47 NG/ML (ref 30–100)
ANA SER QL IA: NEGATIVE
ANION GAP SERPL CALCULATED.3IONS-SCNC: 8 MMOL/L
BUN SERPL-MCNC: 46 MG/DL (ref 5–25)
CALCIUM SERPL-MCNC: 10 MG/DL (ref 8.3–10.1)
CHLORIDE SERPL-SCNC: 108 MMOL/L (ref 96–108)
CO2 SERPL-SCNC: 22 MMOL/L (ref 21–32)
CREAT SERPL-MCNC: 1.63 MG/DL (ref 0.6–1.3)
CREAT UR-MCNC: 67.9 MG/DL
ERYTHROCYTE [DISTWIDTH] IN BLOOD BY AUTOMATED COUNT: 13.7 % (ref 11.6–15.1)
EST. AVERAGE GLUCOSE BLD GHB EST-MCNC: 123 MG/DL
GFR SERPL CREATININE-BSD FRML MDRD: 29 ML/MIN/1.73SQ M
GLUCOSE P FAST SERPL-MCNC: 104 MG/DL (ref 65–99)
HBA1C MFR BLD: 5.9 %
HCT VFR BLD AUTO: 39.2 % (ref 34.8–46.1)
HGB BLD-MCNC: 12.4 G/DL (ref 11.5–15.4)
MCH RBC QN AUTO: 29.2 PG (ref 26.8–34.3)
MCHC RBC AUTO-ENTMCNC: 31.6 G/DL (ref 31.4–37.4)
MCV RBC AUTO: 92 FL (ref 82–98)
MICROALBUMIN UR-MCNC: 21.1 MG/L (ref 0–20)
MICROALBUMIN/CREAT 24H UR: 31 MG/G CREATININE (ref 0–30)
PHOSPHATE SERPL-MCNC: 3.8 MG/DL (ref 2.3–4.1)
PLATELET # BLD AUTO: 441 THOUSANDS/UL (ref 149–390)
PMV BLD AUTO: 9.2 FL (ref 8.9–12.7)
POTASSIUM SERPL-SCNC: 4.8 MMOL/L (ref 3.5–5.3)
PTH-INTACT SERPL-MCNC: 38.2 PG/ML (ref 12–88)
RBC # BLD AUTO: 4.25 MILLION/UL (ref 3.81–5.12)
SODIUM SERPL-SCNC: 138 MMOL/L (ref 135–147)
T4 FREE SERPL-MCNC: 0.88 NG/DL (ref 0.61–1.12)
TSH SERPL DL<=0.05 MIU/L-ACNC: 4.74 UIU/ML (ref 0.45–4.5)
WBC # BLD AUTO: 7.69 THOUSAND/UL (ref 4.31–10.16)

## 2023-08-17 PROCEDURE — 84443 ASSAY THYROID STIM HORMONE: CPT

## 2023-08-17 PROCEDURE — 83036 HEMOGLOBIN GLYCOSYLATED A1C: CPT

## 2023-08-17 PROCEDURE — 86038 ANTINUCLEAR ANTIBODIES: CPT

## 2023-08-17 PROCEDURE — 86235 NUCLEAR ANTIGEN ANTIBODY: CPT

## 2023-08-17 PROCEDURE — 84439 ASSAY OF FREE THYROXINE: CPT

## 2023-08-17 PROCEDURE — 80048 BASIC METABOLIC PNL TOTAL CA: CPT

## 2023-08-17 PROCEDURE — 84100 ASSAY OF PHOSPHORUS: CPT

## 2023-08-17 PROCEDURE — 82306 VITAMIN D 25 HYDROXY: CPT

## 2023-08-17 PROCEDURE — 82570 ASSAY OF URINE CREATININE: CPT

## 2023-08-17 PROCEDURE — 85027 COMPLETE CBC AUTOMATED: CPT

## 2023-08-17 PROCEDURE — 82043 UR ALBUMIN QUANTITATIVE: CPT

## 2023-08-17 PROCEDURE — 83970 ASSAY OF PARATHORMONE: CPT

## 2023-08-17 PROCEDURE — 36415 COLL VENOUS BLD VENIPUNCTURE: CPT

## 2023-08-18 LAB
ENA SS-A AB SER-ACNC: <0.2 AI (ref 0–0.9)
ENA SS-B AB SER-ACNC: <0.2 AI (ref 0–0.9)

## 2023-08-21 ENCOUNTER — OFFICE VISIT (OUTPATIENT)
Dept: NEPHROLOGY | Facility: CLINIC | Age: 83
End: 2023-08-21
Payer: COMMERCIAL

## 2023-08-21 VITALS
BODY MASS INDEX: 31.82 KG/M2 | SYSTOLIC BLOOD PRESSURE: 136 MMHG | DIASTOLIC BLOOD PRESSURE: 66 MMHG | HEIGHT: 65 IN | HEART RATE: 70 BPM | OXYGEN SATURATION: 97 % | WEIGHT: 191 LBS

## 2023-08-21 DIAGNOSIS — I12.9 BENIGN HYPERTENSION WITH CKD (CHRONIC KIDNEY DISEASE) STAGE III (HCC): Primary | ICD-10-CM

## 2023-08-21 DIAGNOSIS — R80.9 MICROALBUMINURIA: ICD-10-CM

## 2023-08-21 DIAGNOSIS — E55.9 VITAMIN D DEFICIENCY: ICD-10-CM

## 2023-08-21 DIAGNOSIS — N18.32 STAGE 3B CHRONIC KIDNEY DISEASE (HCC): ICD-10-CM

## 2023-08-21 DIAGNOSIS — N18.30 BENIGN HYPERTENSION WITH CKD (CHRONIC KIDNEY DISEASE) STAGE III (HCC): Primary | ICD-10-CM

## 2023-08-21 PROCEDURE — 99214 OFFICE O/P EST MOD 30 MIN: CPT | Performed by: INTERNAL MEDICINE

## 2023-08-21 NOTE — PROGRESS NOTES
NEPHROLOGY OUTPATIENT PROGRESS NOTE   Charles Gonzalez 81 y.o. female MRN: [id number]  DATE: 8/21/2023  Reason for visit:   Chief Complaint   Patient presents with   • Follow-up   • Chronic Kidney Disease     ASSESSMENT and PLAN:  CKD stage IIIB, baseline creatinine 1.3 since late 2021, prior 1.1 to 1.2 going back to 2019  -Last creatinine slightly increased to 1.6 in August 2023. Creatinine had fluctuated up to isolated value 1.6 in 2022. -? Exact etiology for increased creatinine. Check urine electrolytes, repeat BMP in couple weeks.   -Encouraged to drink plenty of water to stay hydrated. -If creatinine continue to worsen, may consider further evaluation and reducing Aldactone. -CKD suspected to be secondary to long-term hypertension causing hypertensive arteriosclerosis, age-related nephron loss  -UA in January 2023 bland without hematuria or proteinuria. -repeat BMP, urine microalbumin/creatinine ratio before next visit  -avoid NSAIDs  -renal ultrasound in July 2022 shows normal size kidneys, no hydronephrosis or stones.  Minimal cortical thinning in both kidneys. Normal echogenicity.  Also concern for lobulated renal contour on right kidney?  Normal variant versus multifocal scarring. Recent CT scan with concern for area of concentric focal arthrosclerosis on left renal artery. Patient denies any history of renal stent.     Hypertension  -Blood pressure improved on repeat check in the office today. She does not check BP at home.   -Recommend to get upper arm BP machine at home and continue to monitor BP regularly and call back if remains persistently greater than 135/85.  -Remains on Aldactone 50 mg daily due to hypertension along with hirsutism issues, also on diltiazem 180 mg daily  -She claims to be more compliant following low-salt diet.     Microalbuminuria, last UACR improving 31 mg in August 2023.    -suspect in the setting of underlying hypertension, obesity can cause secondary FSGS  -continue to monitor, if worsening, may consider adding ACE-inhibitor.  -recommended regular exercise, weight loss     Vitamin-D deficiency  -last vitamin-D level   47 in August 2023. She was on vitamin D 50,000 units weekly for long time by PCP.      Prior history of gout 15 years ago, no recent gout flare-up    Diagnoses and all orders for this visit:    Benign hypertension with CKD (chronic kidney disease) stage III ([address])  -     Basic metabolic panel; Future  -     CBC; Future  -     Phosphorus; Future  -     PTH, intact; Future  -     Albumin / creatinine urine ratio; Future  -     Vitamin D 25 hydroxy; Future    Stage 3b chronic kidney disease (HCC)  -     Creatinine, urine, random  -     Sodium, urine, random  -     Urea nitrogen, urine  -     Basic metabolic panel  -     Basic metabolic panel; Future  -     CBC; Future  -     Phosphorus; Future  -     PTH, intact; Future  -     Albumin / creatinine urine ratio; Future  -     Vitamin D 25 hydroxy; Future    Vitamin D deficiency  -     Vitamin D 25 hydroxy; Future    Microalbuminuria  -     Albumin / creatinine urine ratio; Future          SUBJECTIVE / HPI:  Gloria Nelson a 81y.o. year old female with medical issues of hypertension for 18 years, depression, urinary incontinence, factor five Leiden deficiency, CKD stage 3 with baseline 1.3 since late 2021, prior 1.1 to 1.2, remote history of gout who presents for regular follow-up of CKD.  May have some progression of CKD.  Last creatinine increased 1.6. She denies any recent infection issues, no recent NSAID use. She claims to be drinking adequate water. Otherwise feels well. Denies any lightheadedness or dizziness. denies any new urinary complaint.  Denies any chest pain or shortness of breath.  No nausea or vomiting.     Patient denies any obvious history of kidney disease in the past.  No history of autoimmune conditions as per patient. Ángela Tobin remains on stable dose of Aldactone for more than 15 years. REVIEW OF SYSTEMS:  More than 10 point review of systems were obtained and discussed in length with the patient. Complete review of systems were negative / unremarkable except mentioned above. PHYSICAL EXAM:  Vitals:    08/21/23 1602 08/21/23 1628   BP: 159/67 136/66   BP Location: Left arm    Patient Position: Sitting    Cuff Size: Adult    Pulse: 70    SpO2: 97%    Weight: 86.6 kg (191 lb)    Height: 5' 5" (1.651 m)      Body mass index is 31.78 kg/m². Physical Exam  Vitals reviewed. Constitutional:       Appearance: She is well-developed. HENT:      Head: Normocephalic and atraumatic. Right Ear: External ear normal.      Left Ear: External ear normal.   Eyes:      Conjunctiva/sclera: Conjunctivae normal.   Cardiovascular:      Comments: No significant edema in legs  Pulmonary:      Effort: Pulmonary effort is normal.      Breath sounds: Normal breath sounds. No wheezing or rales. Abdominal:      General: Bowel sounds are normal. There is no distension. Palpations: Abdomen is soft. Tenderness: There is no abdominal tenderness. Musculoskeletal:         General: No deformity. Lymphadenopathy:      Cervical: No cervical adenopathy. Skin:     Findings: No rash. Neurological:      Mental Status: She is alert and oriented to person, place, and time.    Psychiatric:         Behavior: Behavior normal.         PAST MEDICAL HISTORY:  Past Medical History:   Diagnosis Date   • Atrial fibrillation (HCC)    • Esophageal stricture    • Factor V Leiden mutation ([address])     LAST ASSESSED: 8/3/16   • Hypertension        PAST SURGICAL HISTORY:  Past Surgical History:   Procedure Laterality Date   • CATARACT EXTRACTION, BILATERAL Bilateral    • REDUCTION MAMMAPLASTY Bilateral    • TONSILLECTOMY AND ADENOIDECTOMY     • TUBAL LIGATION Bilateral        SOCIAL HISTORY:  Social History     Substance and Sexual Activity   Alcohol Use Never     Social History     Substance and Sexual Activity   Drug Use Never     Social History     Tobacco Use   Smoking Status Former   • Types: Cigarettes   • Quit date:    • Years since quittin.6   Smokeless Tobacco Never       FAMILY HISTORY:  Family History   Problem Relation Age of Onset   • Hypertension Mother    • Cancer Father        MEDICATIONS:    Current Outpatient Medications:   •  apixaban (ELIQUIS) 5 mg, Take 1 tablet (5 mg total) by mouth 2 (two) times a day, Disp: 180 tablet, Rfl: 3  •  buPROPion (WELLBUTRIN XL) 150 mg 24 hr tablet, Take 1 tablet (150 mg total) by mouth daily, Disp: 90 tablet, Rfl: 3  •  cetirizine (ZyrTEC) 10 mg tablet, Take 10 mg by mouth daily, Disp: , Rfl:   •  Choline Fenofibrate (Fenofibric Acid) 135 MG CPDR, take 1 capsule by mouth once daily, Disp: 90 capsule, Rfl: 0  •  diltiazem (CARDIZEM CD) 180 mg 24 hr capsule, Take 1 capsule (180 mg total) by mouth daily, Disp: 90 capsule, Rfl: 3  •  ergocalciferol (VITAMIN D2) 50,000 units, Take 1 capsule (50,000 Units total) by mouth once a week, Disp: 16 capsule, Rfl: 0  •  olopatadine (PATANOL) 0.1 % ophthalmic solution, INSTILL 1 DROP into left eye twice a day, Disp: 5 mL, Rfl: 3  •  spironolactone (ALDACTONE) 25 mg tablet, take 2 tablets by mouth once daily, Disp: 180 tablet, Rfl: 0    Lab Results:   Results for orders placed or performed in visit on    Basic metabolic panel   Result Value Ref Range    Sodium 138 135 - 147 mmol/L    Potassium 4.8 3.5 - 5.3 mmol/L    Chloride 108 96 - 108 mmol/L    CO2 22 21 - 32 mmol/L    ANION GAP 8 mmol/L    BUN 46 (H) 5 - 25 mg/dL    Creatinine 1.63 (H) 0.60 - 1.30 mg/dL    Glucose, Fasting 104 (H) 65 - 99 mg/dL    Calcium 10.0 8.3 - 10.1 mg/dL    eGFR 29 ml/min/1.73sq m   Vitamin D 25 hydroxy   Result Value Ref Range    Vit D, 25-Hydroxy 47.0 30.0 - 100.0 ng/mL   Phosphorus   Result Value Ref Range    Phosphorus 3.8 2.3 - 4.1 mg/dL   PTH, intact   Result Value Ref Range    PTH 38.2 12.0 - 88.0 pg/mL   Microalbumin / creatinine urine ratio   Result Value Ref Range    Creatinine, Ur 67.9 mg/dL    Albumin,U,Random 21.1 (H) 0.0 - 20.0 mg/L    Albumin Creat Ratio 31 (H) 0 - 30 mg/g creatinine   CBC   Result Value Ref Range    WBC 7.69 4.31 - 10.16 Thousand/uL    RBC 4.25 3.81 - 5.12 Million/uL    Hemoglobin 12.4 11.5 - 15.4 g/dL    Hematocrit 39.2 34.8 - 46.1 %    MCV 92 82 - 98 fL    MCH 29.2 26.8 - 34.3 pg    MCHC 31.6 31.4 - 37.4 g/dL    RDW 13.7 11.6 - 15.1 %    Platelets 629 (H) 628 - 390 Thousands/uL    MPV 9.2 8.9 - 12.7 fL   TSH, 3rd generation   Result Value Ref Range    TSH 3RD GENERATON 4.741 (H) 0.450 - 4.500 uIU/mL   T4, free   Result Value Ref Range    Free T4 0.88 0.61 - 1.12 ng/dL   Sjogren's Antibodies   Result Value Ref Range    SS-A (RO) Ab <0.2 0.0 - 0.9 AI    SS-B (LA) Ab <0.2 0.0 - 0.9 AI   NING Screen w/ Reflex to Titer/Pattern   Result Value Ref Range    NING Negative Negative   HEMOGLOBIN A1C W/ EAG ESTIMATION   Result Value Ref Range    Hemoglobin A1C 5.9 (H) Normal 4.0-5.6%; PreDiabetic 5.7-6.4%;  Diabetic >=6.5%; Glycemic control for adults with diabetes <7.0% %     mg/dl

## 2023-08-24 ENCOUNTER — OFFICE VISIT (OUTPATIENT)
Dept: CARDIOLOGY CLINIC | Facility: CLINIC | Age: 83
End: 2023-08-24
Payer: COMMERCIAL

## 2023-08-24 VITALS
OXYGEN SATURATION: 94 % | DIASTOLIC BLOOD PRESSURE: 62 MMHG | SYSTOLIC BLOOD PRESSURE: 118 MMHG | WEIGHT: 192.8 LBS | HEART RATE: 68 BPM | HEIGHT: 65 IN | TEMPERATURE: 97.8 F | BODY MASS INDEX: 32.12 KG/M2

## 2023-08-24 DIAGNOSIS — N18.30 BENIGN HYPERTENSION WITH CKD (CHRONIC KIDNEY DISEASE) STAGE III (HCC): ICD-10-CM

## 2023-08-24 DIAGNOSIS — E66.9 OBESITY (BMI 30-39.9): ICD-10-CM

## 2023-08-24 DIAGNOSIS — I12.9 BENIGN HYPERTENSION WITH CKD (CHRONIC KIDNEY DISEASE) STAGE III (HCC): ICD-10-CM

## 2023-08-24 DIAGNOSIS — I35.1 AORTIC VALVE INSUFFICIENCY, ETIOLOGY OF CARDIAC VALVE DISEASE UNSPECIFIED: ICD-10-CM

## 2023-08-24 DIAGNOSIS — I48.0 PAF (PAROXYSMAL ATRIAL FIBRILLATION) (HCC): Primary | ICD-10-CM

## 2023-08-24 PROCEDURE — 99214 OFFICE O/P EST MOD 30 MIN: CPT | Performed by: INTERNAL MEDICINE

## 2023-08-24 NOTE — PROGRESS NOTES
Cardiology Follow Up     Karina Garcia  [id number]  1940  [facility] [place]  [phone]      1. PAF (paroxysmal atrial fibrillation) ([address])        2. Benign hypertension with CKD (chronic kidney disease) stage III ([facility])        3. Obesity (BMI 30-39.9)        4. Aortic valve insufficiency, etiology of cardiac valve disease unspecified            Discussion/Summary:  1. Paroxysmal atrial fibrillation on oral anticoagulation  2. Hypertension  3. Aortic regurgitation  4. Mitral regurgitation  5. Obesity  6. Snoring with concern for possible obstructive sleep apnea scheduled to see sleep medicine November 2023    -Transthoracic echocardiogram 8/9/2023 showing left-ventricular systolic function normal estimated LVEF 60% with mildly dilated left atrium, possible small patent foramen ovale with moderate aortic regurgitation, mild to moderate mitral regurgitation and mild tricuspid regurgitation with IVC with normal respirophasic change  -Patient had an abnormal TSH on recent laboratory studies with normal T4 will have this monitored by her primary care physician.  -Creatinine 1.63 on most recent laboratory studies.   If renal function does not improve on next test may need to reduce Eliquis dosing at that time.  -We will continue oral anticoagulation with Eliquis, diltiazem 180 mg daily and patient can continue spironolactone 50 mg daily at this time with monitoring by her nephrology team  -I will see patient in 3 months or sooner if necessary  -Patient counseled on dietary and lifestyle modifications including following a low-salt, low-fat, heart healthy diet sodium restriction to less than 1800 mg of sodium daily along with weight reduction with goal BMI less than 29  -Patient will continue to monitor through her [employer] mobile enriqueta  Patient counseled if she were to have any warning or alarm type symptoms she is to seek emergency medical care immediately.  -Patient counseled on the importance of keeping sleep medicine appointment. History of Present Illness:  -Patient is an 81-year-old female with hypertension, chronic rhinitis, factor V Leiden, obesity who was hospitalized in August 2022 found at that time to have atrial fibrillation with rapid ventricular response after initially being alerted by her Apple Watch in August 2022. She was initiated on oral anticoagulation with rate control strategy and was eventually able to be discharged to outpatient follow-up. She had been seen and evaluated by hematology who agreed with oral anticoagulation and follows with nephrology for her chronic kidney disease. She presents to the office today for scheduled follow-up with her daughter.  -Patient denies any chest pain, palpitations, lightness or dizziness, loss of consciousness, shortness of breath, lower extremity edema, orthopnea or bendopnea. She notes blood pressures at home are well controlled and has no significant complaints at this time. Patient Active Problem List   Diagnosis   • Stage 3b chronic kidney disease ([address])   • Benign hypertension with CKD (chronic kidney disease) stage III (HCC)   • Vitamin D deficiency   • Dizziness   • Chronic rhinitis   • Obesity (BMI 30-39. 9)   • Factor V Leiden mutation ([address])   • Major depressive disorder, recurrent, unspecified ([address])   • Hypertriglyceridemia   • Subclinical hypothyroidism   • Microalbuminuria   • Atrial fibrillation with rapid ventricular response (HCC)   • Chronic renal disease, stage IV (HCC)   • Class 2 severe obesity due to excess calories with serious comorbidity and body mass index (BMI) of 35.0 to 35.9 in [facility])   • Multiple closed fractures of ribs of left side   • Fall at home   • Displaced fracture of distal phalanx of left great toe, initial encounter for closed fracture   • Back muscle spasm   • Skin tear of right hand without complication   • Epistaxis     Past Medical History:   Diagnosis Date   • Atrial fibrillation [facility])    • Esophageal stricture    • Factor V Leiden mutation ([address])     LAST ASSESSED: 8/3/16   • Hypertension      Social History     Socioeconomic History   • Marital status:      Spouse name: Not on file   • Number of children: 4   • Years of education: Not on file   • Highest education level: Not on file   Occupational History   • Occupation: PRIOR OCCUPATION       Comment: RETIRED   Tobacco Use   • Smoking status: Former     Types: Cigarettes     Quit date:      Years since quittin.6   • Smokeless tobacco: Never   Vaping Use   • Vaping Use: Never used   Substance and Sexual Activity   • Alcohol use: Never   • Drug use: Never   • Sexual activity: Not Currently   Other Topics Concern   • Not on file   Social History Narrative   • Not on file     Social Determinants of Health     Financial Resource Strain: Low Risk  (2023)    Overall Financial Resource Strain (CARDIA)    • Difficulty of Paying Living Expenses: Not hard at all   Food Insecurity: No Food Insecurity (2022)    Hunger Vital Sign    • Worried About Running Out of Food in the Last Year: Never true    • Ran Out of Food in the Last Year: Never true   Transportation Needs: No Transportation Needs (2023)    PRAPARE - Transportation    • Lack of Transportation (Medical): No    • Lack of Transportation (Non-Medical):  No   Physical Activity: Not on file   Stress: Not on file   Social Connections: Not on file   Intimate Partner Violence: Not on file   Housing Stability: Low Risk  (2022)    Housing Stability Vital Sign    • Unable to Pay for Housing in the Last Year: No    • Number of Places Lived in the Last Year: 1    • Unstable Housing in the Last Year: No      Family History   Problem Relation Age of Onset   • Hypertension Mother    • Cancer Father      Past Surgical History:   Procedure Laterality Date   • CATARACT EXTRACTION, BILATERAL Bilateral    • REDUCTION MAMMAPLASTY Bilateral    • TONSILLECTOMY AND ADENOIDECTOMY     • TUBAL LIGATION Bilateral        Current Outpatient Medications:   •  apixaban (ELIQUIS) 5 mg, Take 1 tablet (5 mg total) by mouth 2 (two) times a day, Disp: 180 tablet, Rfl: 3  •  buPROPion (WELLBUTRIN XL) 150 mg 24 hr tablet, Take 1 tablet (150 mg total) by mouth daily, Disp: 90 tablet, Rfl: 3  •  cetirizine (ZyrTEC) 10 mg tablet, Take 10 mg by mouth daily, Disp: , Rfl:   •  Choline Fenofibrate (Fenofibric Acid) 135 MG CPDR, take 1 capsule by mouth once daily, Disp: 90 capsule, Rfl: 0  •  diltiazem (CARDIZEM CD) 180 mg 24 hr capsule, Take 1 capsule (180 mg total) by mouth daily, Disp: 90 capsule, Rfl: 3  •  ergocalciferol (VITAMIN D2) 50,000 units, Take 1 capsule (50,000 Units total) by mouth once a week, Disp: 16 capsule, Rfl: 0  •  olopatadine (PATANOL) 0.1 % ophthalmic solution, INSTILL 1 DROP into left eye twice a day, Disp: 5 mL, Rfl: 3  •  spironolactone (ALDACTONE) 25 mg tablet, take 2 tablets by mouth once daily, Disp: 180 tablet, Rfl: 0  Allergies   Allergen Reactions   • Aspirin    • Montelukast      Other reaction(s): Depression  Category: Adverse Reaction; Other reaction(s): Depression  Category: Adverse Reaction;    • Nsaids    • Pravastatin      Category: Adverse Reaction;    • Simvastatin      Category:  Adverse Reaction;    • Tolmetin          Labs:  Appointment on 08/17/2023   Component Date Value   • Sodium 08/17/2023 138    • Potassium 08/17/2023 4.8    • Chloride 08/17/2023 108    • CO2 08/17/2023 22    • ANION GAP 08/17/2023 8    • BUN 08/17/2023 46 (H)    • Creatinine 08/17/2023 1.63 (H)    • Glucose, Fasting 08/17/2023 104 (H)    • Calcium 08/17/2023 10.0    • eGFR 08/17/2023 29    • Vit D, 25-Hydroxy 08/17/2023 47.0    • Phosphorus 08/17/2023 3.8    • PTH 08/17/2023 38.2    • Creatinine, Ur 08/17/2023 67.9    • Albumin,U,Random 08/17/2023 21.1 (H)    • Albumin Creat Ratio 08/17/2023 31 (H)    • WBC 08/17/2023 7.69    • RBC 08/17/2023 4.25    • Hemoglobin 08/17/2023 12.4    • Hematocrit 08/17/2023 39.2    • MCV 08/17/2023 92    • MCH 08/17/2023 29.2    • MCHC 08/17/2023 31.6    • RDW 08/17/2023 13.7    • Platelets 63/02/4114 441 (H)    • MPV 08/17/2023 9.2    • TSH 3RD GENERATON 08/17/2023 4.741 (H)    • Free T4 08/17/2023 0.88    • SS-A (RO) Ab 08/17/2023 <0.2    • SS-B (LA) Ab 08/17/2023 <0.2    • NING 08/17/2023 Negative    • Hemoglobin A1C 08/17/2023 5.9 (H)    • EAG 08/17/2023 123    Hospital Outpatient Visit on 08/09/2023   Component Date Value   • LV EF 08/09/2023 60    • A4C EF 08/09/2023 63    • LVOT stroke volume 08/09/2023 74.42    • LVOT stroke volume index 08/09/2023 39.40    • LVOT Cardiac Index 08/09/2023 2.61    • LVOT Cardiac Output 08/09/2023 5.03    • LVIDd 08/09/2023 4.50    • LVIDS 08/09/2023 3.20    • IVSd 08/09/2023 1.30    • LVPWd 08/09/2023 1.10    • FS 08/09/2023 29    • MV E' Tissue Velocity Se* 08/09/2023 11    • E wave deceleration time 08/09/2023 253    • MV Peak E Zak 08/09/2023 94    • MV Peak A Zak 08/09/2023 0.7    • AV LVOT peak gradient 08/09/2023 4    • LVOT peak VTI 08/09/2023 23.7    • LVOT peak zak 08/09/2023 1.02    • RVID d 08/09/2023 2.9    • Tricuspid annular plane * 08/09/2023 2.10    • LA size 08/09/2023 3.4    • LA length (A2C) 08/09/2023 5.10    • LA volume (BP) 08/09/2023 64    • RAA A4C 08/09/2023 11.4    • LVOT diameter 08/09/2023 2.0    • Aortic valve peak veloci* 08/09/2023 1.34    • Ao VTI 08/09/2023 29.24    • AV mean gradient 08/09/2023 3    • LVOT mn grad 08/09/2023 2.0    • AV peak gradient 08/09/2023 7    • AV Deceleration Time 08/09/2023 1,418    • AV regurgitation pressur* 08/09/2023 411    • AV area by cont VTI 08/09/2023 2.5    • AV area peak zak 08/09/2023 2.4    • MV stenosis pressure 1/2* 08/09/2023 74    • MV valve area p 1/2 meth* 08/09/2023 2.97    • TR Peak Zak 08/09/2023 2.5    • Triscuspid Valve Regurgi* 08/09/2023 25.0    • Ao root 08/09/2023 3.00    • Asc Ao 08/09/2023 3.2    • MPA 08/09/2023 1.7    • AV peak gradient 08/09/2023 81    • Aortic valve mean veloci* 08/09/2023 7.80    • Tricuspid valve peak reg* 08/09/2023 2.48    • Left ventricular stroke * 08/09/2023 53.00    • IVS 08/09/2023 1.3    • LEFT VENTRICLE SYSTOLIC * 40/87/3995 42    • LV DIASTOLIC VOLUME (MOD* 62/13/4818 95    • Left Atrium Area-systoli* 08/09/2023 19.2    • Left Atrium Area-systoli* 08/09/2023 22.3    • LVSV, 2D 08/09/2023 53    • LVOT area 08/09/2023 3.14    • DVI 08/09/2023 0.76    • AV valve area 08/09/2023 2.55         Imaging: Echo complete w/ contrast if indicated    Result Date: 8/9/2023  Narrative: •  Left Ventricle: Left ventricular cavity size is normal. Wall thickness is mildly increased. The left ventricular ejection fraction is 60%. Systolic function is normal. Wall motion is normal. •  Left Atrium: The atrium is mildly dilated. •  Atrial Septum: There is a possible small and patent foramen ovale. •  Aortic Valve: There is moderate regurgitation. •  Mitral Valve: There is mild to moderate regurgitation. •  Tricuspid Valve: There is mild regurgitation. Review of Systems:  Review of Systems      Vitals:    08/24/23 1452   BP: 118/62   BP Location: Left arm   Patient Position: Sitting   Cuff Size: Standard   Pulse: 68   Temp: 97.8 °F (36.6 °C)   TempSrc: Temporal   SpO2: 94%   Weight: 87.5 kg (192 lb 12.8 oz)   Height: 5' 5" (1.651 m)     Vitals:    08/24/23 1452   Weight: 87.5 kg (192 lb 12.8 oz)     Height: 5' 5" (165.1 cm)     Physical Exam:  Physical Exam  Vitals reviewed. Constitutional:       General: She is not in acute distress. Appearance: She is obese. She is not diaphoretic. HENT:      Head: Normocephalic and atraumatic. Eyes:      General:         Right eye: No discharge. Left eye: No discharge.    Neck:      Comments: Trachea midline, no JVD present  Cardiovascular:      Rate and Rhythm: Normal rate and regular rhythm. Heart sounds: Murmur (AMADA) heard. Pulmonary:      Effort: Pulmonary effort is normal. No respiratory distress. Breath sounds: No wheezing. Chest:      Chest wall: No tenderness. Abdominal:      General: Bowel sounds are normal.      Palpations: Abdomen is soft. Tenderness: There is no abdominal tenderness. There is no rebound. Musculoskeletal:      Right lower leg: No edema. Left lower leg: No edema. Skin:     General: Skin is warm and dry. Neurological:      Mental Status: She is alert. Comments: Awake, alert, able to answer questions appropriately, able to move extremities bilaterally.    Psychiatric:         Mood and Affect: Mood normal.         Behavior: Behavior normal.

## 2023-08-25 DIAGNOSIS — I48.91 ATRIAL FIBRILLATION WITH RVR (HCC): ICD-10-CM

## 2023-08-25 RX ORDER — DILTIAZEM HYDROCHLORIDE 180 MG/1
180 CAPSULE, COATED, EXTENDED RELEASE ORAL DAILY
Qty: 90 CAPSULE | Refills: 3 | Status: SHIPPED | OUTPATIENT
Start: 2023-08-25

## 2023-08-26 DIAGNOSIS — I48.91 ATRIAL FIBRILLATION WITH RVR (HCC): ICD-10-CM

## 2023-08-28 RX ORDER — APIXABAN 5 MG/1
5 TABLET, FILM COATED ORAL 2 TIMES DAILY
Qty: 180 TABLET | Refills: 3 | Status: SHIPPED | OUTPATIENT
Start: 2023-08-28

## 2023-09-06 DIAGNOSIS — E55.9 VITAMIN D DEFICIENCY: ICD-10-CM

## 2023-09-06 RX ORDER — ERGOCALCIFEROL 1.25 MG/1
50000 CAPSULE ORAL WEEKLY
Qty: 16 CAPSULE | Refills: 0 | Status: SHIPPED | OUTPATIENT
Start: 2023-09-06

## 2023-09-06 NOTE — TELEPHONE ENCOUNTER
Left a voicemail with the following information: your refill of Vitamin D2 has been  approved and sent to your pharmacy. Advised patient to please call the office to let us know she received the message and if have any questions or concerns.

## 2023-09-06 NOTE — TELEPHONE ENCOUNTER
Patient requested a refill of Vitamin D2 50, 000 units, take 1 capsule once a week. Sent to [address] [place] [address], [id number]  27.

## 2023-11-20 ENCOUNTER — OFFICE VISIT (OUTPATIENT)
Dept: FAMILY MEDICINE CLINIC | Facility: CLINIC | Age: 83
End: 2023-11-20

## 2023-11-20 VITALS
OXYGEN SATURATION: 99 % | TEMPERATURE: 96.1 F | HEIGHT: 65 IN | HEART RATE: 71 BPM | SYSTOLIC BLOOD PRESSURE: 154 MMHG | BODY MASS INDEX: 31.49 KG/M2 | DIASTOLIC BLOOD PRESSURE: 64 MMHG | WEIGHT: 189 LBS

## 2023-11-20 DIAGNOSIS — J32.9 CHRONIC SINUSITIS, UNSPECIFIED LOCATION: ICD-10-CM

## 2023-11-20 DIAGNOSIS — H53.9 VISUAL CHANGES: ICD-10-CM

## 2023-11-20 DIAGNOSIS — R42 DIZZINESS: Primary | ICD-10-CM

## 2023-11-20 DIAGNOSIS — I10 ESSENTIAL HYPERTENSION: ICD-10-CM

## 2023-11-20 DIAGNOSIS — E78.1 HYPERTRIGLYCERIDEMIA: ICD-10-CM

## 2023-11-20 DIAGNOSIS — R09.82 POST-NASAL DRIP: ICD-10-CM

## 2023-11-20 RX ORDER — FLUTICASONE PROPIONATE 50 MCG
1 SPRAY, SUSPENSION (ML) NASAL DAILY
Qty: 16 G | Refills: 3 | Status: SHIPPED | OUTPATIENT
Start: 2023-11-20

## 2023-11-20 RX ORDER — SPIRONOLACTONE 25 MG/1
50 TABLET ORAL DAILY
Qty: 180 TABLET | Refills: 0 | Status: SHIPPED | OUTPATIENT
Start: 2023-11-20

## 2023-11-20 RX ORDER — FENOFIBRIC ACID 135 MG/1
1 CAPSULE, DELAYED RELEASE ORAL DAILY
Qty: 90 CAPSULE | Refills: 0 | Status: CANCELLED | OUTPATIENT
Start: 2023-11-20

## 2023-11-20 NOTE — PROGRESS NOTES
Name: Wu Lorenz      : 1940      MRN: [id number]  Encounter Provider: NANCY Collado  Encounter Date: 2023   Encounter department: [address] [place] [address]     1. Dizziness  -     Ambulatory Referral to Otolaryngology; Future  -     MRI brain and orbits wo contrast; Future; Expected date: 2023    2. Essential hypertension  -     spironolactone (ALDACTONE) 25 mg tablet; Take 2 tablets (50 mg total) by mouth daily    3. Hypertriglyceridemia  -     Lipid panel; Future; Expected date: 2024    4. Post-nasal drip  -     fluticasone (FLONASE) 50 mcg/act nasal spray; 1 spray into each nostril daily    5. Chronic sinusitis, unspecified location  -     fluticasone (FLONASE) 50 mcg/act nasal spray; 1 spray into each nostril daily    6. Visual changes           Subjective      Feel and landed on the right eye- July. Can't see out of the eye the same way now. Can't stand the sun. Did see eye doctor for this, states they did not find any changes from her chronic vision loss in this eye. States her vision is bad but at times when she feels like her face has lots of pressure or the right eye hurts, her vision is completely blurred and can not see anything at all. This is intermittent and no pattern. Feels a lot of chornic sinus congestion and post nasal drip. Has been using Ayr nasal spray daily for a long time- stop this. May be causing rebound congestion. States in the past when she was given ipratropium and azelastine it made her feel like her throat was itchy. . Sometimes when sleeping will feel pain in the right eye and have to take Tylenol from this. Also, sometimes feels like her right eye gets swollen and then it is hard to see. She feels like sometimes she touches her eye and it hurts a lot and she can not touch it. No recent changes in these symptoms. Congestion and post-nasal drip have been for years.    She states that over the last one week, she has had dizziness (feels like she is spinning) when she rolls to the right in bed and when she first gets up in the morning but then it resolves. She states this happened years ago, exactly the same. She was given prednisone and it resolved. This only happened three times in the last 1 week. Will not use steroids at this time due to CKD and infrequency of symptoms. She had carotid u/s in the paced normal. No bruit on exam. No hydrating well. Takes her spironolactone before bed- take in the AM and aim for 40-50 ounces of hydrating fluid per day. Cataract operation >20 years ago on the right side. Last GFR was 29. On fenofibrate for cholesterol. Last triglycerides 125. Will stop, monitor diet and repeat in 3 months. Dizziness  Associated symptoms include congestion. Pertinent negatives include no abdominal pain, anorexia, arthralgias, change in bowel habit, chest pain, chills, coughing, diaphoresis, fatigue, fever, headaches, joint swelling, myalgias, nausea, neck pain, numbness, rash, sore throat, swollen glands, urinary symptoms, vertigo, visual change, vomiting or weakness. Exacerbated by: rolling to the right in bed. She has tried nothing for the symptoms. Review of Systems   Constitutional:  Negative for chills, diaphoresis, fatigue and fever. HENT:  Positive for congestion, postnasal drip, rhinorrhea and sinus pressure. Negative for sinus pain, sneezing and sore throat. Respiratory:  Negative for cough, chest tightness and shortness of breath. Cardiovascular:  Negative for chest pain, palpitations and leg swelling. Gastrointestinal:  Negative for abdominal pain, anorexia, change in bowel habit, constipation, diarrhea, nausea and vomiting. Musculoskeletal:  Negative for arthralgias, joint swelling, myalgias and neck pain. Skin:  Negative for rash. Neurological:  Positive for dizziness. Negative for vertigo, weakness, numbness and headaches.        Current Outpatient Medications on File Prior to Visit   Medication Sig   • buPROPion (WELLBUTRIN XL) 150 mg 24 hr tablet Take 1 tablet (150 mg total) by mouth daily   • cetirizine (ZyrTEC) 10 mg tablet Take 10 mg by mouth daily   • diltiazem (CARDIZEM CD) 180 mg 24 hr capsule take 1 capsule by mouth once daily   • Eliquis 5 MG take 1 tablet by mouth twice a day   • ergocalciferol (VITAMIN D2) 50,000 units Take 1 capsule (50,000 Units total) by mouth once a week   • olopatadine (PATANOL) 0.1 % ophthalmic solution INSTILL 1 DROP into left eye twice a day   • [DISCONTINUED] Choline Fenofibrate (Fenofibric Acid) 135 MG CPDR take 1 capsule by mouth once daily   • [DISCONTINUED] spironolactone (ALDACTONE) 25 mg tablet take 2 tablets by mouth once daily       Objective     /64   Pulse 71   Temp (!) 96.1 °F (35.6 °C)   Ht 5' 5" (1.651 m)   Wt 85.7 kg (189 lb)   SpO2 99%   BMI 31.45 kg/m²     Physical Exam  Constitutional:       General: She is not in acute distress. Appearance: Normal appearance. She is not ill-appearing. HENT:      Head: Normocephalic and atraumatic. Right Ear: Tympanic membrane and ear canal normal.      Left Ear: Tympanic membrane and ear canal normal.      Nose: Congestion present. Right Turbinates: Swollen. Left Turbinates: Swollen. Right Sinus: Maxillary sinus tenderness present. No frontal sinus tenderness. Left Sinus: No maxillary sinus tenderness or frontal sinus tenderness. Mouth/Throat:      Mouth: Mucous membranes are moist.      Pharynx: Oropharynx is clear. Eyes:      Conjunctiva/sclera: Conjunctivae normal.      Right eye: Right conjunctiva is not injected. No chemosis, exudate or hemorrhage. Left eye: Left conjunctiva is not injected. No chemosis, exudate or hemorrhage. Pupils: Pupils are equal, round, and reactive to light. Cardiovascular:      Rate and Rhythm: Normal rate and regular rhythm. Heart sounds: No murmur heard. No friction rub.    Pulmonary: Effort: Pulmonary effort is normal. No respiratory distress. Breath sounds: Normal breath sounds. No wheezing. Chest:      Chest wall: No tenderness. Abdominal:      General: Abdomen is flat. Bowel sounds are normal.      Palpations: Abdomen is soft. There is no mass. Tenderness: There is no abdominal tenderness. There is no guarding or rebound. Skin:     General: Skin is warm and dry. Neurological:      General: No focal deficit present. Mental Status: She is alert and oriented to person, place, and time. Mental status is at baseline. Psychiatric:         Mood and Affect: Mood normal.         Behavior: Behavior normal.         Thought Content:  Thought content normal.         Judgment: Judgment normal.       NANCY Ray

## 2023-11-21 ENCOUNTER — TELEPHONE (OUTPATIENT)
Dept: NEPHROLOGY | Facility: CLINIC | Age: 83
End: 2023-11-21

## 2023-11-21 NOTE — TELEPHONE ENCOUNTER
Pt called to schedule follow up appt with Dr Sofia Singer in Feb. Appt scheduled on 2/28/24 in the [facility].

## 2023-11-27 DIAGNOSIS — R42 DIZZINESS: Primary | ICD-10-CM

## 2023-11-27 RX ORDER — MECLIZINE HYDROCHLORIDE 25 MG/1
12.5 TABLET ORAL 3 TIMES DAILY PRN
Qty: 30 TABLET | Refills: 0 | Status: SHIPPED | OUTPATIENT
Start: 2023-11-27

## 2023-11-27 NOTE — PROGRESS NOTES
Daughter states that she has been having some dizziness during the day, try meclizine prn for dizziness.

## 2023-11-28 ENCOUNTER — HOSPITAL ENCOUNTER (OUTPATIENT)
Dept: MRI IMAGING | Facility: HOSPITAL | Age: 83
Discharge: HOME | End: 2023-11-28
Payer: COMMERCIAL

## 2023-11-28 DIAGNOSIS — R42 DIZZINESS: ICD-10-CM

## 2023-11-28 PROCEDURE — 70540 MRI ORBIT/FACE/NECK W/O DYE: CPT

## 2023-11-28 PROCEDURE — G1004 CDSM NDSC: HCPCS

## 2023-11-28 PROCEDURE — 70551 MRI BRAIN STEM W/O DYE: CPT

## 2023-11-30 ENCOUNTER — OFFICE VISIT (OUTPATIENT)
Dept: CARDIOLOGY CLINIC | Facility: CLINIC | Age: 83
End: 2023-11-30
Payer: COMMERCIAL

## 2023-11-30 ENCOUNTER — TELEPHONE (OUTPATIENT)
Dept: FAMILY MEDICINE CLINIC | Facility: CLINIC | Age: 83
End: 2023-11-30

## 2023-11-30 VITALS
HEIGHT: 65 IN | DIASTOLIC BLOOD PRESSURE: 74 MMHG | WEIGHT: 190.4 LBS | SYSTOLIC BLOOD PRESSURE: 152 MMHG | OXYGEN SATURATION: 97 % | TEMPERATURE: 97.6 F | HEART RATE: 80 BPM | BODY MASS INDEX: 31.72 KG/M2

## 2023-11-30 DIAGNOSIS — R06.83 SNORING: ICD-10-CM

## 2023-11-30 DIAGNOSIS — I10 PRIMARY HYPERTENSION: ICD-10-CM

## 2023-11-30 DIAGNOSIS — I35.1 AORTIC VALVE INSUFFICIENCY, ETIOLOGY OF CARDIAC VALVE DISEASE UNSPECIFIED: ICD-10-CM

## 2023-11-30 DIAGNOSIS — E66.09 CLASS 1 OBESITY DUE TO EXCESS CALORIES WITH BODY MASS INDEX (BMI) OF 31.0 TO 31.9 IN ADULT, UNSPECIFIED WHETHER SERIOUS COMORBIDITY PRESENT: ICD-10-CM

## 2023-11-30 DIAGNOSIS — I48.0 PAF (PAROXYSMAL ATRIAL FIBRILLATION) (HCC): Primary | ICD-10-CM

## 2023-11-30 DIAGNOSIS — R42 DIZZINESS: ICD-10-CM

## 2023-11-30 PROCEDURE — 93000 ELECTROCARDIOGRAM COMPLETE: CPT | Performed by: INTERNAL MEDICINE

## 2023-11-30 PROCEDURE — 99214 OFFICE O/P EST MOD 30 MIN: CPT | Performed by: INTERNAL MEDICINE

## 2023-11-30 NOTE — TELEPHONE ENCOUNTER
Pt called wanting to know if there is an alternative medication for Verigo? She states she is still having dizziness and feels the meclizine is not helping it.

## 2023-11-30 NOTE — PROGRESS NOTES
Cardiology Follow Up    Kunal Hull  [id number]  1940  [facility]  [phone]      1. PAF (paroxysmal atrial fibrillation) ([facility])  POCT ECG      2. Primary hypertension        3. Aortic valve insufficiency, etiology of cardiac valve disease unspecified        4. Dizziness  VAS carotid complete study      5. Class 1 obesity due to excess calories with body mass index (BMI) of 31.0 to 31.9 in adult, unspecified whether serious comorbidity present        6. Snoring            Discussion/Summary:  1. Paroxysmal atrial fibrillation on coagulation   2. Hypertension   3. Aortic regurgitation  4. Mitral regurgitation  5. Obesity  6. Dizziness  7.   Snoring with concern for possible obstructive sleep apnea    -ECG performed in the office today shows sinus rhythm with sinus arrhythmia heart rate 69 bpm  -Transthoracic echocardiogram 8/9/2023 showing left ventricular systolic function normal estimated LVEF 60% with mildly dilated left atrium, small patent foramen ovale with moderate aortic regurgitation, mild to moderate mitral regurgitation and mild tricuspid regurgitation with IVC normal respirophasic changes  -Patient did have abnormal TSH in August 2023 and will follow-up with her primary care physician for evaluation  - patient was taken off of statin therapy by primary care physician and have repeat laboratory studies pending  -While patient's blood pressure is somewhat elevated in the office today there was no significant change with change in position from sitting to standing  -We will follow-up laboratory studies as we will determine underlying dose of Eliquis however continue 5 mg twice daily at this time  -After discussion with patient she will continue diltiazem 180 mg daily along with spironolactone 50 mg daily and will let our office know how her blood pressures are at home and if consistently elevated greater than 130s/80s mmHg she will be more agreeable to up titration of medical therapy at that time  -We will check carotid Doppler for vascular evaluation in setting of dizziness  -I will see patient in 3 months or sooner if necessary once testing is completed  -Patient counseled on dietary and lifestyle modifications including following a low-salt, low-fat, heart healthy diet with weight reduction goal BMI less than 29  -At this time patient does not want to be seen and evaluated by sleep medicine however if she changes her mind will let our office know.  -Patient counseled if she were to have any warning or alarm type symptoms she is to seek emergency medical care immediately. History of Present Illness:  -Patient is an 81-year-old female with hypertension, chronic rhinitis, factor V Leiden, obesity who was hospitalized in August 2022 found at that time to have atrial fibrillation with rapid ventricular response after initially being alerted by her Apple Watch. She was initiated on oral anticoagulation with rate control strategy and was eventually able to be discharged to outpatient follow-up. She has been seen and evaluated by hematology who agreed with oral anticoagulation strategy and follows with nephrology for chronic kidney disease. She presents to the office today with her daughter for scheduled follow-up and while she denies any chest pain, palpitations, loss of consciousness, shortness of breath, lower extremity edema, orthopnea or bendopnea she notes that her vertigo has been acting up more significantly recently. She states that in the past she had required steroid therapy for this but is currently trying alternative medical therapy prescribed by her primary care physician.       Patient Active Problem List   Diagnosis    Stage 3b chronic kidney disease ([address])    Benign hypertension with CKD (chronic kidney disease) stage III ([address])    Vitamin D deficiency    Dizziness    Chronic rhinitis    Obesity (BMI 30-39. 9)    Factor V Leiden mutation ([address])    Major depressive disorder, recurrent, unspecified ([address])    Hypertriglyceridemia    Subclinical hypothyroidism    Microalbuminuria    Atrial fibrillation with rapid ventricular response (HCC)    Chronic renal disease, stage IV ([facility])    Class 2 severe obesity due to excess calories with serious comorbidity and body mass index (BMI) of 35.0 to 35.9 in adult     Multiple closed fractures of ribs of left side    Fall at home    Displaced fracture of distal phalanx of left great toe, initial encounter for closed fracture    Back muscle spasm    Skin tear of right hand without complication    Epistaxis     Past Medical History:   Diagnosis Date    Atrial fibrillation ([address])     Esophageal stricture     Factor V Leiden mutation ([address])     LAST ASSESSED: 8/3/16    Hypertension      Social History     Socioeconomic History    Marital status:       Spouse name: Not on file    Number of children: 4    Years of education: Not on file    Highest education level: Not on file   Occupational History    Occupation: PRIOR OCCUPATION       Comment: RETIRED   Tobacco Use    Smoking status: Former     Types: Cigarettes     Quit date:      Years since quittin.9    Smokeless tobacco: Never   Vaping Use    Vaping Use: Never used   Substance and Sexual Activity    Alcohol use: Never    Drug use: Never    Sexual activity: Not Currently   Other Topics Concern    Not on file   Social History Narrative    Not on file     Social Determinants of Health     Financial Resource Strain: Low Risk  (2023)    Overall Financial Resource Strain (CARDIA)     Difficulty of Paying Living Expenses: Not hard at all   Food Insecurity: No Food Insecurity (2022)    Hunger Vital Sign     Worried About Running Out of Food in the Last Year: Never true     Ran Out of Food in the Last Year: Never true Transportation Needs: No Transportation Needs (1/27/2023)    PRAPARE - Transportation     Lack of Transportation (Medical): No     Lack of Transportation (Non-Medical):  No   Physical Activity: Not on file   Stress: Not on file   Social Connections: Not on file   Intimate Partner Violence: Not on file   Housing Stability: Low Risk  (8/19/2022)    Housing Stability Vital Sign     Unable to Pay for Housing in the Last Year: No     Number of Places Lived in the Last Year: 1     Unstable Housing in the Last Year: No      Family History   Problem Relation Age of Onset    Hypertension Mother     Cancer Father      Past Surgical History:   Procedure Laterality Date    CATARACT EXTRACTION, BILATERAL Bilateral     REDUCTION MAMMAPLASTY Bilateral     TONSILLECTOMY AND ADENOIDECTOMY      TUBAL LIGATION Bilateral        Current Outpatient Medications:     buPROPion (WELLBUTRIN XL) 150 mg 24 hr tablet, Take 1 tablet (150 mg total) by mouth daily, Disp: 90 tablet, Rfl: 3    cetirizine (ZyrTEC) 10 mg tablet, Take 10 mg by mouth daily, Disp: , Rfl:     diltiazem (CARDIZEM CD) 180 mg 24 hr capsule, take 1 capsule by mouth once daily, Disp: 90 capsule, Rfl: 3    Eliquis 5 MG, take 1 tablet by mouth twice a day, Disp: 180 tablet, Rfl: 3    ergocalciferol (VITAMIN D2) 50,000 units, Take 1 capsule (50,000 Units total) by mouth once a week, Disp: 16 capsule, Rfl: 0    fluticasone (FLONASE) 50 mcg/act nasal spray, 1 spray into each nostril daily, Disp: 16 g, Rfl: 3    meclizine (ANTIVERT) 25 mg tablet, Take 0.5 tablets (12.5 mg total) by mouth 3 (three) times a day as needed for dizziness, Disp: 30 tablet, Rfl: 0    olopatadine (PATANOL) 0.1 % ophthalmic solution, INSTILL 1 DROP into left eye twice a day, Disp: 5 mL, Rfl: 3    spironolactone (ALDACTONE) 25 mg tablet, Take 2 tablets (50 mg total) by mouth daily, Disp: 180 tablet, Rfl: 0  Allergies   Allergen Reactions    Aspirin     Montelukast      Other reaction(s): Depression  Category: Adverse Reaction; Other reaction(s): Depression  Category: Adverse Reaction;     Nsaids     Pravastatin      Category: Adverse Reaction;     Simvastatin      Category: Adverse Reaction; Tolmetin          Labs:  No visits with results within 2 Month(s) from this visit. Latest known visit with results is:   Appointment on 08/17/2023   Component Date Value    Sodium 08/17/2023 138     Potassium 08/17/2023 4.8     Chloride 08/17/2023 108     CO2 08/17/2023 22     ANION GAP 08/17/2023 8     BUN 08/17/2023 46 (H)     Creatinine 08/17/2023 1.63 (H)     Glucose, Fasting 08/17/2023 104 (H)     Calcium 08/17/2023 10.0     eGFR 08/17/2023 29     Vit D, 25-Hydroxy 08/17/2023 47.0     Phosphorus 08/17/2023 3.8     PTH 08/17/2023 38.2     Creatinine, Ur 08/17/2023 67.9     Albumin,U,Random 08/17/2023 21.1 (H)     Albumin Creat Ratio 08/17/2023 31 (H)     WBC 08/17/2023 7.69     RBC 08/17/2023 4.25     Hemoglobin 08/17/2023 12.4     Hematocrit 08/17/2023 39.2     MCV 08/17/2023 92     MCH 08/17/2023 29.2     MCHC 08/17/2023 31.6     RDW 08/17/2023 13.7     Platelets 88/43/3957 441 (H)     MPV 08/17/2023 9.2     TSH 3RD GENERATON 08/17/2023 4.741 (H)     Free T4 08/17/2023 0.88     SS-A (RO) Ab 08/17/2023 <0.2     SS-B (LA) Ab 08/17/2023 <0.2     NING 08/17/2023 Negative     Hemoglobin A1C 08/17/2023 5.9 (H)     EAG 08/17/2023 123         Imaging: MRI brain and orbits wo contrast    Result Date: 11/28/2023  Narrative: MRI OF THE BRAIN AND ORBITS - WITHOUT CONTRAST INDICATION: R42: Dizziness and giddiness. COMPARISON:  None. TECHNIQUE: Multiplanar, multisequence imaging of the brain and orbits was performed. IV Contrast: IMAGE QUALITY:  Diagnostic. FINDINGS: BRAIN PARENCHYMA:  There is no discrete mass, mass effect or midline shift. Brainstem and cerebellum demonstrate normal signal. There is no intracranial hemorrhage. There is no evidence of acute infarction and diffusion imaging is unremarkable. Small scattered hyperintensities on T2/FLAIR imaging are noted in the periventricular and subcortical white matter demonstrating an appearance that is statistically most likely to represent mild microangiopathic change. Normal postcontrast imaging. ORBITS:  Normal globes. Normal ocular muscles. Optic nerves and chiasm are normal.  Normal cavernous sinuses. Preseptal and retrobulbar soft tissues are normal. VENTRICLES:  Normal for the patient's age. SELLA AND PITUITARY GLAND:  Normal PARANASAL SINUSES:  Normal. VASCULATURE:  Evaluation of the major intracranial vasculature demonstrates appropriate flow voids. CALVARIUM AND SKULL BASE:  Normal. EXTRACRANIAL SOFT TISSUES:  Normal.     Impression: White matter changes suggestive of chronic microangiopathy. No acute intracranial pathology. Workstation performed: [id number]     Review of Systems:  Review of Systems   Constitutional:  Negative for chills, diaphoresis, fatigue and fever. HENT:  Negative for trouble swallowing and voice change. Eyes:  Negative for pain and redness. Respiratory:  Negative for shortness of breath and wheezing. Cardiovascular:  Negative for chest pain, palpitations and leg swelling. Gastrointestinal:  Negative for abdominal pain, constipation, diarrhea, nausea and vomiting. Genitourinary:  Negative for dysuria. Musculoskeletal:  Positive for arthralgias. Negative for neck pain and neck stiffness. Skin:  Negative for rash. Neurological:  Negative for syncope, light-headedness and headaches. Psychiatric/Behavioral:  Negative for agitation and confusion.           Vitals:    11/30/23 1400   BP: 152/74   BP Location: Left arm   Patient Position: Sitting   Cuff Size: Standard   Pulse: 80   Temp: 97.6 °F (36.4 °C)   TempSrc: Temporal   SpO2: 97%   Weight: 86.4 kg (190 lb 6.4 oz)   Height: 5' 5" (1.651 m)     Vitals:    11/30/23 1400   Weight: 86.4 kg (190 lb 6.4 oz)     Height: 5' 5" (165.1 cm)     Physical Exam:   Physical Exam  Vitals reviewed. Constitutional:       General: She is not in acute distress. Appearance: She is obese. She is not diaphoretic. HENT:      Head: Normocephalic and atraumatic. Eyes:      General:         Right eye: No discharge. Left eye: No discharge. Cardiovascular:      Rate and Rhythm: Normal rate and regular rhythm. Heart sounds: No friction rub. Pulmonary:      Effort: Pulmonary effort is normal. No respiratory distress. Breath sounds: No wheezing. Chest:      Chest wall: No tenderness. Abdominal:      General: Bowel sounds are normal.      Palpations: Abdomen is soft. Tenderness: There is no abdominal tenderness. There is no rebound. Musculoskeletal:      Right lower leg: No edema. Left lower leg: No edema. Skin:     General: Skin is warm and dry. Neurological:      Mental Status: She is alert.    Psychiatric:         Mood and Affect: Mood normal.         Behavior: Behavior normal.

## 2023-12-01 NOTE — TELEPHONE ENCOUNTER
Called patient and gave her your message. She noticed that it is when she goes from laying down, to sitting up. She will track when she gets dizzy. She states some days she doesn't and some days she does.

## 2023-12-04 ENCOUNTER — RA CDI HCC (OUTPATIENT)
Dept: OTHER | Facility: HOSPITAL | Age: 83
End: 2023-12-04

## 2023-12-04 NOTE — PROGRESS NOTES
[address] coding opportunities       Chart reviewed, no opportunity found: [address] Review     Patients Insurance     Medicare Insurance: [employer]

## 2023-12-05 ENCOUNTER — TELEPHONE (OUTPATIENT)
Dept: FAMILY MEDICINE CLINIC | Facility: CLINIC | Age: 83
End: 2023-12-05

## 2023-12-05 ENCOUNTER — OFFICE VISIT (OUTPATIENT)
Dept: FAMILY MEDICINE CLINIC | Facility: CLINIC | Age: 83
End: 2023-12-05

## 2023-12-05 VITALS
BODY MASS INDEX: 32.15 KG/M2 | HEIGHT: 65 IN | DIASTOLIC BLOOD PRESSURE: 78 MMHG | HEART RATE: 66 BPM | SYSTOLIC BLOOD PRESSURE: 138 MMHG | WEIGHT: 193 LBS | TEMPERATURE: 96.3 F | OXYGEN SATURATION: 99 %

## 2023-12-05 DIAGNOSIS — H57.11 ACUTE RIGHT EYE PAIN: Primary | ICD-10-CM

## 2023-12-05 DIAGNOSIS — H81.90 IMPAIRED VESTIBULAR FUNCTION: Primary | ICD-10-CM

## 2023-12-05 DIAGNOSIS — R42 DIZZINESS: ICD-10-CM

## 2023-12-05 NOTE — PROGRESS NOTES
Assessment/Plan:    No problem-specific Assessment & Plan notes found for this encounter. Diagnoses and all orders for this visit:    Acute right eye pain  Comments:  Seen by eye doctor  History of cataract 20 years ago since then chronic pain which got worse after fall in July  asked to follow-up with eye doctor    Dizziness  Comments:  History of A-fib on diltiazem and Eliquis following up with cardiology regarding dizziness has ENT appointment as well  Do vestibular testing  MRi head reviewed  Orders:  -     Basic vestibular eval; Future          Subjective:      Patient ID: Torres Florez is a 81 y.o. female. HPI  Patient is here regarding dizziness and eye pain. She is a new patient to me. She reports that 20 years ago she had a cardiac surgery on the right side since then she has been having chronic pain which got worse after her fall which happened in July and she had couple of stitches around her eyebrow. She was seen by ophthalmologist after fall. Patient is seeing cardiology secondary to A-fib she is on Eliquis. Cardiology ordered as carotid complete study for dizziness. Patient also done MRI of brain orbits which was unremarkable no acute intracranial hemorrhages. Patient has appointment with ENT regarding dizziness. I did order for her vestibular testing to rule out BPPV. Unfortunately there is not much that I can provide from family medicine standpoint regarding her dizziness and eye pain asked patient to follow-up with specialist  The following portions of the patient's history were reviewed and updated as appropriate: allergies, current medications, past family history, past medical history, past social history, past surgical history, and problem list.    Review of Systems   Constitutional:  Negative for activity change and appetite change. HENT:  Negative for congestion. Eyes:  Positive for pain and discharge.    Respiratory:  Negative for shortness of breath and wheezing. Cardiovascular:  Negative for chest pain and palpitations. Gastrointestinal:  Negative for abdominal pain, constipation, diarrhea, nausea and vomiting. Skin:  Negative for color change. Neurological:  Positive for dizziness. Negative for facial asymmetry, speech difficulty, weakness and numbness. Objective:      /78   Pulse 66   Temp (!) 96.3 °F (35.7 °C)   Ht 5' 5" (1.651 m)   Wt 87.5 kg (193 lb)   SpO2 99%   BMI 32.12 kg/m²          Physical Exam  HENT:      Nose: Nose normal.   Eyes:      General: No scleral icterus. Right eye: No discharge. Left eye: No discharge. Extraocular Movements: Extraocular movements intact. Conjunctiva/sclera: Conjunctivae normal.      Pupils: Pupils are equal, round, and reactive to light. Comments: Patient is constantly closing the eye reports that it is sensitive to light and it is painful. No active discharge was noted during physical examination   Cardiovascular:      Rate and Rhythm: Normal rate and regular rhythm. Pulmonary:      Effort: Pulmonary effort is normal.      Breath sounds: Normal breath sounds. Neurological:      General: No focal deficit present. Mental Status: She is oriented to person, place, and time. Psychiatric:         Mood and Affect: Mood normal.         Thought Content:  Thought content normal.

## 2023-12-05 NOTE — TELEPHONE ENCOUNTER
Patient called back. She was made aware. She will wait for the call from occupational therapy. She will call us if they dont reach out to her.

## 2023-12-05 NOTE — TELEPHONE ENCOUNTER
Attempted to call daughter on phone number she provided regarding the basic vestibular evaluation. She does have to go through occupational therapy to get this done.  I did place a referral for this to get done and they should be reaching out to them or when they call back we can provide a phone number as well

## 2023-12-12 DIAGNOSIS — H81.90 IMPAIRED VESTIBULAR FUNCTION: Primary | ICD-10-CM

## 2023-12-13 ENCOUNTER — TELEPHONE (OUTPATIENT)
Dept: CARDIOLOGY CLINIC | Facility: CLINIC | Age: 83
End: 2023-12-13

## 2023-12-13 NOTE — TELEPHONE ENCOUNTER
I was able to call and speak with patient about potential for holding anticoagulation versus bridging therapy versus continued anticoagulation for procedure. After discussion with patient she wishes to hold off to avoid epistaxis periprocedurally. In that setting patient understands risks of potential clot and stroke. She is agreeable to holding Eliquis at this time and excepting risks as above.   In that setting patient is appropriate risk to proceed with surgery with Eliquis hold for a total of 4 days per ENT request.

## 2023-12-14 ENCOUNTER — HOSPITAL ENCOUNTER (OUTPATIENT)
Dept: NON INVASIVE DIAGNOSTICS | Facility: HOSPITAL | Age: 83
Discharge: HOME | End: 2023-12-14
Payer: COMMERCIAL

## 2023-12-14 DIAGNOSIS — R42 DIZZINESS: ICD-10-CM

## 2023-12-14 PROCEDURE — 93880 EXTRACRANIAL BILAT STUDY: CPT | Performed by: SURGERY

## 2023-12-14 PROCEDURE — 93880 EXTRACRANIAL BILAT STUDY: CPT

## 2023-12-15 ENCOUNTER — TELEPHONE (OUTPATIENT)
Dept: CARDIOLOGY CLINIC | Facility: CLINIC | Age: 83
End: 2023-12-15

## 2023-12-15 ENCOUNTER — TELEPHONE (OUTPATIENT)
Dept: VASCULAR SURGERY | Facility: CLINIC | Age: 83
End: 2023-12-15

## 2023-12-15 DIAGNOSIS — E78.5 HYPERLIPIDEMIA, UNSPECIFIED HYPERLIPIDEMIA TYPE: Primary | ICD-10-CM

## 2023-12-15 DIAGNOSIS — I65.23 BILATERAL CAROTID ARTERY STENOSIS: ICD-10-CM

## 2023-12-15 RX ORDER — EZETIMIBE 10 MG/1
10 TABLET ORAL DAILY
Qty: 30 TABLET | Refills: 6 | Status: SHIPPED | OUTPATIENT
Start: 2023-12-15

## 2023-12-15 NOTE — TELEPHONE ENCOUNTER
----- Message from Jv Perales sent at 12/15/2023 10:46 AM EST -----    ----- Message -----  From: Rosie Ortiz DO  Sent: 12/15/2023  10:17 AM EST  To:  [facility]    Please call patient and assist with setting up vascular referral for carotid stenosis for monitoring.

## 2023-12-15 NOTE — TELEPHONE ENCOUNTER
-Attempted to call patient to go over recent carotid ultrasound results. Unfortunately I was not able to reach the patient but did leave a message on her phone with my name and office number to call back for a better time to speak. -Patient was able to return my phone call and I was able to speak with her about carotid Doppler results. As patient had had issues with statins in the past with significant myalgias limiting quality of life will trial ezetimibe 10 mg daily and will give patient referral to vascular team for monitoring. Will have office staff assist in helping patient with set this up.   Patient understanding was agreeable to plan

## 2023-12-18 ENCOUNTER — EVALUATION (OUTPATIENT)
Dept: PHYSICAL THERAPY | Facility: CLINIC | Age: 83
End: 2023-12-18
Payer: COMMERCIAL

## 2023-12-18 DIAGNOSIS — H81.11 BPPV (BENIGN PAROXYSMAL POSITIONAL VERTIGO), RIGHT: Primary | ICD-10-CM

## 2023-12-18 DIAGNOSIS — H81.90 IMPAIRED VESTIBULAR FUNCTION: ICD-10-CM

## 2023-12-18 PROCEDURE — 97140 MANUAL THERAPY 1/> REGIONS: CPT | Performed by: PHYSICAL THERAPIST

## 2023-12-18 PROCEDURE — 97163 PT EVAL HIGH COMPLEX 45 MIN: CPT | Performed by: PHYSICAL THERAPIST

## 2023-12-18 NOTE — PROGRESS NOTES
PT Evaluation     Today's date: 2023  Patient name: Jordyn Spivey  : 1940  MRN: [id number]  Referring provider: Rika Paz MD  Dx:   Encounter Diagnosis     ICD-10-CM    1. BPPV (benign paroxysmal positional vertigo), right  H81.11       2. Impaired vestibular function  H81.90 Ambulatory Referral to Physical Therapy          Start Time: 1445  Stop Time: 1535  Total time in clinic (min): 50 minutes    Assessment  Assessment details: Jordyn Spivey was seen for an initial PT evaluation today. Patient is a 83 y.o. female with diagnosis of dizziness and past medical history significant for afib, HTN, cataract extraction, chronic rhinitis, CKD, L rib fx, L great toe fx, chonic renal disease, dizziness, depressive disorder, back spasm. High complexity evaluation  due to number of participation restrictions, functional outcome measure of 50% limitation, and unstable clinical presentation. Findings today show symptom provocation with positional testing indicating R BPPV with poor balance/proprioception impacting their ability to perform dynamic functional activities including walking, transfers, and bed mobility tasks. Skilled PT indicated to treat at this time to address above stated deficits and return patient to PLOF.     Impairments: abnormal gait, abnormal movement, activity intolerance, impaired balance, lacks appropriate home exercise program, safety issue and poor posture     Goals  STG (6 weeks)  1. Reduction in dizziness symptoms by 75% for increased independence with daily tasks.   2. Patient will be able to verbalize understanding mechanical dysfunction of vertigo and be demonstrate home canalith repositioning as needed.   LTG (12 weeks)  1. Reduction in dizziness symptoms by 100% for full return to PLOF.   2. Patient will meet FOTO prediction score for return to full function.  3. Patient will be independent with advanced home exercise program for continued maintenance post  discharge.      Plan  Plan details: Progress note in 4 weeks or upon symptom resolution.   Patient would benefit from: skilled physical therapy  Planned therapy interventions: manual therapy, neuromuscular re-education, canalith repositioning, self care, therapeutic activities, therapeutic exercise and home exercise program  Frequency: 2x week  Duration in weeks: 12  Plan of Care beginning date: 12/18/2023  Plan of Care expiration date: 3/18/2024  Treatment plan discussed with: patient        Subjective Evaluation    History of Present Illness  Mechanism of injury: Jordyn Spivey is a 83 y.o. female who presents to outpatient Physical Therapy today with complaints of dizziness. States issue has been going on for about 1 month where when she attempts to get out of bed or when rolling to the right will feel like she is being pushed backwards. Overall is feeling better. Had been on medication, but has stopped.      Patient Goals  Patient goal: decrease dizziness. Want to be able to walk across the room without dizziness.  Social Support  Steps to enter house: no  Stairs in house: no   Lives in: apartment  Lives with: alone    Employment status: not working  Hand dominance: right      Diagnostic Tests  MRI studies: normal        Objective     Concurrent Complaints  Positive for visual change. Negative for headaches, nausea/motion sickness, tinnitus, hearing loss and memory loss    Active Range of Motion   Cervical/Thoracic Spine       Cervical    Flexion: Neck active flexion: min restriction.   Extension: Neck active extension: mod restriction.      Left lateral flexion: Neck active lateral bend left: mod restriction.      Right lateral flexion: Neck active lateral bend right: mod restriction.      Left rotation: Neck active rotation left: WFL.  Right rotation: Neck active rotation right: WFL.     Neuro Exam:     Dizziness  Positive for disequilibrium, vertigo and oscillopsia (eye history).   Negative for motion  sickness, light-headedness and diplopia.     Exacerbating factors  Positive for rolling in bed and supine to/from sitting.   Negative for bending over, looking up and walking.     Headaches   Patient reports headaches: No.     Cervical exam   Ligament Laxity Testing   Alar ligament: WNL  Sharp Jeyson: WNL  Modified VBI   Left: asymptomatic  Right: asymptomatic  Seated posture: forward head posture and internally rotated shoulders    Oculomotor exam   Oculomotor ROM: WNL  Resting nystagmus: not present   Gaze holding nystagmus: present left  Gaze holding nystagmus: not present right  Smooth pursuits: within normal limits  Vertical saccades: normal  Horizontal saccades: normal  Convergence: normal  Head thrust: left normal and right normal  Dynamic visual acuity: normal  Dynamic head: VOR WNL    Positional testing   R[place]-Hallpike   Left posterior canal: WNL  Right posterior canal: symptomatic and torsional  Roll test   Left horizontal canal: WNL  Right horizontal canal: WNL             Precautions: R eye pain  Access code:   Progress note: 1/18  POC: 3/18    Manuals 12/18       Eply Attempted, not able to get into L side lying due to patient comfort       Li maneuver 1x, >1 sec switch from R to L                                               Neuro Re-Ed                                                                                                                                                                Ther Ex                                                                                                                                        Ther Activity                        Gait Training                        Modalities

## 2023-12-21 ENCOUNTER — OFFICE VISIT (OUTPATIENT)
Dept: PHYSICAL THERAPY | Facility: CLINIC | Age: 83
End: 2023-12-21
Payer: COMMERCIAL

## 2023-12-21 DIAGNOSIS — H81.90 IMPAIRED VESTIBULAR FUNCTION: Primary | ICD-10-CM

## 2023-12-21 DIAGNOSIS — H81.11 BPPV (BENIGN PAROXYSMAL POSITIONAL VERTIGO), RIGHT: ICD-10-CM

## 2023-12-21 PROCEDURE — 97140 MANUAL THERAPY 1/> REGIONS: CPT | Performed by: PHYSICAL THERAPIST

## 2023-12-21 NOTE — PROGRESS NOTES
Daily Note     Today's date: 2023  Patient name: Jordyn Spivey  : 1940  MRN: [id number]  Referring provider: Rika Paz MD  Dx:   Encounter Diagnosis     ICD-10-CM    1. Impaired vestibular function  H81.90       2. BPPV (benign paroxysmal positional vertigo), right  H81.11           Start Time: 0942  Stop Time: 1020  Total time in clinic (min): 38 minutes    Subjective: States no dizziness when getting up this am, but did have some last night.       Objective: See treatment diary below      Assessment: Tolerated treatment well. Utilized larger treatment table today for patient comfort and was able to achieve correct head angles for maneuvers. Symptomatic with mikey hallpike testing of R today, but no noted nystagmus. Patient exhibited good technique with therapeutic exercises and would benefit from continued PT      Plan: Continue per plan of care.  Progress treatment as tolerated.       Precautions: R eye pain  Access code:   Progress note:   POC: 3/18    Manuals       Eply Attempted, not able to get into L side lying due to patient comfort X2 for R BPPV      Li maneuver 1x, >1 sec switch from R to L       Kimberly-Semont  5x                                      Neuro Re-Ed                                                                                                                                                                Ther Ex                                                                                                                                        Ther Activity                        Gait Training                        Modalities

## 2023-12-26 DIAGNOSIS — E55.9 VITAMIN D DEFICIENCY: ICD-10-CM

## 2023-12-26 RX ORDER — ERGOCALCIFEROL 1.25 MG/1
50000 CAPSULE ORAL WEEKLY
Qty: 16 CAPSULE | Refills: 0 | Status: SHIPPED | OUTPATIENT
Start: 2023-12-26

## 2023-12-28 ENCOUNTER — APPOINTMENT (OUTPATIENT)
Dept: PHYSICAL THERAPY | Facility: CLINIC | Age: 83
End: 2023-12-28
Payer: COMMERCIAL

## 2024-02-03 NOTE — ASSESSMENT & PLAN NOTE
· Patient has been titrated off Cardizem drip  · Cardizem 180 mg added today per Cardiology  · Echo performed, please see results  · Cardiology following  · Eliquis started for stroke prophylaxis  · If patient remains rate controlled likely discharge home tomorrow Elevated trop 118 on admission, now downtrending. No chest pain. ECG with sinus tachycardia, ?LVH. Likely demand ischemia  -CTM

## 2024-02-08 ENCOUNTER — OFFICE VISIT (OUTPATIENT)
Dept: PHYSICAL THERAPY | Facility: CLINIC | Age: 84
End: 2024-02-08
Payer: COMMERCIAL

## 2024-02-08 DIAGNOSIS — R42 DIZZINESS: Primary | ICD-10-CM

## 2024-02-08 PROCEDURE — 97140 MANUAL THERAPY 1/> REGIONS: CPT | Performed by: PHYSICAL THERAPIST

## 2024-02-08 PROCEDURE — 97162 PT EVAL MOD COMPLEX 30 MIN: CPT | Performed by: PHYSICAL THERAPIST

## 2024-02-08 NOTE — PROGRESS NOTES
PT Evaluation     Today's date: 2024  Patient name: Jordyn Spivey  : 1940  MRN: [id number]  Referring provider: Rika Paz MD  Dx:   Encounter Diagnosis     ICD-10-CM    1. Dizziness  R42           Start Time: 1445  Stop Time: 1530  Total time in clinic (min): 45 minutes    Assessment  Assessment details: Jordyn Spivey was seen for an initial PT evaluation today. Patient is a 83 y.o. female with diagnosis of dizziness and past medical history significant for afib, HTN, cataract extraction, chronic rhinitis, CKD, L rib fx, L great toe fx, chonic renal disease, dizziness, depressive disorder, back spasm. Moderate complexity evaluation  due to number of participation restrictions, functional outcome measure of 54% limitation, and evolving clinical presentation. Findings today show symptom provocation on rebound with positional testing indicating likely R BPPV as well as oculomotor deficits impacting their ability to perform dynamic functional activities including walking, transfers, and bed mobility tasks. Skilled PT indicated to treat at this time to address above stated deficits and return patient to PLOF.     Impairments: abnormal gait, abnormal movement, activity intolerance, impaired balance, lacks appropriate home exercise program, safety issue and poor posture     Goals  STG (6 weeks)  1. Reduction in dizziness symptoms by 75% for increased independence with daily tasks.   2. Patient will be able to verbalize understanding mechanical dysfunction of vertigo and be demonstrate home canalith repositioning as needed.   LTG (12 weeks)  1. Reduction in dizziness symptoms by 100% for full return to PLOF.   2. Patient will meet FOTO prediction score for return to full function.  3. Patient will be independent with advanced home exercise program for continued maintenance post discharge.      Plan  Plan details: Progress note in 4 weeks or upon symptom resolution.   Patient would benefit from:  skilled physical therapy  Planned therapy interventions: manual therapy, neuromuscular re-education, canalith repositioning, self care, therapeutic activities, therapeutic exercise and home exercise program  Frequency: 2x week  Duration in weeks: 12  Plan of Care beginning date: 2/8/2024  Plan of Care expiration date: 5/8/2024  Treatment plan discussed with: patient        Subjective Evaluation    History of Present Illness  Date of onset: 2/4/2024  Mechanism of injury: Jordyn Spivey is a 83 y.o. female who presents to outpatient Physical Therapy today with complaints of dizziness. Patient was last seen in December 2023 with similar complaints.  At that time c/o dizziness that had started in November increased with supine>sit and bed mobility activities. Was seen for IE with Li maneuver performed and Eply maneuver performed at f/u session. Patient opted for DC after f/u session due to elimination of symptoms. States she woke last Sunday and was extremely dizzy getting out of bed which lasted all day. No bad episodes the last few days but will still have some dizziness when getting OOB.   Patient Goals  Patient goal: be able to walk normal with walker, get rid of dizziness  Pain  No pain reported    Social Support  Steps to enter house: no  Stairs in house: no   Lives in: apartment  Lives with: alone    Employment status: not working  Hand dominance: right          Objective     Concurrent Complaints  Positive for visual change (R eye blindness). Negative for headaches, nausea/motion sickness, tinnitus, hearing loss and memory lossAural fullness: sinus.    Active Range of Motion   Cervical/Thoracic Spine       Cervical    Flexion: Neck active flexion: WNL.   Extension: Neck active extension: mod restriction.      Left lateral flexion: Neck active lateral bend left: mod restriction.      Right lateral flexion: Neck active lateral bend right: mod restriction.      Left rotation: Neck active rotation left: min  restriction.  Right rotation: Neck active rotation right: min restriction.     Neuro Exam:     Dizziness  Positive for disequilibrium, vertigo, oscillopsia and light-headedness.   Negative for motion sickness and diplopia.     Exacerbating factors  Positive for rolling in bed and supine to/from sitting.   Negative for bending over, looking up, walking and turning head.     Headaches   Patient reports headaches: No.     Cervical exam   Ligament Laxity Testing   Alar ligament: WNL  Sharp Jeyson: WNL  Modified VBI   Left: asymptomatic  Right: asymptomatic  Seated posture: internally rotated shoulders    Oculomotor exam   Oculomotor ROM: diminished  Resting nystagmus: not present   Gaze holding nystagmus: not present left  and not present right  Smooth pursuits: within normal limits  Vertical saccades: normal  Horizontal saccades: normal  Convergence: NT due to R eye blindness  Cover test: due to R eye blindness  Cover test: abnormal  Crossover test: NT due to R eye blindness  Head thrust: left normal and right normal  Dynamic head: VORx1 WNL    Positional testing   [place]-Hallpike   Left posterior canal: WNL  Right posterior canal: symptomatic  Roll test   Left horizontal canal: WNL  Right horizontal canal: WNL  Positional testing comment: Rebound dizziness upon supine > sit most prominent after R mikey hallpike.  Loaded mikey hallpike no difference from mikey hallpike.              Precautions: R BENOIT, R eye blindness      Manuals 2/8            [facility] reps to L                                                   Neuro Re-Ed                                                                                                        Ther Ex                                                                                                                     Ther Activity                                       Gait Training                                       Modalities

## 2024-02-09 ENCOUNTER — CONSULT (OUTPATIENT)
Dept: VASCULAR SURGERY | Facility: CLINIC | Age: 84
End: 2024-02-09
Payer: COMMERCIAL

## 2024-02-09 VITALS
DIASTOLIC BLOOD PRESSURE: 72 MMHG | WEIGHT: 187 LBS | SYSTOLIC BLOOD PRESSURE: 122 MMHG | BODY MASS INDEX: 31.16 KG/M2 | HEART RATE: 78 BPM | HEIGHT: 65 IN

## 2024-02-09 DIAGNOSIS — I65.23 BILATERAL CAROTID ARTERY STENOSIS: ICD-10-CM

## 2024-02-09 PROCEDURE — 99203 OFFICE O/P NEW LOW 30 MIN: CPT

## 2024-02-09 NOTE — PATIENT INSTRUCTIONS
-Discussed the pathophysiology of carotid stenosis, available treatment options, and indications for intervention.   -Obtain updated carotid artery duplex to evaluate ICA velocities and progression of disease.   -Continue zetia and eliquis    -Educated on s/sx of worsening condition, including TIA/Stroke-like symptoms, and to call 911 or go to the ED if symptoms occur.

## 2024-02-09 NOTE — PROGRESS NOTES
Assessment/Plan:    82yo female with PMH of former tobacco use, Afib on eliquis, factor 5 leiden, CKD3, bilateral carotid artery stenosis     Bilateral carotid artery stenosis  -     Ambulatory referral to Vascular Surgery  -     VAS carotid complete study; Future  - Asymptomatic bilateral carotid artery stenosis <50%. No recent TIA/CVA symptoms or history of prior stroke.  - Reports issues with dizziness over the past few months. She has been going to PT for management of her vertigo symptoms and reports improvement.  -CV duplex demonstrates bilateral <50% stenosis. Vertebral flow antegrade.   -No focal neuro deficits on exam.   -Discussed the pathophysiology of carotid stenosis, available treatment options, and indications for intervention.   -No surgical intervention indicated at this time.   -Obtain updated carotid artery duplex in 1 year to evaluate ICA velocities and for progression of disease.   -On eliquis for Afib. Mgmt per cardiology.  -Aspirin intolerant (nosebleeds).  -Statin intolerant (myalgias). Started on zetia by cardiology in December.   -Educated on s/sx of worsening condition, including TIA/Stroke-like symptoms, and to call 911 or go to the ED if symptoms occur.             Subjective:      Patient ID: Jordyn Spivey is a 83 y.o. female.    SHASHI Cobb is a 82yo female with PMH of former tobacco use, Afib on eliquis, factor 5 leiden, CKD3, bilateral carotid artery stenosis. She presents today with her daughter for consultation for bilateral carotid artery stenosis found on duplex for dizziness work up. She believes her dizziness is related to her vertigo and has been going to PT for this with good improvement. She denies hx of CVA or recent stroke like symptoms including amaurosis fugax, slurred speech, facial droop or lateralizing symptoms. She reports intolerance with aspirin and statins however is on eliquis for Afib and recently started on zetia. She denies any leg pain, swelling. She  "reports that she sustained a fall in July and hit her face off the floor. Since this event she has had trouble seeing out the right eye and has seen an eye doctor for this. She reports both eyes are very sensitive to light. She has had R eye cataract surgery with complications with her lens.     The following portions of the patient's history were reviewed and updated as appropriate: allergies, current medications, past family history, past medical history, past social history, past surgical history, and problem list.    Review of Systems   Constitutional: Negative.    HENT: Negative.     Eyes: Negative.    Respiratory: Negative.     Cardiovascular: Negative.    Gastrointestinal: Negative.    Endocrine: Negative.    Genitourinary: Negative.    Musculoskeletal: Negative.    Skin: Negative.    Allergic/Immunologic: Negative.    Neurological: Negative.    Hematological: Negative.    Psychiatric/Behavioral: Negative.       I have personally reviewed and made appropriate changes to the ROS that was input by the medical assistant.       Objective:      Vitals:    02/09/24 1424   BP: 122/72   BP Location: Left arm   Patient Position: Sitting   Cuff Size: Standard   Pulse: 78   Weight: 84.8 kg (187 lb)   Height: 5' 5\" (1.651 m)       Patient Active Problem List   Diagnosis    Stage 3b chronic kidney disease (HCC)    Benign hypertension with CKD (chronic kidney disease) stage III (HCC)    Vitamin D deficiency    Dizziness    Chronic rhinitis    Obesity (BMI 30-39.9)    Factor V Leiden mutation (HCC)    Major depressive disorder, recurrent, unspecified (HCC)    Hypertriglyceridemia    Subclinical hypothyroidism    Microalbuminuria    Atrial fibrillation with rapid ventricular response (HCC)    Chronic renal disease, stage IV (HCC)    Class 2 severe obesity due to excess calories with serious comorbidity and body mass index (BMI) of 35.0 to 35.9 in adult     Multiple closed fractures of ribs of left side    Fall at home    " Displaced fracture of distal phalanx of left great toe, initial encounter for closed fracture    Back muscle spasm    Skin tear of right hand without complication    Epistaxis    Bilateral carotid artery stenosis       Past Surgical History:   Procedure Laterality Date    CATARACT EXTRACTION, BILATERAL Bilateral     REDUCTION MAMMAPLASTY Bilateral     TONSILLECTOMY AND ADENOIDECTOMY      TUBAL LIGATION Bilateral        Family History   Problem Relation Age of Onset    Hypertension Mother     Cancer Father        Social History     Socioeconomic History    Marital status:      Spouse name: Not on file    Number of children: 4    Years of education: Not on file    Highest education level: Not on file   Occupational History    Occupation: PRIOR OCCUPATION       Comment: RETIRED   Tobacco Use    Smoking status: Former     Current packs/day: 0.00     Types: Cigarettes     Quit date:      Years since quittin.1    Smokeless tobacco: Never   Vaping Use    Vaping status: Never Used   Substance and Sexual Activity    Alcohol use: Never    Drug use: Never    Sexual activity: Not Currently   Other Topics Concern    Not on file   Social History Narrative    Not on file     Social Determinants of Health     Financial Resource Strain: Low Risk  (2023)    Overall Financial Resource Strain (CARDIA)     Difficulty of Paying Living Expenses: Not hard at all   Food Insecurity: No Food Insecurity (2022)    Hunger Vital Sign     Worried About Running Out of Food in the Last Year: Never true     Ran Out of Food in the Last Year: Never true   Transportation Needs: No Transportation Needs (2023)    PRAPARE - Transportation     Lack of Transportation (Medical): No     Lack of Transportation (Non-Medical): No   Physical Activity: Not on file   Stress: Not on file   Social Connections: Not on file   Intimate Partner Violence: Not on file   Housing Stability: Low Risk  (2022)    Housing Stability  "Vital Sign     Unable to Pay for Housing in the Last Year: No     Number of Places Lived in the Last Year: 1     Unstable Housing in the Last Year: No       Allergies   Allergen Reactions    Aspirin     Montelukast      Other reaction(s): Depression  Category: Adverse Reaction;   Other reaction(s): Depression  Category: Adverse Reaction;     Nsaids     Pravastatin      Category: Adverse Reaction;     Simvastatin      Category: Adverse Reaction;     Tolmetin          Current Outpatient Medications:     buPROPion (WELLBUTRIN XL) 150 mg 24 hr tablet, Take 1 tablet (150 mg total) by mouth daily, Disp: 90 tablet, Rfl: 3    cetirizine (ZyrTEC) 10 mg tablet, Take 10 mg by mouth daily, Disp: , Rfl:     diltiazem (CARDIZEM CD) 180 mg 24 hr capsule, take 1 capsule by mouth once daily, Disp: 90 capsule, Rfl: 3    Eliquis 5 MG, take 1 tablet by mouth twice a day, Disp: 180 tablet, Rfl: 3    ergocalciferol (VITAMIN D2) 50,000 units, Take 1 capsule (50,000 Units total) by mouth once a week, Disp: 16 capsule, Rfl: 0    ezetimibe (ZETIA) 10 mg tablet, Take 1 tablet (10 mg total) by mouth daily, Disp: 30 tablet, Rfl: 6    fluticasone (FLONASE) 50 mcg/act nasal spray, 1 spray into each nostril daily, Disp: 16 g, Rfl: 3    spironolactone (ALDACTONE) 25 mg tablet, Take 2 tablets (50 mg total) by mouth daily, Disp: 180 tablet, Rfl: 0    meclizine (ANTIVERT) 25 mg tablet, Take 0.5 tablets (12.5 mg total) by mouth 3 (three) times a day as needed for dizziness (Patient not taking: Reported on 2/9/2024), Disp: 30 tablet, Rfl: 0    olopatadine (PATANOL) 0.1 % ophthalmic solution, INSTILL 1 DROP into left eye twice a day (Patient not taking: Reported on 2/9/2024), Disp: 5 mL, Rfl: 3      /72 (BP Location: Left arm, Patient Position: Sitting, Cuff Size: Standard)   Pulse 78   Ht 5' 5\" (1.651 m)   Wt 84.8 kg (187 lb)   BMI 31.12 kg/m²          Physical Exam  Vitals and nursing note reviewed.   Constitutional:       Appearance: Normal " appearance.   HENT:      Head: Normocephalic and atraumatic.   Neck:      Vascular: No carotid bruit.   Cardiovascular:      Rate and Rhythm: Normal rate and regular rhythm.      Pulses:           Carotid pulses are 2+ on the right side and 2+ on the left side.       Radial pulses are 2+ on the right side and 2+ on the left side.      Heart sounds: Normal heart sounds.      Comments: No carotid bruit   Pulmonary:      Effort: Pulmonary effort is normal. No respiratory distress.      Breath sounds: Normal breath sounds.   Musculoskeletal:         General: Normal range of motion.      Cervical back: Normal range of motion and neck supple.   Skin:     General: Skin is warm.      Capillary Refill: Capillary refill takes less than 2 seconds.      Findings: Bruising present.      Comments: Left anterior shin bruise from recent trauma    BLE truncal varicose veins   Neurological:      General: No focal deficit present.      Mental Status: She is alert and oriented to person, place, and time.      Comments: Smile symmetric, speech clear, tongue midline   Psychiatric:         Mood and Affect: Mood normal.         Behavior: Behavior normal.         Trinidad Mendez PA-C  [facility]  [phone]    I have spent a total time of 30 minutes on 02/09/24 in caring for this patient including Diagnostic results, Prognosis, Risks and benefits of tx options, Instructions for management, Patient and family education, Importance of tx compliance, Risk factor reductions, Impressions, Counseling / Coordination of care, Documenting in the medical record, Reviewing / ordering tests, medicine, procedures  , and Obtaining or reviewing history  .

## 2024-02-12 ENCOUNTER — OFFICE VISIT (OUTPATIENT)
Dept: PHYSICAL THERAPY | Facility: CLINIC | Age: 84
End: 2024-02-12
Payer: COMMERCIAL

## 2024-02-12 DIAGNOSIS — R42 DIZZINESS: Primary | ICD-10-CM

## 2024-02-12 PROCEDURE — 97140 MANUAL THERAPY 1/> REGIONS: CPT | Performed by: PHYSICAL THERAPIST

## 2024-02-12 NOTE — PROGRESS NOTES
Daily Note     Today's date: 2024  Patient name: Jordyn Spivey  : 1940  MRN: [id number]  Referring provider: Rika Paz MD  Dx:   Encounter Diagnosis     ICD-10-CM    1. Dizziness  R42           Start Time: 1615  Stop Time: 1645  Total time in clinic (min): 30 minutes    Subjective: States she has had no episodes of dizziness or room spinning since last session. Was not able to do the romi-semont at home due to difficulty with bed mobility.       Objective: See treatment diary below  (-) L mikey hallpike  (-) L roll test  Lightheaded upon sit, no spinning sensation lasting <1 sec.     Vitals:  Supine=  174/91 mmhg, 82 bpm, 98% SpO2  Immediate sit= 167/81 mmhg, 83 bpm, 98% SpO2    Assessment: Tolerated treatment well. Patient  showed negative vertigo testing for BPPV. Did have some dizziness upon sit from supine position but short duration and no room spinning. BP and vitals were taken as noted and more likely the cause of lightheadedness on position changes. Patient was educated on findings and verbalized understanding.       Plan:  Hold for 2 weeks and will f/u via phone for potential DC. Patient to call if symptoms return to schedule f/u session.      Precautions: R BENOIT, R eye blindness      Manuals            Shawanda Completed 5 reps to L Testing performed                                                  Neuro Re-Ed                                                                                                        Ther Ex                                                                                                                     Ther Activity                                       Gait Training                                       Modalities

## 2024-02-15 DIAGNOSIS — I10 ESSENTIAL HYPERTENSION: ICD-10-CM

## 2024-02-15 RX ORDER — SPIRONOLACTONE 25 MG/1
50 TABLET ORAL DAILY
Qty: 180 TABLET | Refills: 0 | Status: SHIPPED | OUTPATIENT
Start: 2024-02-15

## 2024-02-23 ENCOUNTER — APPOINTMENT (OUTPATIENT)
Dept: LAB | Facility: CLINIC | Age: 84
End: 2024-02-23
Payer: COMMERCIAL

## 2024-02-23 DIAGNOSIS — E55.9 VITAMIN D DEFICIENCY: ICD-10-CM

## 2024-02-23 DIAGNOSIS — I12.9 BENIGN HYPERTENSION WITH CKD (CHRONIC KIDNEY DISEASE) STAGE III (HCC): ICD-10-CM

## 2024-02-23 DIAGNOSIS — E78.1 HYPERTRIGLYCERIDEMIA: ICD-10-CM

## 2024-02-23 DIAGNOSIS — N18.32 STAGE 3B CHRONIC KIDNEY DISEASE (HCC): ICD-10-CM

## 2024-02-23 DIAGNOSIS — R80.9 MICROALBUMINURIA: ICD-10-CM

## 2024-02-23 DIAGNOSIS — N18.30 BENIGN HYPERTENSION WITH CKD (CHRONIC KIDNEY DISEASE) STAGE III (HCC): ICD-10-CM

## 2024-02-23 LAB
25(OH)D3 SERPL-MCNC: 43.4 NG/ML (ref 30–100)
ANION GAP SERPL CALCULATED.3IONS-SCNC: 11 MMOL/L
BUN SERPL-MCNC: 30 MG/DL (ref 5–25)
CALCIUM SERPL-MCNC: 9.6 MG/DL (ref 8.4–10.2)
CHLORIDE SERPL-SCNC: 103 MMOL/L (ref 96–108)
CHOLEST SERPL-MCNC: 197 MG/DL
CO2 SERPL-SCNC: 22 MMOL/L (ref 21–32)
CREAT SERPL-MCNC: 1.2 MG/DL (ref 0.6–1.3)
CREAT UR-MCNC: 81 MG/DL
CREAT UR-MCNC: 81 MG/DL
ERYTHROCYTE [DISTWIDTH] IN BLOOD BY AUTOMATED COUNT: 13.3 % (ref 11.6–15.1)
GFR SERPL CREATININE-BSD FRML MDRD: 42 ML/MIN/1.73SQ M
GLUCOSE P FAST SERPL-MCNC: 110 MG/DL (ref 65–99)
HCT VFR BLD AUTO: 38.2 % (ref 34.8–46.1)
HDLC SERPL-MCNC: 53 MG/DL
HGB BLD-MCNC: 12.3 G/DL (ref 11.5–15.4)
LDLC SERPL CALC-MCNC: 114 MG/DL (ref 0–100)
MCH RBC QN AUTO: 29 PG (ref 26.8–34.3)
MCHC RBC AUTO-ENTMCNC: 32.2 G/DL (ref 31.4–37.4)
MCV RBC AUTO: 90 FL (ref 82–98)
MICROALBUMIN UR-MCNC: 49.7 MG/L
MICROALBUMIN/CREAT 24H UR: 61 MG/G CREATININE (ref 0–30)
NONHDLC SERPL-MCNC: 144 MG/DL
PHOSPHATE SERPL-MCNC: 3.8 MG/DL (ref 2.3–4.1)
PLATELET # BLD AUTO: 382 THOUSANDS/UL (ref 149–390)
PMV BLD AUTO: 9 FL (ref 8.9–12.7)
POTASSIUM SERPL-SCNC: 4.7 MMOL/L (ref 3.5–5.3)
PTH-INTACT SERPL-MCNC: 50.2 PG/ML (ref 12–88)
RBC # BLD AUTO: 4.24 MILLION/UL (ref 3.81–5.12)
SODIUM 24H UR-SCNC: 105 MOL/L
SODIUM SERPL-SCNC: 136 MMOL/L (ref 135–147)
TRIGL SERPL-MCNC: 150 MG/DL
UUN 24H UR-MCNC: 703 MG/DL
WBC # BLD AUTO: 7.84 THOUSAND/UL (ref 4.31–10.16)

## 2024-02-23 PROCEDURE — 83970 ASSAY OF PARATHORMONE: CPT

## 2024-02-23 PROCEDURE — 80061 LIPID PANEL: CPT

## 2024-02-23 PROCEDURE — 82570 ASSAY OF URINE CREATININE: CPT

## 2024-02-23 PROCEDURE — 84100 ASSAY OF PHOSPHORUS: CPT

## 2024-02-23 PROCEDURE — 85027 COMPLETE CBC AUTOMATED: CPT

## 2024-02-23 PROCEDURE — 36415 COLL VENOUS BLD VENIPUNCTURE: CPT

## 2024-02-23 PROCEDURE — 82306 VITAMIN D 25 HYDROXY: CPT

## 2024-02-23 PROCEDURE — 82043 UR ALBUMIN QUANTITATIVE: CPT

## 2024-02-28 ENCOUNTER — OFFICE VISIT (OUTPATIENT)
Dept: NEPHROLOGY | Facility: CLINIC | Age: 84
End: 2024-02-28
Payer: COMMERCIAL

## 2024-02-28 VITALS
WEIGHT: 189 LBS | BODY MASS INDEX: 31.49 KG/M2 | HEIGHT: 65 IN | SYSTOLIC BLOOD PRESSURE: 146 MMHG | HEART RATE: 79 BPM | DIASTOLIC BLOOD PRESSURE: 78 MMHG

## 2024-02-28 DIAGNOSIS — N18.30 BENIGN HYPERTENSION WITH CKD (CHRONIC KIDNEY DISEASE) STAGE III (HCC): Primary | ICD-10-CM

## 2024-02-28 DIAGNOSIS — I12.9 BENIGN HYPERTENSION WITH CKD (CHRONIC KIDNEY DISEASE) STAGE III (HCC): Primary | ICD-10-CM

## 2024-02-28 DIAGNOSIS — E55.9 VITAMIN D DEFICIENCY: ICD-10-CM

## 2024-02-28 DIAGNOSIS — R80.9 MICROALBUMINURIA: ICD-10-CM

## 2024-02-28 DIAGNOSIS — N18.32 STAGE 3B CHRONIC KIDNEY DISEASE (HCC): ICD-10-CM

## 2024-02-28 PROCEDURE — 99214 OFFICE O/P EST MOD 30 MIN: CPT | Performed by: INTERNAL MEDICINE

## 2024-02-28 NOTE — PROGRESS NOTES
NEPHROLOGY OUTPATIENT PROGRESS NOTE   Jordyn Spivey 83 y.o. female MRN: [id number]  DATE: 2/28/2024  Reason for visit:   Chief Complaint   Patient presents with    Follow-up     CKD3, HTN     ASSESSMENT and PLAN:  CKD stage IIIB, baseline creatinine 1.3 since late 2021, prior 1.1 to 1.2 going back to 2019  -Last creatinine 1.2 stable at baseline in February 2024.  -CKD suspected to be secondary to long-term hypertension causing hypertensive arteriosclerosis, age-related nephron loss  -UA in January 2023 bland without hematuria or proteinuria.   -repeat BMP, UACR before next visit  -avoid NSAIDs  -renal ultrasound in July 2022 shows normal size kidneys, no hydronephrosis or stones.  Minimal cortical thinning in both kidneys.  Normal echogenicity.  Also concern for lobulated renal contour on right kidney?  Normal variant versus multifocal scarring.     Prior CT scan with concern for area of concentric focal arthrosclerosis on left renal artery.  Patient denies any history of renal stent.     Hypertension  -BP above goal on repeat check in the office today.  she does not check BP at home.  She has upper arm BP machine at home.  Recommend to check BP daily and call back in 5 to 7 days with daily BP readings.  -Will not change antihypertensive regimen for now since BP fluctuating at different providers visits in the recent past and has been better at times as per patient.  -Remains on Aldactone 50 mg daily due to hypertension along with hirsutism issues, also on diltiazem 180 mg daily  -If BP remains above goal at home, consider increasing Aldactone further.  -She claims to be more compliant following low-salt diet.     Microalbuminuria, last UACR slightly high at 61 mg in February 2024.   -suspect in the setting of underlying hypertension, obesity can cause secondary FSGS  -continue to monitor, if worsening, may consider adding ACE-inhibitor/increasing Aldactone.  -recommended regular exercise, weight loss    "  Vitamin-D deficiency  -last vitamin-D level 43 in February 2024. She was on vitamin D 50,000 units weekly for long time by PCP.  Currently remains on same.     Prior history of gout 15 years ago, no recent gout flare-up    Diagnoses and all orders for this visit:    Benign hypertension with CKD (chronic kidney disease) stage III (HCC)  -     Albumin / creatinine urine ratio; Future  -     Basic metabolic panel; Future  -     CBC; Future  -     Phosphorus; Future  -     Vitamin D 25 hydroxy; Future    Stage 3b chronic kidney disease (HCC)  -     Albumin / creatinine urine ratio; Future  -     Basic metabolic panel; Future  -     CBC; Future  -     Phosphorus; Future  -     Vitamin D 25 hydroxy; Future    Microalbuminuria  -     Albumin / creatinine urine ratio; Future    Vitamin D deficiency  -     Vitamin D 25 hydroxy; Future        SUBJECTIVE / HPI:  Jordyn Spivey is a 83 y.o. year old female with medical issues of hypertension for 18 years, depression, urinary incontinence, factor five Leiden deficiency, CKD stage 3 with baseline 1.3 since late 2021, prior 1.1 to 1.2, remote history of gout who presents for regular follow-up of CKD.  Last creatinine overall stable at baseline.  BP slightly higher in the office today.  She does not like to check BP at home since she gets arm pain with inflating cough. no recent NSAID use.  Denies any lightheadedness or dizziness. denies any new urinary complaint. Denies any chest pain or shortness of breath.   She remains on stable dose of Aldactone for more than 15 years.    REVIEW OF SYSTEMS:  More than 10 point review of systems were obtained and discussed in length with the patient. Complete review of systems were negative / unremarkable except mentioned above.     PHYSICAL EXAM:  Vitals:    02/28/24 1351 02/28/24 1431   BP: 128/76 146/78   BP Location: Left arm    Patient Position: Sitting    Cuff Size: Adult    Pulse: 79    Weight: 85.7 kg (189 lb)    Height: 5' 5\" " (1.651 m)      Body mass index is 31.45 kg/m².    Physical Exam  Vitals reviewed.   Constitutional:       Appearance: She is well-developed.   HENT:      Head: Normocephalic and atraumatic.      Right Ear: External ear normal.      Left Ear: External ear normal.   Eyes:      Conjunctiva/sclera: Conjunctivae normal.   Cardiovascular:      Comments: No significant edema in legs   Pulmonary:      Effort: Pulmonary effort is normal.      Breath sounds: Normal breath sounds. No wheezing or rales.   Abdominal:      General: Bowel sounds are normal. There is no distension.      Palpations: Abdomen is soft.      Tenderness: There is no abdominal tenderness.   Musculoskeletal:         General: No deformity.   Lymphadenopathy:      Cervical: No cervical adenopathy.   Skin:     Findings: No rash.   Neurological:      Mental Status: She is alert and oriented to person, place, and time.   Psychiatric:         Behavior: Behavior normal.         PAST MEDICAL HISTORY:  Past Medical History:   Diagnosis Date    Atrial fibrillation (HCC)     Esophageal stricture     Factor V Leiden mutation (HCC)     LAST ASSESSED: 8/3/16    Hypertension        PAST SURGICAL HISTORY:  Past Surgical History:   Procedure Laterality Date    CATARACT EXTRACTION, BILATERAL Bilateral     REDUCTION MAMMAPLASTY Bilateral     TONSILLECTOMY AND ADENOIDECTOMY      TUBAL LIGATION Bilateral        SOCIAL HISTORY:  Social History     Substance and Sexual Activity   Alcohol Use Never     Social History     Substance and Sexual Activity   Drug Use Never     Social History     Tobacco Use   Smoking Status Former    Current packs/day: 0.00    Types: Cigarettes    Quit date:     Years since quittin.1   Smokeless Tobacco Never       FAMILY HISTORY:  Family History   Problem Relation Age of Onset    Hypertension Mother     Cancer Father        MEDICATIONS:    Current Outpatient Medications:     buPROPion (WELLBUTRIN XL) 150 mg 24 hr tablet, Take 1 tablet (150 mg  total) by mouth daily, Disp: 90 tablet, Rfl: 3    cetirizine (ZyrTEC) 10 mg tablet, Take 10 mg by mouth daily, Disp: , Rfl:     diltiazem (CARDIZEM CD) 180 mg 24 hr capsule, take 1 capsule by mouth once daily, Disp: 90 capsule, Rfl: 3    Eliquis 5 MG, take 1 tablet by mouth twice a day, Disp: 180 tablet, Rfl: 3    ergocalciferol (VITAMIN D2) 50,000 units, Take 1 capsule (50,000 Units total) by mouth once a week, Disp: 16 capsule, Rfl: 0    ezetimibe (ZETIA) 10 mg tablet, Take 1 tablet (10 mg total) by mouth daily, Disp: 30 tablet, Rfl: 6    fluticasone (FLONASE) 50 mcg/act nasal spray, 1 spray into each nostril daily, Disp: 16 g, Rfl: 3    spironolactone (ALDACTONE) 25 mg tablet, take 2 tablets by mouth once daily, Disp: 180 tablet, Rfl: 0    meclizine (ANTIVERT) 25 mg tablet, Take 0.5 tablets (12.5 mg total) by mouth 3 (three) times a day as needed for dizziness (Patient not taking: Reported on 2/9/2024), Disp: 30 tablet, Rfl: 0    olopatadine (PATANOL) 0.1 % ophthalmic solution, INSTILL 1 DROP into left eye twice a day (Patient not taking: Reported on 2/9/2024), Disp: 5 mL, Rfl: 3    Lab Results:   Results for orders placed or performed in visit on 02/23/24   CBC   Result Value Ref Range    WBC 7.84 4.31 - 10.16 Thousand/uL    RBC 4.24 3.81 - 5.12 Million/uL    Hemoglobin 12.3 11.5 - 15.4 g/dL    Hematocrit 38.2 34.8 - 46.1 %    MCV 90 82 - 98 fL    MCH 29.0 26.8 - 34.3 pg    MCHC 32.2 31.4 - 37.4 g/dL    RDW 13.3 11.6 - 15.1 %    Platelets 382 149 - 390 Thousands/uL    MPV 9.0 8.9 - 12.7 fL   Phosphorus   Result Value Ref Range    Phosphorus 3.8 2.3 - 4.1 mg/dL   PTH, intact   Result Value Ref Range    PTH 50.2 12.0 - 88.0 pg/mL   Albumin / creatinine urine ratio   Result Value Ref Range    Creatinine, Ur 81.0 Reference range not established. mg/dL    Albumin,U,Random 49.7 (H) <20.0 mg/L    Albumin Creat Ratio 61 (H) 0 - 30 mg/g creatinine   Vitamin D 25 hydroxy   Result Value Ref Range    Vit D, 25-Hydroxy 43.4  30.0 - 100.0 ng/mL   Lipid panel   Result Value Ref Range    Cholesterol 197 See Comment mg/dL    Triglycerides 150 (H) See Comment mg/dL    HDL, Direct 53 >=50 mg/dL    LDL Calculated 114 (H) 0 - 100 mg/dL    Non-HDL-Chol (CHOL-HDL) 144 mg/dl

## 2024-03-08 DIAGNOSIS — I48.91 ATRIAL FIBRILLATION WITH RVR (HCC): ICD-10-CM

## 2024-03-08 RX ORDER — DILTIAZEM HYDROCHLORIDE 180 MG/1
180 CAPSULE, COATED, EXTENDED RELEASE ORAL DAILY
Qty: 90 CAPSULE | Refills: 3 | Status: SHIPPED | OUTPATIENT
Start: 2024-03-08

## 2024-03-10 DIAGNOSIS — I48.91 ATRIAL FIBRILLATION WITH RVR (HCC): ICD-10-CM

## 2024-03-11 ENCOUNTER — TELEPHONE (OUTPATIENT)
Dept: NEPHROLOGY | Facility: CLINIC | Age: 84
End: 2024-03-11

## 2024-03-11 DIAGNOSIS — I10 ESSENTIAL HYPERTENSION: ICD-10-CM

## 2024-03-11 RX ORDER — SPIRONOLACTONE 25 MG/1
TABLET ORAL
Qty: 270 TABLET | Refills: 3 | Status: SHIPPED | OUTPATIENT
Start: 2024-03-11

## 2024-03-11 RX ORDER — APIXABAN 5 MG/1
5 TABLET, FILM COATED ORAL 2 TIMES DAILY
Qty: 180 TABLET | Refills: 3 | Status: SHIPPED | OUTPATIENT
Start: 2024-03-11

## 2024-03-11 NOTE — TELEPHONE ENCOUNTER
I spoke to minor she is aware if  recommendations and will continue using wrist cuff for now. She will take bp cuff to cardiology for correlation as she is not due until November.

## 2024-03-11 NOTE — TELEPHONE ENCOUNTER
Wrist BP cuff is not ideal but since we do not have any other options due to unable to tolerate upper arm BP cuff, she can continue using this for now and she should bring wrist BP cuff during next office visit so we can compare readings with our office readings.

## 2024-03-11 NOTE — TELEPHONE ENCOUNTER
I spoke to minor and she advised her question is if okay to use wrist bp cuff as it does not hurt her as much as the arm one does. She said these readings are from her wrist cuff and wanted to be sure okay to continue to use.

## 2024-03-11 NOTE — TELEPHONE ENCOUNTER
Her blood pressure seems to be mostly 130s to 140s/70s with occasional SBP in 150s.    Please have her increase spironolactone from current 50 mg daily to 50 mg in a.m. and 25 mg in p.m.  I have sent updated prescription to the pharmacy.

## 2024-03-13 ENCOUNTER — OFFICE VISIT (OUTPATIENT)
Dept: CARDIOLOGY CLINIC | Facility: CLINIC | Age: 84
End: 2024-03-13
Payer: COMMERCIAL

## 2024-03-13 VITALS
RESPIRATION RATE: 16 BRPM | HEART RATE: 96 BPM | TEMPERATURE: 97.5 F | OXYGEN SATURATION: 97 % | DIASTOLIC BLOOD PRESSURE: 62 MMHG | HEIGHT: 65 IN | WEIGHT: 190.8 LBS | SYSTOLIC BLOOD PRESSURE: 144 MMHG | BODY MASS INDEX: 31.79 KG/M2

## 2024-03-13 DIAGNOSIS — I10 PRIMARY HYPERTENSION: ICD-10-CM

## 2024-03-13 DIAGNOSIS — E66.09 CLASS 1 OBESITY DUE TO EXCESS CALORIES WITH BODY MASS INDEX (BMI) OF 31.0 TO 31.9 IN ADULT, UNSPECIFIED WHETHER SERIOUS COMORBIDITY PRESENT: ICD-10-CM

## 2024-03-13 DIAGNOSIS — N18.4 CHRONIC RENAL DISEASE, STAGE IV (HCC): ICD-10-CM

## 2024-03-13 DIAGNOSIS — E78.5 HYPERLIPIDEMIA, UNSPECIFIED HYPERLIPIDEMIA TYPE: Primary | ICD-10-CM

## 2024-03-13 DIAGNOSIS — I48.0 PAF (PAROXYSMAL ATRIAL FIBRILLATION) (HCC): ICD-10-CM

## 2024-03-13 DIAGNOSIS — R06.83 SNORING: ICD-10-CM

## 2024-03-13 PROBLEM — E66.811 CLASS 1 OBESITY DUE TO EXCESS CALORIES WITH BODY MASS INDEX (BMI) OF 31.0 TO 31.9 IN ADULT, UNSPECIFIED WHETHER SERIOUS COMORBIDITY PRESENT: Status: ACTIVE | Noted: 2022-09-20

## 2024-03-13 PROCEDURE — 99214 OFFICE O/P EST MOD 30 MIN: CPT | Performed by: INTERNAL MEDICINE

## 2024-03-13 RX ORDER — ICOSAPENT ETHYL 1000 MG/1
2 CAPSULE ORAL 2 TIMES DAILY
Qty: 120 CAPSULE | Refills: 8 | Status: SHIPPED | OUTPATIENT
Start: 2024-03-13

## 2024-03-13 NOTE — PROGRESS NOTES
Cardiology Follow Up    Jordyn KYLE Patric  [id number]  1940  [facility] [address] [place]  [place] [address] [phone]      1. Hyperlipidemia, unspecified hyperlipidemia type  Icosapent Ethyl (Vascepa) 1 g CAPS    Lipid Panel with Direct LDL reflex    Comprehensive metabolic panel      2. Chronic renal disease, stage IV (HCC)        3. PAF (paroxysmal atrial fibrillation) ([facility])        4. Class 1 obesity due to excess calories with body mass index (BMI) of 31.0 to 31.9 in adult, unspecified whether serious comorbidity present        5. Primary hypertension        6. Snoring            Discussion/Summary:  1.  Paroxysmal atrial fibrillation on oral anticoagulation  2.  Hypertension  3.  Hyperlipidemia  4.  Aortic regurgitation  5.  Mitral regurgitation  6.  Snoring-with concern for possible obstructive sleep apnea  7. CKD    -Transthoracic echocardiogram 8/9/2023 showing left-ventricular systolic function normal estimated LVEF 60% with mildly dilated left atrium, possible small patent foramen ovale with moderate aortic regurgitation and mild to moderate mitral regurgitation  -Zio patch monitor 1/13/2023 showing predominantly sinus rhythm average heart rate 76 bpm with occasional supraventricular ectopic activity and rare ventricular ectopic activity.  -As patient just initiated uptitrated medical therapy can continue diltiazem 180 mg daily, Eliquis 5 mg twice daily, Zetia 10 mg daily, spironolactone 50 mg in a.m. and 25 mg in the evening  -Due to issues with renal dysfunction fenofibrate was previously discontinued would need nephrology clearance prior to reinitiation however given recent fasting lipid panel patient agreeable to up titration of medical therapy therefore can trial Vascepa 2 g twice daily and see if she tolerates as she has had issues with medications in the past  -Patient counseled  on dietary and lifestyle modifications including following a low-salt, low-fat, heart healthy diet with sodium restriction to less than 1800 mg of sodium daily fluid restriction less than 2000 mL of fluid daily along with weight reduction with goal BMI less than 29  -Patient will continue to monitor home blood pressure readings and let our office or her nephrology office know if significantly elevated greater than 130/80's for up titration of medical therapy further  -Patient counseled if she were to have any warning or alarm type symptoms she is to seek emergency medical care immediately.  -Discussed recommendations for sleep medicine evaluation however at this time patient wishes to hold off as she is feeling well  -Patient will be seen in 6 months or sooner if necessary.          History of Present Illness:  -Patient is an 83-year-old female with hypertension, chronic rhinitis, factor V Leiden, obesity and CKD who was hospitalized in August 2022 found at that time to have atrial fibrillation with rapid ventricular response after being alerted by her Apple Watch.  She was initiated on oral anticoagulation along with rate control strategy and eventually was able to be discharged to outpatient follow-up.  She had been seen and evaluated by hematology who agreed with oral anticoagulation and does follow with nephrology for continued titration to assist with CKD.  She presents to the office today with her daughter for scheduled follow-up and denies any chest pain, palpitations, lightness or dizziness, loss of consciousness, shortness of breath, lower extremity edema, orthopnea or bendopnea.  She notes her hypertensive regimen was just increased by her nephrology team and just started this a day ago but has been tolerating the medications well.      Patient Active Problem List   Diagnosis    Stage 3b chronic kidney disease (HCC)    Benign hypertension with CKD (chronic kidney disease) stage III (HCC)    Vitamin D  deficiency    Dizziness    Chronic rhinitis    Obesity (BMI 30-39.9)    Factor V Leiden mutation ([facility])    Major depressive disorder, recurrent, unspecified ([facility])    Hypertriglyceridemia    Subclinical hypothyroidism    Microalbuminuria    PAF (paroxysmal atrial fibrillation) ([facility])    Chronic renal disease, stage IV ([facility])    Class 1 obesity due to excess calories with body mass index (BMI) of 31.0 to 31.9 in adult, unspecified whether serious comorbidity present    Multiple closed fractures of ribs of left side    Fall at home    Displaced fracture of distal phalanx of left great toe, initial encounter for closed fracture    Back muscle spasm    Skin tear of right hand without complication    Epistaxis    Bilateral carotid artery stenosis     Past Medical History:   Diagnosis Date    Atrial fibrillation ([facility])     Esophageal stricture     Factor V Leiden mutation ([facility])     LAST ASSESSED: 8/3/16    Hypertension      Social History     Socioeconomic History    Marital status:      Spouse name: Not on file    Number of children: 4    Years of education: Not on file    Highest education level: Not on file   Occupational History    Occupation: PRIOR OCCUPATION       Comment: RETIRED   Tobacco Use    Smoking status: Former     Current packs/day: 0.00     Types: Cigarettes     Quit date:      Years since quittin.2    Smokeless tobacco: Never   Vaping Use    Vaping status: Never Used   Substance and Sexual Activity    Alcohol use: Never    Drug use: Never    Sexual activity: Not Currently   Other Topics Concern    Not on file   Social History Narrative    Not on file     Social Determinants of Health     Financial Resource Strain: Low Risk  (2023)    Overall Financial Resource Strain (CARDIA)     Difficulty of Paying Living Expenses: Not hard at all   Food Insecurity: No Food Insecurity (2022)    Hunger Vital Sign     Worried About Running Out of Food in the Last Year: Never true     Ran  Out of Food in the Last Year: Never true   Transportation Needs: No Transportation Needs (1/27/2023)    PRAPARE - Transportation     Lack of Transportation (Medical): No     Lack of Transportation (Non-Medical): No   Physical Activity: Not on file   Stress: Not on file   Social Connections: Not on file   Intimate Partner Violence: Not on file   Housing Stability: Low Risk  (8/19/2022)    Housing Stability Vital Sign     Unable to Pay for Housing in the Last Year: No     Number of Places Lived in the Last Year: 1     Unstable Housing in the Last Year: No      Family History   Problem Relation Age of Onset    Hypertension Mother     Cancer Father      Past Surgical History:   Procedure Laterality Date    CATARACT EXTRACTION, BILATERAL Bilateral     REDUCTION MAMMAPLASTY Bilateral     TONSILLECTOMY AND ADENOIDECTOMY      TUBAL LIGATION Bilateral        Current Outpatient Medications:     buPROPion (WELLBUTRIN XL) 150 mg 24 hr tablet, Take 1 tablet (150 mg total) by mouth daily, Disp: 90 tablet, Rfl: 3    cetirizine (ZyrTEC) 10 mg tablet, Take 10 mg by mouth daily, Disp: , Rfl:     diltiazem (CARDIZEM CD) 180 mg 24 hr capsule, take 1 capsule by mouth once daily, Disp: 90 capsule, Rfl: 3    Eliquis 5 MG, take 1 tablet by mouth twice a day, Disp: 180 tablet, Rfl: 3    ergocalciferol (VITAMIN D2) 50,000 units, Take 1 capsule (50,000 Units total) by mouth once a week, Disp: 16 capsule, Rfl: 0    ezetimibe (ZETIA) 10 mg tablet, Take 1 tablet (10 mg total) by mouth daily, Disp: 30 tablet, Rfl: 6    fluticasone (FLONASE) 50 mcg/act nasal spray, 1 spray into each nostril daily, Disp: 16 g, Rfl: 3    Icosapent Ethyl (Vascepa) 1 g CAPS, Take 2 capsules (2 g total) by mouth 2 (two) times a day, Disp: 120 capsule, Rfl: 8    spironolactone (ALDACTONE) 25 mg tablet, Take 50 mg (2 tablets) in a.m. and 25 mg (1 tablet) in p.m., Disp: 270 tablet, Rfl: 3    meclizine (ANTIVERT) 25 mg tablet, Take 0.5 tablets (12.5 mg total) by mouth 3  (three) times a day as needed for dizziness (Patient not taking: Reported on 2/9/2024), Disp: 30 tablet, Rfl: 0    olopatadine (PATANOL) 0.1 % ophthalmic solution, INSTILL 1 DROP into left eye twice a day (Patient not taking: Reported on 2/9/2024), Disp: 5 mL, Rfl: 3  Allergies   Allergen Reactions    Aspirin     Montelukast      Other reaction(s): Depression  Category: Adverse Reaction;   Other reaction(s): Depression  Category: Adverse Reaction;     Nsaids     Pravastatin      Category: Adverse Reaction;     Simvastatin      Category: Adverse Reaction;     Tolmetin          Labs:  Appointment on 02/23/2024   Component Date Value    WBC 02/23/2024 7.84     RBC 02/23/2024 4.24     Hemoglobin 02/23/2024 12.3     Hematocrit 02/23/2024 38.2     MCV 02/23/2024 90     MCH 02/23/2024 29.0     MCHC 02/23/2024 32.2     RDW 02/23/2024 13.3     Platelets 02/23/2024 382     MPV 02/23/2024 9.0     Phosphorus 02/23/2024 3.8     PTH 02/23/2024 50.2     Creatinine, Ur 02/23/2024 81.0     Albumin,U,Random 02/23/2024 49.7 (H)     Albumin Creat Ratio 02/23/2024 61 (H)     Vit D, 25-Hydroxy 02/23/2024 43.4     Cholesterol 02/23/2024 197     Triglycerides 02/23/2024 150 (H)     HDL, Direct 02/23/2024 53     LDL Calculated 02/23/2024 114 (H)     Non-HDL-Chol (CHOL-HDL) 02/23/2024 144         Imaging: No results found.    Review of Systems:  Review of Systems   Constitutional:  Negative for chills, diaphoresis, fatigue and fever.   HENT:  Negative for trouble swallowing and voice change.    Eyes:  Negative for pain and redness.   Respiratory:  Negative for shortness of breath and wheezing.    Cardiovascular:  Negative for chest pain, palpitations and leg swelling.   Gastrointestinal:  Negative for abdominal pain, constipation, diarrhea, nausea and vomiting.   Genitourinary:  Negative for dysuria.   Musculoskeletal:  Positive for arthralgias. Negative for neck pain and neck stiffness.   Skin:  Negative for rash.   Neurological:  Negative  "for dizziness, syncope, light-headedness and headaches.   Psychiatric/Behavioral:  Negative for agitation and confusion.    All other systems reviewed and are negative.        Vitals:    03/13/24 1340   BP: 144/62   BP Location: Right arm   Patient Position: Sitting   Cuff Size: Adult   Pulse: 96   Resp: 16   Temp: 97.5 °F (36.4 °C)   TempSrc: Temporal   SpO2: 97%   Weight: 86.5 kg (190 lb 12.8 oz)   Height: 5' 5\" (1.651 m)     Vitals:    03/13/24 1340   Weight: 86.5 kg (190 lb 12.8 oz)     Height: 5' 5\" (165.1 cm)     Physical Exam:   Physical Exam  Vitals reviewed.   Constitutional:       General: She is not in acute distress.     Appearance: She is obese. She is not diaphoretic.   HENT:      Head: Normocephalic and atraumatic.   Eyes:      General:         Right eye: No discharge.         Left eye: No discharge.   Neck:      Comments: Trachea midline, no JVD present  Cardiovascular:      Rate and Rhythm: Normal rate and regular rhythm.      Heart sounds:      No friction rub.   Pulmonary:      Effort: Pulmonary effort is normal. No respiratory distress.      Breath sounds: No wheezing.   Chest:      Chest wall: No tenderness.   Abdominal:      General: Bowel sounds are normal.      Palpations: Abdomen is soft.      Tenderness: There is no abdominal tenderness. There is no rebound.   Musculoskeletal:      Right lower leg: No edema.      Left lower leg: No edema.   Skin:     General: Skin is warm and dry.   Neurological:      Mental Status: She is alert.      Comments: Awake alert, able to answer questions appropriately, able to move extremities bilaterally.   Psychiatric:         Mood and Affect: Mood normal.         Behavior: Behavior normal.        "

## 2024-04-12 DIAGNOSIS — F32.9 CURRENT EPISODE OF MAJOR DEPRESSIVE DISORDER WITHOUT PRIOR EPISODE: ICD-10-CM

## 2024-04-12 RX ORDER — BUPROPION HYDROCHLORIDE 150 MG/1
150 TABLET ORAL DAILY
Qty: 90 TABLET | Refills: 3 | Status: SHIPPED | OUTPATIENT
Start: 2024-04-12

## 2024-04-15 ENCOUNTER — TELEPHONE (OUTPATIENT)
Dept: CARDIOLOGY CLINIC | Facility: CLINIC | Age: 84
End: 2024-04-15

## 2024-04-15 ENCOUNTER — TELEPHONE (OUTPATIENT)
Age: 84
End: 2024-04-15

## 2024-04-15 DIAGNOSIS — E78.5 HYPERLIPIDEMIA, UNSPECIFIED HYPERLIPIDEMIA TYPE: Primary | ICD-10-CM

## 2024-04-15 RX ORDER — FENOFIBRATE 54 MG/1
54 TABLET ORAL DAILY
Qty: 30 TABLET | Refills: 6 | Status: SHIPPED | OUTPATIENT
Start: 2024-04-15

## 2024-04-15 NOTE — TELEPHONE ENCOUNTER
P/C has been taking the Zetia, but has been having terrible pains/aches.     Feels it is definitely from the medication Zetia       Zetia 10 mg qd   Eliquis 5 mg bid  Spironolactone 50 mg am, 25 mg pm    Please advise    C/B [id number]    Last ov 3/13/2024

## 2024-04-15 NOTE — TELEPHONE ENCOUNTER
I was able to return patient's phone call and after discussion with patient she believes her myalgias are coming from her recent addition of the Vascepa.  Will discontinue Vascepa and patient wishes to trial fenofibrate as she notes this did work well for her in the past despite her renal dysfunction.  I informed her that I am willing to trial a low-dose of fenofibrate however would not uptitrate further given renal dysfunction in the past and we will need to monitor renal function closely.  Patient will get BMP in 1 week to monitor renal function and electrolytes and to see how she does with fenofibrate.  Patient counseled on dietary and lifestyle modifications and was counseled if she were to have any warning or alarm type symptoms she is to seek emergency medical care immediately.  Patient understanding was agreeable to plan.

## 2024-05-08 NOTE — TELEPHONE ENCOUNTER
Called and spoke with Patient to complete their bloodwork prior to their appointment on 01/17/23 with Dr Sanjeev Harper at the [facility]  20

## 2024-05-20 ENCOUNTER — OFFICE VISIT (OUTPATIENT)
Dept: FAMILY MEDICINE CLINIC | Facility: CLINIC | Age: 84
End: 2024-05-20
Payer: COMMERCIAL

## 2024-05-20 VITALS
BODY MASS INDEX: 31.82 KG/M2 | HEART RATE: 72 BPM | SYSTOLIC BLOOD PRESSURE: 116 MMHG | OXYGEN SATURATION: 96 % | HEIGHT: 65 IN | WEIGHT: 191 LBS | TEMPERATURE: 95.6 F | DIASTOLIC BLOOD PRESSURE: 80 MMHG

## 2024-05-20 DIAGNOSIS — R20.2 PARESTHESIA: Primary | ICD-10-CM

## 2024-05-20 DIAGNOSIS — E55.9 VITAMIN D DEFICIENCY: ICD-10-CM

## 2024-05-20 DIAGNOSIS — M72.2 PLANTAR FASCIITIS: ICD-10-CM

## 2024-05-20 PROCEDURE — G2211 COMPLEX E/M VISIT ADD ON: HCPCS | Performed by: NURSE PRACTITIONER

## 2024-05-20 PROCEDURE — 99214 OFFICE O/P EST MOD 30 MIN: CPT | Performed by: NURSE PRACTITIONER

## 2024-05-20 NOTE — PROGRESS NOTES
Ambulatory Visit  Name: Jordyn Spivey      : 1940      MRN: [id number]  Encounter Provider: NANCY Lee  Encounter Date: 2024   Encounter department: [facility]    Assessment & Plan   1. Paresthesia  -     Vitamin B12; Future  -     XR spine cervical complete 4 or 5 vw non injury; Future; Expected date: 2024  2. Plantar fasciitis  3. Vitamin D deficiency  Comments:  completed the weekly but not taking daily- recommend vit d [id number] units daily     History of Present Illness   {Disappearing Hyperlinks I Encounters * My Last Note * Since Last Visit * History :76520}  Patient presents with heel pain and arm problems.   She's states that she started with pain in the left heel pain started about 7 days ago, no injury that she can recall. Hurts when she is first standings up on it and then it feels better as up and walking. Friday was swollen but that has improved.  Tylenol will help with this.   Wears sandals or slip on at home.   - recommend good supportive foot wear, roll foot on frozen bottle 10-15 minutes in the morning and then prn through out the day. Compression socks as instructed. Reviewed stretches to complete. Recommend PT but she declines at this time.     Arm problem- ongoing since February, feels a surge of electricity in the arm intermittently. She states it happens randomly but worsen when she puts pressure on it like leaning on it or when she moves in certain ways. States it feels like a static type pain.  She states it is causing her to drop things. No injury when this started. No color or temperature changes.  She refused EMG as she does not think she can tolerate it. Check b12 and x-ray.         Review of Systems   Constitutional:  Negative for chills and fever.   Respiratory:  Negative for cough and shortness of breath.    Cardiovascular:  Negative for chest pain and palpitations.   Gastrointestinal:  Negative for abdominal pain,  "constipation, diarrhea, nausea and vomiting.   Musculoskeletal:  Positive for arthralgias. Negative for back pain, myalgias, neck pain and neck stiffness.   Skin:  Negative for color change and rash.   Neurological:  Positive for weakness. Negative for dizziness, seizures, syncope, numbness and headaches.   All other systems reviewed and are negative.      Objective   {Disappearing Hyperlinks   Review Vitals * Enter New Vitals * Results Review * Labs * Imaging * Cardiology * Procedures * Lung Cancer Screening :46485}  /80   Pulse 72   Temp (!) 95.6 °F (35.3 °C) (Tympanic)   Ht 5' 5\" (1.651 m)   Wt 86.6 kg (191 lb)   SpO2 96%   BMI 31.78 kg/m²     Physical Exam  Vitals and nursing note reviewed.   Constitutional:       General: She is not in acute distress.     Appearance: She is well-developed.   HENT:      Head: Normocephalic and atraumatic.   Cardiovascular:      Rate and Rhythm: Normal rate and regular rhythm.      Pulses: Normal pulses.           Dorsalis pedis pulses are 2+ on the left side.        Posterior tibial pulses are 2+ on the left side.      Heart sounds: Normal heart sounds. No murmur heard.  Pulmonary:      Effort: Pulmonary effort is normal. No respiratory distress.      Breath sounds: Normal breath sounds.   Abdominal:      Palpations: Abdomen is soft.      Tenderness: There is no abdominal tenderness.   Musculoskeletal:         General: No swelling, tenderness or deformity.      Left shoulder: Normal.      Left upper arm: Normal.      Left elbow: Normal.      Left forearm: Normal.      Left wrist: Normal.      Left hand: Normal.      Cervical back: Normal.      Thoracic back: Normal.      Right lower leg: No edema.      Left foot: Normal range of motion. No deformity, bunion, Charcot foot, foot drop or prominent metatarsal heads.        Feet:    Feet:      Left foot:      Skin integrity: Skin integrity normal.   Skin:     General: Skin is warm and dry.      Capillary Refill: " Capillary refill takes less than 2 seconds.   Neurological:      Mental Status: She is alert.   Psychiatric:         Mood and Affect: Mood normal.       Administrative Statements {Disappearing Hyperlinks I  Level of Service * [facility]}

## 2024-05-24 ENCOUNTER — APPOINTMENT (OUTPATIENT)
Dept: RADIOLOGY | Facility: CLINIC | Age: 84
End: 2024-05-24
Payer: COMMERCIAL

## 2024-05-24 DIAGNOSIS — R20.2 PARESTHESIA: ICD-10-CM

## 2024-05-24 PROCEDURE — 72050 X-RAY EXAM NECK SPINE 4/5VWS: CPT

## 2024-05-28 ENCOUNTER — TELEPHONE (OUTPATIENT)
Dept: FAMILY MEDICINE CLINIC | Facility: CLINIC | Age: 84
End: 2024-05-28

## 2024-05-28 NOTE — TELEPHONE ENCOUNTER
Pascale Friedman MA  5/28/2024  9:07 AM EDT Back to Top      Left message to call back to discuss     Ok to give results    NANCY Lee  5/28/2024  7:55 AM EDT       Does have some degenerative changes noted in the neck. May be part of why she is having intermittent tingling, may have pinched nerve. Could follow up with comprehensive spinal program  to see if this helps. Also, have vit b 12 as ordered. Thanks

## 2024-05-29 ENCOUNTER — TELEPHONE (OUTPATIENT)
Dept: CARDIOLOGY CLINIC | Facility: CLINIC | Age: 84
End: 2024-05-29

## 2024-05-29 ENCOUNTER — APPOINTMENT (OUTPATIENT)
Dept: LAB | Facility: CLINIC | Age: 84
End: 2024-05-29
Payer: COMMERCIAL

## 2024-05-29 DIAGNOSIS — R20.2 PARESTHESIA: ICD-10-CM

## 2024-05-29 DIAGNOSIS — E78.5 HYPERLIPIDEMIA, UNSPECIFIED HYPERLIPIDEMIA TYPE: ICD-10-CM

## 2024-05-29 DIAGNOSIS — N18.9 CHRONIC KIDNEY DISEASE, UNSPECIFIED CKD STAGE: Primary | ICD-10-CM

## 2024-05-29 LAB
ANION GAP SERPL CALCULATED.3IONS-SCNC: 8 MMOL/L (ref 4–13)
BUN SERPL-MCNC: 33 MG/DL (ref 5–25)
CALCIUM SERPL-MCNC: 9.1 MG/DL (ref 8.4–10.2)
CHLORIDE SERPL-SCNC: 107 MMOL/L (ref 96–108)
CO2 SERPL-SCNC: 23 MMOL/L (ref 21–32)
CREAT SERPL-MCNC: 1.39 MG/DL (ref 0.6–1.3)
GFR SERPL CREATININE-BSD FRML MDRD: 35 ML/MIN/1.73SQ M
GLUCOSE P FAST SERPL-MCNC: 91 MG/DL (ref 65–99)
POTASSIUM SERPL-SCNC: 4.4 MMOL/L (ref 3.5–5.3)
SODIUM SERPL-SCNC: 138 MMOL/L (ref 135–147)
VIT B12 SERPL-MCNC: 166 PG/ML (ref 180–914)

## 2024-05-29 PROCEDURE — 80048 BASIC METABOLIC PNL TOTAL CA: CPT

## 2024-05-29 PROCEDURE — 82607 VITAMIN B-12: CPT

## 2024-05-29 PROCEDURE — 36415 COLL VENOUS BLD VENIPUNCTURE: CPT

## 2024-05-29 NOTE — TELEPHONE ENCOUNTER
----- Message from Mc Bermudez, DO sent at 5/29/2024 12:25 PM EDT -----  Please call the patient and let her know renal function appears within baseline range at this time however I would like her to repeat a BMP in 2 weeks to monitor for stability while on medical therapy.

## 2024-05-30 DIAGNOSIS — E53.8 VITAMIN B 12 DEFICIENCY: Primary | ICD-10-CM

## 2024-05-30 RX ORDER — CYANOCOBALAMIN 1000 UG/ML
1000 INJECTION, SOLUTION INTRAMUSCULAR; SUBCUTANEOUS
Qty: 10 ML | Refills: 1 | Status: SHIPPED | OUTPATIENT
Start: 2024-05-30

## 2024-05-31 ENCOUNTER — RA CDI HCC (OUTPATIENT)
Dept: OTHER | Facility: HOSPITAL | Age: 84
End: 2024-05-31

## 2024-06-03 ENCOUNTER — RA CDI HCC (OUTPATIENT)
Dept: OTHER | Facility: HOSPITAL | Age: 84
End: 2024-06-03

## 2024-06-03 ENCOUNTER — TELEPHONE (OUTPATIENT)
Age: 84
End: 2024-06-03

## 2024-06-07 ENCOUNTER — OFFICE VISIT (OUTPATIENT)
Dept: FAMILY MEDICINE CLINIC | Facility: CLINIC | Age: 84
End: 2024-06-07
Payer: COMMERCIAL

## 2024-06-07 VITALS
HEART RATE: 79 BPM | DIASTOLIC BLOOD PRESSURE: 60 MMHG | WEIGHT: 192.4 LBS | OXYGEN SATURATION: 98 % | SYSTOLIC BLOOD PRESSURE: 122 MMHG | HEIGHT: 61 IN | TEMPERATURE: 97.2 F | BODY MASS INDEX: 36.32 KG/M2

## 2024-06-07 DIAGNOSIS — D68.51 FACTOR V LEIDEN MUTATION (HCC): ICD-10-CM

## 2024-06-07 DIAGNOSIS — Z00.00 MEDICARE ANNUAL WELLNESS VISIT, SUBSEQUENT: Primary | ICD-10-CM

## 2024-06-07 DIAGNOSIS — R32 URINARY INCONTINENCE, UNSPECIFIED TYPE: ICD-10-CM

## 2024-06-07 DIAGNOSIS — E55.9 VITAMIN D DEFICIENCY: ICD-10-CM

## 2024-06-07 DIAGNOSIS — F33.9 RECURRENT MAJOR DEPRESSIVE DISORDER, REMISSION STATUS UNSPECIFIED (HCC): ICD-10-CM

## 2024-06-07 DIAGNOSIS — E53.8 VITAMIN B12 DEFICIENCY: ICD-10-CM

## 2024-06-07 DIAGNOSIS — Z23 ENCOUNTER FOR IMMUNIZATION: ICD-10-CM

## 2024-06-07 DIAGNOSIS — R41.3 MEMORY CHANGES: ICD-10-CM

## 2024-06-07 PROCEDURE — 99214 OFFICE O/P EST MOD 30 MIN: CPT | Performed by: NURSE PRACTITIONER

## 2024-06-07 PROCEDURE — 96372 THER/PROPH/DIAG INJ SC/IM: CPT | Performed by: NURSE PRACTITIONER

## 2024-06-07 PROCEDURE — G0439 PPPS, SUBSEQ VISIT: HCPCS | Performed by: NURSE PRACTITIONER

## 2024-06-07 RX ORDER — CYANOCOBALAMIN 1000 UG/ML
1000 INJECTION, SOLUTION INTRAMUSCULAR; SUBCUTANEOUS
Status: SHIPPED | OUTPATIENT
Start: 2024-06-07

## 2024-06-07 RX ORDER — PREDNISOLONE ACETATE 10 MG/ML
1 SUSPENSION/ DROPS OPHTHALMIC 4 TIMES DAILY
COMMUNITY
Start: 2024-04-07

## 2024-06-07 RX ADMIN — CYANOCOBALAMIN 1000 MCG: 1000 INJECTION, SOLUTION INTRAMUSCULAR; SUBCUTANEOUS at 11:47

## 2024-06-07 NOTE — PROGRESS NOTES
Ambulatory Visit  Name: Jordyn Spivey      : 1940      MRN: [id number]  Encounter Provider: NANCY Lee  Encounter Date: 2024   Encounter department: [facility]    Assessment & Plan   1. Medicare annual wellness visit, subsequent  2. Urinary incontinence, unspecified type  3. Encounter for immunization  -     Pneumococcal Conjugate Vaccine 20-valent (Pcv20)  4. Vitamin B12 deficiency  -     cyanocobalamin injection 1,000 mcg  5. Recurrent major depressive disorder, remission status unspecified ([facility])  6. Factor V Leiden mutation ([facility])  7. Vitamin D deficiency  -     Vitamin D 25 hydroxy; Future  8. Memory changes      Depression Screening and Follow-up Plan: Patient was screened for depression during today's encounter. They screened negative with a PHQ-9 score of 0.    Falls Plan of Care: Recommended assistive device to help with gait and balance. Medications that increase falls were reviewed. Vitamin D supplementation was recommended. Home safety education provided.     Urinary Incontinence Plan of Care: counseling topics discussed: practice Kegel (pelvic floor strengthening) exercises, use restroom every 2 hours, limit alcohol, caffeine, spicy foods, and acidic foods, limiting fluid intake 3-4 hours before bed and preventing constipation.       Preventive health issues were discussed with patient, and age appropriate screening tests were ordered as noted in patient's After Visit Summary. Personalized health advice and appropriate referrals for health education or preventive services given if needed, as noted in patient's After Visit Summary.    History of Present Illness   {Disappearing Hyperlinks I Encounters * My Last Note * Since Last Visit * History :43408}  Patient presents for Medicare well visit and follow-up.  Patient does have intermittent tingling in the upper extremities.  Does have some intermittent neck pain.  X-ray shows loss of height between  vertebrae.  Offered follow-up with spine and pain but declines at this time.  B12 was low at 166 started B12 injections today.  Will repeat every 30 days repeat labs in 2 months.  If does not improve with B12 then we know this is not part of the reason for her paresthesias in her upper extremities.  Over the last 1 year daughter has noted that she is having some memory deficit changes and some depression symptoms.  As we need more time to assess this thoroughly will have patient back in a few weeks to complete cognitive screenings.  Pressure has been well-controlled checking at home with a wrist cuff and it is in the 120s over 60s.       Patient Care Team:  NANCY Lee as PCP - General (Family Medicine)  Anselmo Mccullough DO as PCP - PCP-[facility] ([facility])  Anselmo Mccullough DO as PCP - PCP-[facility] ([facility])  Gonzalo Lan MD (Nephrology)  Mc Bermudez DO (Cardiology)  Trinidad Mendez PA-C as Physician Assistant (Vascular Surgery)    Review of Systems   Constitutional:  Negative for chills and fever.   HENT:  Negative for ear pain and sore throat.    Eyes:  Negative for pain and visual disturbance.   Respiratory:  Negative for cough and shortness of breath.    Cardiovascular:  Negative for chest pain and palpitations.   Gastrointestinal:  Negative for abdominal pain, constipation, diarrhea and vomiting.   Genitourinary:  Negative for dysuria and hematuria.   Musculoskeletal:  Positive for neck pain. Negative for arthralgias and back pain.   Skin:  Negative for color change and rash.   Neurological:  Positive for numbness. Negative for seizures and syncope.   Psychiatric/Behavioral:  Positive for dysphoric mood.    All other systems reviewed and are negative.    Medical History Reviewed by provider this encounter:  Tobacco  Allergies  Meds  Problems  Med Hx  Surg Hx  Fam Hx       Annual Wellness Visit Questionnaire   Jordyn is here for her Subsequent Wellness visit.  Last Medicare Wellness visit information reviewed, patient interviewed and updates made to the record today.      Health Risk Assessment:   Patient rates overall health as good. Patient feels that their physical health rating is slightly worse. Patient is satisfied with their life. Eyesight was rated as much worse. Hearing was rated as same. Patient feels that their emotional and mental health rating is same. Patients states they are never, rarely angry. Patient states they are sometimes unusually tired/fatigued. Pain experienced in the last 7 days has been some. Patient's pain rating has been 6/10. Patient states that she has experienced no weight loss or gain in last 6 months.     Depression Screening:   PHQ-9 Score: 0      Fall Risk Screening:   In the past year, patient has experienced: history of falling in past year    Number of falls: 2 or more  Injured during fall?: Yes    Feels unsteady when standing or walking?: Yes    Worried about falling?: Yes      Urinary Incontinence Screening:   Patient has leaked urine accidently in the last six months.     Home Safety:  Patient has trouble with stairs inside or outside of their home. Patient has working smoke alarms and has working carbon monoxide detector. Home safety hazards include: none.     Nutrition:   Current diet is Low Carb.     Medications:   Patient is currently taking over-the-counter supplements. OTC medications include: see medication list. Patient is able to manage medications.     Activities of Daily Living (ADLs)/Instrumental Activities of Daily Living (IADLs):   Walk and transfer into and out of bed and chair?: Yes  Dress and groom yourself?: Yes    Bathe or shower yourself?: Yes    Feed yourself? Yes  Do your laundry/housekeeping?: Yes  Manage your money, pay your bills and track your expenses?: Yes  Make your own meals?: Yes    Do your own shopping?: Yes    Previous Hospitalizations:   Any hospitalizations or ED visits within the last 12  months?: Yes    How many hospitalizations have you had in the last year?: 1-2    Advance Care Planning:   Living will: No    Durable POA for healthcare: No    Advanced directive: No    Advanced directive counseling given: Yes    ACP document given: Yes    End of Life Decisions reviewed with patient: Yes    Provider agrees with end of life decisions: Yes      PREVENTIVE SCREENINGS      Cardiovascular Screening:    General: Screening Not Indicated and History Lipid Disorder      Diabetes Screening:     General: Screening Current      Cervical Cancer Screening:    General: Screening Not Indicated      Lung Cancer Screening:     General: Screening Not Indicated    Screening, Brief Intervention, and Referral to Treatment (SBIRT)    Screening  Typical number of drinks in a day: 0  Typical number of drinks in a week: 0  Interpretation: Low risk drinking behavior.    Single Item Drug Screening:  How often have you used an illegal drug (including marijuana) or a prescription medication for non-medical reasons in the past year? never    Single Item Drug Screen Score: 0  Interpretation: Negative screen for possible drug use disorder    Social Determinants of Health     Financial Resource Strain: Low Risk  (1/27/2023)    Overall Financial Resource Strain (CARDIA)    • Difficulty of Paying Living Expenses: Not hard at all   Food Insecurity: No Food Insecurity (6/7/2024)    Hunger Vital Sign    • Worried About Running Out of Food in the Last Year: Never true    • Ran Out of Food in the Last Year: Never true   Transportation Needs: No Transportation Needs (6/7/2024)    PRAPARE - Transportation    • Lack of Transportation (Medical): No    • Lack of Transportation (Non-Medical): No   Housing Stability: Unknown (6/7/2024)    Housing Stability Vital Sign    • Unable to Pay for Housing in the Last Year: No    • Homeless in the Last Year: No   Utilities: Not At Risk (6/7/2024)    [facility] Utilities    • Threatened with loss of utilities: No  "    No results found.    Objective   {Disappearing Hyperlinks   Review Vitals * Enter New Vitals * Results Review * Labs * Imaging * Cardiology * Procedures * Lung Cancer Screening :61898}  /60   Pulse 79   Temp (!) 97.2 °F (36.2 °C) (Tympanic)   Ht 5' 1\" (1.549 m)   Wt 87.3 kg (192 lb 6.4 oz)   SpO2 98%   BMI 36.35 kg/m²     Physical Exam  Vitals and nursing note reviewed.   Constitutional:       General: She is not in acute distress.     Appearance: Normal appearance. She is well-developed and normal weight.   HENT:      Head: Normocephalic and atraumatic.      Right Ear: Tympanic membrane, ear canal and external ear normal. There is no impacted cerumen.      Left Ear: Tympanic membrane, ear canal and external ear normal. There is no impacted cerumen.      Nose: Nose normal. No congestion or rhinorrhea.      Mouth/Throat:      Mouth: Mucous membranes are moist.      Pharynx: Oropharynx is clear. No oropharyngeal exudate or posterior oropharyngeal erythema.   Eyes:      General: No scleral icterus.        Right eye: No discharge.         Left eye: No discharge.      Extraocular Movements: Extraocular movements intact.      Conjunctiva/sclera: Conjunctivae normal.      Pupils: Pupils are equal, round, and reactive to light.   Cardiovascular:      Rate and Rhythm: Normal rate and regular rhythm.      Pulses: Normal pulses.      Heart sounds: Murmur heard.   Pulmonary:      Effort: Pulmonary effort is normal. No respiratory distress.      Breath sounds: Normal breath sounds. No wheezing or rales.   Abdominal:      General: Abdomen is flat. Bowel sounds are normal. There is no distension.      Palpations: Abdomen is soft.      Tenderness: There is no abdominal tenderness. There is no guarding.   Musculoskeletal:         General: No swelling. Normal range of motion.      Cervical back: Normal range of motion and neck supple. No rigidity or tenderness.      Right lower leg: No edema.      Left lower leg: No " edema.   Lymphadenopathy:      Cervical: No cervical adenopathy.   Skin:     General: Skin is warm and dry.      Capillary Refill: Capillary refill takes less than 2 seconds.      Findings: No bruising, erythema or lesion.   Neurological:      General: No focal deficit present.      Mental Status: She is alert and oriented to person, place, and time. Mental status is at baseline.      Motor: No weakness.      Coordination: Coordination normal.      Gait: Gait normal.   Psychiatric:         Mood and Affect: Mood normal.         Behavior: Behavior normal.         Thought Content: Thought content normal.         Judgment: Judgment normal.       Administrative Statements {Disappearing Hyperlinks I  Level of Service * [facility]:70483}

## 2024-06-07 NOTE — PATIENT INSTRUCTIONS

## 2024-06-14 ENCOUNTER — RA CDI HCC (OUTPATIENT)
Dept: OTHER | Facility: HOSPITAL | Age: 84
End: 2024-06-14

## 2024-06-18 DIAGNOSIS — J32.9 CHRONIC SINUSITIS, UNSPECIFIED LOCATION: ICD-10-CM

## 2024-06-18 DIAGNOSIS — R09.82 POST-NASAL DRIP: ICD-10-CM

## 2024-06-18 RX ORDER — FLUTICASONE PROPIONATE 50 MCG
SPRAY, SUSPENSION (ML) NASAL
Qty: 16 G | Refills: 5 | Status: SHIPPED | OUTPATIENT
Start: 2024-06-18

## 2024-07-03 ENCOUNTER — TELEPHONE (OUTPATIENT)
Age: 84
End: 2024-07-03

## 2024-07-03 NOTE — TELEPHONE ENCOUNTER
Daughter called to schedule an appt. For B12 shot injection. Did not see an order for it in chart. Warm transferred to office for further assistance.

## 2024-07-05 ENCOUNTER — CLINICAL SUPPORT (OUTPATIENT)
Dept: FAMILY MEDICINE CLINIC | Facility: CLINIC | Age: 84
End: 2024-07-05
Payer: COMMERCIAL

## 2024-07-05 DIAGNOSIS — E53.8 VITAMIN B 12 DEFICIENCY: Primary | ICD-10-CM

## 2024-07-05 PROCEDURE — 99211 OFF/OP EST MAY X REQ PHY/QHP: CPT | Performed by: NURSE PRACTITIONER

## 2024-07-11 DIAGNOSIS — E78.5 HYPERLIPIDEMIA, UNSPECIFIED HYPERLIPIDEMIA TYPE: ICD-10-CM

## 2024-07-11 RX ORDER — EZETIMIBE 10 MG/1
10 TABLET ORAL DAILY
Qty: 30 TABLET | Refills: 5 | Status: SHIPPED | OUTPATIENT
Start: 2024-07-11

## 2024-07-21 ENCOUNTER — OFFICE VISIT (OUTPATIENT)
Dept: URGENT CARE | Facility: CLINIC | Age: 84
End: 2024-07-21
Payer: COMMERCIAL

## 2024-07-21 VITALS
DIASTOLIC BLOOD PRESSURE: 70 MMHG | WEIGHT: 191 LBS | SYSTOLIC BLOOD PRESSURE: 150 MMHG | OXYGEN SATURATION: 98 % | TEMPERATURE: 97.4 F | BODY MASS INDEX: 36.09 KG/M2 | RESPIRATION RATE: 18 BRPM | HEART RATE: 74 BPM

## 2024-07-21 DIAGNOSIS — N30.01 ACUTE CYSTITIS WITH HEMATURIA: Primary | ICD-10-CM

## 2024-07-21 DIAGNOSIS — R30.0 DYSURIA: ICD-10-CM

## 2024-07-21 LAB
SL AMB  POCT GLUCOSE, UA: ABNORMAL
SL AMB LEUKOCYTE ESTERASE,UA: ABNORMAL
SL AMB POCT BILIRUBIN,UA: ABNORMAL
SL AMB POCT BLOOD,UA: ABNORMAL
SL AMB POCT CLARITY,UA: ABNORMAL
SL AMB POCT COLOR,UA: ABNORMAL
SL AMB POCT KETONES,UA: ABNORMAL
SL AMB POCT NITRITE,UA: ABNORMAL
SL AMB POCT PH,UA: 5
SL AMB POCT SPECIFIC GRAVITY,UA: 1.02
SL AMB POCT URINE PROTEIN: 30
SL AMB POCT UROBILINOGEN: 0.2

## 2024-07-21 PROCEDURE — 81002 URINALYSIS NONAUTO W/O SCOPE: CPT | Performed by: NURSE PRACTITIONER

## 2024-07-21 PROCEDURE — 99214 OFFICE O/P EST MOD 30 MIN: CPT | Performed by: NURSE PRACTITIONER

## 2024-07-21 PROCEDURE — S9088 SERVICES PROVIDED IN URGENT: HCPCS | Performed by: NURSE PRACTITIONER

## 2024-07-21 PROCEDURE — 87086 URINE CULTURE/COLONY COUNT: CPT | Performed by: NURSE PRACTITIONER

## 2024-07-21 RX ORDER — CEPHALEXIN 500 MG/1
500 CAPSULE ORAL EVERY 12 HOURS SCHEDULED
Qty: 14 CAPSULE | Refills: 0 | Status: SHIPPED | OUTPATIENT
Start: 2024-07-21 | End: 2024-07-28

## 2024-07-21 NOTE — PROGRESS NOTES
"  [facility]        NAME: Jordyn Spivey is a 84 y.o. female  : 1940    MRN: [id number]  DATE: 2024  TIME: 10:58 AM    Assessment and Plan   Acute cystitis with hematuria [N30.01]  1. Acute cystitis with hematuria  cephalexin (KEFLEX) 500 mg capsule      2. Dysuria  POCT urine dip    Urine culture    cephalexin (KEFLEX) 500 mg capsule            Patient Instructions       Follow up with PCP in 3-5 days.  Proceed to  ER if symptoms worsen.    If tests have been performed at [facility], our office will contact you with results if changes need to be made to the care plan discussed with you at the visit.  You can review your full results on [facility] MyChart.  Your urine shows bacteria.  You have been prescribed an antibiotic. You are to take all medication as prescribed.   You are to avoid alcohol and caffeine - they are bladder irritants.  Drink water.  Take tylenol or motrin for pain or fever.    You are to download [facility] mychart for the results in 3-5 days.   You will be notified if the antibiotic needs changed.  Follow up with your PCP in 2-3 days.   Go to the ED if symptoms worsen.      You have been prescribed cephalexin - take all medication as prescribed    If tests have been performed at [facility], our office will contact you with results if changes need to be made to the care plan discussed with you at the visit.  You can review your full results on [facility] MyChart.              Chief Complaint     Chief Complaint   Patient presents with    Possible UTI     Pt c/o urinary burning, pain, frequency, and blood in urine that started yesterday         History of Present Illness       This is an 84 year old female who states developed urinary burning, frequency and hematuria yesterday.  Denies fevers, chills, n/v/d. She has not taken anything for her symptoms.  PMH is listed.   Daughter asks for \"bladder spasm medications\".          Review of Systems   Review of Systems   Constitutional: " Negative.    HENT: Negative.     Eyes: Negative.    Respiratory: Negative.     Cardiovascular: Negative.    Gastrointestinal: Negative.    Endocrine: Negative.    Genitourinary:  Positive for dysuria, frequency, hematuria and urgency.   Musculoskeletal: Negative.    Skin: Negative.    Allergic/Immunologic: Negative.    Neurological: Negative.    Hematological: Negative.    Psychiatric/Behavioral: Negative.           Current Medications       Current Outpatient Medications:     buPROPion (WELLBUTRIN XL) 150 mg 24 hr tablet, take 1 tablet by mouth once daily, Disp: 90 tablet, Rfl: 3    cephalexin (KEFLEX) 500 mg capsule, Take 1 capsule (500 mg total) by mouth every 12 (twelve) hours for 7 days, Disp: 14 capsule, Rfl: 0    cetirizine (ZyrTEC) 10 mg tablet, Take 10 mg by mouth daily, Disp: , Rfl:     cyanocobalamin 1,000 mcg/mL, Inject 1 mL (1,000 mcg total) into a muscle every 30 (thirty) days, Disp: 10 mL, Rfl: 1    diltiazem (CARDIZEM CD) 180 mg 24 hr capsule, take 1 capsule by mouth once daily, Disp: 90 capsule, Rfl: 3    Eliquis 5 MG, take 1 tablet by mouth twice a day, Disp: 180 tablet, Rfl: 3    ezetimibe (ZETIA) 10 mg tablet, take 1 tablet by mouth once daily, Disp: 30 tablet, Rfl: 5    fenofibrate (TRICOR) 54 MG tablet, Take 1 tablet (54 mg total) by mouth daily, Disp: 30 tablet, Rfl: 6    fluticasone (FLONASE) 50 mcg/act nasal spray, instill 1 spray into each nostril daily, Disp: 16 g, Rfl: 5    prednisoLONE acetate (PRED FORTE) 1 % ophthalmic suspension, Administer 1 drop to the right eye 4 (four) times a day, Disp: , Rfl:     spironolactone (ALDACTONE) 25 mg tablet, Take 50 mg (2 tablets) in a.m. and 25 mg (1 tablet) in p.m., Disp: 270 tablet, Rfl: 3    meclizine (ANTIVERT) 25 mg tablet, Take 0.5 tablets (12.5 mg total) by mouth 3 (three) times a day as needed for dizziness (Patient not taking: Reported on 2/9/2024), Disp: 30 tablet, Rfl: 0    Current Facility-Administered Medications:     cyanocobalamin  injection 1,000 mcg, 1,000 mcg, Intramuscular, Q30 Days, , 1,000 mcg at 06/07/24 1147    Current Allergies     Allergies as of 07/21/2024 - Reviewed 07/21/2024   Allergen Reaction Noted    Aspirin  03/20/2018    Icosapent ethyl Myalgia 04/15/2024    Montelukast  03/25/2016    Nsaids  03/25/2016    Pravastatin  03/25/2016    Simvastatin  03/25/2016    Tolmetin  03/25/2016            The following portions of the patient's history were reviewed and updated as appropriate: allergies, current medications, past family history, past medical history, past social history, past surgical history and problem list.     Past Medical History:   Diagnosis Date    Atrial fibrillation (HCC)     Esophageal stricture     Factor V Leiden mutation (HCC)     LAST ASSESSED: 8/3/16    Hypertension        Past Surgical History:   Procedure Laterality Date    CATARACT EXTRACTION, BILATERAL Bilateral     REDUCTION MAMMAPLASTY Bilateral     TONSILLECTOMY AND ADENOIDECTOMY      TUBAL LIGATION Bilateral        Family History   Problem Relation Age of Onset    Hypertension Mother     Cancer Father          Medications have been verified.        Objective   /70   Pulse 74   Temp (!) 97.4 °F (36.3 °C) (Temporal)   Resp 18   Wt 86.6 kg (191 lb)   SpO2 98%   BMI 36.09 kg/m²   No LMP recorded. Patient is postmenopausal.       Physical Exam     Physical Exam  Vitals and nursing note reviewed.   Constitutional:       General: She is not in acute distress.     Appearance: Normal appearance. She is obese. She is not ill-appearing, toxic-appearing or diaphoretic.   Musculoskeletal:      Comments: Uses a walker for ambulation assistance    Neurological:      Mental Status: She is alert.           Poct urine with pink urine, large blood, mod leuks  protein 30    Will send for ua culture      Education provided on caffeine avoidance as this can cause bladder spasms and irritation.  Discussed azo. Explained that care now does not prescribed  oxybutin and other bladder medications.

## 2024-07-21 NOTE — PATIENT INSTRUCTIONS
Your urine shows bacteria.  You have been prescribed an antibiotic. You are to take all medication as prescribed.   You are to avoid alcohol and caffeine - they are bladder irritants.  Drink water.  Take tylenol or motrin for pain or fever.    You are to download [facility] for the results in 3-5 days.   You will be notified if the antibiotic needs changed.  Follow up with your PCP in 2-3 days.   Go to the ED if symptoms worsen.      You have been prescribed cephalexin - take all medication as prescribed    If tests have been performed at [facility], our office will contact you with results if changes need to be made to the care plan discussed with you at the visit.  You can review your full results on [facility].

## 2024-07-22 LAB — BACTERIA UR CULT: NORMAL

## 2024-07-29 ENCOUNTER — TELEPHONE (OUTPATIENT)
Age: 84
End: 2024-07-29

## 2024-08-02 ENCOUNTER — HOSPITAL ENCOUNTER (OUTPATIENT)
Dept: CT IMAGING | Facility: HOSPITAL | Age: 84
End: 2024-08-02
Payer: COMMERCIAL

## 2024-08-02 DIAGNOSIS — R91.1 LUNG NODULE: ICD-10-CM

## 2024-08-02 PROCEDURE — G1004 CDSM NDSC: HCPCS

## 2024-08-02 PROCEDURE — 71250 CT THORAX DX C-: CPT

## 2024-08-07 ENCOUNTER — TELEPHONE (OUTPATIENT)
Dept: FAMILY MEDICINE CLINIC | Facility: CLINIC | Age: 84
End: 2024-08-07

## 2024-08-07 DIAGNOSIS — K86.89 PANCREATIC CALCIFICATION: Primary | ICD-10-CM

## 2024-08-07 NOTE — TELEPHONE ENCOUNTER
----- Message from NANCY Nelson sent at 8/7/2024  2:41 PM EDT -----  Attempted to call patient yesterday, no answer. Patient had a nodule in the lungs which is stable.   She does have a calcification on the pancrease which need to be evaluated further. I ordered this with contrast. With renal function, should make sure drinking enough fluids after to help flush kidneys.

## 2024-08-14 ENCOUNTER — HOSPITAL ENCOUNTER (OUTPATIENT)
Dept: CT IMAGING | Facility: HOSPITAL | Age: 84
Discharge: HOME | End: 2024-08-14
Payer: COMMERCIAL

## 2024-08-14 DIAGNOSIS — K86.89 PANCREATIC CALCIFICATION: ICD-10-CM

## 2024-08-14 PROCEDURE — 74177 CT ABD & PELVIS W/CONTRAST: CPT

## 2024-08-14 RX ADMIN — IOHEXOL 100 ML: 350 INJECTION, SOLUTION INTRAVENOUS at 08:14

## 2024-08-16 ENCOUNTER — TELEPHONE (OUTPATIENT)
Age: 84
End: 2024-08-16

## 2024-08-16 NOTE — TELEPHONE ENCOUNTER
Pt's daughter warm transferred by PEP.  Inquiring after 8/14 CT Abd and Pelvis results.  Please review and advise and call daughter back.

## 2024-08-19 DIAGNOSIS — K86.1 CHRONIC PANCREATITIS, UNSPECIFIED PANCREATITIS TYPE (HCC): Primary | ICD-10-CM

## 2024-08-19 NOTE — TELEPHONE ENCOUNTER
Spoke to patient she stated she does not have any pain on the right side ( on the left yes) but not the right and she stated she is not having any symptoms she also stated she knew about the hiatal hernia she has had that for many years. I advised her to call back if any symptoms develop and that GI will be reaching out to her.

## 2024-08-21 ENCOUNTER — OFFICE VISIT (OUTPATIENT)
Dept: FAMILY MEDICINE CLINIC | Facility: CLINIC | Age: 84
End: 2024-08-21
Payer: COMMERCIAL

## 2024-08-21 ENCOUNTER — TELEPHONE (OUTPATIENT)
Age: 84
End: 2024-08-21

## 2024-08-21 ENCOUNTER — TELEPHONE (OUTPATIENT)
Dept: FAMILY MEDICINE CLINIC | Facility: CLINIC | Age: 84
End: 2024-08-21

## 2024-08-21 VITALS
BODY MASS INDEX: 36.25 KG/M2 | SYSTOLIC BLOOD PRESSURE: 126 MMHG | WEIGHT: 192 LBS | HEIGHT: 61 IN | DIASTOLIC BLOOD PRESSURE: 70 MMHG | HEART RATE: 69 BPM | OXYGEN SATURATION: 98 % | TEMPERATURE: 97 F

## 2024-08-21 DIAGNOSIS — R35.0 URINARY FREQUENCY: Primary | ICD-10-CM

## 2024-08-21 LAB
SL AMB  POCT GLUCOSE, UA: NEGATIVE
SL AMB LEUKOCYTE ESTERASE,UA: ABNORMAL
SL AMB POCT BILIRUBIN,UA: NEGATIVE
SL AMB POCT BLOOD,UA: ABNORMAL
SL AMB POCT CLARITY,UA: ABNORMAL
SL AMB POCT COLOR,UA: ABNORMAL
SL AMB POCT KETONES,UA: NEGATIVE
SL AMB POCT NITRITE,UA: NEGATIVE
SL AMB POCT PH,UA: 5.5
SL AMB POCT SPECIFIC GRAVITY,UA: 1.02
SL AMB POCT URINE PROTEIN: ABNORMAL
SL AMB POCT UROBILINOGEN: 0.2

## 2024-08-21 PROCEDURE — 81002 URINALYSIS NONAUTO W/O SCOPE: CPT

## 2024-08-21 PROCEDURE — 99213 OFFICE O/P EST LOW 20 MIN: CPT

## 2024-08-21 RX ORDER — PHENAZOPYRIDINE HYDROCHLORIDE 100 MG/1
100 TABLET, FILM COATED ORAL 3 TIMES DAILY PRN
Qty: 20 TABLET | Refills: 0 | Status: SHIPPED | OUTPATIENT
Start: 2024-08-21

## 2024-08-21 NOTE — PROGRESS NOTES
Ambulatory Visit  Name: Jordyn Spivey      : 1940      MRN: [id number]  Encounter Provider: NANCY Conway  Encounter Date: 2024   Encounter department: [facility]    Assessment & Plan   1. Urinary frequency  -     phenazopyridine (PYRIDIUM) 100 mg tablet; Take 1 tablet (100 mg total) by mouth 3 (three) times a day as needed for bladder spasms  -     Ambulatory Referral to Urogynecology; Future  -     POCT urine dip  -     Urinalysis with microscopic; Future  -     Urine culture; Future    Unable to provide enough of a sample for culture.  Recommend repeat sample - will place orders for OP testing.    Depression Screening and Follow-up Plan: Patient was screened for depression during today's encounter. They screened negative with a PHQ-9 score of 0.      History of Present Illness     Urinary Tract Infection   Associated symptoms include frequency and hematuria. Pertinent negatives include no chills, flank pain, nausea or vomiting.     Presents for urinary frequency  States she is also having some hematuria  Reports being seen in UC 2-3 weeks ago and treated with Keflex for UTI  UXC subseq negative  Pt states symptoms have continued  Denies fevers or chills  No back pain  +urinary incontinence      Review of Systems   Constitutional:  Negative for chills, fatigue and fever.   HENT:  Negative for congestion, ear pain, facial swelling, hearing loss, rhinorrhea, sinus pressure, sneezing, sore throat and trouble swallowing.    Eyes:  Negative for pain, redness and visual disturbance.   Respiratory:  Negative for cough, chest tightness, shortness of breath and wheezing.    Cardiovascular:  Negative for chest pain and palpitations.   Gastrointestinal:  Negative for abdominal pain, diarrhea, nausea and vomiting.   Genitourinary:  Positive for frequency and hematuria. Negative for dysuria, flank pain and pelvic pain.   Musculoskeletal:  Negative for arthralgias, back pain  "and myalgias.   Skin:  Negative for color change and rash.   Neurological:  Negative for dizziness, seizures, syncope, weakness, light-headedness and headaches.   Psychiatric/Behavioral:  Negative for confusion, hallucinations and sleep disturbance. The patient is not nervous/anxious.    All other systems reviewed and are negative.      Objective     /70   Pulse 69   Temp (!) 97 °F (36.1 °C)   Ht 5' 1\" (1.549 m)   Wt 87.1 kg (192 lb)   SpO2 98%   BMI 36.28 kg/m²     Physical Exam  Vitals and nursing note reviewed.   Constitutional:       General: She is not in acute distress.     Appearance: She is well-developed.   HENT:      Head: Normocephalic and atraumatic.      Right Ear: Hearing and tympanic membrane normal.      Left Ear: Hearing and tympanic membrane normal.      Nose: Nose normal.      Mouth/Throat:      Mouth: Mucous membranes are moist.      Dentition: Normal dentition.      Tongue: No lesions.      Pharynx: Oropharynx is clear. Uvula midline. No oropharyngeal exudate.      Tonsils: No tonsillar exudate.   Eyes:      Extraocular Movements: Extraocular movements intact.      Conjunctiva/sclera: Conjunctivae normal.   Neck:      Vascular: No carotid bruit or JVD.   Cardiovascular:      Rate and Rhythm: Normal rate and regular rhythm.      Heart sounds: S1 normal and S2 normal. No murmur heard.  Pulmonary:      Effort: Pulmonary effort is normal. No tachypnea or respiratory distress.      Breath sounds: Normal breath sounds and air entry. No decreased breath sounds.   Chest:      Chest wall: No deformity or tenderness.   Abdominal:      General: Abdomen is flat. Bowel sounds are normal. There is no distension.      Palpations: Abdomen is soft.      Tenderness: There is no abdominal tenderness. There is no right CVA tenderness, left CVA tenderness or guarding.   Musculoskeletal:         General: No swelling.      Cervical back: Full passive range of motion without pain and neck supple.      Right " lower leg: No edema.      Left lower leg: No edema.   Lymphadenopathy:      Cervical: No cervical adenopathy.   Skin:     General: Skin is warm and dry.      Capillary Refill: Capillary refill takes less than 2 seconds.      Findings: No rash.   Neurological:      Mental Status: She is alert and oriented to person, place, and time.      Cranial Nerves: Cranial nerves 2-12 are intact.      Sensory: Sensation is intact.      Motor: Motor function is intact.      Coordination: Coordination is intact.      Gait: Gait is intact.   Psychiatric:         Mood and Affect: Mood normal.         Behavior: Behavior is cooperative.       Administrative Statements

## 2024-08-21 NOTE — TELEPHONE ENCOUNTER
Called pt in reference to message sent over earlier about UTI, she needs be seen in office. We do have an opening as of right now for today (08/21/2024) at 2pm with Lolis Grant. Offered to pt, states she will have to call back and see if she can get a ride.

## 2024-08-21 NOTE — TELEPHONE ENCOUNTER
Patient called in stating she thinks she may have a uti again.  She has blood in her urine and other symptoms she stated.  She was requesting another rx of Cephalexin be called in for her.      Pep asked about appointment and she was unsure and said if she had to she could probably make it in.      Patient requesting medication for blood in urine/Keflex/[facility]    Please follow up with patient if appointment needed vs medication call in.

## 2024-08-25 ENCOUNTER — OFFICE VISIT (OUTPATIENT)
Dept: URGENT CARE | Facility: CLINIC | Age: 84
End: 2024-08-25
Payer: COMMERCIAL

## 2024-08-25 VITALS
WEIGHT: 195.6 LBS | SYSTOLIC BLOOD PRESSURE: 144 MMHG | BODY MASS INDEX: 36.93 KG/M2 | DIASTOLIC BLOOD PRESSURE: 80 MMHG | HEIGHT: 61 IN | TEMPERATURE: 97.7 F | RESPIRATION RATE: 18 BRPM | HEART RATE: 60 BPM | OXYGEN SATURATION: 98 %

## 2024-08-25 DIAGNOSIS — R30.0 DYSURIA: ICD-10-CM

## 2024-08-25 DIAGNOSIS — N30.01 ACUTE CYSTITIS WITH HEMATURIA: Primary | ICD-10-CM

## 2024-08-25 LAB — SL AMB POCT COLOR,UA: ABNORMAL

## 2024-08-25 PROCEDURE — 99213 OFFICE O/P EST LOW 20 MIN: CPT | Performed by: FAMILY MEDICINE

## 2024-08-25 PROCEDURE — 81002 URINALYSIS NONAUTO W/O SCOPE: CPT | Performed by: ORTHOPAEDIC SURGERY

## 2024-08-25 PROCEDURE — S9088 SERVICES PROVIDED IN URGENT: HCPCS | Performed by: FAMILY MEDICINE

## 2024-08-25 PROCEDURE — 87086 URINE CULTURE/COLONY COUNT: CPT | Performed by: ORTHOPAEDIC SURGERY

## 2024-08-25 RX ORDER — SULFAMETHOXAZOLE/TRIMETHOPRIM 800-160 MG
1 TABLET ORAL EVERY 12 HOURS SCHEDULED
Qty: 10 TABLET | Refills: 0 | Status: SHIPPED | OUTPATIENT
Start: 2024-08-25 | End: 2024-08-30

## 2024-08-25 NOTE — PATIENT INSTRUCTIONS
Take prescription antibiotic as prescribed.   Your urine sample was sent to our labs for culture. The office will contact you following results to either continue your current antibiotics or change to a different antibiotic that will work better  You may have been prescribed phenazopyridine (Pyridium) to take for relief of UTI symptoms such as pain, burning, irritation, and urinary frequency. It should only be taken for the first 48 hours of treatment. This drug causes orange/yellow discoloration of the urine, which is a normal side effect.   You should stay properly hydrated and frequently urinate to help flush out the infection  Warm compresses for abdominal discomfort  Follow up with urology  Follow up with your PCP in 3-5 days  Proceed to ER if your symptoms worsen  If you experience worsening abdominal/back pain, fever/chills, bloody urination

## 2024-08-25 NOTE — PROGRESS NOTES
[facility]        NAME: Jordyn Spivey is a 84 y.o. female  : 1940    MRN: [id number]  DATE: 2024  TIME: 1:01 PM    Assessment and Plan   Acute cystitis with hematuria [N30.01]  1. Acute cystitis with hematuria  sulfamethoxazole-trimethoprim (BACTRIM DS) 800-160 mg per tablet      2. Dysuria  POCT urine dip    Urine culture    Urine culture        POCT urine unable to be analyzed due to discoloration from the pyridium. Advised patient that she should not use pyridium for more than 2-3 days as the medication may mask worsening symptoms. Previous POCT urine and culture reviewed.    Will start the patient on a course of Bactrim per her symptoms, advised to call the urology office tomorrow for prompt follow up.     Patient Instructions     Take prescription antibiotic as prescribed.   Your urine sample was sent to our labs for culture. The office will contact you following results to either continue your current antibiotics or change to a different antibiotic that will work better  You may have been prescribed phenazopyridine (Pyridium) to take for relief of UTI symptoms such as pain, burning, irritation, and urinary frequency. It should only be taken for the first 48 hours of treatment. This drug causes orange/yellow discoloration of the urine, which is a normal side effect.   You should stay properly hydrated and frequently urinate to help flush out the infection  Warm compresses for abdominal discomfort  Follow up with urology  Follow up with your PCP in 3-5 days  Proceed to ER if your symptoms worsen  If you experience worsening abdominal/back pain, fever/chills, bloody urination    If tests are performed, our office will contact you with results only if changes need to made to the care plan discussed with you at the visit. You can review your full results on [facility].    Chief Complaint     Chief Complaint   Patient presents with    Possible UTI     Patient c/o dysuria that  started over a month ago.  Patient treated on 7/21/24 with ABX and did see improvement for a few days.  Patient did see PCP 4 days ago, urine dip obtained, not enough urine to be sent for culture per patient.          History of Present Illness       84 YOF presents to the urgent care for evaluation of urinary burning and frequency. Symptoms started about a month ago. The patient was seen at the [facility] on 7/21/2024 and prescribed Keflex. The urine culture from that visit resulted in mixed contaminates. The patient states following the completion of the Keflex her symptoms seemed to have resolved for about a week or so. However, since then her symptoms have returned. On 8/22/2024 her PCP provided her with a script for pyridium, but was unable to obtain a urine sample. She now notices some blood in her urine. No fevers, chills, no back or flank pain. No history of complicated for frequent UTIs. On further discussion the patient denies any new or changes in medication or clothing, though she does admit that prior to symptoms she was using Lume Deoderant on her underwear. She has since stopped.         Review of Systems   Review of Systems   Constitutional:  Negative for chills and fever.   HENT:  Negative for congestion, ear pain and sore throat.    Eyes:  Negative for pain and visual disturbance.   Respiratory:  Negative for cough, chest tightness and shortness of breath.    Cardiovascular:  Negative for chest pain and palpitations.   Gastrointestinal:  Negative for abdominal pain, diarrhea, nausea and vomiting.   Genitourinary:  Positive for dysuria, frequency and hematuria. Negative for flank pain and urgency.   Musculoskeletal:  Negative for arthralgias and back pain.   Skin:  Negative for color change and rash.   Neurological:  Negative for seizures and syncope.   Psychiatric/Behavioral: Negative.     All other systems reviewed and are negative.        Current Medications       Current Outpatient Medications:      buPROPion (WELLBUTRIN XL) 150 mg 24 hr tablet, take 1 tablet by mouth once daily, Disp: 90 tablet, Rfl: 3    cetirizine (ZyrTEC) 10 mg tablet, Take 10 mg by mouth daily, Disp: , Rfl:     cyanocobalamin 1,000 mcg/mL, Inject 1 mL (1,000 mcg total) into a muscle every 30 (thirty) days, Disp: 10 mL, Rfl: 1    diltiazem (CARDIZEM CD) 180 mg 24 hr capsule, take 1 capsule by mouth once daily, Disp: 90 capsule, Rfl: 3    Eliquis 5 MG, take 1 tablet by mouth twice a day, Disp: 180 tablet, Rfl: 3    ezetimibe (ZETIA) 10 mg tablet, take 1 tablet by mouth once daily, Disp: 30 tablet, Rfl: 5    fenofibrate (TRICOR) 54 MG tablet, Take 1 tablet (54 mg total) by mouth daily, Disp: 30 tablet, Rfl: 6    fluticasone (FLONASE) 50 mcg/act nasal spray, instill 1 spray into each nostril daily, Disp: 16 g, Rfl: 5    meclizine (ANTIVERT) 25 mg tablet, Take 0.5 tablets (12.5 mg total) by mouth 3 (three) times a day as needed for dizziness, Disp: 30 tablet, Rfl: 0    phenazopyridine (PYRIDIUM) 100 mg tablet, Take 1 tablet (100 mg total) by mouth 3 (three) times a day as needed for bladder spasms, Disp: 20 tablet, Rfl: 0    prednisoLONE acetate (PRED FORTE) 1 % ophthalmic suspension, Administer 1 drop to the right eye 4 (four) times a day, Disp: , Rfl:     spironolactone (ALDACTONE) 25 mg tablet, Take 50 mg (2 tablets) in a.m. and 25 mg (1 tablet) in p.m., Disp: 270 tablet, Rfl: 3    sulfamethoxazole-trimethoprim (BACTRIM DS) 800-160 mg per tablet, Take 1 tablet by mouth every 12 (twelve) hours for 5 days, Disp: 10 tablet, Rfl: 0    Current Facility-Administered Medications:     cyanocobalamin injection 1,000 mcg, 1,000 mcg, Intramuscular, Q30 Days, , 1,000 mcg at 06/07/24 1147    Current Allergies     Allergies as of 08/25/2024 - Reviewed 08/25/2024   Allergen Reaction Noted    Aspirin  03/20/2018    Icosapent ethyl Myalgia 04/15/2024    Montelukast  03/25/2016    Nsaids  03/25/2016    Pravastatin  03/25/2016    Simvastatin  03/25/2016     "Tolmetin  03/25/2016            The following portions of the patient's history were reviewed and updated as appropriate: allergies, current medications, past family history, past medical history, past social history, past surgical history and problem list.     Past Medical History:   Diagnosis Date    Atrial fibrillation (HCC)     Esophageal stricture     Factor V Leiden mutation (HCC)     LAST ASSESSED: 8/3/16    Hypertension        Past Surgical History:   Procedure Laterality Date    CATARACT EXTRACTION, BILATERAL Bilateral     REDUCTION MAMMAPLASTY Bilateral     TONSILLECTOMY AND ADENOIDECTOMY      TUBAL LIGATION Bilateral        Family History   Problem Relation Age of Onset    Hypertension Mother     Cancer Father          Medications have been verified.        Objective   /80   Pulse 60   Temp 97.7 °F (36.5 °C) (Temporal)   Resp 18   Ht 5' 1\" (1.549 m)   Wt 88.7 kg (195 lb 9.6 oz)   SpO2 98%   BMI 36.96 kg/m²        Physical Exam     Physical Exam  Vitals and nursing note reviewed.   Constitutional:       General: She is not in acute distress.     Appearance: Normal appearance. She is not ill-appearing.   HENT:      Head: Normocephalic and atraumatic.      Nose: Nose normal.      Mouth/Throat:      Mouth: Mucous membranes are moist.      Pharynx: Oropharynx is clear.   Eyes:      Extraocular Movements: Extraocular movements intact.      Pupils: Pupils are equal, round, and reactive to light.   Cardiovascular:      Rate and Rhythm: Normal rate and regular rhythm.      Pulses: Normal pulses.      Heart sounds: Normal heart sounds.   Pulmonary:      Effort: Pulmonary effort is normal. No respiratory distress.      Breath sounds: Normal breath sounds.   Abdominal:      Palpations: Abdomen is soft.      Tenderness: There is no right CVA tenderness or left CVA tenderness.   Musculoskeletal:      Cervical back: Normal range of motion.   Skin:     General: Skin is warm and dry.      Capillary Refill: " Capillary refill takes less than 2 seconds.   Neurological:      General: No focal deficit present.      Mental Status: She is alert and oriented to person, place, and time.   Psychiatric:         Mood and Affect: Mood normal.         Behavior: Behavior normal.

## 2024-08-26 ENCOUNTER — TELEPHONE (OUTPATIENT)
Age: 84
End: 2024-08-26

## 2024-08-26 LAB — BACTERIA UR CULT: NORMAL

## 2024-08-26 NOTE — TELEPHONE ENCOUNTER
New Patient    What is the reason for the patient’s appointment?: NP- dysuria, gross hematuria, incontinence      Patients daughter states this has been going on intermittently for about a month now.     Denies any fever,chills, blood clots in urine. Patient is incontinent.     What office location does the patient prefer?:    Does patient have Imaging/Lab Results:    Urine testing last done on 8/25/24.     Have patient records been requested?:  If No, are the records showing in Epic: Records in [place].     HISTORY:   Has the patient had any previous Urologist(s)?: Daughter is unsure.       Scheduled: 8/28/24 with Douglas in our [place] office.

## 2024-08-28 ENCOUNTER — OFFICE VISIT (OUTPATIENT)
Dept: UROLOGY | Facility: CLINIC | Age: 84
End: 2024-08-28
Payer: COMMERCIAL

## 2024-08-28 VITALS
SYSTOLIC BLOOD PRESSURE: 148 MMHG | HEART RATE: 73 BPM | WEIGHT: 191.2 LBS | OXYGEN SATURATION: 97 % | BODY MASS INDEX: 36.1 KG/M2 | TEMPERATURE: 98 F | HEIGHT: 61 IN | DIASTOLIC BLOOD PRESSURE: 72 MMHG

## 2024-08-28 DIAGNOSIS — R31.0 GROSS HEMATURIA: ICD-10-CM

## 2024-08-28 DIAGNOSIS — R32 URINARY INCONTINENCE, UNSPECIFIED TYPE: Primary | ICD-10-CM

## 2024-08-28 LAB
SL AMB  POCT GLUCOSE, UA: NORMAL
SL AMB LEUKOCYTE ESTERASE,UA: NORMAL
SL AMB POCT BILIRUBIN,UA: NORMAL
SL AMB POCT BLOOD,UA: NORMAL
SL AMB POCT CLARITY,UA: CLEAR
SL AMB POCT COLOR,UA: YELLOW
SL AMB POCT KETONES,UA: NORMAL
SL AMB POCT NITRITE,UA: NORMAL
SL AMB POCT PH,UA: 6
SL AMB POCT SPECIFIC GRAVITY,UA: 1.03
SL AMB POCT URINE PROTEIN: NORMAL
SL AMB POCT UROBILINOGEN: 0.2

## 2024-08-28 PROCEDURE — 81002 URINALYSIS NONAUTO W/O SCOPE: CPT

## 2024-08-28 PROCEDURE — 99203 OFFICE O/P NEW LOW 30 MIN: CPT

## 2024-08-28 RX ORDER — OLOPATADINE HYDROCHLORIDE 1 MG/ML
1 SOLUTION/ DROPS OPHTHALMIC 2 TIMES DAILY
COMMUNITY

## 2024-08-28 NOTE — PROGRESS NOTES
8/28/2024    No chief complaint on file.      Assessment and Plan    84 y.o. female new patient to office    1.  Dysuria and gross hematuria  Based on her symptoms, I suspect this is due to acute cystitis although urine cultures have shown mixed contaminant growth.  She is currently completing a course of Bactrim which she will continue as prescribed.  Urine dip today is negative for infection or blood.  CT imaging 2 weeks ago which did not show any acute findings pertaining to the kidneys or bladder.  She does have simple renal cysts bilaterally.  For now we will continue with observation.  She will call with any recurrence of symptoms and we will order urine testing.  I would recommend including urine cytology as well.  If her urine culture shows mixed contaminant growth again we will need to perform a straight cath urine specimen in the future.  Consider need for cystoscopy as well.  I have also instructed her to start oral cranberry and d-mannose for UTI prevention.  She has a history of factor V so I am hesitant to recommend estrogen replacement therapy.  She will follow-up in 3 months.    2. Urinary incontinence  This has been stable for many years.  I cannot exclude that this may be contributing to her bothersome urinary symptoms.  Specifically her nighttime incontinence.  During the daytime she is for the most part fine.  Given her age and comorbidities we will continue with observation as well.  I would strongly caution the use of anticholinergics due to side effect profile.  Beta 3 agonist may be an alternative option although these can be cost prohibitive.      History of Present Illness  Jordyn Spivey is a 84 y.o. female new patient to office with PMHx significant for CKD, atrial fibrillation, carotid artery stenosis, hypothyroidism. Here for evaluation of dysuria and gross hematuria.    She presents today with her daughter for evaluation of dysuria and gross hematuria.  This started approximately 1  month ago and she was see at [facility] on 7/21/2024 and prescribed Keflex for presumed UTI. The urine culture from that visit resulted in mixed contaminates. The patient states following the completion of the Keflex her symptoms seemed to have resolved for about a week or so. However, since then her symptoms have returned.  She had recurrence of her symptoms in August and her PCP prescribed her Pyridium but was unable to obtain urine testing.  She was seen in urgent care again on 8/25/2024 for complaints of dysuria, she was treated again for acute cystitis and prescribed Bactrim.  Urine culture however again showed mixed contaminant growth.      She reports no prior history of recurrent UTIs.  She has no history of kidney stones or prior urologic surgeries.  In regards to her reports of gross hematuria, this was worse in July and improved after she was prescribed Keflex.  More recently she experienced gross hematuria again however this was only mild.  She is currently prescribed Bactrim and reports resolution of her symptoms.  She does have baseline urinary incontinence which she has been suffering for over 20 years.  She utilizes both pads and briefs.  At night she has difficulty getting out of bed due to ambulation so she uses a brief which is saturated by the morning.  During the day however she is for the most part okay.  She will use a pad primarily during the daytime with a brief as backup.  She changes the pad frequently and only uses about 2/day and reports only mild wetness. She had been using a deodorant on her pads which she stopped using a few days ago.  She had been doing this for several months.    She had a CT abdomen pelvis with contrast completed on 8/14/2024 this showed normal kidneys aside from simple renal cyst.  Bladder was also unremarkable.    She is a former smoker having quit 35 years ago and smoked for about 35 years on average 1 pack/day.  She denied any occupational chemical exposure.  No  family history of urinary tract malignancy to her knowledge.      Urine cultures:  8/25/2024: 70,000-79,000 CFU/mL mixed contaminants x 4    7/21/2024: 40,000-49,000 CFU/mL mixed contaminants x 4            Review of Systems   Constitutional:  Negative for chills and fever.   HENT:  Negative for ear pain and sore throat.    Eyes:  Negative for pain and visual disturbance.   Respiratory:  Negative for cough and shortness of breath.    Cardiovascular:  Negative for chest pain and palpitations.   Gastrointestinal:  Negative for abdominal pain and vomiting.   Genitourinary:  Negative for dysuria and hematuria.   Musculoskeletal:  Negative for arthralgias and back pain.   Skin:  Negative for color change and rash.   Neurological:  Negative for seizures and syncope.   All other systems reviewed and are negative.              Vitals  There were no vitals filed for this visit.    Physical Exam  Vitals reviewed.   Constitutional:       General: She is not in acute distress.     Appearance: Normal appearance. She is normal weight. She is not ill-appearing or toxic-appearing.   HENT:      Head: Normocephalic and atraumatic.      Nose: Nose normal.   Eyes:      General: No scleral icterus.     Conjunctiva/sclera: Conjunctivae normal.   Cardiovascular:      Rate and Rhythm: Normal rate.      Pulses: Normal pulses.   Pulmonary:      Effort: Pulmonary effort is normal. No respiratory distress.   Abdominal:      General: Abdomen is flat.      Palpations: Abdomen is soft.      Tenderness: There is no abdominal tenderness. There is no right CVA tenderness or left CVA tenderness.      Hernia: No hernia is present.   Musculoskeletal:         General: Normal range of motion.      Cervical back: Normal range of motion.   Skin:     General: Skin is warm and dry.   Neurological:      General: No focal deficit present.      Mental Status: She is alert and oriented to person, place, and time. Mental status is at baseline.   Psychiatric:          Mood and Affect: Mood normal.         Behavior: Behavior normal.         Thought Content: Thought content normal.         Judgment: Judgment normal.         Past History  Past Medical History:   Diagnosis Date    Atrial fibrillation (HCC)     Esophageal stricture     Factor V Leiden mutation (HCC)     LAST ASSESSED: 8/3/16    Hypertension      Social History     Socioeconomic History    Marital status:      Spouse name: Not on file    Number of children: 4    Years of education: Not on file    Highest education level: Not on file   Occupational History    Occupation: PRIOR OCCUPATION       Comment: RETIRED   Tobacco Use    Smoking status: Former     Current packs/day: 0.00     Types: Cigarettes     Quit date:      Years since quittin.6    Smokeless tobacco: Never   Vaping Use    Vaping status: Never Used   Substance and Sexual Activity    Alcohol use: Never    Drug use: Never    Sexual activity: Not Currently   Other Topics Concern    Not on file   Social History Narrative    Not on file     Social Determinants of Health     Financial Resource Strain: Low Risk  (2023)    Overall Financial Resource Strain (CARDIA)     Difficulty of Paying Living Expenses: Not hard at all   Food Insecurity: No Food Insecurity (2024)    Hunger Vital Sign     Worried About Running Out of Food in the Last Year: Never true     Ran Out of Food in the Last Year: Never true   Transportation Needs: No Transportation Needs (2024)    PRAPARE - Transportation     Lack of Transportation (Medical): No     Lack of Transportation (Non-Medical): No   Physical Activity: Not on file   Stress: Not on file   Social Connections: Unknown (2024)    Received from [facility]    Social Connections     How often do you feel lonely or isolated from those around you? (Adult - for ages 18 years and over): Not on file   Intimate Partner Violence: Not on file   Housing Stability: Unknown (2024)    Housing  Stability Vital Sign     Unable to Pay for Housing in the Last Year: No     Number of Times Moved in the Last Year: Not on file     Homeless in the Last Year: No     Social History     Tobacco Use   Smoking Status Former    Current packs/day: 0.00    Types: Cigarettes    Quit date:     Years since quittin.6   Smokeless Tobacco Never     Family History   Problem Relation Age of Onset    Hypertension Mother     Cancer Father        The following portions of the patient's history were reviewed and updated as appropriate allergies, current medications, past medical history, past social history, past surgical history and problem list    Imagin/14/2024    CT ABDOMEN AND PELVIS WITH IV CONTRAST     INDICATION:   Other specified diseases of pancreas.       COMPARISON: 2023     TECHNIQUE:  CT examination of the abdomen and pelvis was performed. Multiplanar 2D reformatted images were created from the source data.     This examination, like all CT scans performed in the [facility], was performed utilizing techniques to minimize radiation dose exposure, including the use of iterative reconstruction and automated exposure control. Radiation dose length   product (DLP) for this visit:     IV Contrast:  barium (READI-CAT 2) suspension 900 mL - iohexol (OMNIPAQUE) 350 MG/ML injection (MULTI-DOSE) 100 mL  Enteric Contrast: Enteric contrast was administered.     FINDINGS:     ABDOMEN     LOWER CHEST: No clinically significant abnormality in the visualized lower chest. Small sliding hiatal hernia.     LIVER/BILIARY TREE: Unremarkable.     GALLBLADDER: No calcified gallstones. No pericholecystic inflammatory change.     SPLEEN: Unremarkable.     PANCREAS: Coarse calcifications in the pancreatic head. Minimal pancreatic duct dilation. No peripancreatic inflammatory changes     ADRENAL GLANDS: Unremarkable.     KIDNEYS/URETERS: Simple renal cyst(s). Otherwise unremarkable kidneys. No  "hydronephrosis.     STOMACH AND BOWEL: Colonic diverticulosis without findings of acute diverticulitis.     APPENDIX: No findings to suggest appendicitis.     ABDOMINOPELVIC CAVITY: No ascites. No pneumoperitoneum. No lymphadenopathy.     VESSELS: Atherosclerosis without abdominal aortic aneurysm.     PELVIS     REPRODUCTIVE ORGANS: Unremarkable for patient's age.     URINARY BLADDER: Unremarkable.     ABDOMINAL WALL/INGUINAL REGIONS: Unremarkable.     BONES: No acute fracture or suspicious osseous lesion. Spinal degenerative changes. Total right hip arthroplasty.     IMPRESSION:        1. Coarse calcifications in the pancreatic head with minimal pancreatic duct dilation. No surrounding inflammatory changes. Findings consistent with sequelae of chronic pancreatitis..     2. Colonic diverticulosis. Small sliding hiatal hernia.    Results  No results found for this or any previous visit (from the past 1 hour(s)).]  No results found for: \"PSA\"  Lab Results   Component Value Date    CALCIUM 9.1 05/29/2024    K 4.4 05/29/2024    CO2 23 05/29/2024     05/29/2024    BUN 33 (H) 05/29/2024    CREATININE 1.39 (H) 05/29/2024     Lab Results   Component Value Date    WBC 7.84 02/23/2024    HGB 12.3 02/23/2024    HCT 38.2 02/23/2024    MCV 90 02/23/2024     02/23/2024       Please Note:  Voice dictation software has been used to create this document. There may be inadvertent transcriptions errors.     NANCY Clarke 08/28/24   "

## 2024-09-04 DIAGNOSIS — T78.40XA ALLERGY, INITIAL ENCOUNTER: Primary | ICD-10-CM

## 2024-09-05 RX ORDER — OLOPATADINE HYDROCHLORIDE 1 MG/ML
SOLUTION/ DROPS OPHTHALMIC
Qty: 5 ML | Refills: 1 | Status: SHIPPED | OUTPATIENT
Start: 2024-09-05

## 2024-09-12 ENCOUNTER — TELEPHONE (OUTPATIENT)
Age: 84
End: 2024-09-12

## 2024-09-12 DIAGNOSIS — N18.32 STAGE 3B CHRONIC KIDNEY DISEASE (HCC): ICD-10-CM

## 2024-09-12 DIAGNOSIS — N18.30 BENIGN HYPERTENSION WITH CKD (CHRONIC KIDNEY DISEASE) STAGE III (HCC): Primary | ICD-10-CM

## 2024-09-12 DIAGNOSIS — R80.9 MICROALBUMINURIA: ICD-10-CM

## 2024-09-12 DIAGNOSIS — I12.9 BENIGN HYPERTENSION WITH CKD (CHRONIC KIDNEY DISEASE) STAGE III (HCC): Primary | ICD-10-CM

## 2024-09-12 DIAGNOSIS — E55.9 VITAMIN D DEFICIENCY: ICD-10-CM

## 2024-09-12 NOTE — TELEPHONE ENCOUNTER
Received call from pt asking if the blood work ordered by Dr. Bermudez back in March is active in her chart prior to going to the lab. Informed her both the CMP and the lipid panel he ordered are active. Advised pt per instructions to fast for 10-12 hours prior to the test. She voiced understanding

## 2024-09-12 NOTE — TELEPHONE ENCOUNTER
I have placed orders with today's date sold this order should be okay for her to do with cardiology labs.

## 2024-09-12 NOTE — TELEPHONE ENCOUNTER
Patient called stating she is going tomorrow to have her labs done for her cardiology appt. She is asking if her labs for Dr. Lan can be updated from expected date of 10/1 for the date of 9/13? Please call patient back to let her know if this can be done.

## 2024-09-13 NOTE — TELEPHONE ENCOUNTER
Spoke to pt regarding the following message per Dr. Lan     I have placed orders with today's date sold this order should be okay for her to do with cardiology labs.     Pt verbalized understanding.

## 2024-09-16 ENCOUNTER — LAB (OUTPATIENT)
Dept: LAB | Facility: CLINIC | Age: 84
End: 2024-09-16
Payer: COMMERCIAL

## 2024-09-16 ENCOUNTER — TELEPHONE (OUTPATIENT)
Dept: NEPHROLOGY | Facility: CLINIC | Age: 84
End: 2024-09-16

## 2024-09-16 DIAGNOSIS — N18.9 CHRONIC KIDNEY DISEASE, UNSPECIFIED CKD STAGE: ICD-10-CM

## 2024-09-16 DIAGNOSIS — N18.32 STAGE 3B CHRONIC KIDNEY DISEASE (HCC): Primary | ICD-10-CM

## 2024-09-16 DIAGNOSIS — E53.8 VITAMIN B 12 DEFICIENCY: ICD-10-CM

## 2024-09-16 DIAGNOSIS — E55.9 VITAMIN D DEFICIENCY: ICD-10-CM

## 2024-09-16 DIAGNOSIS — I12.9 BENIGN HYPERTENSION WITH CKD (CHRONIC KIDNEY DISEASE) STAGE III (HCC): ICD-10-CM

## 2024-09-16 DIAGNOSIS — R80.9 MICROALBUMINURIA: ICD-10-CM

## 2024-09-16 DIAGNOSIS — E78.5 HYPERLIPIDEMIA, UNSPECIFIED HYPERLIPIDEMIA TYPE: ICD-10-CM

## 2024-09-16 DIAGNOSIS — N18.32 STAGE 3B CHRONIC KIDNEY DISEASE (HCC): ICD-10-CM

## 2024-09-16 DIAGNOSIS — N18.30 BENIGN HYPERTENSION WITH CKD (CHRONIC KIDNEY DISEASE) STAGE III (HCC): ICD-10-CM

## 2024-09-16 DIAGNOSIS — R35.0 URINARY FREQUENCY: ICD-10-CM

## 2024-09-16 LAB
25(OH)D3 SERPL-MCNC: 27.1 NG/ML (ref 30–100)
ALBUMIN SERPL BCG-MCNC: 4.1 G/DL (ref 3.5–5)
ALP SERPL-CCNC: 64 U/L (ref 34–104)
ALT SERPL W P-5'-P-CCNC: 8 U/L (ref 7–52)
ANION GAP SERPL CALCULATED.3IONS-SCNC: 11 MMOL/L (ref 4–13)
AST SERPL W P-5'-P-CCNC: 15 U/L (ref 13–39)
BACTERIA UR QL AUTO: ABNORMAL /HPF
BILIRUB SERPL-MCNC: 0.34 MG/DL (ref 0.2–1)
BILIRUB UR QL STRIP: NEGATIVE
BUN SERPL-MCNC: 38 MG/DL (ref 5–25)
CALCIUM SERPL-MCNC: 9.4 MG/DL (ref 8.4–10.2)
CHLORIDE SERPL-SCNC: 107 MMOL/L (ref 96–108)
CHOLEST SERPL-MCNC: 165 MG/DL
CLARITY UR: CLEAR
CO2 SERPL-SCNC: 19 MMOL/L (ref 21–32)
COLOR UR: ABNORMAL
CREAT SERPL-MCNC: 1.27 MG/DL (ref 0.6–1.3)
CREAT UR-MCNC: 47.8 MG/DL
ERYTHROCYTE [DISTWIDTH] IN BLOOD BY AUTOMATED COUNT: 13.4 % (ref 11.6–15.1)
GFR SERPL CREATININE-BSD FRML MDRD: 39 ML/MIN/1.73SQ M
GLUCOSE P FAST SERPL-MCNC: 102 MG/DL (ref 65–99)
GLUCOSE UR STRIP-MCNC: NEGATIVE MG/DL
HCT VFR BLD AUTO: 37.2 % (ref 34.8–46.1)
HDLC SERPL-MCNC: 53 MG/DL
HGB BLD-MCNC: 11.7 G/DL (ref 11.5–15.4)
HGB UR QL STRIP.AUTO: NEGATIVE
KETONES UR STRIP-MCNC: NEGATIVE MG/DL
LDLC SERPL CALC-MCNC: 85 MG/DL (ref 0–100)
LEUKOCYTE ESTERASE UR QL STRIP: NEGATIVE
MCH RBC QN AUTO: 28.9 PG (ref 26.8–34.3)
MCHC RBC AUTO-ENTMCNC: 31.5 G/DL (ref 31.4–37.4)
MCV RBC AUTO: 92 FL (ref 82–98)
MICROALBUMIN UR-MCNC: 20.5 MG/L
MICROALBUMIN/CREAT 24H UR: 43 MG/G CREATININE (ref 0–30)
MUCOUS THREADS UR QL AUTO: ABNORMAL
NITRITE UR QL STRIP: NEGATIVE
NON-SQ EPI CELLS URNS QL MICRO: ABNORMAL /HPF
PH UR STRIP.AUTO: 6 [PH]
PHOSPHATE SERPL-MCNC: 3.7 MG/DL (ref 2.3–4.1)
PLATELET # BLD AUTO: 374 THOUSANDS/UL (ref 149–390)
PMV BLD AUTO: 9.2 FL (ref 8.9–12.7)
POTASSIUM SERPL-SCNC: 4.5 MMOL/L (ref 3.5–5.3)
PROT SERPL-MCNC: 7 G/DL (ref 6.4–8.4)
PROT UR STRIP-MCNC: NEGATIVE MG/DL
RBC # BLD AUTO: 4.05 MILLION/UL (ref 3.81–5.12)
RBC #/AREA URNS AUTO: ABNORMAL /HPF
SODIUM SERPL-SCNC: 137 MMOL/L (ref 135–147)
SP GR UR STRIP.AUTO: 1.01 (ref 1–1.03)
TRIGL SERPL-MCNC: 133 MG/DL
UROBILINOGEN UR STRIP-ACNC: <2 MG/DL
VIT B12 SERPL-MCNC: 213 PG/ML (ref 180–914)
WBC # BLD AUTO: 7.26 THOUSAND/UL (ref 4.31–10.16)
WBC #/AREA URNS AUTO: ABNORMAL /HPF

## 2024-09-16 PROCEDURE — 80053 COMPREHEN METABOLIC PANEL: CPT

## 2024-09-16 PROCEDURE — 85027 COMPLETE CBC AUTOMATED: CPT

## 2024-09-16 PROCEDURE — 87186 SC STD MICRODIL/AGAR DIL: CPT

## 2024-09-16 PROCEDURE — 80061 LIPID PANEL: CPT

## 2024-09-16 PROCEDURE — 81001 URINALYSIS AUTO W/SCOPE: CPT

## 2024-09-16 PROCEDURE — 82570 ASSAY OF URINE CREATININE: CPT

## 2024-09-16 PROCEDURE — 87077 CULTURE AEROBIC IDENTIFY: CPT

## 2024-09-16 PROCEDURE — 87086 URINE CULTURE/COLONY COUNT: CPT

## 2024-09-16 PROCEDURE — 82043 UR ALBUMIN QUANTITATIVE: CPT

## 2024-09-16 PROCEDURE — 36415 COLL VENOUS BLD VENIPUNCTURE: CPT

## 2024-09-16 PROCEDURE — 82607 VITAMIN B-12: CPT

## 2024-09-16 PROCEDURE — 84100 ASSAY OF PHOSPHORUS: CPT

## 2024-09-16 PROCEDURE — 82306 VITAMIN D 25 HYDROXY: CPT

## 2024-09-16 NOTE — TELEPHONE ENCOUNTER
Creatinine levels remain stable at baseline.  Isolated low bicarb level comparing to prior.  No changes for now.  Please have her do repeat BMP in 1 month prior to her office visit in October.

## 2024-09-17 ENCOUNTER — TELEPHONE (OUTPATIENT)
Dept: FAMILY MEDICINE CLINIC | Facility: CLINIC | Age: 84
End: 2024-09-17

## 2024-09-17 ENCOUNTER — TELEPHONE (OUTPATIENT)
Dept: NEPHROLOGY | Facility: CLINIC | Age: 84
End: 2024-09-17

## 2024-09-17 DIAGNOSIS — E53.8 VITAMIN B 12 DEFICIENCY: Primary | ICD-10-CM

## 2024-09-17 NOTE — TELEPHONE ENCOUNTER
Spoke to pt regarding the following message per Dr. Lan     Vitamin D level 27 slightly lower.  Please have patient start OTC vitamin D 2000 units daily.     Pt verbalized understanding.

## 2024-09-17 NOTE — TELEPHONE ENCOUNTER
Spoke to pt regarding the following message per Dr. Lan     Creatinine levels remain stable at baseline.  Isolated low bicarb level comparing to prior.  No changes for now.  Please have her do repeat BMP in 1 month prior to her office visit in October.     Pt verbalized understanding.   Labs in epic.

## 2024-09-17 NOTE — TELEPHONE ENCOUNTER
----- Message from NANCY Nelson sent at 9/17/2024  8:18 AM EDT -----  Vit b 12 is improving but still low, continue monthly injections and can take vit b 12 500 mcg daily, repeat labs in 3 months

## 2024-09-18 ENCOUNTER — OFFICE VISIT (OUTPATIENT)
Dept: CARDIOLOGY CLINIC | Facility: CLINIC | Age: 84
End: 2024-09-18
Payer: COMMERCIAL

## 2024-09-18 ENCOUNTER — TELEPHONE (OUTPATIENT)
Age: 84
End: 2024-09-18

## 2024-09-18 VITALS
HEART RATE: 68 BPM | HEIGHT: 61 IN | BODY MASS INDEX: 35.68 KG/M2 | DIASTOLIC BLOOD PRESSURE: 70 MMHG | OXYGEN SATURATION: 97 % | SYSTOLIC BLOOD PRESSURE: 130 MMHG | RESPIRATION RATE: 18 BRPM | WEIGHT: 189 LBS

## 2024-09-18 DIAGNOSIS — I12.9 BENIGN HYPERTENSION WITH CKD (CHRONIC KIDNEY DISEASE) STAGE III (HCC): ICD-10-CM

## 2024-09-18 DIAGNOSIS — N18.30 BENIGN HYPERTENSION WITH CKD (CHRONIC KIDNEY DISEASE) STAGE III (HCC): ICD-10-CM

## 2024-09-18 DIAGNOSIS — I35.1 AORTIC VALVE INSUFFICIENCY, ETIOLOGY OF CARDIAC VALVE DISEASE UNSPECIFIED: ICD-10-CM

## 2024-09-18 DIAGNOSIS — E78.1 HYPERTRIGLYCERIDEMIA: ICD-10-CM

## 2024-09-18 DIAGNOSIS — E66.01 CLASS 2 SEVERE OBESITY DUE TO EXCESS CALORIES WITH SERIOUS COMORBIDITY IN ADULT, UNSPECIFIED BMI (HCC): ICD-10-CM

## 2024-09-18 DIAGNOSIS — I48.0 PAF (PAROXYSMAL ATRIAL FIBRILLATION) (HCC): Primary | ICD-10-CM

## 2024-09-18 LAB — BACTERIA UR CULT: ABNORMAL

## 2024-09-18 PROCEDURE — 99214 OFFICE O/P EST MOD 30 MIN: CPT | Performed by: INTERNAL MEDICINE

## 2024-09-18 NOTE — ASSESSMENT & PLAN NOTE
-Moderate on transthoracic echocardiogram August 2023  -Will repeat transthoracic echocardiogram for monitoring of severity  -Continue spironolactone 50 mg in a.m. and 25 mg in p.m.  Counseled patient on the importance of adequate blood pressure control along with dietary and lifestyle modifications  -Will continue to monitor at this time with a additional recommendations pending results of repeat echocardiographic imaging.

## 2024-09-18 NOTE — ASSESSMENT & PLAN NOTE
Lab Results   Component Value Date    EGFR 39 09/16/2024    EGFR 35 05/29/2024    EGFR 42 02/23/2024    CREATININE 1.27 09/16/2024    CREATININE 1.39 (H) 05/29/2024    CREATININE 1.20 02/23/2024     -Counseled on dietary and lifestyle modifications  -Continue medical therapy with assistance from nephrology team with spironolactone 50 mg in a.m. and 25 mg evening  -Counseled on DASH diet along with a low-salt, low-fat, heart healthy diet and weight reduction goal BMI less than 29  -Continue to monitor

## 2024-09-18 NOTE — TELEPHONE ENCOUNTER
Patient called states, returning call, she had gone to an urgent care, she can't remember the date when she  went there. She had hematuria ( blood in urine and burning sensation).      She went to urology on 8/28 and was told to continue taking cranberry tablets.  She is not having symptoms now.      Pt states if any more questions you can call her back.

## 2024-09-18 NOTE — ASSESSMENT & PLAN NOTE
-Counseled on dietary and lifestyle modifications need for weight reduction goal BMI less than 29  -Will continue to monitor

## 2024-09-18 NOTE — PROGRESS NOTES
Patient ID: Jordyn Spivey is a 84 y.o. female.        Plan:      PAF (paroxysmal atrial fibrillation) ([facility])  -Patient denies significant symptoms  -Can continue to monitor with smart watch  -Continue diltiazem 180 mg daily along with Eliquis 5 mg twice daily for stroke risk reduction given elevated CHADS2 Vascor  -Counseled on dietary lifestyle modifications will continue to monitor.    Benign hypertension with CKD (chronic kidney disease) stage III ([facility])  Lab Results   Component Value Date    EGFR 39 09/16/2024    EGFR 35 05/29/2024    EGFR 42 02/23/2024    CREATININE 1.27 09/16/2024    CREATININE 1.39 (H) 05/29/2024    CREATININE 1.20 02/23/2024     -Counseled on dietary and lifestyle modifications  -Continue medical therapy with assistance from nephrology team with spironolactone 50 mg in a.m. and 25 mg evening  -Counseled on DASH diet along with a low-salt, low-fat, heart healthy diet and weight reduction goal BMI less than 29  -Continue to monitor    Class 2 severe obesity due to excess calories with serious comorbidity in adult ([facility])  -Counseled on dietary and lifestyle modifications need for weight reduction goal BMI less than 29  -Will continue to monitor    Hypertriglyceridemia  -Improved on current medical therapy with Zetia 10 mg daily and fenofibrate 54 mg daily therefore we will continue at this dose and monitor  -Unfortunately patient had issues with statin intolerance  -Counseled on dietary and lifestyle modifications.    Aortic regurgitation  -Moderate on transthoracic echocardiogram August 2023  -Will repeat transthoracic echocardiogram for monitoring of severity  -Continue spironolactone 50 mg in a.m. and 25 mg in p.m.  Counseled patient on the importance of adequate blood pressure control along with dietary and lifestyle modifications  -Will continue to monitor at this time with a additional recommendations pending results of repeat echocardiographic imaging.       Follow up Plan/Other  summary comments:  -Lipid panel 9/16/2024 showing total cholesterol 165 glyceride 133, HDL 53, LDL 85  -Patient will continue Eliquis 5 mg twice daily, diltiazem 180 mg daily, Zetia 10 mg daily, fenofibrate 54 mg daily, spironolactone 50 mg in a.m. and 25 mg in evening  -Patient counseled on dietary and lifestyle modifications including following a low-salt, low-fat, heart healthy diet with sodium restriction to less than 1800 mg of sodium daily and weight reduction goal BMI less than 29  -Patient will continue to follow with nephrology team for monitoring and evaluation of CKD  -Unfortunately patient had statin intolerance with multiple medications therefore we will hold off initiation at that time we will continue above therapy  -Will see patient in 6 months or sooner if necessary  -Will repeat transthoracic echocardiogram prior to office visit to monitor aortic disease  -Patient counseled if she were to have any warning or alarm type symptoms she is to seek emergency medical care immediately.    HPI:   -Patient is a 84-year-old female with hypertension, chronic rhinitis, factor V Leiden, obesity, CKD hospitalized in August 2022 at that time found to have atrial fibrillation with rapid ventricular response after being alerted by her Apple Watch.  She was initiated on oral anticoagulation with rate control strategy and eventually was able to be discharged to outpatient follow-up.  She had been seen and evaluated by hematology who agreed with oral anticoagulation and does follow with nephrology for continued titration and assistance with CKD.  -She presents to the office today for scheduled follow-up.  Currently in the office today patient denies any chest pain, palpitations, lightness dizziness, loss conscious, shortness of breath no lower EXTR edema, orthopnea or bendopnea.  She notes overall her blood pressure readings at home are very well-controlled and has not had any alerts on her smart watch for atrial  fibrillation which she does follow with.  She denies any significant bleeding issues and overall feels that she is doing well.      Most recent or relevant cardiac/vascular testing:    -Transthoracic echocardiogram 8/9/2023 showing low ventricular systolic function normal estimated LVEF 60% with mildly dilated left atrium, possible small patent foramen ovale, moderate aortic regurgitation, mild to moderate mitral regurgitation and mild tricuspid regurgitation with IVC normal in size and tricuspid valve regurgitant peak gradient 25 mmHg    -Ambulatory event monitor 1/13/2023 showing predominantly sinus rhythm average heart rate 76 bpm with occasional supraventricular ectopic activity accounting for approximately 3% of total monitored beats with no significant atrial fibrillation appreciated.    -CT lung nodule follow-up 8/2/2024 stating heart/great vessels unremarkable for patient's age.      Past Surgical History:   Procedure Laterality Date    CATARACT EXTRACTION, BILATERAL Bilateral     REDUCTION MAMMAPLASTY Bilateral     TONSILLECTOMY AND ADENOIDECTOMY      TUBAL LIGATION Bilateral        Review of Systems   Review of Systems   Constitutional:  Negative for chills, diaphoresis and fatigue.   HENT:  Negative for trouble swallowing and voice change.    Eyes:  Negative for pain and redness.   Respiratory:  Negative for shortness of breath and wheezing.    Cardiovascular:  Negative for chest pain, palpitations and leg swelling.   Gastrointestinal:  Negative for abdominal pain, constipation, diarrhea, nausea and vomiting.   Genitourinary:  Negative for dysuria.   Musculoskeletal:  Positive for arthralgias. Negative for neck pain and neck stiffness.   Skin:  Negative for rash.   Neurological:  Negative for dizziness, syncope, light-headedness and headaches.   Psychiatric/Behavioral:  Negative for agitation and confusion.    All other systems reviewed and are negative.       Objective:     /70   Pulse 68   Resp  "18   Ht 5' 1\" (1.549 m)   Wt 85.7 kg (189 lb)   SpO2 97%   BMI 35.71 kg/m²     PHYSICAL EXAM:  Physical Exam  Vitals reviewed.   Constitutional:       General: She is not in acute distress.     Appearance: She is obese. She is not diaphoretic.   HENT:      Head: Normocephalic and atraumatic.   Neck:      Comments: Trachea midline, neck obese, no significant JVD appreciated  Cardiovascular:      Rate and Rhythm: Normal rate and regular rhythm.      Heart sounds:      No friction rub.   Pulmonary:      Effort: Pulmonary effort is normal. No respiratory distress.      Breath sounds: No wheezing.   Chest:      Chest wall: No tenderness.   Abdominal:      General: Bowel sounds are normal.      Palpations: Abdomen is soft.      Tenderness: There is no abdominal tenderness. There is no rebound.   Musculoskeletal:      Right lower leg: Edema (trace) present.      Left lower leg: Edema (trace) present.   Neurological:      Mental Status: She is alert.      Comments: Awake, alert, able to answer questions appropriately, able to move extremities bilaterally.   Psychiatric:         Mood and Affect: Mood normal.         Behavior: Behavior normal.        Meds reviewed.  Current Outpatient Medications on File Prior to Visit   Medication Sig Dispense Refill    buPROPion (WELLBUTRIN XL) 150 mg 24 hr tablet take 1 tablet by mouth once daily 90 tablet 3    cetirizine (ZyrTEC) 10 mg tablet Take 10 mg by mouth daily      diltiazem (CARDIZEM CD) 180 mg 24 hr capsule take 1 capsule by mouth once daily 90 capsule 3    Eliquis 5 MG take 1 tablet by mouth twice a day 180 tablet 3    ezetimibe (ZETIA) 10 mg tablet take 1 tablet by mouth once daily 30 tablet 5    fenofibrate (TRICOR) 54 MG tablet Take 1 tablet (54 mg total) by mouth daily 30 tablet 6    fluticasone (FLONASE) 50 mcg/act nasal spray instill 1 spray into each nostril daily 16 g 5    meclizine (ANTIVERT) 25 mg tablet Take 0.5 tablets (12.5 mg total) by mouth 3 (three) times a " day as needed for dizziness 30 tablet 0    olopatadine (PATANOL) 0.1 % ophthalmic solution instill 1 drop into left eye twice a day 5 mL 1    prednisoLONE acetate (PRED FORTE) 1 % ophthalmic suspension Administer 1 drop to the right eye 4 (four) times a day      spironolactone (ALDACTONE) 25 mg tablet Take 50 mg (2 tablets) in a.m. and 25 mg (1 tablet) in p.m. 270 tablet 3    cyanocobalamin 1,000 mcg/mL Inject 1 mL (1,000 mcg total) into a muscle every 30 (thirty) days (Patient not taking: Reported on 2024) 10 mL 1    phenazopyridine (PYRIDIUM) 100 mg tablet Take 1 tablet (100 mg total) by mouth 3 (three) times a day as needed for bladder spasms (Patient not taking: Reported on 2024) 20 tablet 0     Current Facility-Administered Medications on File Prior to Visit   Medication Dose Route Frequency Provider Last Rate Last Admin    cyanocobalamin injection 1,000 mcg  1,000 mcg Intramuscular Q30 Days    1,000 mcg at 24 1147      Past Medical History:   Diagnosis Date    Atrial fibrillation ([facility])     Esophageal stricture     Factor V Leiden mutation ([facility])     LAST ASSESSED: 8/3/16    Hypertension        Social History     Tobacco Use   Smoking Status Former    Current packs/day: 0.00    Types: Cigarettes    Quit date:     Years since quittin.7   Smokeless Tobacco Never     Family History   Problem Relation Age of Onset    Hypertension Mother     Cancer Father

## 2024-09-18 NOTE — ASSESSMENT & PLAN NOTE
-Patient denies significant symptoms  -Can continue to monitor with smart watch  -Continue diltiazem 180 mg daily along with Eliquis 5 mg twice daily for stroke risk reduction given elevated CHADS2 Vascor  -Counseled on dietary lifestyle modifications will continue to monitor.

## 2024-09-18 NOTE — ASSESSMENT & PLAN NOTE
-Improved on current medical therapy with Zetia 10 mg daily and fenofibrate 54 mg daily therefore we will continue at this dose and monitor  -Unfortunately patient had issues with statin intolerance  -Counseled on dietary and lifestyle modifications.

## 2024-09-19 ENCOUNTER — TELEPHONE (OUTPATIENT)
Dept: UROLOGY | Facility: CLINIC | Age: 84
End: 2024-09-19

## 2024-09-19 DIAGNOSIS — N30.01 ACUTE CYSTITIS WITH HEMATURIA: Primary | ICD-10-CM

## 2024-09-19 RX ORDER — AMOXICILLIN 500 MG/1
500 CAPSULE ORAL EVERY 8 HOURS SCHEDULED
Qty: 21 CAPSULE | Refills: 0 | Status: SHIPPED | OUTPATIENT
Start: 2024-09-19 | End: 2024-09-26

## 2024-09-19 NOTE — TELEPHONE ENCOUNTER
----- Message from NANCY Iqbal sent at 9/19/2024  7:43 AM EDT -----  Please let patient know her urine culture is positive. I sent a prescription for Amoxicillin to her pharmacy.

## 2024-09-19 NOTE — TELEPHONE ENCOUNTER
Called patient and informed her that her urine culture came back positive for infection. Informed her that amoxicillin was sent to her pharmacy. Patient confirmed understanding and was thankful for the call.

## 2024-09-19 NOTE — RESULT ENCOUNTER NOTE
Please let patient know her urine culture is positive. I sent a prescription for Amoxicillin to her pharmacy.

## 2024-09-27 ENCOUNTER — DOCUMENTATION (OUTPATIENT)
Dept: GASTROENTEROLOGY | Facility: CLINIC | Age: 84
End: 2024-09-27

## 2024-09-27 NOTE — PROGRESS NOTES
[facility]  Gastroenterology Outpatient Consultation  Patient Jordyn Spivey   Age 84 y.o.   Gender female   MRN: [id number]  [facility] [id number]     ASSESSMENT AND PLAN:   Problem List Items Addressed This Visit          Digestive    Other chronic pancreatitis (HCC) - Primary     I explained to Jordyn and her daughter she was noted to have pancreatic calcifications incidentally noted on imaging studies.  The exact cause for this is not clear.  She has never had acute pancreatitis, she does not drink alcohol and is a non-smoker.  She does have chronic kidney disease therefore hyperparathyroidism would be in the differential diagnosis.  Unusual causes of chronic pancreatitis would include hemochromatosis, pancreatic neoplasm.  There is no evidence of pancreatic lesion on CT scan.  Autoimmune etiologies must be considered as well.  Parathyroid hormone was normal in February 2024  To further evaluate this I would recommend MRI of the pancreas with MRCP.  Check fat-soluble vitamin levels.  Her vitamin D was mildly low at 27.1 and is started vitamin D supplementation.  Check vitamin A, K, and E.  Check IgG levels including IgG4  Check NING  Check hemoglobin A1c  Check fecal fat and fecal elastase         GERD (gastroesophageal reflux disease)     Patient reports having some intermittent indigestion especially with lying down at night.  She will wake and burp.  She does report having a history of reflux in the past but has not had it in quite some time until more recently.  She is not currently on acid lowering medication.  I did offer maybe famotidine 20 mg 2 hours before bed but she is not interested in pursuing that at this time.  She should try to avoid lying down for 2 to 3 hours after meals.  Consider raising the head of her bed up on 4 to 6 inch blocks as this may help with nocturnal reflux or regurgitation.         Metabolic dysfunction-associated steatotic liver disease (MASLD)     Patient was noted  to have fatty liver on imaging studies.  This is likely the result of hyperlipidemia, obesity with BMI of 35.86 excetra.  We did talk about the need for weight reduction to help with metabolic associated steatotic liver disease.  Hold off on ultrasound elastography at this time.  Her platelet count is normal and there liver otherwise was without nodularity on imaging studies.            Other    Vitamin B12 deficiency     Patient was noted to have vitamin B12 deficiency.  She reports that she is required vitamin B12 shots in the past.  She will resume this with her primary care provider.  Check  intrinsic factor antibody and parietal cell antibodies         Relevant Orders    Intrinsic factor blocking antibody    Anti-parietal antibody    Screening for colon cancer     Patient informing that she has never had a colonoscopy and is never done any other form of colorectal cancer screening.  At 84 years of age she is not interested in pursuing any form of colon cancer screening testing.          Other Visit Diagnoses       Chronic pancreatitis, unspecified pancreatitis type (HCC)        Relevant Orders    NING Screen w/ Reflex to Titer/Pattern    Pancreatic elastase, fecal    Fecal fat, qualitative    IgG 1, 2, 3, and 4    Vitamin A    Vitamin K    Hemoglobin A1C    MRI abdomen w wo contrast and mrcp    TRYPSIN           _____________________________________________________________    HPI:   Lux is a delightful 84-year-old woman whom seen in the office accompanied by her daughter in consultation for pancreatic calcifications noted incidentally on a CAT scan of her chest for lung cancer screening.  Of note she did also have some calcifications noted in July 2023 CAT scan but there is no mention of calcifications on a September 2022 CAT scan.  Patient denies any prior history of acute pancreatitis.  There is no family history of pancreatic problems such as pancreatic cancer chronic pancreatitis or pancreatitis.  She  does have a history of chronic kidney disease stage III.  She denies any abdominal pain or unintentional weight loss.  She has not had a change in her bowel habits.  She reports her bowel habits are very regular without diarrhea constipation bleeding or black stools.  She has not noticed any fat in the bowl after a bowel movement.  From an upper GI standpoint she reports that lately but infrequently she will have indigestion especially at night.  I wake her from sleep.  She will get up walk around burp a bit and be able to go back to bed.  She does not currently take anything for this.  She states this was evaluated by upper endoscopy about 15 years ago.  The report is not in the chart.  She also states her esophagus was stretched at that time.  She denies any dysphagia at this time.    There is no family history of colon cancer colon polyps.  There is no family history of pancreatic cancer      She quit smoking 30 years ago.  She has not had any alcohol in 62 years.    Records reviewed for 20 minutes prior to office visit  9/16/2024 laboratory data  Sodium 137  Potassium 4.5  Chloride 107  CO2 19  BUN 38  Creatinine 1.27  Glucose 102  AST 15  ALT 8  Alk phos 64  T. bili 0.34  Cholesterol 165  Triglycerides 133  HDL 53  LDL 85  Vitamin D27.1  Vitamin B12 213  WBC 7.26 Hgb 11.7 HCT 37.2 MCV 92 platelet count 374,000    8/14/2024 CT scan abdomen pelvis with IV contrast  Liver/biliary tree unremarkable  Gallbladder unremarkable  Spleen unremarkable  Pancreas coarse calcifications in the pancreatic head.  Minimal pancreatic duct dilatation.  No peripancreatic inflammatory changes  Simple renal cyst noted.  Diverticulosis without findings of diverticulitis   Atherosclerosis of the abdominal vasculature without abdominal aortic aneurysm.    8/2/2024 CT chest for screening  Stable pulmonary nodule since 2011  Large pancreatic head calcification not present previously.  This is not well-evaluated without contrast and is  only partially imaged.    7/2/2023 CT scan chest abdomen pelvis with IV contrast  Liver is diffusely decreased in density consistent with fatty change.  No CT evidence of suspicious hepatic mass.  Normal hepatic contours  Small hiatal hernia  Gallbladder without calcified gallstones  Spleen unremarkable  Few scattered pancreatic calcifications are noted concerning for chronic pancreatitis  Kidney cyst noted  Diverticulosis without diverticulitis  Minimal mesenteric fat stranding compatible with bernabe mesentery.  (Not reported on the 8/14/2024 CT)  Status post right hip arthroplasty with streak artifact    9/6/2022 CT scan chest abdomen pelvis indication is trauma  Heart mildly enlarged with aortic valvular calcification  Liver/biliary tree unremarkable  Spleen, pancreas, adrenal glands unremarkable slight angulation of left anterior fifth and sixth ribs could represent nondisplaced fractures.  Small right upper lobe lung nodule      Allergies   Allergen Reactions    Aspirin     Icosapent Ethyl Myalgia    Montelukast      Other reaction(s): Depression  Category: Adverse Reaction;   Other reaction(s): Depression  Category: Adverse Reaction;     Nsaids     Pravastatin      Category: Adverse Reaction;     Simvastatin      Category: Adverse Reaction;     Tolmetin      Current Outpatient Medications   Medication Sig Dispense Refill    buPROPion (WELLBUTRIN XL) 150 mg 24 hr tablet take 1 tablet by mouth once daily 90 tablet 3    cetirizine (ZyrTEC) 10 mg tablet Take 10 mg by mouth daily      diltiazem (CARDIZEM CD) 180 mg 24 hr capsule take 1 capsule by mouth once daily 90 capsule 3    Eliquis 5 MG take 1 tablet by mouth twice a day 180 tablet 3    ezetimibe (ZETIA) 10 mg tablet take 1 tablet by mouth once daily 30 tablet 5    fenofibrate (TRICOR) 54 MG tablet Take 1 tablet (54 mg total) by mouth daily 30 tablet 6    fluticasone (FLONASE) 50 mcg/act nasal spray instill 1 spray into each nostril daily 16 g 5    meclizine  (ANTIVERT) 25 mg tablet Take 0.5 tablets (12.5 mg total) by mouth 3 (three) times a day as needed for dizziness 30 tablet 0    olopatadine (PATANOL) 0.1 % ophthalmic solution instill 1 drop into left eye twice a day 5 mL 1    prednisoLONE acetate (PRED FORTE) 1 % ophthalmic suspension Administer 1 drop to the right eye 4 (four) times a day      spironolactone (ALDACTONE) 25 mg tablet Take 50 mg (2 tablets) in a.m. and 25 mg (1 tablet) in p.m. 270 tablet 3    cyanocobalamin 1,000 mcg/mL Inject 1 mL (1,000 mcg total) into a muscle every 30 (thirty) days (Patient not taking: Reported on 2024) 10 mL 1    phenazopyridine (PYRIDIUM) 100 mg tablet Take 1 tablet (100 mg total) by mouth 3 (three) times a day as needed for bladder spasms (Patient not taking: Reported on 2024) 20 tablet 0     Current Facility-Administered Medications   Medication Dose Route Frequency Provider Last Rate Last Admin    cyanocobalamin injection 1,000 mcg  1,000 mcg Intramuscular Q30 Days    1,000 mcg at 24 1147     MEDICAL HISTORY:  Past Medical History:   Diagnosis Date    Atrial fibrillation (HCC)     Esophageal stricture     Factor V Leiden mutation (HCC)     LAST ASSESSED: 8/3/16    Hypertension      Past Surgical History:   Procedure Laterality Date    CATARACT EXTRACTION, BILATERAL Bilateral     REDUCTION MAMMAPLASTY Bilateral     TONSILLECTOMY AND ADENOIDECTOMY      TUBAL LIGATION Bilateral      Social History     Substance and Sexual Activity   Alcohol Use Never     Social History     Substance and Sexual Activity   Drug Use Never     Social History     Tobacco Use   Smoking Status Former    Current packs/day: 0.00    Types: Cigarettes    Quit date:     Years since quittin.7   Smokeless Tobacco Never     Family History   Problem Relation Age of Onset    Hypertension Mother     Cancer Father        REVIEW OF SYSTEMS:  CONSTITUTIONAL: Denies any fever, chills, rigors, and weight loss.  HEENT: No earache or tinnitus.  "Denies hearing loss or visual disturbances.  CARDIOVASCULAR: No chest pain or palpitations.   RESPIRATORY: Denies any cough, hemoptysis, shortness of breath or dyspnea on exertion.  GASTROINTESTINAL: As noted in the History of Present Illness.   GENITOURINARY: No problems with urination. Denies any hematuria or dysuria.  She does report frequent urinary tract infections recently managed by her primary care provider  NEUROLOGIC: She does report some vertigo at times, denies headaches.   MUSCULOSKELETAL: Denies any muscle or joint pain.   SKIN: Denies skin rashes or itching.   ENDOCRINE: Denies excessive thirst. Denies intolerance to heat or cold.  PSYCHOSOCIAL: She does have some anxiety and depression but is on medication for this.    Objective   Blood pressure 138/70, pulse 85, temperature 97.5 °F (36.4 °C), temperature source Temporal, resp. rate 16, height 5' 1\" (1.549 m), weight 86.1 kg (189 lb 12.8 oz), SpO2 98%. Body mass index is 35.86 kg/m².    PHYSICAL EXAM:   General Appearance: Alert, cooperative, no distress.  Somewhat unsteady on her feet.  She does ambulate with a walker.  HEENT: Normocephalic, atraumatic, anicteric.   Neck: Supple, symmetrical, trachea midline  Lungs: Clear to auscultation bilaterally; no rales, rhonchi or wheezing; respirations unlabored   Heart: Regular rate and rhythm; no murmur, rub, or gallop.  No evidence of A-fib at this time.  Abdomen: Soft, bowel sounds normal,, non-distended; no masses, there is no hepatosplenomegaly. No spider angiomas. Obese abdomen.  There is some minimal tenderness in the left mid abdomen with superficial palpation.  There is no rebound or guarding.  Patient does not report any abdominal pain without palpation.  Genitalia: Deferred   Rectal: Deferred   Extremities: No cyanosis, clubbing or edema   Skin: No jaundice, rashes, or lesions   Lymph nodes: No palpable cervical lymphadenopathy   Lab Results:   Lab on 09/16/2024   Component Date Value    " Creatinine, Ur 09/16/2024 47.8     Albumin,U,Random 09/16/2024 20.5 (H)     Albumin Creat Ratio 09/16/2024 43 (H)     WBC 09/16/2024 7.26     RBC 09/16/2024 4.05     Hemoglobin 09/16/2024 11.7     Hematocrit 09/16/2024 37.2     MCV 09/16/2024 92     MCH 09/16/2024 28.9     MCHC 09/16/2024 31.5     RDW 09/16/2024 13.4     Platelets 09/16/2024 374     MPV 09/16/2024 9.2     Phosphorus 09/16/2024 3.7     Vit D, 25-Hydroxy 09/16/2024 27.1 (L)     Cholesterol 09/16/2024 165     Triglycerides 09/16/2024 133     HDL, Direct 09/16/2024 53     LDL Calculated 09/16/2024 85     Sodium 09/16/2024 137     Potassium 09/16/2024 4.5     Chloride 09/16/2024 107     CO2 09/16/2024 19 (L)     ANION GAP 09/16/2024 11     BUN 09/16/2024 38 (H)     Creatinine 09/16/2024 1.27     Glucose, Fasting 09/16/2024 102 (H)     Calcium 09/16/2024 9.4     AST 09/16/2024 15     ALT 09/16/2024 8     Alkaline Phosphatase 09/16/2024 64     Total Protein 09/16/2024 7.0     Albumin 09/16/2024 4.1     Total Bilirubin 09/16/2024 0.34     eGFR 09/16/2024 39     Vitamin B-12 09/16/2024 213     Color, UA 09/16/2024 Light Yellow     Clarity, UA 09/16/2024 Clear     Specific Gravity, UA 09/16/2024 1.014     pH, UA 09/16/2024 6.0     Leukocytes, UA 09/16/2024 Negative     Nitrite, UA 09/16/2024 Negative     Protein, UA 09/16/2024 Negative     Glucose, UA 09/16/2024 Negative     Ketones, UA 09/16/2024 Negative     Urobilinogen, UA 09/16/2024 <2.0     Bilirubin, UA 09/16/2024 Negative     Occult Blood, UA 09/16/2024 Negative     RBC, UA 09/16/2024 None Seen     WBC, UA 09/16/2024 None Seen     Epithelial Cells 09/16/2024 Occasional     Bacteria, UA 09/16/2024 None Seen     MUCUS THREADS 09/16/2024 Occasional (A)     Urine Culture 09/16/2024 60,000-69,000 cfu/ml Escherichia coli (A)    Office Visit on 08/28/2024   Component Date Value    LEUKOCYTE ESTERASE,UA 08/28/2024 -     NITRITE,UA 08/28/2024 -     SL AMB POCT UROBILINOGEN 08/28/2024 0.2     POCT URINE PROTEIN  08/28/2024 -      PH,UA 08/28/2024 6.0     BLOOD,UA 08/28/2024 -     SPECIFIC GRAVITY,UA 08/28/2024 1.030     KETONES,UA 08/28/2024 -     BILIRUBIN,UA 08/28/2024 -     GLUCOSE, UA 08/28/2024 -      COLOR,UA 08/28/2024 yellow     CLARITY,UA 08/28/2024 clear    Office Visit on 08/25/2024   Component Date Value     COLOR,UA 08/25/2024 orange- unable to interpret due to taking AZO     Urine Culture 08/25/2024 70,000-79,000 cfu/ml    Office Visit on 08/21/2024   Component Date Value    LEUKOCYTE ESTERASE,UA 08/21/2024 trace     NITRITE,UA 08/21/2024 negative     SL AMB POCT UROBILINOGEN 08/21/2024 0.2     POCT URINE PROTEIN 08/21/2024 30mg      PH,UA 08/21/2024 5.5     BLOOD,UA 08/21/2024 large     SPECIFIC GRAVITY,UA 08/21/2024 1.025     KETONES,UA 08/21/2024 negative     BILIRUBIN,UA 08/21/2024 negative     GLUCOSE, UA 08/21/2024 negative      COLOR,UA 08/21/2024 tea color     CLARITY,UA 08/21/2024 cloudy      Radiology Results:   No results found.  Anselmo Escoto, DO   09/30/24   Cc:

## 2024-09-27 NOTE — PROGRESS NOTES
Records reviewed and outlined below in preparation for office visit 9/30/2024;    9/16/2024 laboratory data  Sodium 137  Potassium 4.5  Chloride 107  CO2 19  BUN 38  Creatinine 1.27  Glucose 102  AST 15  ALT 8  Alk phos 64  T. bili 0.34  Cholesterol 165  Triglycerides 133  HDL 53  LDL 85  Vitamin D27.1  Vitamin B12 213  WBC 7.26 Hgb 11.7 HCT 37.2 MCV 92 platelet count 374,000    8/14/2024 CT scan abdomen pelvis with IV contrast  Liver/biliary tree unremarkable  Gallbladder unremarkable  Spleen unremarkable  Pancreas coarse calcifications in the pancreatic head.  Minimal pancreatic duct dilatation.  No peripancreatic inflammatory changes  Simple renal cyst noted.  Diverticulosis without findings of diverticulitis   Atherosclerosis of the abdominal vasculature without abdominal aortic aneurysm.    8/2/2024 CT chest for screening  Stable pulmonary nodule since 2011  Large pancreatic head calcification not present previously.  This is not well-evaluated without contrast and is only partially imaged.    7/2/2023 CT scan chest abdomen pelvis with IV contrast  Liver is diffusely decreased in density consistent with fatty change.  No CT evidence of suspicious hepatic mass.  Normal hepatic contours  Small hiatal hernia  Gallbladder without calcified gallstones  Spleen unremarkable  Few scattered pancreatic calcifications are noted concerning for chronic pancreatitis  Kidney cyst noted  Diverticulosis without diverticulitis  Minimal mesenteric fat stranding compatible with bernabe mesentery.  (Not reported on the 8/14/2024 CT)  Status post right hip arthroplasty with streak artifact    9/6/2022 CT scan chest abdomen pelvis indication is trauma  Heart mildly enlarged with aortic valvular calcification  Liver/biliary tree unremarkable  Spleen, pancreas, adrenal glands unremarkable slight angulation of left anterior fifth and sixth ribs could represent nondisplaced fractures.  Small right upper lobe lung nodule

## 2024-09-30 ENCOUNTER — CONSULT (OUTPATIENT)
Dept: GASTROENTEROLOGY | Facility: CLINIC | Age: 84
End: 2024-09-30
Payer: COMMERCIAL

## 2024-09-30 VITALS
BODY MASS INDEX: 35.83 KG/M2 | HEART RATE: 85 BPM | WEIGHT: 189.8 LBS | TEMPERATURE: 97.5 F | OXYGEN SATURATION: 98 % | RESPIRATION RATE: 16 BRPM | HEIGHT: 61 IN | SYSTOLIC BLOOD PRESSURE: 138 MMHG | DIASTOLIC BLOOD PRESSURE: 70 MMHG

## 2024-09-30 DIAGNOSIS — K76.0 METABOLIC DYSFUNCTION-ASSOCIATED STEATOTIC LIVER DISEASE (MASLD): ICD-10-CM

## 2024-09-30 DIAGNOSIS — E53.8 VITAMIN B12 DEFICIENCY: ICD-10-CM

## 2024-09-30 DIAGNOSIS — K21.9 GASTROESOPHAGEAL REFLUX DISEASE, UNSPECIFIED WHETHER ESOPHAGITIS PRESENT: ICD-10-CM

## 2024-09-30 DIAGNOSIS — K86.1 CHRONIC PANCREATITIS, UNSPECIFIED PANCREATITIS TYPE (HCC): ICD-10-CM

## 2024-09-30 DIAGNOSIS — K86.1 OTHER CHRONIC PANCREATITIS (HCC): Primary | ICD-10-CM

## 2024-09-30 DIAGNOSIS — Z12.11 SCREENING FOR COLON CANCER: ICD-10-CM

## 2024-09-30 PROCEDURE — G2211 COMPLEX E/M VISIT ADD ON: HCPCS | Performed by: INTERNAL MEDICINE

## 2024-09-30 PROCEDURE — 99204 OFFICE O/P NEW MOD 45 MIN: CPT | Performed by: INTERNAL MEDICINE

## 2024-09-30 NOTE — ASSESSMENT & PLAN NOTE
Patient was noted to have vitamin B12 deficiency.  She reports that she is required vitamin B12 shots in the past.  She will resume this with her primary care provider.  Check  intrinsic factor antibody and parietal cell antibodies

## 2024-09-30 NOTE — ASSESSMENT & PLAN NOTE
Patient was noted to have fatty liver on imaging studies.  This is likely the result of hyperlipidemia, obesity with BMI of 35.86 excetra.  We did talk about the need for weight reduction to help with metabolic associated steatotic liver disease.  Hold off on ultrasound elastography at this time.  Her platelet count is normal and there liver otherwise was without nodularity on imaging studies.

## 2024-09-30 NOTE — ASSESSMENT & PLAN NOTE
Patient informing that she has never had a colonoscopy and is never done any other form of colorectal cancer screening.  At 84 years of age she is not interested in pursuing any form of colon cancer screening testing.

## 2024-09-30 NOTE — ASSESSMENT & PLAN NOTE
I explained to Jordyn and her daughter she was noted to have pancreatic calcifications incidentally noted on imaging studies.  The exact cause for this is not clear.  She has never had acute pancreatitis, she does not drink alcohol and is a non-smoker.  She does have chronic kidney disease therefore hyperparathyroidism would be in the differential diagnosis.  Unusual causes of chronic pancreatitis would include hemochromatosis, pancreatic neoplasm.  There is no evidence of pancreatic lesion on CT scan.  Autoimmune etiologies must be considered as well.  Parathyroid hormone was normal in February 2024  To further evaluate this I would recommend MRI of the pancreas with MRCP.  Check fat-soluble vitamin levels.  Her vitamin D was mildly low at 27.1 and is started vitamin D supplementation.  Check vitamin A, K, and E.  Check IgG levels including IgG4  Check NING  Check hemoglobin A1c  Check fecal fat and fecal elastase

## 2024-09-30 NOTE — ASSESSMENT & PLAN NOTE
Patient reports having some intermittent indigestion especially with lying down at night.  She will wake and burp.  She does report having a history of reflux in the past but has not had it in quite some time until more recently.  She is not currently on acid lowering medication.  I did offer maybe famotidine 20 mg 2 hours before bed but she is not interested in pursuing that at this time.  She should try to avoid lying down for 2 to 3 hours after meals.  Consider raising the head of her bed up on 4 to 6 inch blocks as this may help with nocturnal reflux or regurgitation.

## 2024-09-30 NOTE — PATIENT INSTRUCTIONS
Other chronic pancreatitis (HCC)  I explained to Jordyn and her daughter she was noted to have pancreatic calcifications incidentally noted on imaging studies.  The exact cause for this is not clear.  She has never had acute pancreatitis, she does not drink alcohol and is a non-smoker.  She does have chronic kidney disease therefore hyperparathyroidism would be in the differential diagnosis.  Unusual causes of chronic pancreatitis would include hemochromatosis, pancreatic neoplasm.  There is no evidence of pancreatic lesion on CT scan.  Autoimmune etiologies must be considered as well.  Parathyroid hormone was normal in February 2024  To further evaluate this I would recommend MRI of the pancreas with MRCP.  Check fat-soluble vitamin levels.  Her vitamin D was mildly low at 27.1 and is started vitamin D supplementation.  Check vitamin A, K, and E.  Check IgG levels including IgG4  Check NING  Check hemoglobin A1c  Check fecal fat and fecal elastase    GERD (gastroesophageal reflux disease)  Patient reports having some intermittent indigestion especially with lying down at night.  She will wake and burp.  She does report having a history of reflux in the past but has not had it in quite some time until more recently.  She is not currently on acid lowering medication.  I did offer maybe famotidine 20 mg 2 hours before bed but she is not interested in pursuing that at this time.  She should try to avoid lying down for 2 to 3 hours after meals.  Consider raising the head of her bed up on 4 to 6 inch blocks as this may help with nocturnal reflux or regurgitation.    Metabolic dysfunction-associated steatotic liver disease (MASLD)  Patient was noted to have fatty liver on imaging studies.  This is likely the result of hyperlipidemia, obesity with BMI of 35.86 excetra.  We did talk about the need for weight reduction to help with metabolic associated steatotic liver disease.  Hold off on ultrasound elastography at this  time.  Her platelet count is normal and there liver otherwise was without nodularity on imaging studies.    Vitamin B12 deficiency  Patient was noted to have vitamin B12 deficiency.  She reports that she is required vitamin B12 shots in the past.  She will resume this with her primary care provider.  Check  intrinsic factor antibody and parietal cell antibodies    Screening for colon cancer  Patient informing that she has never had a colonoscopy and is never done any other form of colorectal cancer screening.  At 84 years of age she is not interested in pursuing any form of colon cancer screening testing.

## 2024-10-03 ENCOUNTER — APPOINTMENT (OUTPATIENT)
Dept: LAB | Facility: CLINIC | Age: 84
End: 2024-10-03
Payer: COMMERCIAL

## 2024-10-03 DIAGNOSIS — K86.1 CHRONIC PANCREATITIS, UNSPECIFIED PANCREATITIS TYPE (HCC): ICD-10-CM

## 2024-10-03 DIAGNOSIS — E78.5 HYPERLIPIDEMIA, UNSPECIFIED HYPERLIPIDEMIA TYPE: ICD-10-CM

## 2024-10-03 DIAGNOSIS — J32.9 CHRONIC SINUSITIS, UNSPECIFIED LOCATION: ICD-10-CM

## 2024-10-03 DIAGNOSIS — R09.82 POST-NASAL DRIP: ICD-10-CM

## 2024-10-03 PROCEDURE — 82653 EL-1 FECAL QUANTITATIVE: CPT

## 2024-10-03 PROCEDURE — 82705 FATS/LIPIDS FECES QUAL: CPT

## 2024-10-03 RX ORDER — FLUTICASONE PROPIONATE 50 MCG
SPRAY, SUSPENSION (ML) NASAL
Qty: 48 ML | Refills: 1 | Status: SHIPPED | OUTPATIENT
Start: 2024-10-03

## 2024-10-03 RX ORDER — EZETIMIBE 10 MG/1
10 TABLET ORAL DAILY
Qty: 90 TABLET | Refills: 1 | Status: SHIPPED | OUTPATIENT
Start: 2024-10-03

## 2024-10-03 RX ORDER — FENOFIBRATE 54 MG/1
54 TABLET ORAL DAILY
Qty: 30 TABLET | Refills: 5 | Status: SHIPPED | OUTPATIENT
Start: 2024-10-03

## 2024-10-07 ENCOUNTER — TELEPHONE (OUTPATIENT)
Age: 84
End: 2024-10-07

## 2024-10-07 ENCOUNTER — HOSPITAL ENCOUNTER (OUTPATIENT)
Dept: MRI IMAGING | Facility: HOSPITAL | Age: 84
Discharge: HOME | End: 2024-10-07
Attending: INTERNAL MEDICINE
Payer: COMMERCIAL

## 2024-10-07 DIAGNOSIS — K86.1 CHRONIC PANCREATITIS, UNSPECIFIED PANCREATITIS TYPE (HCC): ICD-10-CM

## 2024-10-07 DIAGNOSIS — E53.8 VITAMIN B 12 DEFICIENCY: ICD-10-CM

## 2024-10-07 DIAGNOSIS — E53.8 VITAMIN B 12 DEFICIENCY: Primary | ICD-10-CM

## 2024-10-07 LAB — ELASTASE PANC STL-MCNT: 151 UG/G

## 2024-10-07 PROCEDURE — A9585 GADOBUTROL INJECTION: HCPCS | Performed by: INTERNAL MEDICINE

## 2024-10-07 PROCEDURE — 74183 MRI ABD W/O CNTR FLWD CNTR: CPT

## 2024-10-07 RX ORDER — CYANOCOBALAMIN 1000 UG/ML
1000 INJECTION, SOLUTION INTRAMUSCULAR; SUBCUTANEOUS
Qty: 10 ML | Refills: 1 | Status: SHIPPED | OUTPATIENT
Start: 2024-10-07

## 2024-10-07 RX ORDER — CYANOCOBALAMIN 1000 UG/ML
1000 INJECTION, SOLUTION INTRAMUSCULAR; SUBCUTANEOUS
Status: DISCONTINUED | OUTPATIENT
Start: 2024-10-07 | End: 2024-10-08

## 2024-10-07 RX ORDER — GADOBUTROL 604.72 MG/ML
8 INJECTION INTRAVENOUS
Status: COMPLETED | OUTPATIENT
Start: 2024-10-07 | End: 2024-10-07

## 2024-10-07 RX ADMIN — GADOBUTROL 8 ML: 604.72 INJECTION INTRAVENOUS at 16:38

## 2024-10-07 NOTE — TELEPHONE ENCOUNTER
Pt call to report if she can come in today for her B12 injection, she will be in the area.  Triage nurse called the office and the staff stated they will ask PCP once's she's available to inform us if she wants pt to still receive these shots.

## 2024-10-08 LAB
FAT STL QL: NORMAL
NEUTRAL FAT STL QL: NORMAL

## 2024-10-09 NOTE — RESULT ENCOUNTER NOTE
Please inform the fecal elastase was mildly low at 151 normal is greater than 200.  There was some fat detected in her stool.  Given that she is not experiencing any diarrhea I would hold off on the addition of pancreatic enzyme supplementation at this time.  They should notify us if patient develops any significant diarrhea on a frequent basis.  In the meantime would recommend a low-fat diet high-protein diet.  Thank you

## 2024-10-10 ENCOUNTER — CLINICAL SUPPORT (OUTPATIENT)
Dept: FAMILY MEDICINE CLINIC | Facility: CLINIC | Age: 84
End: 2024-10-10
Payer: COMMERCIAL

## 2024-10-10 DIAGNOSIS — Z23 ENCOUNTER FOR IMMUNIZATION: Primary | ICD-10-CM

## 2024-10-10 PROCEDURE — G0008 ADMIN INFLUENZA VIRUS VAC: HCPCS

## 2024-10-10 PROCEDURE — 90662 IIV NO PRSV INCREASED AG IM: CPT

## 2024-10-10 RX ADMIN — CYANOCOBALAMIN 1000 MCG: 1000 INJECTION, SOLUTION INTRAMUSCULAR; SUBCUTANEOUS at 14:02

## 2024-10-10 NOTE — RESULT ENCOUNTER NOTE
Please inform patient or her daughter that the MRI did reveal fatty infiltration of liver, this was seen on prior imaging studies as well.  Primary treatment for this at this time would be weight loss.  Imaging the pancreas did reveal atrophy of the pancreas and mildly dilated pancreatic duct.  There is no evidence of mass of the pancreas or pancreatic cancer.  There are small cysts in the pancreas.  Between the findings on the CAT scan and this MRI, findings are consistent with chronic pancreatitis.  Follow-up with me in February.  Notify us if she develops any oily or fatty stools, weight loss, abdominal pain excetra.  Thank you

## 2024-10-14 ENCOUNTER — NURSE TRIAGE (OUTPATIENT)
Age: 84
End: 2024-10-14

## 2024-10-14 DIAGNOSIS — R30.0 DYSURIA: Primary | ICD-10-CM

## 2024-10-14 NOTE — TELEPHONE ENCOUNTER
Agree with urine testing and we will monitor those results. I did include urine cytology as well.

## 2024-10-14 NOTE — TELEPHONE ENCOUNTER
Contacted patient and made her aware a urine cytology was added along with u/a and culture. Patient will provide a urine sample at the lab and office to call with results.

## 2024-10-14 NOTE — TELEPHONE ENCOUNTER
Reason for Disposition   The patient has an unknown case of mild dysuria    Answer Assessment - Initial Assessment Questions  1. When did your symptoms start?   Yesterday   2. Do you experience pain or burning with every void?   yes  3. Do you have any other urinary symptoms such as urinary frequency, urgency, incontinence, blood in your urine?   Yes-Just when she wipes she sees pink.she is taking Eliquis and will contact prescriber about this as last time she had infection she began to have a lot of gross hematuria.   4. Do you have any abdominal pain or flank pain?  no  5. Do you have a fever of 101 or higher? How did you take your temperature?   no  6. Have you become unable to urinate?   no  Taking cranberry tablets,   Reviewed bladder irritants to avoid, and to stay hydrated with at least 64 oz. Of water/day as long as no fluid restrictions. Emergency room precautions reviewed as well.    Protocols used: Urology-Dysuria-ADULT-[place]

## 2024-10-16 ENCOUNTER — OFFICE VISIT (OUTPATIENT)
Dept: NEPHROLOGY | Facility: CLINIC | Age: 84
End: 2024-10-16
Payer: COMMERCIAL

## 2024-10-16 ENCOUNTER — LAB (OUTPATIENT)
Dept: LAB | Facility: CLINIC | Age: 84
End: 2024-10-16
Payer: COMMERCIAL

## 2024-10-16 VITALS
BODY MASS INDEX: 35.68 KG/M2 | SYSTOLIC BLOOD PRESSURE: 140 MMHG | HEIGHT: 61 IN | OXYGEN SATURATION: 98 % | HEART RATE: 72 BPM | DIASTOLIC BLOOD PRESSURE: 76 MMHG | WEIGHT: 189 LBS

## 2024-10-16 DIAGNOSIS — I12.9 BENIGN HYPERTENSION WITH CKD (CHRONIC KIDNEY DISEASE) STAGE III (HCC): Primary | ICD-10-CM

## 2024-10-16 DIAGNOSIS — R80.9 MICROALBUMINURIA: ICD-10-CM

## 2024-10-16 DIAGNOSIS — E87.8 LOW BICARBONATE: ICD-10-CM

## 2024-10-16 DIAGNOSIS — K86.1 CHRONIC PANCREATITIS, UNSPECIFIED PANCREATITIS TYPE (HCC): ICD-10-CM

## 2024-10-16 DIAGNOSIS — E55.9 VITAMIN D DEFICIENCY: ICD-10-CM

## 2024-10-16 DIAGNOSIS — N18.30 BENIGN HYPERTENSION WITH CKD (CHRONIC KIDNEY DISEASE) STAGE III (HCC): Primary | ICD-10-CM

## 2024-10-16 DIAGNOSIS — R30.0 DYSURIA: ICD-10-CM

## 2024-10-16 DIAGNOSIS — E53.8 VITAMIN B12 DEFICIENCY: ICD-10-CM

## 2024-10-16 DIAGNOSIS — N18.32 STAGE 3B CHRONIC KIDNEY DISEASE (HCC): ICD-10-CM

## 2024-10-16 PROBLEM — N18.4 CHRONIC RENAL DISEASE, STAGE IV (HCC): Status: RESOLVED | Noted: 2022-09-20 | Resolved: 2024-10-16

## 2024-10-16 PROCEDURE — 83036 HEMOGLOBIN GLYCOSYLATED A1C: CPT

## 2024-10-16 PROCEDURE — 36415 COLL VENOUS BLD VENIPUNCTURE: CPT

## 2024-10-16 PROCEDURE — 84590 ASSAY OF VITAMIN A: CPT

## 2024-10-16 PROCEDURE — 87086 URINE CULTURE/COLONY COUNT: CPT

## 2024-10-16 PROCEDURE — 83516 IMMUNOASSAY NONANTIBODY: CPT

## 2024-10-16 PROCEDURE — 82787 IGG 1 2 3 OR 4 EACH: CPT

## 2024-10-16 PROCEDURE — 86038 ANTINUCLEAR ANTIBODIES: CPT

## 2024-10-16 PROCEDURE — 82784 ASSAY IGA/IGD/IGG/IGM EACH: CPT

## 2024-10-16 PROCEDURE — 86340 INTRINSIC FACTOR ANTIBODY: CPT

## 2024-10-16 PROCEDURE — 81001 URINALYSIS AUTO W/SCOPE: CPT

## 2024-10-16 PROCEDURE — 87077 CULTURE AEROBIC IDENTIFY: CPT

## 2024-10-16 PROCEDURE — 99214 OFFICE O/P EST MOD 30 MIN: CPT | Performed by: INTERNAL MEDICINE

## 2024-10-16 PROCEDURE — 87186 SC STD MICRODIL/AGAR DIL: CPT

## 2024-10-16 NOTE — PROGRESS NOTES
NEPHROLOGY OUTPATIENT PROGRESS NOTE   Jordyn Spivey 84 y.o. female MRN: [id number]  DATE: 10/16/2024  Reason for visit:   Chief Complaint   Patient presents with    Follow-up    Chronic Kidney Disease     ASSESSMENT and PLAN:  CKD stage IIIB, baseline creatinine 1.3 since late 2021, prior 1.1 to 1.2 going back to 2019  -Last creatinine 1.2 stable at baseline in 9/2024.   -CKD suspected to be secondary to long-term hypertension causing hypertensive arteriosclerosis, age-related nephron loss  -UA in September 2024 bland without hematuria or proteinuria.  -repeat BMP, UACR before next visit  -avoid NSAIDs  -renal US in July 2022 shows normal size kidneys, no hydronephrosis or stones. Minimal cortical thinning in both kidneys. Normal echogenicity.  Also concern for lobulated renal contour on right kidney? Normal variant versus multifocal scarring.     Prior CT scan with concern for area of concentric focal arthrosclerosis on left renal artery.  Patient denies any history of renal stent.     Hypertension  -BP slightly higher in the office today.    -She is unable to check BP with upper arm BP cuff at home due to being uncomfortable and painful with BP cuff squeezing on her arm.    -She is using a wrist BP cuff but does not check BP on regular basis.    -BP has been 130s to 140 SBP at different providers office visits.    -Advised to check BP on regular basis and call back if BP remains persistently greater than 135/85.  She will keep BP log and call us back in 7 to 10 days.  -Currently on Aldactone 50 mg in a.m./25 mg in p.m., diltiazem 180 mg daily.  Remains on Aldactone due to hirsutism issues  -If BP remains above goal at home, consider increasing Aldactone further versus adding ACE inhibitor.  -She claims to be more compliant following low-salt diet.     Microalbuminuria, last UACR 43 mg in September 2024.   -suspect in the setting of underlying hypertension, obesity can cause secondary FSGS  -continue to  monitor, if worsening, may consider adding ACE-inhibitor/increasing Aldactone.  -recommended regular exercise, weight loss     Vitamin-D deficiency  -last vitamin-D level 27 in September 2024.  Recently recommended to start OTC vitamin D 2000 units daily.  She has not done this yet but will do soon.      Isolated low bicarb value 19 in September 2024.?  Etiology.  Since this is isolated value, with stable renal function, no change for now.  Will repeat BMP in couple months.     Prior history of gout 16 years ago, no recent gout flare-up    Diagnoses and all orders for this visit:    Benign hypertension with CKD (chronic kidney disease) stage III (HCC)  -     Albumin / creatinine urine ratio; Future  -     Basic metabolic panel; Future  -     CBC; Future  -     Phosphorus; Future  -     PTH, intact; Future  -     Vitamin D 25 hydroxy; Future    Stage 3b chronic kidney disease (HCC)  -     Albumin / creatinine urine ratio; Future  -     Basic metabolic panel; Future  -     CBC; Future  -     Phosphorus; Future  -     PTH, intact; Future  -     Vitamin D 25 hydroxy; Future  -     Basic metabolic panel; Future    Microalbuminuria  -     Albumin / creatinine urine ratio; Future    Vitamin D deficiency  -     Vitamin D 25 hydroxy; Future    Low bicarbonate  -     Basic metabolic panel; Future  -     Basic metabolic panel; Future          SUBJECTIVE / HPI:  Jordyn Spivey is a 84 y.o. year old female with medical issues of hypertension for 19 years, depression, urinary incontinence, factor five Leiden deficiency, CKD stage 3 with baseline 1.3 since late 2021, prior 1.1 to 1.2, remote history of gout who presents for regular follow-up of CKD.  Last creatinine overall stable at baseline.   BP slightly higher in the office today, she does not check BP on regular basis and unable to use upper arm BP cuff at home.  She uses wrist BP cuff.   Her SBP generally 130s to 140 at different providers office visits.  no recent NSAID  "use.  Denies any lightheadedness or dizziness. denies any new urinary complaint. Denies any chest pain or shortness of breath.   She remains on stable dose of Aldactone for more than 16 years.    REVIEW OF SYSTEMS:  More than 10 point review of systems were obtained and discussed in length with the patient. Complete review of systems were negative / unremarkable except mentioned above.     PHYSICAL EXAM:  Vitals:    10/16/24 1251   BP: 140/76   BP Location: Left arm   Patient Position: Sitting   Cuff Size: Adult   Pulse: 72   SpO2: 98%   Weight: 85.7 kg (189 lb)   Height: 5' 1\" (1.549 m)     Body mass index is 35.71 kg/m².    Physical Exam  Vitals reviewed.   Constitutional:       Appearance: She is well-developed.   HENT:      Head: Normocephalic and atraumatic.      Right Ear: External ear normal.      Left Ear: External ear normal.   Eyes:      Conjunctiva/sclera: Conjunctivae normal.   Cardiovascular:      Comments: No significant edema in legs  Pulmonary:      Effort: Pulmonary effort is normal.      Breath sounds: Normal breath sounds. No wheezing or rales.   Abdominal:      General: Bowel sounds are normal. There is no distension.      Palpations: Abdomen is soft.      Tenderness: There is no abdominal tenderness.   Musculoskeletal:         General: No deformity.   Lymphadenopathy:      Cervical: No cervical adenopathy.   Skin:     Findings: No rash.   Neurological:      Mental Status: She is alert and oriented to person, place, and time.   Psychiatric:         Behavior: Behavior normal.         PAST MEDICAL HISTORY:  Past Medical History:   Diagnosis Date    Atrial fibrillation (HCC)     Esophageal stricture     Factor V Leiden mutation (HCC)     LAST ASSESSED: 8/3/16    Hypertension        PAST SURGICAL HISTORY:  Past Surgical History:   Procedure Laterality Date    CATARACT EXTRACTION, BILATERAL Bilateral     REDUCTION MAMMAPLASTY Bilateral     TONSILLECTOMY AND ADENOIDECTOMY      TUBAL LIGATION Bilateral  "       SOCIAL HISTORY:  Social History     Substance and Sexual Activity   Alcohol Use Never     Social History     Substance and Sexual Activity   Drug Use Never     Social History     Tobacco Use   Smoking Status Former    Current packs/day: 0.00    Types: Cigarettes    Quit date:     Years since quittin.8   Smokeless Tobacco Never       FAMILY HISTORY:  Family History   Problem Relation Age of Onset    Hypertension Mother     Cancer Father        MEDICATIONS:    Current Outpatient Medications:     buPROPion (WELLBUTRIN XL) 150 mg 24 hr tablet, take 1 tablet by mouth once daily, Disp: 90 tablet, Rfl: 3    cetirizine (ZyrTEC) 10 mg tablet, Take 10 mg by mouth daily, Disp: , Rfl:     cyanocobalamin 1,000 mcg/mL, Inject 1 mL (1,000 mcg total) into a muscle every 30 (thirty) days, Disp: 10 mL, Rfl: 1    diltiazem (CARDIZEM CD) 180 mg 24 hr capsule, take 1 capsule by mouth once daily, Disp: 90 capsule, Rfl: 3    Eliquis 5 MG, take 1 tablet by mouth twice a day, Disp: 180 tablet, Rfl: 3    ezetimibe (ZETIA) 10 mg tablet, take 1 tablet by mouth daily, Disp: 90 tablet, Rfl: 1    fenofibrate (TRICOR) 54 MG tablet, take 1 tablet by mouth once daily, Disp: 30 tablet, Rfl: 5    fluticasone (FLONASE) 50 mcg/act nasal spray, instill 1 spray into each nostril daily, Disp: 48 mL, Rfl: 1    meclizine (ANTIVERT) 25 mg tablet, Take 0.5 tablets (12.5 mg total) by mouth 3 (three) times a day as needed for dizziness, Disp: 30 tablet, Rfl: 0    olopatadine (PATANOL) 0.1 % ophthalmic solution, instill 1 drop into left eye twice a day, Disp: 5 mL, Rfl: 1    prednisoLONE acetate (PRED FORTE) 1 % ophthalmic suspension, Administer 1 drop to the right eye 4 (four) times a day, Disp: , Rfl:     spironolactone (ALDACTONE) 25 mg tablet, Take 50 mg (2 tablets) in a.m. and 25 mg (1 tablet) in p.m., Disp: 270 tablet, Rfl: 3    phenazopyridine (PYRIDIUM) 100 mg tablet, Take 1 tablet (100 mg total) by mouth 3 (three) times a day as needed for  bladder spasms (Patient not taking: Reported on 8/28/2024), Disp: 20 tablet, Rfl: 0    Current Facility-Administered Medications:     cyanocobalamin injection 1,000 mcg, 1,000 mcg, Intramuscular, Q30 Days, , 1,000 mcg at 10/10/24 1402    Lab Results:   Creatinine 1.2

## 2024-10-17 ENCOUNTER — LAB (OUTPATIENT)
Dept: LAB | Facility: CLINIC | Age: 84
End: 2024-10-17
Payer: COMMERCIAL

## 2024-10-17 DIAGNOSIS — R30.0 DYSURIA: ICD-10-CM

## 2024-10-17 DIAGNOSIS — E53.8 VITAMIN B12 DEFICIENCY: ICD-10-CM

## 2024-10-17 DIAGNOSIS — K86.1 CHRONIC PANCREATITIS, UNSPECIFIED PANCREATITIS TYPE (HCC): ICD-10-CM

## 2024-10-17 LAB
ANA SER QL IA: NEGATIVE
BACTERIA UR QL AUTO: ABNORMAL /HPF
BILIRUB UR QL STRIP: NEGATIVE
CLARITY UR: CLEAR
COLOR UR: ABNORMAL
EST. AVERAGE GLUCOSE BLD GHB EST-MCNC: 126 MG/DL
GLUCOSE UR STRIP-MCNC: NEGATIVE MG/DL
HBA1C MFR BLD: 6 %
HGB UR QL STRIP.AUTO: ABNORMAL
KETONES UR STRIP-MCNC: NEGATIVE MG/DL
LEUKOCYTE ESTERASE UR QL STRIP: ABNORMAL
NITRITE UR QL STRIP: NEGATIVE
NON-SQ EPI CELLS URNS QL MICRO: ABNORMAL /HPF
PH UR STRIP.AUTO: 5.5 [PH]
PROT UR STRIP-MCNC: ABNORMAL MG/DL
RBC #/AREA URNS AUTO: ABNORMAL /HPF
SP GR UR STRIP.AUTO: 1.01 (ref 1–1.03)
UROBILINOGEN UR STRIP-ACNC: <2 MG/DL
WBC #/AREA URNS AUTO: ABNORMAL /HPF
WBC CLUMPS # UR AUTO: PRESENT /UL

## 2024-10-17 PROCEDURE — 88112 CYTOPATH CELL ENHANCE TECH: CPT | Performed by: STUDENT IN AN ORGANIZED HEALTH CARE EDUCATION/TRAINING PROGRAM

## 2024-10-18 ENCOUNTER — TELEPHONE (OUTPATIENT)
Age: 84
End: 2024-10-18

## 2024-10-18 DIAGNOSIS — R30.0 DYSURIA: ICD-10-CM

## 2024-10-18 DIAGNOSIS — N30.01 ACUTE CYSTITIS WITH HEMATURIA: Primary | ICD-10-CM

## 2024-10-18 LAB — PCA AB SER-ACNC: 29.2 UNITS (ref 0–20)

## 2024-10-18 RX ORDER — AMOXICILLIN 500 MG/1
500 CAPSULE ORAL EVERY 8 HOURS SCHEDULED
Qty: 21 CAPSULE | Refills: 0 | Status: SHIPPED | OUTPATIENT
Start: 2024-10-18 | End: 2024-10-25

## 2024-10-18 NOTE — TELEPHONE ENCOUNTER
Patient called in requesting for an antibiotics for a UTI before the weekend. She would like her UA results as well. I verified pharmacy as [facility] on [address]. Please advise.    Urinalysis with microscopic              Component  Ref Range & Units 10/16/24 1103 9/16/24 1640 8/28/24 1356 8/25/24 1148 8/21/24 1423 7/21/24 1046 1/13/23 1121   Color, UA Light Yellow Light Yellow yellow orange- unable to interpret due to taking AZO tea color pink Light Yellow   Clarity, UA Clear Clear clear  cloudy turbid Clear   Specific Gravity, UA  1.003 - 1.030 1.012 1.014 1.030 R  1.025 R 1.025 R 1.017   pH, UA  4.5, 5.0, 5.5, 6.0, 6.5, 7.0, 7.5, 8.0 5.5 6.0 6.0 R  5.5 R 5.0 R 5.5   Leukocytes, UA  Negative Large Abnormal  Negative - R  trace R mod R Negative   Nitrite, UA  Negative Negative Negative - R  negative R neg R Negative   Protein, UA  Negative mg/dl Trace Abnormal  Negative - R  30mg R 30 R Negative   Glucose, UA  Negative mg/dl Negative Negative - R  negative R neg R Negative   Ketones, UA  Negative mg/dl Negative Negative - R  negative R neg R Negative   Urobilinogen, UA  <2.0 mg/dl mg/dl <2.0 <2.0     <2.0   Bilirubin, UA  Negative Negative Negative     Negative   Occult Blood, UA  Negative Trace Abnormal  Negative - R  large R large R Negative   RBC, UA  None Seen, 1-2 /hpf 2-4 Abnormal  None Seen     None Seen   WBC, UA  None Seen, 1-2 /hpf 30-50 Abnormal  None Seen     None Seen   Epithelial Cells  None Seen, Occasional /hpf Occasional Occasional     Occasional   Bacteria, UA  None Seen, Occasional /hpf None Seen None Seen     None Seen   WBC Clumps Present

## 2024-10-18 NOTE — RESULT ENCOUNTER NOTE
Please let patient know that her preliminary urine culture is positive.  I sent a prescription for Amoxicillin to her pharmacy due to upcoming weekend.  We will let her know if this needs to be changed.

## 2024-10-19 LAB
BACTERIA UR CULT: ABNORMAL
IGG SERPL-MCNC: 826 MG/DL (ref 586–1602)
IGG1 SER-MCNC: 511 MG/DL (ref 248–810)
IGG2 SER-MCNC: 214 MG/DL (ref 130–555)
IGG3 SER-MCNC: 40 MG/DL (ref 15–102)
IGG4 SER-MCNC: 18 MG/DL (ref 2–96)

## 2024-10-20 LAB — IF BLOCK AB SER QL RIA: 1 AU/ML (ref 0–1.1)

## 2024-10-21 NOTE — RESULT ENCOUNTER NOTE
I sent patient a [employer] message stating that;    I am writing to inform you of recent lab results.  Hemoglobin A1c, a marker for diabetes is 6.  It is about similar to what it was 1 year ago where was 5.9.  1 the blood test for pernicious anemia was weakly positive.  This could be because of B12 deficiency.  Couple other blood test are still pending.  Nothing more to do other than continuing with vitamin B12 replacement.

## 2024-10-22 ENCOUNTER — NURSE TRIAGE (OUTPATIENT)
Age: 84
End: 2024-10-22

## 2024-10-22 ENCOUNTER — OFFICE VISIT (OUTPATIENT)
Dept: FAMILY MEDICINE CLINIC | Facility: CLINIC | Age: 84
End: 2024-10-22
Payer: COMMERCIAL

## 2024-10-22 ENCOUNTER — TELEPHONE (OUTPATIENT)
Dept: UROLOGY | Facility: CLINIC | Age: 84
End: 2024-10-22

## 2024-10-22 VITALS
DIASTOLIC BLOOD PRESSURE: 68 MMHG | WEIGHT: 187.8 LBS | SYSTOLIC BLOOD PRESSURE: 118 MMHG | HEIGHT: 61 IN | TEMPERATURE: 97.9 F | OXYGEN SATURATION: 99 % | HEART RATE: 62 BPM | BODY MASS INDEX: 35.45 KG/M2

## 2024-10-22 DIAGNOSIS — L03.115 CELLULITIS OF RIGHT LOWER EXTREMITY: Primary | ICD-10-CM

## 2024-10-22 LAB — VIT A SERPL-MCNC: 47.5 UG/DL (ref 22–69.5)

## 2024-10-22 PROCEDURE — 99213 OFFICE O/P EST LOW 20 MIN: CPT

## 2024-10-22 PROCEDURE — 88112 CYTOPATH CELL ENHANCE TECH: CPT | Performed by: STUDENT IN AN ORGANIZED HEALTH CARE EDUCATION/TRAINING PROGRAM

## 2024-10-22 RX ORDER — DOXYCYCLINE HYCLATE 100 MG
100 TABLET ORAL 2 TIMES DAILY
Qty: 14 TABLET | Refills: 0 | Status: SHIPPED | OUTPATIENT
Start: 2024-10-22 | End: 2024-10-29

## 2024-10-22 NOTE — LETTER
October 22, 2024     Patient: Jordyn Spivey  YOB: 1940  Date of Visit: 10/22/2024      To Whom it May Concern:    Jordyn Spivey is under my professional care. Jordyn was seen in my office on 10/22/2024. It is recommended by her PCP that she take osteomatrix, vitamin B12, cranberry supplements, earwax removal, cough drops, a daily antihistamine, flushable wipes, and band aids.    If you have any questions or concerns, please don't hesitate to call.         Sincerely,          NANCY Conway        CC: No Recipients

## 2024-10-22 NOTE — TELEPHONE ENCOUNTER
"Pt called stating that she has a red spot on her ankle that is about 3 inches in diameter that was noticed by a friend at aerobics class.  Pt states it does not hurt but that the vein on the other side is \"sticking out.\"  Denies cool temp to rest of foot.  States she does notice swelling in R>L.  Denies CP, SOB.  Advised pt to go to ED.  Pt declined at this time pt prefers to be seen in office.  Pt scheduled to see Trent NANCY today 10/22/24 at 2:20pm.    Reason for Disposition   Thigh, calf, or ankle swelling in both legs, but one side is definitely more swollen (Exception: longstanding difference between legs)    Answer Assessment - Initial Assessment Questions  1. ONSET: \"When did the swelling start?\" (e.g., minutes, hours, days)      Noticed it this morning    2. LOCATION: \"What part of the leg is swollen?\"  \"Are both legs swollen or just one leg?\"      Right leg swelling and vein on the other side \"sticking out.\"    3. SEVERITY: \"How bad is the swelling?\" (e.g., localized; mild, moderate, severe)   - Localized - small area of swelling localized to one leg   - MILD pedal edema - swelling limited to foot and ankle, pitting edema < 1/4 inch (6 mm) deep, rest and elevation eliminate most or all swelling   - MODERATE edema - swelling of lower leg to knee, pitting edema > 1/4 inch (6 mm) deep, rest and elevation only partially reduce swelling   - SEVERE edema - swelling extends above knee, facial or hand swelling present       Localized    4. REDNESS: \"Does the swelling look red or infected?\"      Red spot about 3 inches in diameter.    5. PAIN: \"Is the swelling painful to touch?\" If Yes, ask: \"How painful is it?\"   (Scale 1-10; mild, moderate or severe)      Denies    6. FEVER: \"Do you have a fever?\" If Yes, ask: \"What is it, how was it measured, and when did it start?\"       Denies    7. CAUSE: \"What do you think is causing the leg swelling?\"      Unsure    8. MEDICAL HISTORY: \"Do you have a history of heart " "failure, kidney disease, liver failure, or cancer?\"      PAF, Aortic Regurge, HTN,    9. RECURRENT SYMPTOM: \"Have you had leg swelling before?\" If Yes, ask: \"When was the last time?\" \"What happened that time?\"      Denies    10. OTHER SYMPTOMS: \"Do you have any other symptoms?\" (e.g., chest pain, difficulty breathing)        Denies    Protocols used: Leg Swelling and Edema-ADULT-[place]    "

## 2024-10-22 NOTE — RESULT ENCOUNTER NOTE
Please let patient know that her urine cytology was negative.  Dysuria is due to acute cystitis.  Follow-up as scheduled.

## 2024-10-22 NOTE — TELEPHONE ENCOUNTER
Per communication consent left detailed message of negative urine cytology. Office number also provided if patient has questions.

## 2024-10-23 NOTE — PROGRESS NOTES
Ambulatory Visit  Name: Jordyn Spivey      : 1940      MRN: [id number]  Encounter Provider: NANCY Conway  Encounter Date: 10/22/2024   Encounter department: [facility]    Assessment & Plan  Cellulitis of right lower extremity    C/o RLE erythema, warmth & swelling x few days.  Denies injury or trauma. No open wounds. Denies fevers or chills.  Reports compliance with Eliquis. Denies CP or SOB.  On Amoxicillin for UTI.  Add on Doxycyline. Continue to monitor.  Advised to call our office if sympt fail to improve or worsen.    Orders:    doxycycline hyclate (VIBRA-TABS) 100 mg tablet; Take 1 tablet (100 mg total) by mouth 2 (two) times a day for 7 days       History of Present Illness       History obtained from : patient  Review of Systems   Constitutional:  Negative for chills, fatigue and fever.   HENT:  Negative for congestion, ear pain, facial swelling, hearing loss, rhinorrhea, sinus pressure, sneezing, sore throat and trouble swallowing.    Eyes:  Negative for pain, redness and visual disturbance.   Respiratory:  Negative for cough, chest tightness, shortness of breath and wheezing.    Cardiovascular:  Negative for chest pain and palpitations.   Gastrointestinal:  Negative for abdominal pain, diarrhea, nausea and vomiting.   Genitourinary:  Negative for dysuria, flank pain, hematuria and pelvic pain.   Musculoskeletal:  Negative for arthralgias, back pain and myalgias.   Skin:  Positive for color change and wound. Negative for rash.   Neurological:  Negative for dizziness, seizures, syncope, weakness, light-headedness and headaches.   Psychiatric/Behavioral:  Negative for confusion, hallucinations and sleep disturbance. The patient is not nervous/anxious.    All other systems reviewed and are negative.    Medical History Reviewed by provider this encounter:  Tobacco  Allergies  Meds  Problems  Med Hx  Surg Hx  Fam Hx       Current Outpatient Medications on  File Prior to Visit   Medication Sig Dispense Refill    amoxicillin (AMOXIL) 500 mg capsule Take 1 capsule (500 mg total) by mouth every 8 (eight) hours for 7 days 21 capsule 0    buPROPion (WELLBUTRIN XL) 150 mg 24 hr tablet take 1 tablet by mouth once daily 90 tablet 3    cetirizine (ZyrTEC) 10 mg tablet Take 10 mg by mouth daily      cyanocobalamin 1,000 mcg/mL Inject 1 mL (1,000 mcg total) into a muscle every 30 (thirty) days 10 mL 1    diltiazem (CARDIZEM CD) 180 mg 24 hr capsule take 1 capsule by mouth once daily 90 capsule 3    Eliquis 5 MG take 1 tablet by mouth twice a day 180 tablet 3    ezetimibe (ZETIA) 10 mg tablet take 1 tablet by mouth daily 90 tablet 1    fenofibrate (TRICOR) 54 MG tablet take 1 tablet by mouth once daily 30 tablet 5    fluticasone (FLONASE) 50 mcg/act nasal spray instill 1 spray into each nostril daily 48 mL 1    olopatadine (PATANOL) 0.1 % ophthalmic solution instill 1 drop into left eye twice a day 5 mL 1    prednisoLONE acetate (PRED FORTE) 1 % ophthalmic suspension Administer 1 drop to the right eye 4 (four) times a day      spironolactone (ALDACTONE) 25 mg tablet Take 50 mg (2 tablets) in a.m. and 25 mg (1 tablet) in p.m. 270 tablet 3    meclizine (ANTIVERT) 25 mg tablet Take 0.5 tablets (12.5 mg total) by mouth 3 (three) times a day as needed for dizziness (Patient not taking: Reported on 10/22/2024) 30 tablet 0    phenazopyridine (PYRIDIUM) 100 mg tablet Take 1 tablet (100 mg total) by mouth 3 (three) times a day as needed for bladder spasms (Patient not taking: Reported on 8/28/2024) 20 tablet 0     Current Facility-Administered Medications on File Prior to Visit   Medication Dose Route Frequency Provider Last Rate Last Admin    cyanocobalamin injection 1,000 mcg  1,000 mcg Intramuscular Q30 Days    1,000 mcg at 10/10/24 1402      Social History     Tobacco Use    Smoking status: Former     Current packs/day: 0.00     Types: Cigarettes     Quit date: 1988     Years since  "quittin.8    Smokeless tobacco: Never   Vaping Use    Vaping status: Never Used   Substance and Sexual Activity    Alcohol use: Never    Drug use: Never    Sexual activity: Not Currently         Objective     /68   Pulse 62   Temp 97.9 °F (36.6 °C) (Tympanic)   Ht 5' 1\" (1.549 m)   Wt 85.2 kg (187 lb 12.8 oz)   SpO2 99%   BMI 35.48 kg/m²     Physical Exam  Vitals and nursing note reviewed.   Constitutional:       General: She is not in acute distress.     Appearance: She is well-developed.   HENT:      Head: Normocephalic and atraumatic.      Right Ear: Hearing and tympanic membrane normal.      Left Ear: Hearing and tympanic membrane normal.      Nose: Nose normal.      Mouth/Throat:      Mouth: Mucous membranes are moist.      Dentition: Normal dentition.      Tongue: No lesions.      Pharynx: Oropharynx is clear. Uvula midline. No oropharyngeal exudate.      Tonsils: No tonsillar exudate.   Eyes:      Extraocular Movements: Extraocular movements intact.      Conjunctiva/sclera: Conjunctivae normal.   Neck:      Vascular: No carotid bruit or JVD.   Cardiovascular:      Rate and Rhythm: Normal rate and regular rhythm.      Heart sounds: S1 normal and S2 normal. No murmur heard.  Pulmonary:      Effort: Pulmonary effort is normal. No tachypnea or respiratory distress.      Breath sounds: Normal breath sounds and air entry. No decreased breath sounds.   Chest:      Chest wall: No deformity or tenderness.   Abdominal:      General: Abdomen is flat. Bowel sounds are normal. There is no distension.      Palpations: Abdomen is soft.      Tenderness: There is no abdominal tenderness. There is no right CVA tenderness, left CVA tenderness or guarding.   Musculoskeletal:         General: No swelling.      Cervical back: Full passive range of motion without pain and neck supple.      Right lower leg: No edema.      Left lower leg: No edema.   Lymphadenopathy:      Cervical: No cervical adenopathy.   Skin:     " General: Skin is warm and dry.      Capillary Refill: Capillary refill takes less than 2 seconds.      Findings: Rash present.             Comments:   R medial ankle with erythema & warmth, slightly swollen  No calf tenderness or swelling  No apparent open wounds  Neurovasc intact distally   Neurological:      Mental Status: She is alert and oriented to person, place, and time.      Cranial Nerves: Cranial nerves 2-12 are intact.      Sensory: Sensation is intact.      Motor: Motor function is intact.      Coordination: Coordination is intact.      Gait: Gait is intact.   Psychiatric:         Mood and Affect: Mood normal.         Behavior: Behavior is cooperative.

## 2024-10-30 PROBLEM — Z12.11 SCREENING FOR COLON CANCER: Status: RESOLVED | Noted: 2024-09-30 | Resolved: 2024-10-30

## 2024-11-05 LAB — MISCELLANEOUS LAB TEST RESULT: NORMAL

## 2024-11-07 ENCOUNTER — CLINICAL SUPPORT (OUTPATIENT)
Dept: FAMILY MEDICINE CLINIC | Facility: CLINIC | Age: 84
End: 2024-11-07
Payer: COMMERCIAL

## 2024-11-07 DIAGNOSIS — E53.8 VITAMIN B 12 DEFICIENCY: Primary | ICD-10-CM

## 2024-11-07 RX ADMIN — CYANOCOBALAMIN 1000 MCG: 1000 INJECTION, SOLUTION INTRAMUSCULAR; SUBCUTANEOUS at 13:55

## 2024-11-16 ENCOUNTER — OFFICE VISIT (OUTPATIENT)
Dept: URGENT CARE | Facility: CLINIC | Age: 84
End: 2024-11-16
Payer: COMMERCIAL

## 2024-11-16 VITALS
TEMPERATURE: 98.8 F | DIASTOLIC BLOOD PRESSURE: 100 MMHG | HEIGHT: 61 IN | OXYGEN SATURATION: 97 % | SYSTOLIC BLOOD PRESSURE: 172 MMHG | RESPIRATION RATE: 18 BRPM | HEART RATE: 63 BPM | BODY MASS INDEX: 35.48 KG/M2

## 2024-11-16 DIAGNOSIS — R31.9 HEMATURIA, UNSPECIFIED TYPE: Primary | ICD-10-CM

## 2024-11-16 LAB
SL AMB  POCT GLUCOSE, UA: NEGATIVE
SL AMB LEUKOCYTE ESTERASE,UA: ABNORMAL
SL AMB POCT BILIRUBIN,UA: NEGATIVE
SL AMB POCT BLOOD,UA: ABNORMAL
SL AMB POCT CLARITY,UA: CLEAR
SL AMB POCT COLOR,UA: YELLOW
SL AMB POCT KETONES,UA: NEGATIVE
SL AMB POCT NITRITE,UA: NEGATIVE
SL AMB POCT PH,UA: 6
SL AMB POCT SPECIFIC GRAVITY,UA: 1.03
SL AMB POCT URINE PROTEIN: NEGATIVE
SL AMB POCT UROBILINOGEN: 0.2

## 2024-11-16 PROCEDURE — 99213 OFFICE O/P EST LOW 20 MIN: CPT

## 2024-11-16 PROCEDURE — S9088 SERVICES PROVIDED IN URGENT: HCPCS

## 2024-11-16 PROCEDURE — 87086 URINE CULTURE/COLONY COUNT: CPT

## 2024-11-16 PROCEDURE — 81002 URINALYSIS NONAUTO W/O SCOPE: CPT

## 2024-11-16 RX ORDER — CEPHALEXIN 500 MG/1
500 CAPSULE ORAL EVERY 6 HOURS SCHEDULED
Qty: 28 CAPSULE | Refills: 0 | Status: SHIPPED | OUTPATIENT
Start: 2024-11-16 | End: 2024-11-23

## 2024-11-16 NOTE — PATIENT INSTRUCTIONS
Your urine test was not specific for a urinary Tract infection at this time.  Your urine has been sent to culture, it will take a few days to grow.  If urine test returns positive then I will have you start antibiotics that I sent to the pharmacy.  If negative then no UTI.  However if you develop multiple episodes of blood in your urine and you start having severe pelvic pain then I will have you start the antibiotics before your urine culture results.    Follow up with Urology or PCP in 3-5 days if not improving.  Proceed to Emergency Department if symptoms worsen.    If tests have been performed at [facility], our office will contact you with results if changes need to be made to the care plan discussed with you at the visit.  You can review your full results on [facility] MyChart.

## 2024-11-16 NOTE — PROGRESS NOTES
[facility]        NAME: Jordyn Spivey is a 84 y.o. female  : 1940    MRN: [id number]  DATE: 2024  TIME: 1:22 PM    Assessment and Plan   Hematuria, unspecified type [R31.9]  1. Hematuria, unspecified type  POCT urine dip    cephalexin (KEFLEX) 500 mg capsule    Urine culture        2024 UTI - Keflex  2024 UTI - Bactrim  2024 UTI - Amoxicillin  10/18/2024 UTI - Amoxicillin  Patient Instructions   Your urine test was not specific for a urinary Tract infection at this time.  Your urine has been sent to culture, it will take a few days to grow.  If urine test returns positive then I will have you start antibiotics that I sent to the pharmacy.  If negative then no UTI.  However if you develop multiple episodes of blood in your urine and you start having severe pelvic pain then I will have you start the antibiotics before your urine culture results.    Follow up with Urology or PCP in 3-5 days if not improving.  Proceed to Emergency Department if symptoms worsen.    If tests have been performed at [facility], our office will contact you with results if changes need to be made to the care plan discussed with you at the visit.  [facility]    Chief Complaint     Chief Complaint   Patient presents with    Possible UTI     Patient c/o hematuria that started this morning.   Patient reports she has frequent UTIs, seeing Urology 24.           History of Present Illness       States she has multiple bouts of UTIs in the past 6 months and she saw some blood when she wiped this morning after urinating once.  States that made her nervous as her past UTIs were very painful.         Review of Systems   Review of Systems   Constitutional:  Negative for chills and fever.   Respiratory:  Negative for shortness of breath.    Cardiovascular:  Negative for chest pain and palpitations.   Gastrointestinal:  Negative for abdominal pain, diarrhea,  nausea and vomiting.   Genitourinary:  Positive for hematuria. Negative for dysuria, frequency and urgency.        Saw blood when she wiped this morning after urinating.   Musculoskeletal:  Positive for back pain. Negative for arthralgias.   Skin:  Negative for color change and rash.   Neurological:  Negative for dizziness, seizures, syncope, weakness and numbness.   All other systems reviewed and are negative.        Current Medications       Current Outpatient Medications:     buPROPion (WELLBUTRIN XL) 150 mg 24 hr tablet, take 1 tablet by mouth once daily, Disp: 90 tablet, Rfl: 3    cephalexin (KEFLEX) 500 mg capsule, Take 1 capsule (500 mg total) by mouth every 6 (six) hours for 7 days, Disp: 28 capsule, Rfl: 0    cetirizine (ZyrTEC) 10 mg tablet, Take 10 mg by mouth daily, Disp: , Rfl:     cyanocobalamin 1,000 mcg/mL, Inject 1 mL (1,000 mcg total) into a muscle every 30 (thirty) days, Disp: 10 mL, Rfl: 1    diltiazem (CARDIZEM CD) 180 mg 24 hr capsule, take 1 capsule by mouth once daily, Disp: 90 capsule, Rfl: 3    Eliquis 5 MG, take 1 tablet by mouth twice a day, Disp: 180 tablet, Rfl: 3    ezetimibe (ZETIA) 10 mg tablet, take 1 tablet by mouth daily, Disp: 90 tablet, Rfl: 1    fenofibrate (TRICOR) 54 MG tablet, take 1 tablet by mouth once daily, Disp: 30 tablet, Rfl: 5    fluticasone (FLONASE) 50 mcg/act nasal spray, instill 1 spray into each nostril daily, Disp: 48 mL, Rfl: 1    olopatadine (PATANOL) 0.1 % ophthalmic solution, instill 1 drop into left eye twice a day, Disp: 5 mL, Rfl: 1    prednisoLONE acetate (PRED FORTE) 1 % ophthalmic suspension, Administer 1 drop to the right eye 4 (four) times a day, Disp: , Rfl:     spironolactone (ALDACTONE) 25 mg tablet, Take 50 mg (2 tablets) in a.m. and 25 mg (1 tablet) in p.m., Disp: 270 tablet, Rfl: 3    meclizine (ANTIVERT) 25 mg tablet, Take 0.5 tablets (12.5 mg total) by mouth 3 (three) times a day as needed for dizziness (Patient not taking: Reported on  "10/22/2024), Disp: 30 tablet, Rfl: 0    phenazopyridine (PYRIDIUM) 100 mg tablet, Take 1 tablet (100 mg total) by mouth 3 (three) times a day as needed for bladder spasms (Patient not taking: Reported on 8/28/2024), Disp: 20 tablet, Rfl: 0    Current Facility-Administered Medications:     cyanocobalamin injection 1,000 mcg, 1,000 mcg, Intramuscular, Q30 Days, , 1,000 mcg at 11/07/24 1355    Current Allergies     Allergies as of 11/16/2024 - Reviewed 11/16/2024   Allergen Reaction Noted    Aspirin  03/20/2018    Icosapent ethyl Myalgia 04/15/2024    Montelukast  03/25/2016    Nsaids  03/25/2016    Pravastatin  03/25/2016    Simvastatin  03/25/2016    Tolmetin  03/25/2016            The following portions of the patient's history were reviewed and updated as appropriate: allergies, current medications, past family history, past medical history, past social history, past surgical history and problem list.     Past Medical History:   Diagnosis Date    Atrial fibrillation (HCC)     Esophageal stricture     Factor V Leiden mutation (HCC)     LAST ASSESSED: 8/3/16    Hypertension        Past Surgical History:   Procedure Laterality Date    CATARACT EXTRACTION, BILATERAL Bilateral     REDUCTION MAMMAPLASTY Bilateral     TONSILLECTOMY AND ADENOIDECTOMY      TUBAL LIGATION Bilateral        Family History   Problem Relation Age of Onset    Hypertension Mother     Cancer Father          Medications have been verified.        Objective   BP (!) 172/100   Pulse 63   Temp 98.8 °F (37.1 °C) (Temporal)   Resp 18   Ht 5' 1\" (1.549 m)   SpO2 97%   BMI 35.48 kg/m²   No LMP recorded. Patient is postmenopausal.       Physical Exam     Physical Exam  Vitals and nursing note reviewed.   Constitutional:       Appearance: Normal appearance.   HENT:      Head: Normocephalic and atraumatic.   Pulmonary:      Effort: Pulmonary effort is normal.   Abdominal:      Tenderness: There is no abdominal tenderness. There is no right CVA " tenderness or left CVA tenderness.   Skin:     General: Skin is warm and dry.      Capillary Refill: Capillary refill takes less than 2 seconds.   Neurological:      General: No focal deficit present.      Mental Status: She is alert and oriented to person, place, and time. Mental status is at baseline.      Sensory: No sensory deficit.      Motor: No weakness.   Psychiatric:         Mood and Affect: Mood normal.         Behavior: Behavior normal.         Thought Content: Thought content normal.

## 2024-11-18 ENCOUNTER — DOCUMENTATION (OUTPATIENT)
Dept: ADMINISTRATIVE | Facility: OTHER | Age: 84
End: 2024-11-18

## 2024-11-18 LAB — BACTERIA UR CULT: NORMAL

## 2024-11-18 NOTE — PROGRESS NOTES
Blood pressure elevated  Appointment department: [facility]  Appointment provider: NANCY Tejada  Blood pressure   11/16/24 1320 (!) 172/100   11/16/24 1255 168/72     11/18/24 9:27 AM    Patient was called after the Urgent Care visit Patient declined to schedule appointment.    She does take her BP at home but doesn't have any readings to give    Thank you.  Daisha Cummins MA  [facility]

## 2024-12-09 ENCOUNTER — NURSE TRIAGE (OUTPATIENT)
Age: 84
End: 2024-12-09

## 2024-12-09 ENCOUNTER — TELEPHONE (OUTPATIENT)
Dept: CARDIOLOGY CLINIC | Facility: CLINIC | Age: 84
End: 2024-12-09

## 2024-12-09 ENCOUNTER — APPOINTMENT (EMERGENCY)
Dept: RADIOLOGY | Facility: HOSPITAL | Age: 84
End: 2024-12-09
Payer: COMMERCIAL

## 2024-12-09 ENCOUNTER — HOSPITAL ENCOUNTER (EMERGENCY)
Facility: HOSPITAL | Age: 84
Discharge: HOME | End: 2024-12-09
Attending: STUDENT IN AN ORGANIZED HEALTH CARE EDUCATION/TRAINING PROGRAM
Payer: COMMERCIAL

## 2024-12-09 VITALS
SYSTOLIC BLOOD PRESSURE: 126 MMHG | HEART RATE: 83 BPM | TEMPERATURE: 97 F | OXYGEN SATURATION: 97 % | RESPIRATION RATE: 21 BRPM | DIASTOLIC BLOOD PRESSURE: 60 MMHG

## 2024-12-09 DIAGNOSIS — I48.91 ATRIAL FIBRILLATION WITH RVR (HCC): ICD-10-CM

## 2024-12-09 DIAGNOSIS — R79.89 ELEVATED TSH: ICD-10-CM

## 2024-12-09 DIAGNOSIS — I48.91 ATRIAL FIBRILLATION WITH RAPID VENTRICULAR RESPONSE (HCC): ICD-10-CM

## 2024-12-09 DIAGNOSIS — R00.2 PALPITATIONS: Primary | ICD-10-CM

## 2024-12-09 LAB
2HR DELTA HS TROPONIN: 2 NG/L
ANION GAP SERPL CALCULATED.3IONS-SCNC: 10 MMOL/L (ref 4–13)
APTT PPP: 36 SECONDS (ref 23–34)
BASOPHILS # BLD AUTO: 0.05 THOUSANDS/ÂΜL (ref 0–0.1)
BASOPHILS NFR BLD AUTO: 1 % (ref 0–1)
BUN SERPL-MCNC: 38 MG/DL (ref 5–25)
CALCIUM SERPL-MCNC: 9.7 MG/DL (ref 8.4–10.2)
CARDIAC TROPONIN I PNL SERPL HS: 4 NG/L (ref ?–50)
CARDIAC TROPONIN I PNL SERPL HS: 6 NG/L (ref ?–50)
CHLORIDE SERPL-SCNC: 106 MMOL/L (ref 96–108)
CO2 SERPL-SCNC: 20 MMOL/L (ref 21–32)
CREAT SERPL-MCNC: 1.47 MG/DL (ref 0.6–1.3)
EOSINOPHIL # BLD AUTO: 0.22 THOUSAND/ÂΜL (ref 0–0.61)
EOSINOPHIL NFR BLD AUTO: 3 % (ref 0–6)
ERYTHROCYTE [DISTWIDTH] IN BLOOD BY AUTOMATED COUNT: 13.3 % (ref 11.6–15.1)
GFR SERPL CREATININE-BSD FRML MDRD: 32 ML/MIN/1.73SQ M
GLUCOSE SERPL-MCNC: 135 MG/DL (ref 65–140)
HCT VFR BLD AUTO: 37.6 % (ref 34.8–46.1)
HGB BLD-MCNC: 11.9 G/DL (ref 11.5–15.4)
IMM GRANULOCYTES # BLD AUTO: 0.06 THOUSAND/UL (ref 0–0.2)
IMM GRANULOCYTES NFR BLD AUTO: 1 % (ref 0–2)
INR PPP: 1.29 (ref 0.85–1.19)
LYMPHOCYTES # BLD AUTO: 2.2 THOUSANDS/ÂΜL (ref 0.6–4.47)
LYMPHOCYTES NFR BLD AUTO: 30 % (ref 14–44)
MAGNESIUM SERPL-MCNC: 1.9 MG/DL (ref 1.9–2.7)
MCH RBC QN AUTO: 29.2 PG (ref 26.8–34.3)
MCHC RBC AUTO-ENTMCNC: 31.6 G/DL (ref 31.4–37.4)
MCV RBC AUTO: 92 FL (ref 82–98)
MONOCYTES # BLD AUTO: 0.83 THOUSAND/ÂΜL (ref 0.17–1.22)
MONOCYTES NFR BLD AUTO: 11 % (ref 4–12)
NEUTROPHILS # BLD AUTO: 4.06 THOUSANDS/ÂΜL (ref 1.85–7.62)
NEUTS SEG NFR BLD AUTO: 54 % (ref 43–75)
NRBC BLD AUTO-RTO: 0 /100 WBCS
PLATELET # BLD AUTO: 382 THOUSANDS/UL (ref 149–390)
PMV BLD AUTO: 8.8 FL (ref 8.9–12.7)
POTASSIUM SERPL-SCNC: 4.3 MMOL/L (ref 3.5–5.3)
PROTHROMBIN TIME: 16.6 SECONDS (ref 12.3–15)
RBC # BLD AUTO: 4.08 MILLION/UL (ref 3.81–5.12)
SODIUM SERPL-SCNC: 136 MMOL/L (ref 135–147)
T4 FREE SERPL-MCNC: 0.82 NG/DL (ref 0.61–1.12)
TSH SERPL DL<=0.05 MIU/L-ACNC: 5.09 UIU/ML (ref 0.45–4.5)
WBC # BLD AUTO: 7.42 THOUSAND/UL (ref 4.31–10.16)

## 2024-12-09 PROCEDURE — 84484 ASSAY OF TROPONIN QUANT: CPT | Performed by: STUDENT IN AN ORGANIZED HEALTH CARE EDUCATION/TRAINING PROGRAM

## 2024-12-09 PROCEDURE — 84439 ASSAY OF FREE THYROXINE: CPT | Performed by: STUDENT IN AN ORGANIZED HEALTH CARE EDUCATION/TRAINING PROGRAM

## 2024-12-09 PROCEDURE — 85610 PROTHROMBIN TIME: CPT | Performed by: STUDENT IN AN ORGANIZED HEALTH CARE EDUCATION/TRAINING PROGRAM

## 2024-12-09 PROCEDURE — 96366 THER/PROPH/DIAG IV INF ADDON: CPT

## 2024-12-09 PROCEDURE — 93005 ELECTROCARDIOGRAM TRACING: CPT

## 2024-12-09 PROCEDURE — 85730 THROMBOPLASTIN TIME PARTIAL: CPT | Performed by: STUDENT IN AN ORGANIZED HEALTH CARE EDUCATION/TRAINING PROGRAM

## 2024-12-09 PROCEDURE — 71045 X-RAY EXAM CHEST 1 VIEW: CPT

## 2024-12-09 PROCEDURE — 85025 COMPLETE CBC W/AUTO DIFF WBC: CPT | Performed by: STUDENT IN AN ORGANIZED HEALTH CARE EDUCATION/TRAINING PROGRAM

## 2024-12-09 PROCEDURE — 36415 COLL VENOUS BLD VENIPUNCTURE: CPT | Performed by: STUDENT IN AN ORGANIZED HEALTH CARE EDUCATION/TRAINING PROGRAM

## 2024-12-09 PROCEDURE — 99285 EMERGENCY DEPT VISIT HI MDM: CPT

## 2024-12-09 PROCEDURE — 83735 ASSAY OF MAGNESIUM: CPT | Performed by: STUDENT IN AN ORGANIZED HEALTH CARE EDUCATION/TRAINING PROGRAM

## 2024-12-09 PROCEDURE — 96365 THER/PROPH/DIAG IV INF INIT: CPT

## 2024-12-09 PROCEDURE — 99291 CRITICAL CARE FIRST HOUR: CPT | Performed by: STUDENT IN AN ORGANIZED HEALTH CARE EDUCATION/TRAINING PROGRAM

## 2024-12-09 PROCEDURE — 84443 ASSAY THYROID STIM HORMONE: CPT | Performed by: STUDENT IN AN ORGANIZED HEALTH CARE EDUCATION/TRAINING PROGRAM

## 2024-12-09 PROCEDURE — 80048 BASIC METABOLIC PNL TOTAL CA: CPT | Performed by: STUDENT IN AN ORGANIZED HEALTH CARE EDUCATION/TRAINING PROGRAM

## 2024-12-09 PROCEDURE — 96375 TX/PRO/DX INJ NEW DRUG ADDON: CPT

## 2024-12-09 RX ORDER — METOPROLOL TARTRATE 1 MG/ML
5 INJECTION, SOLUTION INTRAVENOUS
Status: DISCONTINUED | OUTPATIENT
Start: 2024-12-09 | End: 2024-12-09 | Stop reason: HOSPADM

## 2024-12-09 RX ORDER — DILTIAZEM HYDROCHLORIDE 240 MG/1
240 CAPSULE, COATED, EXTENDED RELEASE ORAL DAILY
Qty: 90 CAPSULE | Refills: 1 | Status: SHIPPED | OUTPATIENT
Start: 2024-12-09

## 2024-12-09 RX ORDER — MAGNESIUM SULFATE HEPTAHYDRATE 40 MG/ML
2 INJECTION, SOLUTION INTRAVENOUS ONCE
Status: COMPLETED | OUTPATIENT
Start: 2024-12-09 | End: 2024-12-09

## 2024-12-09 RX ADMIN — MAGNESIUM SULFATE HEPTAHYDRATE 2 G: 40 INJECTION, SOLUTION INTRAVENOUS at 00:42

## 2024-12-09 RX ADMIN — METOROPROLOL TARTRATE 5 MG: 5 INJECTION, SOLUTION INTRAVENOUS at 00:42

## 2024-12-09 NOTE — ED PROVIDER NOTES
Time reflects when diagnosis was documented in both MDM as applicable and the Disposition within this note       Time User Action Codes Description Comment    12/9/2024  3:57 AM Sofia Duval [R00.2] Palpitations     12/9/2024  3:57 AM Sofia Duval [I48.91] Atrial fibrillation with rapid ventricular response (HCC)     12/9/2024  3:59 AM Zohra Duvalily Miri [R79.89] Elevated TSH           ED Disposition       ED Disposition   Discharge    Condition   Stable    Date/Time   Mon Dec 9, 2024  3:57 AM    Comment   Jordyn Spivey discharge to home/self care.                   Assessment & Plan       Medical Decision Making  Differential diagnoses include but are not limited to: Acute myocardial ischemia, atrial fibrillation with rapid ventricular response, medication noncompliance, thyroid dysfunction, electrolyte disturbance, anxiety, dehydration    Patient is an 84-year-old female with a history of paroxysmal A-fib who does take diltiazem and Eliquis at home for her A-fib who got in bed and felt palpitations.  She had minimal other symptoms but noticed that her heart rate was above 150 on her smart watch.  When she arrived here she was persistently in the 130s to 140s and was complaining of some mild chest heaviness but no other symptoms.  She was given 5 mg of metoprolol once and 2 g of magnesium which rate controlled her.  After this medication was given the patient states that she feels completely normal.  Serial troponins were within normal limits.  The rest of her electrolytes were relatively normal.  Her renal function was at her baseline functioning.  Her TSH was elevated with a free T4 pending.  [facility]  Since patient is feeling better and has her daughter with her they would like to go home.  She was offered admission since she does have a heart score 4 and is at relatively high risk for having a cardiac dysfunction, however she states that she feels  back at her baseline and would like to go home.  She will call her cardiologist about her medications tomorrow.  They were given strict return precautions.  She was hemodynamically stable for several hours prior to discharge.    Amount and/or Complexity of Data Reviewed  Independent Historian: caregiver     Details: Daughter  External Data Reviewed: labs and ECG.  Labs: ordered.  Radiology: ordered and independent interpretation performed.  ECG/medicine tests: ordered and independent interpretation performed.    Risk  Prescription drug management.        ED Course as of 12/09/24 0406   Mon Dec 09, 2024   0020 Twelve-lead EKG was performed at 12:13 AM.  Patient is in atrial fibrillation with a rate of 136.  Axis is normal, intervals are normal.  There are no acute ischemic changes noted       Medications   metoprolol (LOPRESSOR) injection 5 mg (5 mg Intravenous Given 12/9/24 0042)   magnesium sulfate 2 g/50 mL IVPB (premix) 2 g (0 g Intravenous Stopped 12/9/24 0220)       ED Risk Strat Scores   HEART Risk Score      Flowsheet Row Most Recent Value   Heart Score Risk Calculator    History 0 Filed at: 12/09/2024 0400   ECG 1 Filed at: 12/09/2024 0400   Age 2 Filed at: 12/09/2024 0400   Risk Factors 1 Filed at: 12/09/2024 0400   Troponin 0 Filed at: 12/09/2024 0400   HEART Score 4 Filed at: 12/09/2024 0400                               SBIRT 22yo+      Flowsheet Row Most Recent Value   Initial Alcohol Screen: US AUDIT-C     1. How often do you have a drink containing alcohol? 0 Filed at: 12/09/2024 0017   2. How many drinks containing alcohol do you have on a typical day you are drinking?  0 Filed at: 12/09/2024 0017   3a. Male UNDER 65: How often do you have five or more drinks on one occasion? 0 Filed at: 12/09/2024 0017   3b. FEMALE Any Age, or MALE 65+: How often do you have 4 or more drinks on one occassion? 0 Filed at: 12/09/2024 0017   Audit-C Score 0 Filed at: 12/09/2024 0017   MELVIN: How many times in the past  year have you...    Used an illegal drug or used a prescription medication for non-medical reasons? Never Filed at: 2024 0017                            History of Present Illness       Chief Complaint   Patient presents with    Rapid Heart Rate     Pt reports she felt her heart racing stating it was in the 150s       Past Medical History:   Diagnosis Date    Atrial fibrillation (HCC)     Esophageal stricture     Factor V Leiden mutation (HCC)     LAST ASSESSED: 8/3/16    Hypertension       Past Surgical History:   Procedure Laterality Date    CATARACT EXTRACTION, BILATERAL Bilateral     REDUCTION MAMMAPLASTY Bilateral     TONSILLECTOMY AND ADENOIDECTOMY      TUBAL LIGATION Bilateral       Family History   Problem Relation Age of Onset    Hypertension Mother     Cancer Father       Social History     Tobacco Use    Smoking status: Former     Current packs/day: 0.00     Types: Cigarettes     Quit date:      Years since quittin.9    Smokeless tobacco: Never   Vaping Use    Vaping status: Never Used   Substance Use Topics    Alcohol use: Never    Drug use: Never      E-Cigarette/Vaping    E-Cigarette Use Never User       E-Cigarette/Vaping Substances    Nicotine No     THC No     CBD No     Flavoring No     Other No     Unknown No       I have reviewed and agree with the history as documented.     Patient is an 84-year-old female who presents with palpitations and tachycardia.  She states that she was doing some laundry and then went upstairs and got in bed and as she was lying there she felt palpitations and her smart watch told her that her heart rate was 150.  She reported some mild chest pressure that was associated with it.  She has been taking her diltiazem for atrial fibrillation and has not missed any doses.  She denies any recent illness, fevers, chills.  She is not having any shortness of breath, nausea, diaphoresis.  She denies syncope she has not missed any doses of her Eliquis      Rapid  Heart Rate  Associated symptoms: no back pain, no chest pain, no cough, no diaphoresis, no dizziness, no nausea, no numbness, no shortness of breath, no vomiting and no weakness        Review of Systems   Constitutional:  Negative for chills, diaphoresis, fatigue and fever.   HENT:  Negative for congestion, ear pain and sore throat.    Eyes:  Negative for pain and visual disturbance.   Respiratory:  Positive for chest tightness. Negative for cough and shortness of breath.    Cardiovascular:  Positive for palpitations. Negative for chest pain.   Gastrointestinal:  Negative for abdominal pain, nausea and vomiting.   Endocrine: Negative.    Genitourinary:  Negative for dysuria and hematuria.   Musculoskeletal:  Negative for arthralgias and back pain.   Skin:  Negative for color change and rash.   Neurological:  Negative for dizziness, seizures, syncope, speech difficulty, weakness, light-headedness, numbness and headaches.   Hematological: Negative.    Psychiatric/Behavioral: Negative.     All other systems reviewed and are negative.          Objective       ED Triage Vitals [12/09/24 0009]   Temperature Pulse Blood Pressure Respirations SpO2 Patient Position - Orthostatic VS   (!) 97 °F (36.1 °C) (!) 135 166/79 18 95 % --      Temp Source Heart Rate Source BP Location FiO2 (%) Pain Score    Temporal Monitor Left arm -- No Pain      Vitals      Date and Time Temp Pulse SpO2 Resp BP Pain Score FACES Pain Rating User   12/09/24 0330 -- 83 97 % 21 126/60 -- --    12/09/24 0230 -- 87 96 % 19 146/69 -- --    12/09/24 0200 -- 84 96 % 18 127/64 -- --    12/09/24 0130 -- 82 94 % 18 125/60 -- --    12/09/24 0100 -- 95 95 % 19 140/74 -- --    12/09/24 0009 97 °F (36.1 °C) 135 95 % 18 166/79 No Pain -- MD            Physical Exam  Vitals and nursing note reviewed.   Constitutional:       General: She is not in acute distress.     Appearance: She is well-developed. She is not ill-appearing.   HENT:      Head:  Normocephalic and atraumatic.      Nose: Nose normal.      Mouth/Throat:      Mouth: Mucous membranes are moist.   Eyes:      Conjunctiva/sclera: Conjunctivae normal.      Pupils: Pupils are equal, round, and reactive to light.   Cardiovascular:      Rate and Rhythm: Tachycardia present. Rhythm irregular.      Heart sounds: No murmur heard.  Pulmonary:      Effort: Pulmonary effort is normal. No respiratory distress.      Breath sounds: Normal breath sounds.   Abdominal:      Palpations: Abdomen is soft.      Tenderness: There is no abdominal tenderness.   Musculoskeletal:         General: No swelling.      Cervical back: Neck supple.   Skin:     General: Skin is warm and dry.      Capillary Refill: Capillary refill takes less than 2 seconds.   Neurological:      General: No focal deficit present.      Mental Status: She is alert and oriented to person, place, and time.      Cranial Nerves: No cranial nerve deficit.      Motor: No weakness.   Psychiatric:         Mood and Affect: Mood normal.         Results Reviewed       Procedure Component Value Units Date/Time    HS Troponin I 2hr [[id number]]  (Normal) Collected: 12/09/24 0221    Lab Status: Final result Specimen: Blood from Arm, Right Updated: 12/09/24 0344     hs TnI 2hr 6 ng/L      Delta 2hr hsTnI 2 ng/L     TSH [[id number]]  (Abnormal) Collected: 12/09/24 0024    Lab Status: Final result Specimen: Blood from Arm, Right Updated: 12/09/24 0113     TSH 3RD GENERATON 5.093 uIU/mL     Protime-INR [[id number]]  (Abnormal) Collected: 12/09/24 0024    Lab Status: Final result Specimen: Blood from Arm, Right Updated: 12/09/24 0105     Protime 16.6 seconds      INR 1.29    Narrative:      INR Therapeutic Range    Indication                                             INR Range      Atrial Fibrillation                                               2.0-3.0  Hypercoagulable State                                    2.0.2.3  Left Ventricular Asist Device                             2.0-3.0  Mechanical Heart Valve                                  -    Aortic(with afib, MI, embolism, HF, LA enlargement,    and/or coagulopathy)                                     2.0-3.0 (2.5-3.5)     Mitral                                                             2.5-3.5  Prosthetic/Bioprosthetic Heart Valve               2.0-3.0  Venous thromboembolism (VTE: VT, PE        2.0-3.0    APTT [[id number]]  (Abnormal) Collected: 12/09/24 0024    Lab Status: Final result Specimen: Blood from Arm, Right Updated: 12/09/24 0105     PTT 36 seconds     HS Troponin 0hr (reflex protocol) [[id number]]  (Normal) Collected: 12/09/24 0024    Lab Status: Final result Specimen: Blood from Arm, Right Updated: 12/09/24 0104     hs TnI 0hr 4 ng/L     Basic metabolic panel [[id number]]  (Abnormal) Collected: 12/09/24 0024    Lab Status: Final result Specimen: Blood from Arm, Right Updated: 12/09/24 0100     Sodium 136 mmol/L      Potassium 4.3 mmol/L      Chloride 106 mmol/L      CO2 20 mmol/L      ANION GAP 10 mmol/L      BUN 38 mg/dL      Creatinine 1.47 mg/dL      Glucose 135 mg/dL      Calcium 9.7 mg/dL      eGFR 32 ml/min/1.73sq m     Narrative:      National Kid[employer] Foundation guidelines for Chronic Kidney Disease (CKD):     Stage 1 with normal or high GFR (GFR > 90 mL/min/1.73 square meters)    Stage 2 Mild CKD (GFR = 60-89 mL/min/1.73 square meters)    Stage 3A Moderate CKD (GFR = 45-59 mL/min/1.73 square meters)    Stage 3B Moderate CKD (GFR = 30-44 mL/min/1.73 square meters)    Stage 4 Severe CKD (GFR = 15-29 mL/min/1.73 square meters)    Stage 5 End Stage CKD (GFR <15 mL/min/1.73 square meters)  Note: GFR calculation is accurate only with a steady state creatinine    Magnesium [[id number]]  (Normal) Collected: 12/09/24 0024    Lab Status: Final result Specimen: Blood from Arm, Right Updated: 12/09/24 0100     Magnesium 1.9 mg/dL     CBC and differential [[id number]]  (Abnormal) Collected: 12/09/24 0024    Lab  Status: Final result Specimen: Blood from Arm, Right Updated: 12/09/24 0035     WBC 7.42 Thousand/uL      RBC 4.08 Million/uL      Hemoglobin 11.9 g/dL      Hematocrit 37.6 %      MCV 92 fL      MCH 29.2 pg      MCHC 31.6 g/dL      RDW 13.3 %      MPV 8.8 fL      Platelets 382 Thousands/uL      nRBC 0 /100 WBCs      Segmented % 54 %      Immature Grans % 1 %      Lymphocytes % 30 %      Monocytes % 11 %      Eosinophils Relative 3 %      Basophils Relative 1 %      Absolute Neutrophils 4.06 Thousands/µL      Absolute Immature Grans 0.06 Thousand/uL      Absolute Lymphocytes 2.20 Thousands/µL      Absolute Monocytes 0.83 Thousand/µL      Eosinophils Absolute 0.22 Thousand/µL      Basophils Absolute 0.05 Thousands/µL     T4, free [[id number]] Collected: 12/09/24 0024    Lab Status: In process Specimen: Blood from Arm, Right Updated: 12/09/24 0034            XR chest portable - 1 View    (Results Pending)       CriticalCare Time    Date/Time: 12/9/2024 1:05 AM    Performed by: Sofia Duval MD  Authorized by: Sofia Duval MD    Critical care provider statement:     Critical care time (minutes):  30    Critical care time was exclusive of:  Separately billable procedures and treating other patients and teaching time    Critical care was necessary to treat or prevent imminent or life-threatening deterioration of the following conditions:  Circulatory failure and cardiac failure    Critical care was time spent personally by me on the following activities:  Obtaining history from patient or surrogate, development of treatment plan with patient or surrogate, evaluation of patient's response to treatment, examination of patient, interpretation of cardiac output measurements, ordering and performing treatments and interventions, ordering and review of laboratory studies, ordering and review of radiographic studies, re-evaluation of patient's condition and review of old charts    I assumed direction of critical care for this  patient from another provider in my specialty: no        ED Medication and Procedure Management   Prior to Admission Medications   Prescriptions Last Dose Informant Patient Reported? Taking?   Eliquis 5 MG  Self No No   Sig: take 1 tablet by mouth twice a day   buPROPion (WELLBUTRIN XL) 150 mg 24 hr tablet  Self No No   Sig: take 1 tablet by mouth once daily   cetirizine (ZyrTEC) 10 mg tablet  Self Yes No   Sig: Take 10 mg by mouth daily   cyanocobalamin 1,000 mcg/mL   No No   Sig: Inject 1 mL (1,000 mcg total) into a muscle every 30 (thirty) days   diltiazem (CARDIZEM CD) 180 mg 24 hr capsule  Self No No   Sig: take 1 capsule by mouth once daily   ezetimibe (ZETIA) 10 mg tablet   No No   Sig: take 1 tablet by mouth daily   fenofibrate (TRICOR) 54 MG tablet   No No   Sig: take 1 tablet by mouth once daily   fluticasone (FLONASE) 50 mcg/act nasal spray   No No   Sig: instill 1 spray into each nostril daily   meclizine (ANTIVERT) 25 mg tablet  Self No No   Sig: Take 0.5 tablets (12.5 mg total) by mouth 3 (three) times a day as needed for dizziness   Patient not taking: Reported on 10/22/2024   olopatadine (PATANOL) 0.1 % ophthalmic solution  Self No No   Sig: instill 1 drop into left eye twice a day   phenazopyridine (PYRIDIUM) 100 mg tablet  Self No No   Sig: Take 1 tablet (100 mg total) by mouth 3 (three) times a day as needed for bladder spasms   Patient not taking: Reported on 8/28/2024   prednisoLONE acetate (PRED FORTE) 1 % ophthalmic suspension  Self Yes No   Sig: Administer 1 drop to the right eye 4 (four) times a day   spironolactone (ALDACTONE) 25 mg tablet  Self No No   Sig: Take 50 mg (2 tablets) in a.m. and 25 mg (1 tablet) in p.m.      Facility-Administered Medications Last Administration Doses Remaining   cyanocobalamin injection 1,000 mcg 11/7/2024  1:55 PM         Patient's Medications   Discharge Prescriptions    No medications on file     No discharge procedures on file.  ED SEPSIS DOCUMENTATION    Time reflects when diagnosis was documented in both MDM as applicable and the Disposition within this note       Time User Action Codes Description Comment    12/9/2024  3:57 AM Sofia Duval [R00.2] Palpitations     12/9/2024  3:57 AM Sofia Duval [I48.91] Atrial fibrillation with rapid ventricular response (HCC)     12/9/2024  3:59 AM Sofia Duval [R79.89] Elevated TSH                  Sofia Duval MD  12/09/24 0406

## 2024-12-09 NOTE — DISCHARGE INSTRUCTIONS
Please call your cardiologist and let them know that you were seen here today for atrial fibrillation with rapid ventricular response.  You only required 1 dose of medication to control your heart rate and have been feeling well since then.  Increase your fluid intake.  Return here if you develop more palpitations, chest discomfort, difficulty breathing, weakness, if you feel like you are going to pass out.  Your TSH was elevated as it has been on prior blood tests.  I will follow the results of your T4 test and if it requires any treatment, I will call you and let you know.

## 2024-12-09 NOTE — TELEPHONE ENCOUNTER
As per Dr. Bermudez telephone note- scheduled patient sooner. And Faviano ordered. Patients request to have sent to home.

## 2024-12-09 NOTE — TELEPHONE ENCOUNTER
"Reason for Conversation: received call from pt.  She went to ED last night for rapid heart rate. She states last night HR was 155.  She was given IV medications- metoprolol and magnesium and discharged once HR came down.  Pt checked her HR now and it is between 139-145 bpm on her smart watch.  Pt said she has been walking around.  Pt denies SOB/chest pain.  Advised pt to sit and rest and recheck HR, if still elevated above 140, advised her to go to ED.  Advised pt I would send a message to Dr. Bermudez to review and provide recommendations.      VS/Weight: (Note: Please include date/time vitals/weight were measured)  -145 on apple watch     Pain: No    Risk Factors: Arrhythmia- PAF    Recent relevant testing and date of testing: Labs 12/9, cxr 12/9    Medication: eliquis 5 mg bid, spironolactone 50 mg in am/25 mg in pm, diltiazem 180 mg qd, fenofibrate 54 mg qd    Upcoming Office Visit: Yes    Last Office Visit: 9/18/24      Reason for Disposition   Heart rhythm alert (e.g., 'you have irregular heartbeat') from personal wearable device (e.g., Apple Watch)    Answer Assessment - Initial Assessment Questions  1. DESCRIPTION: \"Please describe your heart rate or heartbeat that you are having\" (e.g., fast/slow, regular/irregular, skipped or extra beats, \"palpitations\")      Afib, elevated HR  2. ONSET: \"When did it start?\" (e.g., minutes, hours, days)       Last night   3. DURATION: \"How long does it last\" (e.g., seconds, minutes, hours)      Unsure   4. PATTERN \"Does it come and go, or has it been constant since it started?\"  \"Does it get worse with exertion?\"   \"Are you feeling it now?\"      Unsure   6. HEART RATE: \"Can you tell me your heart rate?\" \"How many beats in 15 seconds?\"  Note: Not all patients can do this.        Normally 80. Currentl 139-145 bpm  7. RECURRENT SYMPTOM: \"Have you ever had this before?\" If Yes, ask: \"When was the last time?\" and \"What happened that time?\"       Last night   8. CAUSE: \"What " "do you think is causing the palpitations?\"      Afib   9. CARDIAC HISTORY: \"Do you have any history of heart disease?\" (e.g., heart attack, angina, bypass surgery, angioplasty, arrhythmia)       Afib   10. OTHER SYMPTOMS: \"Do you have any other symptoms?\" (e.g., dizziness, chest pain, sweating, difficulty breathing)        Denies    Protocols used: Heart Rate and Heartbeat Questions-Adult-[place]    "

## 2024-12-10 ENCOUNTER — VBI (OUTPATIENT)
Dept: FAMILY MEDICINE CLINIC | Facility: CLINIC | Age: 84
End: 2024-12-10

## 2024-12-10 LAB
ATRIAL RATE: 110 BPM
ATRIAL RATE: 64 BPM
QRS AXIS: -14 DEGREES
QRS AXIS: -6 DEGREES
QRSD INTERVAL: 84 MS
QRSD INTERVAL: 90 MS
QT INTERVAL: 300 MS
QT INTERVAL: 348 MS
QTC INTERVAL: 416 MS
QTC INTERVAL: 451 MS
T WAVE AXIS: 48 DEGREES
T WAVE AXIS: 59 DEGREES
VENTRICULAR RATE: 136 BPM
VENTRICULAR RATE: 86 BPM

## 2024-12-10 PROCEDURE — 93010 ELECTROCARDIOGRAM REPORT: CPT | Performed by: INTERNAL MEDICINE

## 2024-12-10 NOTE — TELEPHONE ENCOUNTER
12/10/24 10:00 AM    Patient contacted post ED visit, first outreach attempt made. Message was left for patient to return a call to the [facility] Department at [facility]: Phone [phone].    Thank you.  Daisha Cummins MA  [facility]

## 2024-12-11 NOTE — TELEPHONE ENCOUNTER
12/11/24 9:57 AM    Patient contacted post ED visit, VBI department spoke with patient/caregiver and outreach was successful.    Thank you.  Daisha Cummins MA  [facility]

## 2024-12-16 ENCOUNTER — HOSPITAL ENCOUNTER (OUTPATIENT)
Dept: NON INVASIVE DIAGNOSTICS | Facility: HOSPITAL | Age: 84
Discharge: HOME | End: 2024-12-16
Payer: COMMERCIAL

## 2024-12-16 ENCOUNTER — TELEPHONE (OUTPATIENT)
Dept: CARDIOLOGY CLINIC | Facility: CLINIC | Age: 84
End: 2024-12-16

## 2024-12-16 DIAGNOSIS — I65.23 BILATERAL CAROTID ARTERY STENOSIS: ICD-10-CM

## 2024-12-16 PROCEDURE — 93880 EXTRACRANIAL BILAT STUDY: CPT

## 2024-12-16 PROCEDURE — 93880 EXTRACRANIAL BILAT STUDY: CPT | Performed by: SURGERY

## 2024-12-16 NOTE — TELEPHONE ENCOUNTER
Jordyn is getting her zio mailed this week so she won't have those results yet for her Friday appointment with you. She was wondering if she should reschedule this? She did mention her apple watch does read atrial fibrillation. And she checks her HR and it reads 70's-80's. Never been higher than 80's. Do you recommend she still keeps her appointment with you on Friday or reschedule to a later time?    She is taking the higher dose of diltiazem that you prescribed on 12/9.

## 2024-12-20 ENCOUNTER — CLINICAL SUPPORT (OUTPATIENT)
Dept: CARDIOLOGY CLINIC | Facility: CLINIC | Age: 84
End: 2024-12-20

## 2024-12-20 ENCOUNTER — OFFICE VISIT (OUTPATIENT)
Dept: CARDIOLOGY CLINIC | Facility: CLINIC | Age: 84
End: 2024-12-20

## 2024-12-20 ENCOUNTER — RESULTS FOLLOW-UP (OUTPATIENT)
Dept: VASCULAR SURGERY | Facility: CLINIC | Age: 84
End: 2024-12-20

## 2024-12-20 VITALS
WEIGHT: 190 LBS | DIASTOLIC BLOOD PRESSURE: 66 MMHG | HEIGHT: 60 IN | RESPIRATION RATE: 18 BRPM | HEART RATE: 74 BPM | BODY MASS INDEX: 37.3 KG/M2 | SYSTOLIC BLOOD PRESSURE: 124 MMHG | OXYGEN SATURATION: 99 %

## 2024-12-20 DIAGNOSIS — I48.91 ATRIAL FIBRILLATION WITH RVR (HCC): Primary | ICD-10-CM

## 2024-12-20 DIAGNOSIS — I35.1 AORTIC VALVE INSUFFICIENCY, ETIOLOGY OF CARDIAC VALVE DISEASE UNSPECIFIED: Primary | ICD-10-CM

## 2024-12-20 DIAGNOSIS — E66.812 CLASS 2 SEVERE OBESITY DUE TO EXCESS CALORIES WITH SERIOUS COMORBIDITY IN ADULT, UNSPECIFIED BMI (HCC): ICD-10-CM

## 2024-12-20 DIAGNOSIS — E66.01 CLASS 2 SEVERE OBESITY DUE TO EXCESS CALORIES WITH SERIOUS COMORBIDITY IN ADULT, UNSPECIFIED BMI (HCC): ICD-10-CM

## 2024-12-20 DIAGNOSIS — I48.0 PAF (PAROXYSMAL ATRIAL FIBRILLATION) (HCC): ICD-10-CM

## 2024-12-20 DIAGNOSIS — E78.1 HYPERTRIGLYCERIDEMIA: ICD-10-CM

## 2024-12-20 NOTE — ASSESSMENT & PLAN NOTE
-Counseled on dietary and lifestyle modifications  -Continue Zetia 10 mg daily and fenofibrate 54 mg daily  -Continue to monitor

## 2024-12-20 NOTE — ASSESSMENT & PLAN NOTE
-Moderate on transthoracic echocardiogram August 2023  -Continue spironolactone 50 mg in the morning and 25 mg in the evening  -Follow-up transthoracic echocardiogram results

## 2024-12-20 NOTE — ASSESSMENT & PLAN NOTE
-Rate controlled in office at this time  -After discussion with patient and daughter she wishes for second opinion on potential for rhythm control strategy and therefore we will give her electrophysiology referral  -Will continue diltiazem 240 mg daily and follow-up transthoracic echocardiogram results and Zio patch monitor  -Patient will follow-up with her primary care physician for abnormal TSH  -Patient has been 100% compliant with oral anticoagulation and can continue Eliquis 5 mg twice daily

## 2024-12-20 NOTE — PROGRESS NOTES
Patient ID: Jordyn Spivey is a 84 y.o. female.        Plan:      Assessment & Plan  Aortic valve insufficiency, etiology of cardiac valve disease unspecified  -Moderate on transthoracic echocardiogram August 2023  -Continue spironolactone 50 mg in the morning and 25 mg in the evening  -Follow-up transthoracic echocardiogram results  PAF (paroxysmal atrial fibrillation) (HCC)  -Rate controlled in office at this time  -After discussion with patient and daughter she wishes for second opinion on potential for rhythm control strategy and therefore we will give her electrophysiology referral  -Will continue diltiazem 240 mg daily and follow-up transthoracic echocardiogram results and Zio patch monitor  -Patient will follow-up with her primary care physician for abnormal TSH  -Patient has been 100% compliant with oral anticoagulation and can continue Eliquis 5 mg twice daily  Class 2 severe obesity due to excess calories with serious comorbidity in adult, unspecified BMI (HCC)  -Counseled on dietary lifestyle modifications along with need for weight reduction goal BMI less than 29  -Continue to monitor  Hypertriglyceridemia  -Counseled on dietary and lifestyle modifications  -Continue Zetia 10 mg daily and fenofibrate 54 mg daily  -Continue to monitor      Follow up Plan/Other summary comments:  -Will follow-up pending Zio patch monitor and transthoracic echocardiogram.  -Patient will follow-up with primary care physician for abnormal TSH levels  -Patient will continue Eliquis 5 mg twice daily, Zetia 10 mg daily, fenofibrate 54 mg daily, spironolactone 50 mg in a.m. and 25 mg in the evening  -Patient will continue diltiazem 240 mg daily  -Counseled patient on dietary and lifestyle modifications along with need for weight reduction goal BMI less than 29  -Patient counseled if she were to have any warning or alarm type symptoms she is to seek emergency medical care immediately.  -After discussion with patient about  potential for rhythm control strategy she wishes to complete Zio patch monitor and will undergo transthoracic echocardiogram prior to making decision.  Will also give referral to electrophysiology for second opinion  -Patient will be seen in 3 months or sooner if necessary  -ECG performed in the office today shows atrial fibrillation heart rate 89 bpm low voltage QRS.    HPI:   -Patient is an 84-year-old female with hypertension, chronic rhinitis, factor V Leiden, obesity, CKD hospitalized in August 2022 at which time was found to have atrial fibrillation with rapid ventricular response after being alerted by her Apple Watch.  She was initiated on oral anticoagulation with rate control strategy and eventually was able to be discharged to outpatient follow-up.  She had been seen and evaluated by hematology who agreed with oral anticoagulation and also follows with nephrology for continued monitoring of CKD.  She recently was seen and evaluated emergency department for tachycardia had electrolytes repleted and was given IV metoprolol which improved heart rate and was recommended to follow-up with cardiology and presents to the office today for follow-up.  -Currently in the office today patient denies any chest pain, palpitations, lightheadedness or dizziness, loss of Marian that the shortness of breath.  She notes apart from some joint arthropathies she is doing well.  She notes that she monitors her heart rate on her Apple Watch and it is usually in the 70s-80s beat per minute range on medical therapy.  She is not 100% compliant with her oral anticoagulation and has not missed any doses in well over a month if ever.      Most recent or relevant cardiac/vascular testing:    -Ambulatory event monitor 1/13/2023 showing predominantly sinus rhythm average heart rate 76 bpm with occasional supraventricular ectopic activity coming for 2.9% of total monitored beats with no significant atrial fibrillation  "appreciated.    -Transthoracic echocardiogram 8/9/2023 showing left ventricular systolic function normal cemented LVEF 60% with possible small patent foramen ovale, mildly dilated left atrium with moderate aortic regurgitation, mild to moderate mitral regurgitation and mild tricuspid regurgitation with IVC normal in size.      Past Surgical History:   Procedure Laterality Date    CATARACT EXTRACTION, BILATERAL Bilateral     REDUCTION MAMMAPLASTY Bilateral     TONSILLECTOMY AND ADENOIDECTOMY      TUBAL LIGATION Bilateral        Review of Systems   Review of Systems   Constitutional:  Negative for chills, diaphoresis, fatigue and fever.   HENT:  Negative for trouble swallowing and voice change.    Eyes:  Negative for pain and redness.   Respiratory:  Negative for shortness of breath and wheezing.    Cardiovascular:  Negative for chest pain, palpitations and leg swelling.   Gastrointestinal:  Negative for abdominal pain, constipation, diarrhea, nausea and vomiting.   Genitourinary:  Negative for dysuria.   Musculoskeletal:  Positive for arthralgias. Negative for neck pain and neck stiffness.   Skin:  Negative for rash.   Neurological:  Negative for dizziness, syncope, light-headedness and headaches.   Psychiatric/Behavioral:  Negative for agitation and confusion.    All other systems reviewed and are negative.         Objective:     /66 (BP Location: Left arm, Patient Position: Sitting, Cuff Size: Large)   Pulse 74   Resp 18   Ht 5' 0.24\" (1.53 m)   Wt 86.2 kg (190 lb)   SpO2 99%   BMI 36.82 kg/m²     PHYSICAL EXAM:  Physical Exam  Vitals reviewed.   Constitutional:       General: She is not in acute distress.     Appearance: She is obese. She is not diaphoretic.   HENT:      Head: Normocephalic and atraumatic.   Eyes:      General:         Right eye: No discharge.         Left eye: No discharge.   Neck:      Comments: Trachea midline, obese, no significant JVD appreciated  Cardiovascular:      Heart " sounds: Murmur (AMADA) heard.      No friction rub.      Comments: Irregularly irregular rate and rhythm  Pulmonary:      Effort: No respiratory distress.      Breath sounds: No wheezing.      Comments: Decreased breath sounds bilaterally  Chest:      Chest wall: No tenderness.   Abdominal:      General: Bowel sounds are normal.      Palpations: Abdomen is soft.      Tenderness: There is no abdominal tenderness. There is no rebound.   Musculoskeletal:      Right lower leg: No edema.      Left lower leg: No edema.   Skin:     General: Skin is warm and dry.   Neurological:      Mental Status: She is alert.      Comments: Awake, alert, able to answer questions appropriately, able to move extremities bilaterally.   Psychiatric:         Mood and Affect: Mood normal.         Behavior: Behavior normal.          Meds reviewed.  Current Outpatient Medications on File Prior to Visit   Medication Sig Dispense Refill    buPROPion (WELLBUTRIN XL) 150 mg 24 hr tablet take 1 tablet by mouth once daily 90 tablet 3    cetirizine (ZyrTEC) 10 mg tablet Take 10 mg by mouth daily      cyanocobalamin 1,000 mcg/mL Inject 1 mL (1,000 mcg total) into a muscle every 30 (thirty) days 10 mL 1    diltiazem (CARDIZEM CD) 240 mg 24 hr capsule Take 1 capsule (240 mg total) by mouth daily 90 capsule 1    Eliquis 5 MG take 1 tablet by mouth twice a day 180 tablet 3    ezetimibe (ZETIA) 10 mg tablet take 1 tablet by mouth daily 90 tablet 1    fenofibrate (TRICOR) 54 MG tablet take 1 tablet by mouth once daily 30 tablet 5    fluticasone (FLONASE) 50 mcg/act nasal spray instill 1 spray into each nostril daily 48 mL 1    olopatadine (PATANOL) 0.1 % ophthalmic solution instill 1 drop into left eye twice a day 5 mL 1    prednisoLONE acetate (PRED FORTE) 1 % ophthalmic suspension Administer 1 drop to the right eye 4 (four) times a day      spironolactone (ALDACTONE) 25 mg tablet Take 50 mg (2 tablets) in a.m. and 25 mg (1 tablet) in p.m. 270 tablet 3     meclizine (ANTIVERT) 25 mg tablet Take 0.5 tablets (12.5 mg total) by mouth 3 (three) times a day as needed for dizziness (Patient not taking: Reported on 10/22/2024) 30 tablet 0    phenazopyridine (PYRIDIUM) 100 mg tablet Take 1 tablet (100 mg total) by mouth 3 (three) times a day as needed for bladder spasms (Patient not taking: Reported on 2024) 20 tablet 0     Current Facility-Administered Medications on File Prior to Visit   Medication Dose Route Frequency Provider Last Rate Last Admin    cyanocobalamin injection 1,000 mcg  1,000 mcg Intramuscular Q30 Days    1,000 mcg at 24 1145      Past Medical History:   Diagnosis Date    Atrial fibrillation (HCC)     Esophageal stricture     Factor V Leiden mutation (HCC)     LAST ASSESSED: 8/3/16    Hypertension      Social History     Tobacco Use   Smoking Status Former    Current packs/day: 0.00    Types: Cigarettes    Quit date:     Years since quittin.9   Smokeless Tobacco Never     Family History   Problem Relation Age of Onset    Hypertension Mother     Cancer Father

## 2024-12-20 NOTE — PROGRESS NOTES
Left pectoral area prepped and Zio placed without incident.  Patient counseled on the Do's and Don'ts of the device.  Patient understanding and agreeable with the removal and return of device.    Serial#  [id number]

## 2024-12-20 NOTE — ASSESSMENT & PLAN NOTE
-Counseled on dietary lifestyle modifications along with need for weight reduction goal BMI less than 29  -Continue to monitor

## 2024-12-23 ENCOUNTER — RESULTS FOLLOW-UP (OUTPATIENT)
Dept: CARDIOLOGY CLINIC | Facility: CLINIC | Age: 84
End: 2024-12-23

## 2024-12-23 ENCOUNTER — HOSPITAL ENCOUNTER (OUTPATIENT)
Dept: NON INVASIVE DIAGNOSTICS | Facility: CLINIC | Age: 84
Discharge: HOME | End: 2024-12-23
Payer: COMMERCIAL

## 2024-12-23 VITALS
SYSTOLIC BLOOD PRESSURE: 124 MMHG | HEART RATE: 85 BPM | BODY MASS INDEX: 37.3 KG/M2 | DIASTOLIC BLOOD PRESSURE: 66 MMHG | WEIGHT: 190 LBS | HEIGHT: 60 IN

## 2024-12-23 DIAGNOSIS — I35.1 AORTIC VALVE INSUFFICIENCY, ETIOLOGY OF CARDIAC VALVE DISEASE UNSPECIFIED: ICD-10-CM

## 2024-12-23 LAB
AORTIC ROOT: 3.1 CM
AORTIC VALVE MEAN VELOCITY: 6.8 M/S
APICAL FOUR CHAMBER EJECTION FRACTION: 57 %
ASCENDING AORTA: 3 CM
AV AREA BY CONTINUOUS VTI: 2.6 CM2
AV AREA PEAK VELOCITY: 2.5 CM2
AV LVOT MEAN GRADIENT: 1 MMHG
AV LVOT PEAK GRADIENT: 2 MMHG
AV MEAN GRADIENT: 2 MMHG
AV PEAK GRADIENT: 4 MMHG
AV REGURGITATION PRESSURE HALF TIME: 398 MS
AV VALVE AREA: 2.62 CM2
AV VELOCITY RATIO: 0.79
BSA FOR ECHO PROCEDURE: 1.83 M2
DOP CALC AO PEAK VEL: 0.94 M/S
DOP CALC AO VTI: 20.44 CM
DOP CALC LVOT AREA: 3.14 CM2
DOP CALC LVOT CARDIAC INDEX: 2.14 L/MIN/M2
DOP CALC LVOT CARDIAC OUTPUT: 3.91 L/MIN
DOP CALC LVOT DIAMETER: 2 CM
DOP CALC LVOT PEAK VEL VTI: 17.03 CM
DOP CALC LVOT PEAK VEL: 0.74 M/S
DOP CALC LVOT STROKE INDEX: 29.5 ML/M2
DOP CALC LVOT STROKE VOLUME: 53.47
FRACTIONAL SHORTENING: 33 (ref 28–44)
INTERVENTRICULAR SEPTUM IN DIASTOLE (PARASTERNAL SHORT AXIS VIEW): 1.3 CM
INTERVENTRICULAR SEPTUM: 1.3 CM (ref 0.6–1.1)
LAAS-AP2: 23.7 CM2
LAAS-AP4: 29.1 CM2
LEFT ATRIUM SIZE: 4 CM
LEFT ATRIUM VOLUME (MOD BIPLANE): 93 ML
LEFT ATRIUM VOLUME INDEX (MOD BIPLANE): 50.8 ML/M2
LEFT INTERNAL DIMENSION IN SYSTOLE: 2.9 CM (ref 2.1–4)
LEFT VENTRICULAR INTERNAL DIMENSION IN DIASTOLE: 4.3 CM (ref 3.5–6)
LEFT VENTRICULAR POSTERIOR WALL IN END DIASTOLE: 1.1 CM
LEFT VENTRICULAR STROKE VOLUME: 49 ML
LVSV (TEICH): 49 ML
MITRAL REGURGITATION PEAK VELOCITY: 4.9 M/S
MITRAL VALVE MEAN INFLOW VELOCITY: 4.21 M/S
MITRAL VALVE REGURGITANT PEAK GRADIENT: 96 MMHG
RA PRESSURE ESTIMATED: 12 MMHG
RIGHT ATRIUM AREA SYSTOLE A4C: 14.3 CM2
RIGHT VENTRICLE ID DIMENSION: 2.9 CM
RV PSP: 48 MMHG
SL CV AV DECELERATION TIME RETROGRADE: 1371 MS
SL CV AV PEAK GRADIENT RETROGRADE: 62 MMHG
SL CV DOP CALC MV VTI RETROGRADE: 168.2 CM
SL CV LEFT ATRIUM LENGTH A2C: 5.8 CM
SL CV LV EF: 60
SL CV MV MEAN GRADIENT RETROGRADE: 74 MMHG
SL CV PED ECHO LEFT VENTRICLE DIASTOLIC VOLUME (MOD BIPLANE) 2D: 81 ML
SL CV PED ECHO LEFT VENTRICLE SYSTOLIC VOLUME (MOD BIPLANE) 2D: 32 ML
TR MAX PG: 36 MMHG
TR PEAK VELOCITY: 3 M/S
TRICUSPID ANNULAR PLANE SYSTOLIC EXCURSION: 1.8 CM
TRICUSPID VALVE PEAK REGURGITATION VELOCITY: 3.02 M/S

## 2024-12-23 PROCEDURE — 93306 TTE W/DOPPLER COMPLETE: CPT | Performed by: INTERNAL MEDICINE

## 2024-12-23 PROCEDURE — 93306 TTE W/DOPPLER COMPLETE: CPT

## 2024-12-31 ENCOUNTER — RESULTS FOLLOW-UP (OUTPATIENT)
Dept: CARDIOLOGY CLINIC | Facility: CLINIC | Age: 84
End: 2024-12-31

## 2024-12-31 ENCOUNTER — CLINICAL SUPPORT (OUTPATIENT)
Dept: CARDIOLOGY CLINIC | Facility: CLINIC | Age: 84
End: 2024-12-31
Payer: COMMERCIAL

## 2024-12-31 DIAGNOSIS — I48.91 ATRIAL FIBRILLATION WITH RVR (HCC): ICD-10-CM

## 2024-12-31 PROCEDURE — 93244 EXT ECG>48HR<7D REV&INTERPJ: CPT | Performed by: INTERNAL MEDICINE

## 2024-12-31 RX ORDER — DILTIAZEM HYDROCHLORIDE 300 MG/1
300 CAPSULE, COATED, EXTENDED RELEASE ORAL DAILY
Qty: 90 CAPSULE | Refills: 2 | Status: SHIPPED | OUTPATIENT
Start: 2024-12-31

## 2024-12-31 NOTE — TELEPHONE ENCOUNTER
-I was able to call and speak with patient about ambulatory event monitor results.  As rates still not adequately controlled will increase diltiazem to 300 mg daily from 240 mg daily dosing.  Patient is agreeable and will repeat Holter monitor in 1 week to monitor response.  Will have office staff reach out to her to assist in setting this up.

## 2025-01-03 ENCOUNTER — TELEPHONE (OUTPATIENT)
Age: 85
End: 2025-01-03

## 2025-01-03 NOTE — TELEPHONE ENCOUNTER
Patient calling regarding scheduling question for holter monitor. Verified with clinical staff from [facility] that patient is scheduled to have holter monitor placed in office on 1/10/25. Patient verbalized understanding.

## 2025-01-09 ENCOUNTER — TELEPHONE (OUTPATIENT)
Age: 85
End: 2025-01-09

## 2025-01-09 DIAGNOSIS — E53.8 VITAMIN B 12 DEFICIENCY: Primary | ICD-10-CM

## 2025-01-09 RX ORDER — CYANOCOBALAMIN 1000 UG/ML
1000 INJECTION, SOLUTION INTRAMUSCULAR; SUBCUTANEOUS
Status: SHIPPED | OUTPATIENT
Start: 2025-01-09

## 2025-01-09 NOTE — TELEPHONE ENCOUNTER
Patient is scheduled to get a B12 shot tomorrow at office, she has the B12 shot with her. If we could please place order

## 2025-01-10 ENCOUNTER — HOSPITAL ENCOUNTER (OUTPATIENT)
Dept: NON INVASIVE DIAGNOSTICS | Facility: CLINIC | Age: 85
Discharge: HOME | End: 2025-01-10
Payer: COMMERCIAL

## 2025-01-10 ENCOUNTER — CLINICAL SUPPORT (OUTPATIENT)
Dept: FAMILY MEDICINE CLINIC | Facility: CLINIC | Age: 85
End: 2025-01-10
Payer: COMMERCIAL

## 2025-01-10 DIAGNOSIS — I48.91 ATRIAL FIBRILLATION WITH RVR (HCC): ICD-10-CM

## 2025-01-10 DIAGNOSIS — E53.8 VITAMIN B 12 DEFICIENCY: Primary | ICD-10-CM

## 2025-01-10 PROCEDURE — 93226 XTRNL ECG REC<48 HR SCAN A/R: CPT

## 2025-01-10 PROCEDURE — 96372 THER/PROPH/DIAG INJ SC/IM: CPT

## 2025-01-10 PROCEDURE — 93225 XTRNL ECG REC<48 HRS REC: CPT

## 2025-01-10 RX ADMIN — CYANOCOBALAMIN 1000 MCG: 1000 INJECTION, SOLUTION INTRAMUSCULAR; SUBCUTANEOUS at 14:00

## 2025-01-20 ENCOUNTER — RESULTS FOLLOW-UP (OUTPATIENT)
Dept: CARDIOLOGY CLINIC | Facility: CLINIC | Age: 85
End: 2025-01-20

## 2025-01-20 PROCEDURE — 93227 XTRNL ECG REC<48 HR R&I: CPT | Performed by: INTERNAL MEDICINE

## 2025-01-20 NOTE — TELEPHONE ENCOUNTER
-Attempted to call patient to go over results of Holter monitor.  Unfortunately was not able to reach the patient but I did leave a message on her phone with my name and office number to call back for a better time to speak.      -Patient was able to return my phone call and I was able to speak with her about rhythm control strategy options.  At this time patient will think about them and will discuss for second opinion at upcoming electrophysiology appointment.

## 2025-01-22 ENCOUNTER — OFFICE VISIT (OUTPATIENT)
Dept: CARDIOLOGY CLINIC | Facility: CLINIC | Age: 85
End: 2025-01-22
Payer: COMMERCIAL

## 2025-01-22 VITALS
WEIGHT: 181 LBS | HEIGHT: 60 IN | DIASTOLIC BLOOD PRESSURE: 66 MMHG | HEART RATE: 79 BPM | SYSTOLIC BLOOD PRESSURE: 144 MMHG | BODY MASS INDEX: 35.53 KG/M2

## 2025-01-22 DIAGNOSIS — I48.0 PAF (PAROXYSMAL ATRIAL FIBRILLATION) (HCC): Primary | ICD-10-CM

## 2025-01-22 PROCEDURE — 99204 OFFICE O/P NEW MOD 45 MIN: CPT | Performed by: INTERNAL MEDICINE

## 2025-01-22 PROCEDURE — 93000 ELECTROCARDIOGRAM COMPLETE: CPT | Performed by: INTERNAL MEDICINE

## 2025-01-22 NOTE — PROGRESS NOTES
EPS Consultation/New Patient Evaluation - Jordyn Spivey 84 y.o. female MRN: [id number]       Referring:Dr. Bermudez      CC/HPI:   It was a pleasure to see Jordyn Spivey in our arrhythmia clinic at [facility]. As you know she is a 84 y.o. woman with atrial fibrillation, esophageal stricture, Factor V leiden mutation and HTN who presents for atrial fibrillation management.     Patient was diagnosed with atrial fibrillation in 2022.  Her Apple Watch had reported atrial fibrillation with rapid ventricular response and she was hospitalized.  She was initiated on anticoagulation and rate control strategy with outpatient follow-up.  She had RVR episode again in December 2024 and was given IV metoprolol which improved her heart rate.  She did not have any symptoms at a follow-up visit.  Patient she did have an echocardiogram in August 2023 showing preserved EF.  She underwent repeat cardiac event monitor and echocardiogram.  She was recommended to continue diltiazem 240 mg daily, Eliquis 5 mg twice daily.  Event monitor revealed heart rate ranging from 51 to 188 bpm.  She was in atrial fibrillation 100% of the time.  She then had a Holter monitor in January which showed predominantly atrial fibrillation.  Minimum heart rate was noted to be 56 bpm and maximum was 118 bpm.    She currently feels well without any symptoms. She denies any chest pain, palpitations, dizziness, lightheadedness, orthopnea, PND or syncope. She denies any bleeding episodes.     Past Medical History:  Past Medical History:   Diagnosis Date    Atrial fibrillation (HCC)     Esophageal stricture     Factor V Leiden mutation (HCC)     LAST ASSESSED: 8/3/16    Hypertension        Medications:      Current Outpatient Medications:     buPROPion (WELLBUTRIN XL) 150 mg 24 hr tablet, take 1 tablet by mouth once daily, Disp: 90 tablet, Rfl: 3    cetirizine (ZyrTEC) 10 mg tablet, Take 10 mg by mouth daily, Disp: , Rfl:      cyanocobalamin 1,000 mcg/mL, Inject 1 mL (1,000 mcg total) into a muscle every 30 (thirty) days, Disp: 10 mL, Rfl: 1    diltiazem (CARDIZEM CD) 300 mg 24 hr capsule, Take 1 capsule (300 mg total) by mouth daily, Disp: 90 capsule, Rfl: 2    Eliquis 5 MG, take 1 tablet by mouth twice a day, Disp: 180 tablet, Rfl: 3    ezetimibe (ZETIA) 10 mg tablet, take 1 tablet by mouth daily, Disp: 90 tablet, Rfl: 1    fenofibrate (TRICOR) 54 MG tablet, take 1 tablet by mouth once daily, Disp: 30 tablet, Rfl: 5    fluticasone (FLONASE) 50 mcg/act nasal spray, instill 1 spray into each nostril daily, Disp: 48 mL, Rfl: 1    spironolactone (ALDACTONE) 25 mg tablet, Take 50 mg (2 tablets) in a.m. and 25 mg (1 tablet) in p.m., Disp: 270 tablet, Rfl: 3    meclizine (ANTIVERT) 25 mg tablet, Take 0.5 tablets (12.5 mg total) by mouth 3 (three) times a day as needed for dizziness (Patient not taking: Reported on 10/22/2024), Disp: 30 tablet, Rfl: 0    olopatadine (PATANOL) 0.1 % ophthalmic solution, instill 1 drop into left eye twice a day, Disp: 5 mL, Rfl: 1    phenazopyridine (PYRIDIUM) 100 mg tablet, Take 1 tablet (100 mg total) by mouth 3 (three) times a day as needed for bladder spasms (Patient not taking: Reported on 8/28/2024), Disp: 20 tablet, Rfl: 0    prednisoLONE acetate (PRED FORTE) 1 % ophthalmic suspension, Administer 1 drop to the right eye 4 (four) times a day, Disp: , Rfl:     Current Facility-Administered Medications:     cyanocobalamin injection 1,000 mcg, 1,000 mcg, Intramuscular, Q30 Days, , 1,000 mcg at 11/07/24 9637    cyanocobalamin injection 1,000 mcg, 1,000 mcg, Intramuscular, Q30 Days, , 1,000 mcg at 01/10/25 1400     Family History   Problem Relation Age of Onset    Hypertension Mother     Cancer Father      Social History     Socioeconomic History    Marital status:      Spouse name: Not on file    Number of children: 4    Years of education: Not on file    Highest education level: Not on file    Occupational History    Occupation: PRIOR OCCUPATION       Comment: RETIRED   Tobacco Use    Smoking status: Former     Current packs/day: 0.00     Types: Cigarettes     Quit date:      Years since quittin.0    Smokeless tobacco: Never   Vaping Use    Vaping status: Never Used   Substance and Sexual Activity    Alcohol use: Never    Drug use: Never    Sexual activity: Not Currently   Other Topics Concern    Not on file   Social History Narrative    Not on file     Social Drivers of Health     Financial Resource Strain: Low Risk  (2023)    Overall Financial Resource Strain (CARDIA)     Difficulty of Paying Living Expenses: Not hard at all   Food Insecurity: No Food Insecurity (2024)    Nursing - Inadequate Food Risk Classification     Worried About Running Out of Food in the Last Year: Never true     Ran Out of Food in the Last Year: Never true     Ran Out of Food in the Last Year: Not on file   Transportation Needs: No Transportation Needs (2024)    PRAPARE - Transportation     Lack of Transportation (Medical): No     Lack of Transportation (Non-Medical): No   Physical Activity: Not on file   Stress: Not on file   Social Connections: Unknown (2024)    Received from [facility]     How often do you feel lonely or isolated from those around you? (Adult - for ages 18 years and over): Not on file   Intimate Partner Violence: Not on file   Housing Stability: Unknown (2024)    Housing Stability Vital Sign     Unable to Pay for Housing in the Last Year: No     Number of Times Moved in the Last Year: Not on file     Homeless in the Last Year: No     Social History     Tobacco Use   Smoking Status Former    Current packs/day: 0.00    Types: Cigarettes    Quit date:     Years since quittin.0   Smokeless Tobacco Never     Social History     Substance and Sexual Activity   Alcohol Use Never       Review of Systems   Constitutional: Negative for chills  and fever.   HENT: Negative.     Eyes:  Negative for blurred vision and double vision.   Cardiovascular:  Negative for chest pain, dyspnea on exertion, leg swelling, near-syncope, orthopnea, palpitations, paroxysmal nocturnal dyspnea and syncope.   Respiratory:  Negative for cough and sputum production.    Endocrine: Negative.    Skin: Negative.  Negative for rash.   Musculoskeletal:  Positive for arthritis. Negative for joint pain.   Gastrointestinal:  Negative for abdominal pain, nausea and vomiting.   Genitourinary: Negative.    Neurological: Negative.  Negative for dizziness and light-headedness.   Psychiatric/Behavioral: Negative.  The patient is not nervous/anxious.         Objective:     Vitals: Blood pressure 144/66, pulse 79, height 5' (1.524 m), weight 82.1 kg (181 lb)., Body mass index is 35.35 kg/m².,        Physical Exam:    GEN: Jordyn Spivey appears well, alert and oriented x 3, pleasant and cooperative   HEENT: pupils equal, round, and reactive to light; extraocular muscles intact  NECK: supple, no carotid bruits   HEART: irregularly irregular rhythm, normal S1 and S2, no murmurs, clicks, gallops or rubs   LUNGS: clear to auscultation bilaterally; no wheezes, rales, or rhonchi   ABDOMEN: normal bowel sounds, soft, no tenderness, no distention  EXTREMITIES: peripheral pulses normal; no clubbing, cyanosis, or edema  NEURO: no focal findings   SKIN: normal without suspicious lesions on exposed skin      Labs & Results:  Below is the patient's most recent value for Albumin, ALT, AST, BUN, Calcium, Chloride, Cholesterol, CO2, Creatinine, GFR, Glucose, HDL, Hematocrit, Hemoglobin, Hemoglobin A1C, LDL, Magnesium, Phosphorus, Platelets, Potassium, PSA, Sodium, Triglycerides, and WBC.   Lab Results   Component Value Date    ALT 8 09/16/2024    AST 15 09/16/2024    BUN 38 (H) 12/09/2024    CALCIUM 9.7 12/09/2024     12/09/2024    CO2 20 (L) 12/09/2024    CREATININE 1.47 (H) 12/09/2024    HDL 53  09/16/2024    HCT 37.6 12/09/2024    HGB 11.9 12/09/2024    HGBA1C 6.0 (H) 10/16/2024    MG 1.9 12/09/2024    PHOS 3.7 09/16/2024     12/09/2024    K 4.3 12/09/2024    TRIG 133 09/16/2024    WBC 7.42 12/09/2024     Note: for a comprehensive list of the patient's lab results, access the Results Review activity.          Cardiac testing:     I personally reviewed the ECG performed in the clinic on 01/22/25. It reveals atrial fibrillation with ventricular rate 79 bpm.      Echocardiograms:  No results found for this or any previous visit.    No results found for this or any previous visit.      Catheterizations:   No results found for this or any previous visit.      Stress Tests:  No results found for this or any previous visit.      Holter monitor -   No results found for this or any previous visit.    No results found for this or any previous visit.        ASSESSMENT/PLAN:  Persistent atrial fibrillation  Patient was diagnosed with atrial fibrillation in 2022  But did not have recurrence until December 2024  Had two monitors since then - Event monitor and Holter monitor both showing 100% atrial fibrillation burden  Holter monitor showed max heart rate was 118 bpm on diltiazem 180 mg daily   She does not have any symptoms from atrial fibrillation   We discussed rate controlled strategy vs. Rhythm control   Currently EF is anny and rhythm control is not warranted  She can stay on diltiazem and Eliquis as rhythm control with ablation will be high risk and not needed  If she has RVR episodes then we can consider rhythm control with CVN + amiodarone vs. PPM/AVJ ablation   HLD  Maintained on fenofibrate and Zetia 10 mg daily   HTN  Mildly hypertensive in the office  Maintained on spironolactone  Factor V Leiden def  Maintained on Eliquis 5 mg twice daily   Denies any bleeding episodes     Please call us with any questions  Follow-up with Dr. Bermudez  Follow-up with EP as needed

## 2025-02-10 ENCOUNTER — CLINICAL SUPPORT (OUTPATIENT)
Dept: FAMILY MEDICINE CLINIC | Facility: CLINIC | Age: 85
End: 2025-02-10
Payer: COMMERCIAL

## 2025-02-10 ENCOUNTER — TELEPHONE (OUTPATIENT)
Age: 85
End: 2025-02-10

## 2025-02-10 DIAGNOSIS — E53.8 VITAMIN B12 DEFICIENCY: Primary | ICD-10-CM

## 2025-02-10 RX ADMIN — CYANOCOBALAMIN 1000 MCG: 1000 INJECTION, SOLUTION INTRAMUSCULAR; SUBCUTANEOUS at 14:07

## 2025-02-10 NOTE — TELEPHONE ENCOUNTER
Patient called to schedule a nurse visit to obtain the B12 shot. Patient stated that she has the shot and will be bringing it in. Patient was schedule for this afternoon at 2:15 pm.

## 2025-02-18 ENCOUNTER — TELEPHONE (OUTPATIENT)
Age: 85
End: 2025-02-18

## 2025-02-18 DIAGNOSIS — E78.5 HYPERLIPIDEMIA, UNSPECIFIED HYPERLIPIDEMIA TYPE: ICD-10-CM

## 2025-02-18 RX ORDER — EZETIMIBE 10 MG/1
10 TABLET ORAL DAILY
Qty: 90 TABLET | Refills: 3 | Status: SHIPPED | OUTPATIENT
Start: 2025-02-18

## 2025-02-18 NOTE — TELEPHONE ENCOUNTER
"Spoke to patient.   She ran out of Zetia a few days ago and only picked up a \"90\" day supply on 1/3/25, which was her last refill.   Previous to that, she was getting a 30-day supply.       Called [facility] and went over this with the pharmacist and requested that they use a [employer] card to fill a partial.   A new 90-day supply refill was also sent to [facility].  The pharmacist will reach out to the patient and see how they can help her.    "

## 2025-02-18 NOTE — TELEPHONE ENCOUNTER
Pt called to report that she has a problem. She reports that she is out of her Ezetimibe. She is out of the medication and her pharmacy will not refill it for her. She picked it up on 12/29 and it was a 90 day supply and she is now out of the medication. Advised the patient would inform the provider of the problem and see what we could do.     [facility] [place]

## 2025-02-19 ENCOUNTER — DOCUMENTATION (OUTPATIENT)
Dept: GASTROENTEROLOGY | Facility: CLINIC | Age: 85
End: 2025-02-19

## 2025-02-19 NOTE — PROGRESS NOTES
Records reviewed and outlined below in preparation for office visit 2/20/2025; however patient canceled her appointment.  This information will be saved for future visit.    10/16/2024 laboratory data  Intrinsic factor antibody 1.0  Vitamin a level 47.5  Hemoglobin A1c 6.0  NING negative  Parietal cell antibody elevated 29.2    10/7/2024 MRI with MRCP  Hepatic steatosis  Moderate diffuse atrophy of the pancreatic parenchyma with mild diffuse main pancreatic duct dilatation 5 to 6 mm.  No evidence of obstructing mass.  Possible pancreas divisum but difficult to assess due to motion artifact  Multiple tiny sidebranch cystic foci throughout the pancreas without visible suspicious features.  The coarse calcification within the pancreatic head are better seen on CT scan      10/3/2024 laboratory data  Fecal fat total increase  Fecal fat qualitative normal  Fecal elastase 151  Total immunoglobulin G 826  IgG 1- 5 11  IgG 2- 214  IgG 3- 40  IgG 4- 18    8/14/2024 CT scan abdomen pelvis with IV contrast  Liver/biliary tree unremarkable  Gallbladder unremarkable  Spleen unremarkable  Pancreas coarse calcifications in the pancreatic head.  Minimal pancreatic duct dilatation.  No peripancreatic inflammatory changes  Simple renal cyst noted.  Diverticulosis without findings of diverticulitis   Atherosclerosis of the abdominal vasculature without abdominal aortic aneurysm.     8/2/2024 CT chest for screening  Stable pulmonary nodule since 2011  Large pancreatic head calcification not present previously.  This is not well-evaluated without contrast and is only partially imaged.     7/2/2023 CT scan chest abdomen pelvis with IV contrast  Liver is diffusely decreased in density consistent with fatty change.  No CT evidence of suspicious hepatic mass.  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  (Not reported on the 8/14/2024 CT)  Status post right hip arthroplasty with streak artifact     9/6/2022 CT scan chest abdomen pelvis indication is trauma  Heart mildly enlarged with aortic valvular calcification  Liver/biliary tree unremarkable  Spleen, pancreas, adrenal glands unremarkable slight angulation of left anterior fifth and sixth ribs could represent nondisplaced fractures.  Small right upper lobe lung nodule

## 2025-02-25 ENCOUNTER — APPOINTMENT (EMERGENCY)
Dept: CT IMAGING | Facility: HOSPITAL | Age: 85
End: 2025-02-25
Payer: COMMERCIAL

## 2025-02-25 ENCOUNTER — APPOINTMENT (EMERGENCY)
Dept: RADIOLOGY | Facility: HOSPITAL | Age: 85
End: 2025-02-25
Payer: COMMERCIAL

## 2025-02-25 ENCOUNTER — HOSPITAL ENCOUNTER (EMERGENCY)
Facility: HOSPITAL | Age: 85
Discharge: HOME | End: 2025-02-25
Attending: EMERGENCY MEDICINE | Admitting: EMERGENCY MEDICINE
Payer: COMMERCIAL

## 2025-02-25 VITALS
DIASTOLIC BLOOD PRESSURE: 72 MMHG | BODY MASS INDEX: 37.85 KG/M2 | WEIGHT: 193.78 LBS | OXYGEN SATURATION: 99 % | HEART RATE: 91 BPM | TEMPERATURE: 97.5 F | SYSTOLIC BLOOD PRESSURE: 157 MMHG | RESPIRATION RATE: 18 BRPM

## 2025-02-25 DIAGNOSIS — S09.90XA CLOSED HEAD INJURY, INITIAL ENCOUNTER: ICD-10-CM
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
## 2025-02-25 LAB
ABO GROUP BLD: NORMAL
ABO GROUP BLD: NORMAL
ALBUMIN SERPL BCG-MCNC: 4.3 G/DL (ref 3.5–5)
ALP SERPL-CCNC: 75 U/L (ref 34–104)
ALT SERPL W P-5'-P-CCNC: 10 U/L (ref 7–52)
ANION GAP SERPL CALCULATED.3IONS-SCNC: 11 MMOL/L (ref 4–13)
APTT PPP: 32 SECONDS (ref 23–34)
AST SERPL W P-5'-P-CCNC: 14 U/L (ref 13–39)
BASOPHILS # BLD AUTO: 0.05 THOUSANDS/ÂΜL (ref 0–0.1)
BASOPHILS NFR BLD AUTO: 1 % (ref 0–1)
BILIRUB SERPL-MCNC: 0.42 MG/DL (ref 0.2–1)
BLD GP AB SCN SERPL QL: NEGATIVE
BUN SERPL-MCNC: 34 MG/DL (ref 5–25)
CALCIUM SERPL-MCNC: 9.8 MG/DL (ref 8.4–10.2)
CHLORIDE SERPL-SCNC: 104 MMOL/L (ref 96–108)
CO2 SERPL-SCNC: 21 MMOL/L (ref 21–32)
CREAT SERPL-MCNC: 1.5 MG/DL (ref 0.6–1.3)
EOSINOPHIL # BLD AUTO: 0.14 THOUSAND/ÂΜL (ref 0–0.61)
EOSINOPHIL NFR BLD AUTO: 2 % (ref 0–6)
ERYTHROCYTE [DISTWIDTH] IN BLOOD BY AUTOMATED COUNT: 13.5 % (ref 11.6–15.1)
GFR SERPL CREATININE-BSD FRML MDRD: 31 ML/MIN/1.73SQ M
GLUCOSE SERPL-MCNC: 119 MG/DL (ref 65–140)
HCT VFR BLD AUTO: 41.4 % (ref 34.8–46.1)
HGB BLD-MCNC: 12.9 G/DL (ref 11.5–15.4)
IMM GRANULOCYTES # BLD AUTO: 0.04 THOUSAND/UL (ref 0–0.2)
IMM GRANULOCYTES NFR BLD AUTO: 1 % (ref 0–2)
INR PPP: 1.38 (ref 0.85–1.19)
LYMPHOCYTES # BLD AUTO: 1.34 THOUSANDS/ÂΜL (ref 0.6–4.47)
LYMPHOCYTES NFR BLD AUTO: 16 % (ref 14–44)
MCH RBC QN AUTO: 28.4 PG (ref 26.8–34.3)
MCHC RBC AUTO-ENTMCNC: 31.2 G/DL (ref 31.4–37.4)
MCV RBC AUTO: 91 FL (ref 82–98)
MONOCYTES # BLD AUTO: 0.65 THOUSAND/ÂΜL (ref 0.17–1.22)
MONOCYTES NFR BLD AUTO: 8 % (ref 4–12)
NEUTROPHILS # BLD AUTO: 6.18 THOUSANDS/ÂΜL (ref 1.85–7.62)
NEUTS SEG NFR BLD AUTO: 72 % (ref 43–75)
NRBC BLD AUTO-RTO: 0 /100 WBCS
PLATELET # BLD AUTO: 448 THOUSANDS/UL (ref 149–390)
PMV BLD AUTO: 8.7 FL (ref 8.9–12.7)
POTASSIUM SERPL-SCNC: 4.7 MMOL/L (ref 3.5–5.3)
PROT SERPL-MCNC: 7.9 G/DL (ref 6.4–8.4)
PROTHROMBIN TIME: 17.5 SECONDS (ref 12.3–15)
RBC # BLD AUTO: 4.54 MILLION/UL (ref 3.81–5.12)
RH BLD: NEGATIVE
RH BLD: NEGATIVE
SODIUM SERPL-SCNC: 136 MMOL/L (ref 135–147)
SPECIMEN EXPIRATION DATE: NORMAL
WBC # BLD AUTO: 8.4 THOUSAND/UL (ref 4.31–10.16)

## 2025-02-25 PROCEDURE — 85610 PROTHROMBIN TIME: CPT

## 2025-02-25 PROCEDURE — 86901 BLOOD TYPING SEROLOGIC RH(D): CPT

## 2025-02-25 PROCEDURE — 85730 THROMBOPLASTIN TIME PARTIAL: CPT

## 2025-02-25 PROCEDURE — 73610 X-RAY EXAM OF ANKLE: CPT

## 2025-02-25 PROCEDURE — 99285 EMERGENCY DEPT VISIT HI MDM: CPT

## 2025-02-25 PROCEDURE — 86850 RBC ANTIBODY SCREEN: CPT

## 2025-02-25 PROCEDURE — 85025 COMPLETE CBC W/AUTO DIFF WBC: CPT

## 2025-02-25 PROCEDURE — 71045 X-RAY EXAM CHEST 1 VIEW: CPT

## 2025-02-25 PROCEDURE — 73552 X-RAY EXAM OF FEMUR 2/>: CPT

## 2025-02-25 PROCEDURE — 86900 BLOOD TYPING SEROLOGIC ABO: CPT

## 2025-02-25 PROCEDURE — 70450 CT HEAD/BRAIN W/O DYE: CPT

## 2025-02-25 PROCEDURE — 71260 CT THORAX DX C+: CPT

## 2025-02-25 PROCEDURE — 99285 EMERGENCY DEPT VISIT HI MDM: CPT | Performed by: EMERGENCY MEDICINE

## 2025-02-25 PROCEDURE — 72125 CT NECK SPINE W/O DYE: CPT

## 2025-02-25 PROCEDURE — 36415 COLL VENOUS BLD VENIPUNCTURE: CPT

## 2025-02-25 PROCEDURE — 80053 COMPREHEN METABOLIC PANEL: CPT

## 2025-02-25 PROCEDURE — 96360 HYDRATION IV INFUSION INIT: CPT

## 2025-02-25 PROCEDURE — 74177 CT ABD & PELVIS W/CONTRAST: CPT

## 2025-02-25 RX ADMIN — IOHEXOL 100 ML: 350 INJECTION, SOLUTION INTRAVENOUS at 14:51

## 2025-02-25 RX ADMIN — SODIUM CHLORIDE 500 ML: 0.9 INJECTION, SOLUTION INTRAVENOUS at 14:35

## 2025-02-25 NOTE — DISCHARGE INSTRUCTIONS
Jordyn Spivey was seen and evaluated today in the emergency department over your concern of fall with head strike on Eliquis.  The workup that we performed showed no acute traumatic abnormalities, no metabolic or electrolyte derangement.  Please return to the emergency department if you experience worsening headache, nausea, vomiting, inability to walk or any other signs and symptoms that may be concerning to you.  Please follow-up with your primary care doctor within 1 week.  All questions were answered prior to discharge.  Thank you for choosing [facility] for your care.

## 2025-02-25 NOTE — ED ATTENDING ATTESTATION
2/25/2025  I, Americo Lyman Jr, DO, saw and evaluated the patient. I have discussed the patient with the resident/non-physician practitioner and agree with the resident's/non-physician practitioner's findings, Plan of Care, and MDM as documented in the resident's/non-physician practitioner's note, except where noted. All available labs and Radiology studies were reviewed.  I was present for key portions of any procedure(s) performed by the resident/non-physician practitioner and I was immediately available to provide assistance.       At this point I agree with the current assessment done in the Emergency Department.  I have conducted an independent evaluation of this patient a history and physical is as follows:    84-year-old female presents emergency room after suffering inversion injury to her right ankle causing her to fall, while walking outside.  The patient notes that she fell on her backside and also hit her right posterior scalp.  Patient is on Eliquis and has history of atrial fibrillation.  Patient is found to be tachycardic with a rate of 110 on my evaluation and has evidence of a right parietal hematoma.  There is no evidence of bony step-off.  There is no Goode's or raccoon's appreciated.  Lungs are clear bilaterally without crepitance or paradoxical chest wall motion.  Abdomen is soft and nontender.  Patient was made a trauma evaluation due to a fall with head strike.  Patient will have imaging as well as CT as well as lab studies regarding potential A-fib with RVR.    Heart is tachycardic and irregular, most consistent with atrial fibrillation, without rubs or gallops.  Lungs are clear without wheeze rales or rhonchi.  Abdomen is soft and nontender.  There is also tenderness to the distal ankle over the fibula and tibia on the right leg.  No evidence of anterior posterior drawer.  The patient's right lower extremity is neurovascular intact with 2/4 pulses.     Imaging currently shows no  evidence of acute fracture, or hemorrhage.  Patient be discharged to home.    ED Course         Critical Care Time  ECG 12 Lead Documentation Only    Date/Time: 2/25/2025 2:44 PM    Performed by: Americo Lyman Jr., DO  Authorized by: Americo Lyman Jr., DO    ECG reviewed by me, the ED Provider: yes    Patient location:  ED  Comments:      EKG is atrial fibrillation tachycardic with a rate of 106 beats a minute.  Axis is normal.  There is no definitive acute ST or T wave changes present.  There is questionable old anterior septal MI.

## 2025-02-25 NOTE — ED PROVIDER NOTES
Emergency Department Trauma Note  Jordyn Spivey 84 y.o. female MRN: [id number]  Unit/Bed#: ED 14/ED 14 Encounter: [id number]      Trauma Alert: Trauma Acuity: Trauma Evaluation  Model of Arrival: Mode of Arrival: Direct from scene via    Trauma Team: Current Providers  Attending Provider: Americo Lyman Jr., DO  Resident: Mc Alcaraz DO  Registered Nurse: Rossy Brunner RN  Consultants:     None      History of Present Illness     Chief Complaint:   Chief Complaint   Patient presents with    Fall     HPI:  Jordyn Spivey is a 84 y.o. female who presents with mechanical fall.  States she was going to get her haircut however lost balance while ambulating with a cane.  States she twisted her right ankle and landed on her buttocks and struck the back of her head.  She takes Eliquis for atrial fibrillation.  She denies any loss of consciousness, nausea, vomiting, difficulty walking.  Mild discomfort in the buttocks and right ankle.  No lacerations or bleeding.  Small right occipital hematoma..  Mechanism:           HPI  Review of Systems   Musculoskeletal:         Right ankle pain, hip pain   Neurological:  Positive for headaches.       Historical Information     Immunizations:   Immunization History   Administered Date(s) Administered    COVID-19 MODERNA VACC 0.5 ML IM 01/26/2021, 03/01/2021    INFLUENZA 10/05/2015, 10/10/2016, 10/21/2016, 09/15/2017, 10/15/2018, 11/01/2020, 09/20/2021, 10/01/2023    Influenza Split High Dose Preservative Free IM 10/10/2024    Influenza, high dose seasonal 0.7 mL 09/20/2022    influenza, trivalent, adjuvanted 10/03/2019       Past Medical History:   Diagnosis Date    Atrial fibrillation (HCC)     Esophageal stricture     Factor V Leiden mutation (HCC)     LAST ASSESSED: 8/3/16    Hypertension        Family History   Problem Relation Age of Onset    Hypertension Mother     Cancer Father      Past Surgical History:   Procedure Laterality Date    CATARACT  EXTRACTION, BILATERAL Bilateral     REDUCTION MAMMAPLASTY Bilateral     TONSILLECTOMY AND ADENOIDECTOMY      TUBAL LIGATION Bilateral      Social History     Tobacco Use    Smoking status: Former     Current packs/day: 0.00     Types: Cigarettes     Quit date:      Years since quittin.1    Smokeless tobacco: Never   Vaping Use    Vaping status: Never Used   Substance Use Topics    Alcohol use: Never    Drug use: Never     E-Cigarette/Vaping    E-Cigarette Use Never User      E-Cigarette/Vaping Substances    Nicotine No     THC No     CBD No     Flavoring No     Other No     Unknown No        Family History: non-contributory    Meds/Allergies   Prior to Admission Medications   Prescriptions Last Dose Informant Patient Reported? Taking?   Eliquis 5 MG  Self No No   Sig: take 1 tablet by mouth twice a day   buPROPion (WELLBUTRIN XL) 150 mg 24 hr tablet  Self No No   Sig: take 1 tablet by mouth once daily   cetirizine (ZyrTEC) 10 mg tablet  Self Yes No   Sig: Take 10 mg by mouth daily   cyanocobalamin 1,000 mcg/mL  Self No No   Sig: Inject 1 mL (1,000 mcg total) into a muscle every 30 (thirty) days   diltiazem (CARDIZEM CD) 300 mg 24 hr capsule  Self No No   Sig: Take 1 capsule (300 mg total) by mouth daily   ezetimibe (ZETIA) 10 mg tablet   No No   Sig: Take 1 tablet (10 mg total) by mouth daily   fenofibrate (TRICOR) 54 MG tablet  Self No No   Sig: take 1 tablet by mouth once daily   fluticasone (FLONASE) 50 mcg/act nasal spray  Self No No   Sig: instill 1 spray into each nostril daily   meclizine (ANTIVERT) 25 mg tablet  Self No No   Sig: Take 0.5 tablets (12.5 mg total) by mouth 3 (three) times a day as needed for dizziness   Patient not taking: Reported on 10/22/2024   olopatadine (PATANOL) 0.1 % ophthalmic solution  Self No No   Sig: instill 1 drop into left eye twice a day   phenazopyridine (PYRIDIUM) 100 mg tablet  Self No No   Sig: Take 1 tablet (100 mg total) by mouth 3 (three) times a day as needed  for bladder spasms   Patient not taking: Reported on 8/28/2024   prednisoLONE acetate (PRED FORTE) 1 % ophthalmic suspension  Self Yes No   Sig: Administer 1 drop to the right eye 4 (four) times a day   spironolactone (ALDACTONE) 25 mg tablet  Self No No   Sig: Take 50 mg (2 tablets) in a.m. and 25 mg (1 tablet) in p.m.      Facility-Administered Medications Last Administration Doses Remaining   cyanocobalamin injection 1,000 mcg 11/7/2024  1:55 PM    cyanocobalamin injection 1,000 mcg 2/10/2025  2:07 PM           Allergies   Allergen Reactions    Aspirin     Icosapent Ethyl (Epa Ethyl Kelly) (Fish) Myalgia    Montelukast      Other reaction(s): Depression  Category: Adverse Reaction;   Other reaction(s): Depression  Category: Adverse Reaction;     Nsaids     Pravastatin      Category: Adverse Reaction;     Simvastatin      Category: Adverse Reaction;     Tolmetin        PHYSICAL EXAM    PE limited by: n/a    Objective   Vitals:   First set: Temperature: 98.9 °F (37.2 °C) (02/25/25 1420)  Pulse: (!) 112 (02/25/25 1420)  Respirations: 18 (02/25/25 1420)  Blood Pressure: 136/67 (02/25/25 1420)  SpO2: 98 % (02/25/25 1420)    Primary Survey:   (A) Airway: Intact  (B) Breathing: Bilateral breath sounds  (C) Circulation: Pulses:   normal  (D) Disabliity:  GCS Total:  15  (E) Expose:  Completed    Secondary Survey: (Click on Physical Exam tab above)  Physical Exam  Vitals and nursing note reviewed.   Constitutional:       General: She is not in acute distress.     Appearance: She is well-developed.   HENT:      Head: Normocephalic.     Eyes:      Conjunctiva/sclera: Conjunctivae normal.   Cardiovascular:      Rate and Rhythm: Tachycardia present. Rhythm irregularly irregular.      Heart sounds: No murmur heard.  Pulmonary:      Effort: Pulmonary effort is normal. No respiratory distress.      Breath sounds: Normal breath sounds.   Abdominal:      Palpations: Abdomen is soft.      Tenderness: There is no abdominal  tenderness.   Musculoskeletal:         General: No swelling.      Cervical back: Neck supple.        Feet:    Feet:      Comments: Tenderness over right lateral malleolus.  No tenderness to medial malleolus, navicular, cuboid or fifth metatarsal tenderness.  +2 DP and PT pulses bilaterally.  Cap refill less than 2 seconds.  Skin:     General: Skin is warm and dry.      Capillary Refill: Capillary refill takes less than 2 seconds.   Neurological:      General: No focal deficit present.      Mental Status: She is alert and oriented to person, place, and time. Mental status is at baseline.      GCS: GCS eye subscore is 4. GCS verbal subscore is 5. GCS motor subscore is 6.      Cranial Nerves: No cranial nerve deficit.      Sensory: No sensory deficit.      Motor: No weakness.      Coordination: Coordination normal.      Gait: Gait normal.   Psychiatric:         Mood and Affect: Mood normal.         Cervical spine cleared by clinical criteria? N/A     Invasive Devices       None                   Lab Results:   Results Reviewed       Procedure Component Value Units Date/Time    Protime-INR [[id number]]  (Abnormal) Collected: 02/25/25 1428    Lab Status: Final result Specimen: Blood from Arm, Right Updated: 02/25/25 1455     Protime 17.5 seconds      INR 1.38    Narrative:      INR Therapeutic Range    Indication                                             INR Range      Atrial Fibrillation                                               2.0-3.0  Hypercoagulable State                                    2.0.2.3  Left Ventricular Asist Device                            2.0-3.0  Mechanical Heart Valve                                  -    Aortic(with afib, MI, embolism, HF, LA enlargement,    and/or coagulopathy)                                     2.0-3.0 (2.5-3.5)     Mitral                                                             2.5-3.5  Prosthetic/Bioprosthetic Heart Valve               2.0-3.0  Venous thromboembolism  (VTE: VT, PE        2.0-3.0    APTT [[id number]]  (Normal) Collected: 02/25/25 1428    Lab Status: Final result Specimen: Blood from Arm, Right Updated: 02/25/25 1455     PTT 32 seconds     Comprehensive metabolic panel [[id number]]  (Abnormal) Collected: 02/25/25 1428    Lab Status: Final result Specimen: Blood from Arm, Right Updated: 02/25/25 1454     Sodium 136 mmol/L      Potassium 4.7 mmol/L      Chloride 104 mmol/L      CO2 21 mmol/L      ANION GAP 11 mmol/L      BUN 34 mg/dL      Creatinine 1.50 mg/dL      Glucose 119 mg/dL      Calcium 9.8 mg/dL      AST 14 U/L      ALT 10 U/L      Alkaline Phosphatase 75 U/L      Total Protein 7.9 g/dL      Albumin 4.3 g/dL      Total Bilirubin 0.42 mg/dL      eGFR 31 ml/min/1.73sq m     Narrative:      National Kid[employer] Foundation guidelines for Chronic Kidney Disease (CKD):     Stage 1 with normal or high GFR (GFR > 90 mL/min/1.73 square meters)    Stage 2 Mild CKD (GFR = 60-89 mL/min/1.73 square meters)    Stage 3A Moderate CKD (GFR = 45-59 mL/min/1.73 square meters)    Stage 3B Moderate CKD (GFR = 30-44 mL/min/1.73 square meters)    Stage 4 Severe CKD (GFR = 15-29 mL/min/1.73 square meters)    Stage 5 End Stage CKD (GFR <15 mL/min/1.73 square meters)  Note: GFR calculation is accurate only with a steady state creatinine    CBC and differential [[id number]]  (Abnormal) Collected: 02/25/25 1428    Lab Status: Final result Specimen: Blood from Arm, Right Updated: 02/25/25 1439     WBC 8.40 Thousand/uL      RBC 4.54 Million/uL      Hemoglobin 12.9 g/dL      Hematocrit 41.4 %      MCV 91 fL      MCH 28.4 pg      MCHC 31.2 g/dL      RDW 13.5 %      MPV 8.7 fL      Platelets 448 Thousands/uL      nRBC 0 /100 WBCs      Segmented % 72 %      Immature Grans % 1 %      Lymphocytes % 16 %      Monocytes % 8 %      Eosinophils Relative 2 %      Basophils Relative 1 %      Absolute Neutrophils 6.18 Thousands/µL      Absolute Immature Grans 0.04 Thousand/uL      Absolute  Lymphocytes 1.34 Thousands/µL      Absolute Monocytes 0.65 Thousand/µL      Eosinophils Absolute 0.14 Thousand/µL      Basophils Absolute 0.05 Thousands/µL                    Imaging Studies:   Direct to CT: Yes  XR femur 2 views RIGHT   ED Interpretation by Mc Alcaraz DO (02/25 1535)   No acute osseous abnormality       Final Result by Jordan Lira MD (02/25 1542)      No acute osseous abnormality.         Computerized Assisted Algorithm (CAA) may have been used to analyze all applicable images.         Workstation performed: [id number]         TRAUMA - CT chest abdomen pelvis w contrast   Final Result by Jordan Lira MD (02/25 1512)      1.  No findings of acute traumatic injury in the chest, abdomen or pelvis.   2.  Stigmata of chronic pancreatitis as above, unchanged.   3.  Additional chronic and incidental findings as outlined above.               Workstation performed: [id number]         TRAUMA - CT spine cervical wo contrast   Final Result by Jordan Lira MD (02/25 1459)      No cervical spine fracture or traumatic malalignment.                  Workstation performed: [id number]         TRAUMA - CT head wo contrast   Final Result by Jordan Lira MD (02/25 1457)      No acute intracranial abnormality.                  Workstation performed: [id number]         XR ankle 3+ views RIGHT   Final Result by Oscar Wheeler MD (02/25 1459)      No acute osseous abnormality.         Computerized Assisted Algorithm (CAA) may have been used to analyze all applicable images.               Workstation performed: [id number]         XR Trauma chest portable   Final Result by Himanshu Rust MD (02/25 1444)      No acute cardiopulmonary disease.            Workstation performed: [id number]               Procedures  ECG 12 Lead Documentation Only    Date/Time: 2/25/2025 2:48 PM    Performed by: Mc Alcaraz DO  Authorized by: Mc Alcaraz DO    Indications / Diagnosis:  Fall  ECG reviewed by me,  the ED Provider: yes    Patient location:  ED  Previous ECG:     Previous ECG:  Unavailable    Comparison to cardiac monitor: Yes    Interpretation:     Interpretation: normal    Rate:     ECG rate:  106    ECG rate assessment: tachycardic    Rhythm:     Rhythm: atrial fibrillation    Ectopy:     Ectopy: none    QRS:     QRS axis:  Normal    QRS intervals:  Normal  ST segments:     ST segments:  Normal  T waves:     T waves: normal             ED Course  ED Course as of 02/25/25 1811 Tu Feb 25, 2025   1514 TRAUMA - CT chest abdomen pelvis w contrast  IMPRESSION:     1.  No findings of acute traumatic injury in the chest, abdomen or pelvis.  2.  Stigmata of chronic pancreatitis as above, unchanged.  3.  Additional chronic and incidental findings as outlined above.              Workstation performed: [id number]        1514 TRAUMA - CT spine cervical wo contrast  IMPRESSION:     No cervical spine fracture or traumatic malalignment.                 Workstation performed: [id number]        1514 XR ankle 3+ views RIGHT  IMPRESSION:     No acute osseous abnormality.        Computerized Assisted Algorithm (CAA) may have been used to analyze all applicable images.              Workstation performed: [id number]        1514 TRAUMA - CT head wo contrast  IMPRESSION:     No acute intracranial abnormality.                 Workstation performed: [id number]        1515 XR Trauma chest portable  IMPRESSION:     No acute cardiopulmonary disease.           Workstation performed: [id number]     1515 Sodium: 136   1515 BUN(!): 34   1515 Creatinine(!): 1.50   1515 GFR, Calculated: 31   1515 WBC: 8.40   1515 RBC: 4.54   1515 Hemoglobin: 12.9   1515 Platelet Count(!): 448   1515 PROTIME(!): 17.5   1515 POCT INR(!): 1.38   1515 PTT: 32   1529 Patient reevaluated this time.  Complains of mild tailbone pain.  Explained all test results are overall unremarkable.  No signs of acute traumatic injury.   1535 XR femur 2 views RIGHT  No acute  osseous abnormality      1557 Able to ambulate with walker.  Will discharge home.           Medical Decision Making  84-year-old female presents with fall with Eliquis    DDx: Fall, gait disturbance, intraparenchymal hemorrhage, acute osseous abnormality, traumatic injury    Plan: Basic set of labs, CT imaging, ambulation    See ED course for further discussion    Trauma workup was negative for acute traumatic injury.  Blood work within normal limits.  Heart rate under the threshold for rate control of atrial fibrillation.  Able to ambulate with assistance of walker emergency department.  This is patient's baseline.  Stable for discharge home with return precautions provided.  Verbalized understanding.    Disposition: Discharged with instructions to obtain outpatient follow up of patient's symptoms and findings, with strict return precautions if patient develops new or worsening symptoms. Patient understands this plan and is agreeable. All questions answered. Patient discharged home with return precautions.      Amount and/or Complexity of Data Reviewed  Labs: ordered. Decision-making details documented in ED Course.  Radiology: ordered and independent interpretation performed. Decision-making details documented in ED Course.    Risk  Prescription drug management.                Disposition  [facility] Transfer: No  Final diagnoses:   Fall, initial encounter   Closed head injury, initial encounter     Time reflects when diagnosis was documented in both MDM as applicable and the Disposition within this note       Time User Action Codes Description Comment    2/25/2025  3:59 PM Mc Alcaraz [W19.XXXA] Fall, initial encounter     2/25/2025  3:59 PM Mc Alcaraz [S09.90XA] Closed head injury, initial encounter           ED Disposition       ED Disposition   Discharge    Condition   Stable    Date/Time   Tue Feb 25, 2025  3:59 PM    Comment   Jordyn Spivey discharge to home/self care.                    Follow-up Information       Follow up With Specialties Details Why Contact Info    [facility], Nurse Practitioner   [address] [place] [address]  [facility] [address] [phone]            Discharge Medication List as of 2/25/2025  4:01 PM        CONTINUE these medications which have NOT CHANGED    Details   buPROPion (WELLBUTRIN XL) 150 mg 24 hr tablet take 1 tablet by mouth once daily, Starting Fri 4/12/2024, Normal      cetirizine (ZyrTEC) 10 mg tablet Take 10 mg by mouth daily, Historical Med      cyanocobalamin 1,000 mcg/mL Inject 1 mL (1,000 mcg total) into a muscle every 30 (thirty) days, Starting Mon 10/7/2024, Normal      diltiazem (CARDIZEM CD) 300 mg 24 hr capsule Take 1 capsule (300 mg total) by mouth daily, Starting Tue 12/31/2024, Normal      Eliquis 5 MG take 1 tablet by mouth twice a day, Starting Mon 3/11/2024, Normal      ezetimibe (ZETIA) 10 mg tablet Take 1 tablet (10 mg total) by mouth daily, Starting Tue 2/18/2025, Normal      fenofibrate (TRICOR) 54 MG tablet take 1 tablet by mouth once daily, Starting Thu 10/3/2024, Normal      fluticasone (FLONASE) 50 mcg/act nasal spray instill 1 spray into each nostril daily, Normal      meclizine (ANTIVERT) 25 mg tablet Take 0.5 tablets (12.5 mg total) by mouth 3 (three) times a day as needed for dizziness, Starting Mon 11/27/2023, Normal      olopatadine (PATANOL) 0.1 % ophthalmic solution instill 1 drop into left eye twice a day, Normal      phenazopyridine (PYRIDIUM) 100 mg tablet Take 1 tablet (100 mg total) by mouth 3 (three) times a day as needed for bladder spasms, Starting Wed 8/21/2024, Normal      prednisoLONE acetate (PRED FORTE) 1 % ophthalmic suspension Administer 1 drop to the right eye 4 (four) times a day, Starting Sun 4/7/2024, Historical Med      spironolactone (ALDACTONE) 25 mg tablet Take 50 mg (2 tablets) in a.m. and 25 mg (1 tablet) in p.m., Normal           No discharge procedures on file.    PDMP Review          Value Time User    PDMP Reviewed  Yes 9/26/2022  6:13 PM Arvind Ahuja DO            ED Provider  Electronically Signed by           Mc Alcaraz DO  02/25/25 5019

## 2025-02-26 ENCOUNTER — VBI (OUTPATIENT)
Dept: FAMILY MEDICINE CLINIC | Facility: CLINIC | Age: 85
End: 2025-02-26

## 2025-02-26 NOTE — TELEPHONE ENCOUNTER
02/26/25 1:59 PM    Patient contacted post ED visit, [facility] department spoke with patient/caregiver and outreach was successful.    Thank you.  Whitney Mandel MA  [facility]

## 2025-02-27 LAB
ATRIAL RATE: 60 BPM
QRS AXIS: 19 DEGREES
QRSD INTERVAL: 88 MS
QT INTERVAL: 330 MS
QTC INTERVAL: 438 MS
T WAVE AXIS: 45 DEGREES
VENTRICULAR RATE: 106 BPM

## 2025-03-01 DIAGNOSIS — I48.91 ATRIAL FIBRILLATION WITH RVR (HCC): ICD-10-CM

## 2025-03-10 ENCOUNTER — TELEPHONE (OUTPATIENT)
Age: 85
End: 2025-03-10

## 2025-03-10 NOTE — TELEPHONE ENCOUNTER
Caller: Patient     Doctor: Dr. Eubanks     Reason for call: Patient called and stated that she callled Central Scheduling to r/s her Echo and was advised it was cancelled by the office. Pt would like to know does Dr Eubanks still want her to get the Echo done, Please call pt and advise.     Call back#: [phone]

## 2025-03-18 ENCOUNTER — RA CDI HCC (OUTPATIENT)
Dept: RADIOLOGY | Facility: HOSPITAL | Age: 85
End: 2025-03-18

## 2025-03-26 ENCOUNTER — OFFICE VISIT (OUTPATIENT)
Dept: CARDIOLOGY CLINIC | Facility: CLINIC | Age: 85
End: 2025-03-26

## 2025-03-26 VITALS
HEART RATE: 89 BPM | BODY MASS INDEX: 36.52 KG/M2 | DIASTOLIC BLOOD PRESSURE: 76 MMHG | HEIGHT: 60 IN | WEIGHT: 186 LBS | RESPIRATION RATE: 16 BRPM | TEMPERATURE: 97.6 F | OXYGEN SATURATION: 99 % | SYSTOLIC BLOOD PRESSURE: 130 MMHG

## 2025-03-26 DIAGNOSIS — I12.9 BENIGN HYPERTENSION WITH CKD (CHRONIC KIDNEY DISEASE) STAGE III (HCC): ICD-10-CM

## 2025-03-26 DIAGNOSIS — N18.9 CHRONIC KIDNEY DISEASE, UNSPECIFIED CKD STAGE: ICD-10-CM

## 2025-03-26 DIAGNOSIS — I48.0 PAF (PAROXYSMAL ATRIAL FIBRILLATION) (HCC): ICD-10-CM

## 2025-03-26 DIAGNOSIS — R06.83 SNORING: ICD-10-CM

## 2025-03-26 DIAGNOSIS — I35.1 AORTIC VALVE INSUFFICIENCY, ETIOLOGY OF CARDIAC VALVE DISEASE UNSPECIFIED: Primary | ICD-10-CM

## 2025-03-26 DIAGNOSIS — E66.01 CLASS 2 SEVERE OBESITY DUE TO EXCESS CALORIES WITH SERIOUS COMORBIDITY AND BODY MASS INDEX (BMI) OF 36.0 TO 36.9 IN ADULT (HCC): ICD-10-CM

## 2025-03-26 DIAGNOSIS — E66.812 CLASS 2 SEVERE OBESITY DUE TO EXCESS CALORIES WITH SERIOUS COMORBIDITY AND BODY MASS INDEX (BMI) OF 36.0 TO 36.9 IN ADULT (HCC): ICD-10-CM

## 2025-03-26 DIAGNOSIS — N18.30 BENIGN HYPERTENSION WITH CKD (CHRONIC KIDNEY DISEASE) STAGE III (HCC): ICD-10-CM

## 2025-03-26 NOTE — PROGRESS NOTES
Patient ID: Jordyn Spivey is a 84 y.o. female.        Plan:      Assessment & Plan  Aortic valve insufficiency, etiology of cardiac valve disease unspecified  -Moderate on transthoracic echocardiogram December 2024  -Patient denies significant symptoms  -Patient will continue spironolactone 50 mg in morning and 25 mg in the evening  -Continue to monitor  Benign hypertension with CKD (chronic kidney disease) stage III ([facility])  Lab Results   Component Value Date    EGFR 31 02/25/2025    EGFR 32 12/09/2024    EGFR 39 09/16/2024    CREATININE 1.50 (H) 02/25/2025    CREATININE 1.47 (H) 12/09/2024    CREATININE 1.27 09/16/2024     -Patient notes blood pressures at home are very well-controlled on current medical regimen and does follow with nephrology team  -Can continue spironolactone 50 mg in a.m. and 25 mg in evening managed by nephrology  -Continue to monitor  PAF (paroxysmal atrial fibrillation) ([facility])  -Currently on rate control strategy and appears rate controlled in the office today  -Will continue metoprolol succinate 25 mg daily along with diltiazem 240 mg daily  -Continue to monitor patient clinically.  -Patient will continue Eliquis 5 mg twice daily at this time however if renal function worsens will likely need to reduce to 2.5 mg twice daily at that time.  Class 2 severe obesity due to excess calories with serious comorbidity and body mass index (BMI) of 36.0 to 36.9 in adult ([facility])  -Counseled patient on dietary and lifestyle modification along with need for weight reduction  -Discussed with patient recommendations for weight management evaluation however at this time she politely declines but will let us know if she changes her mind.  Chronic kidney disease, unspecified CKD stage  Lab Results   Component Value Date    EGFR 31 02/25/2025    EGFR 32 12/09/2024    EGFR 39 09/16/2024    CREATININE 1.50 (H) 02/25/2025    CREATININE 1.47 (H) 12/09/2024    CREATININE 1.27 09/16/2024   -Counseled on dietary and  lifestyle modifications along with avoidance of nephrotoxic agents  -Patient will follow-up with nephrology team.  Snoring  -Discussed with patient recommendations for sleep medicine evaluation which she politely declines but will let us know if she changes her mind  -Continue to monitor.      Follow up Plan/Other summary comments:  -Lipid panel 9/16/2024 showing total cholesterol 165, triglycerides 133, HDL 53, LDL 85  -Counseled patient on dietary and lifestyle modifications including following a low-salt, low-fat, heart healthy diet with sodium restriction to less than 1800 mg of sodium daily, DASH diet, NSAID avoidance and need for fluid restriction to less than 1800 mL of fluid daily along with weight reduction goal BMI less than 29  -Patient will continue Eliquis 5 mg twice daily however we will need to monitor renal function and electrolytes as if creatinine continues to worsen will likely need to reduce Eliquis dosing at that time  -Patient will continue Zetia 10 mg daily, fenofibrate 54 mg daily, spironolactone 50 mg in morning and 25 mg in the evening along with diltiazem 240 mg daily  -discussed with patient about increasing diuretic regimen but at this time she declines.  -I will see patient in 6 months or sooner if necessary and will recheck laboratory studies prior to that visit  -Patient counseled if she were to have any warning or alarm type symptoms she is to seek emergency medical care immediately.    HPI:   -Patient is an 84-year-old female with hypertension, chronic rhinitis, factor V Leiden, obesity, CKD hospitalized in August 2022 at which time she was found to have atrial fibrillation with right ventricular response after being alerted by her Apple Watch.  She was initiated on oral anticoagulation with rate control strategy and eventually was able to be discharged to outpatient follow-up.  She had been seen and evaluated by hematology who agreed with oral anticoagulation and also follows with  nephrology for monitoring of CKD.  She unfortunately had significant recurrence and had medical therapy uptitrated.  Given this she was even seen by electrophysiology who recommended continuing current regimen with potential for alternative antiarrhythmic strategy versus even possibly permanent pacemaker and AV node ablation if patient were to have significant RVR episodes.  -Patient presents to the office today for scheduled follow-up.  She presents with her daughter who notes that overall patient is doing reasonly well.  Per patient she denies any chest pain, palpitations, lightheadedness or dizziness, loss consciousness or shortness of breath.  She notes intermittent lower extremity edema but states this improves with leg elevation.  She notes overall she feels well and has no active complaints.      Most recent or relevant cardiac/vascular testing:    -Holter monitor 1/10/2025 showing predominantly atrial fibrillation average heart rate 81 bpm with longest R-R interval 2.0 seconds with no evidence of advanced heart block.    -Transthoracic echocardiogram 12/23/2024 showing left ventricular systolic function normal cemented LVEF 60% with mildly dilated right ventricle with low normal right ventricular systolic function with dilated left atrium, atrial septum with patent foramen ovale with trivial left-to-right flow, moderate aortic regurgitation and aortic valve sclerosis, moderate mitral regurgitation and mild to moderate tricuspid regurgitation with estimated RVSP 48 mmHg       Past Surgical History:   Procedure Laterality Date    CATARACT EXTRACTION, BILATERAL Bilateral     REDUCTION MAMMAPLASTY Bilateral     TONSILLECTOMY AND ADENOIDECTOMY      TUBAL LIGATION Bilateral        Review of Systems   Review of Systems   Constitutional:  Negative for chills, diaphoresis, fatigue and fever.   HENT:  Negative for trouble swallowing and voice change.    Eyes:  Negative for pain and redness.   Respiratory:  Negative  for shortness of breath and wheezing.    Cardiovascular:  Positive for leg swelling (intermittent but improved with elevation). Negative for chest pain and palpitations.   Gastrointestinal:  Negative for abdominal pain, blood in stool, constipation, diarrhea, nausea and vomiting.   Genitourinary:  Negative for dysuria.   Musculoskeletal:  Positive for arthralgias. Negative for neck pain and neck stiffness.   Skin:  Negative for rash.   Neurological:  Negative for dizziness, syncope, light-headedness and headaches.   Psychiatric/Behavioral:  Negative for agitation and confusion.    All other systems reviewed and are negative.         Objective:     /76   Pulse 89   Temp 97.6 °F (36.4 °C)   Resp 16   Ht 5' (1.524 m)   Wt 84.4 kg (186 lb)   SpO2 99%   BMI 36.33 kg/m²     PHYSICAL EXAM:  Physical Exam  Vitals reviewed.   Constitutional:       General: She is not in acute distress.     Appearance: She is obese. She is not diaphoretic.   HENT:      Head: Normocephalic and atraumatic.   Eyes:      General:         Right eye: No discharge.         Left eye: No discharge.   Neck:      Comments: Trachea midline, minimal JVD appreciated  Cardiovascular:      Heart sounds: Murmur (AMADA) heard.      Comments: Irregularly irregular rate and rhythm  Pulmonary:      Effort: No respiratory distress.      Breath sounds: No wheezing.      Comments: Decreased breath sounds bilaterally  Chest:      Chest wall: No tenderness.   Abdominal:      General: Bowel sounds are normal.      Palpations: Abdomen is soft.      Tenderness: There is no abdominal tenderness. There is no rebound.   Musculoskeletal:      Right lower leg: Edema (trace) present.      Left lower leg: Edema (trace) present.   Skin:     General: Skin is warm and dry.   Neurological:      Mental Status: She is alert.      Comments: Awake, alert, able to answer questions appropriately, able to move extremities bilaterally.   Psychiatric:         Mood and Affect:  Mood normal.         Behavior: Behavior normal.            Meds reviewed.  Current Outpatient Medications on File Prior to Visit   Medication Sig Dispense Refill    apixaban (Eliquis) 5 mg take 1 tablet by mouth twice a day 180 tablet 1    buPROPion (WELLBUTRIN XL) 150 mg 24 hr tablet take 1 tablet by mouth once daily 90 tablet 3    cetirizine (ZyrTEC) 10 mg tablet Take 10 mg by mouth daily      cyanocobalamin 1,000 mcg/mL Inject 1 mL (1,000 mcg total) into a muscle every 30 (thirty) days 10 mL 1    diltiazem (CARDIZEM CD) 300 mg 24 hr capsule Take 1 capsule (300 mg total) by mouth daily 90 capsule 2    ezetimibe (ZETIA) 10 mg tablet Take 1 tablet (10 mg total) by mouth daily 90 tablet 3    fenofibrate (TRICOR) 54 MG tablet take 1 tablet by mouth once daily 30 tablet 5    fluticasone (FLONASE) 50 mcg/act nasal spray instill 1 spray into each nostril daily 48 mL 1    olopatadine (PATANOL) 0.1 % ophthalmic solution instill 1 drop into left eye twice a day 5 mL 1    prednisoLONE acetate (PRED FORTE) 1 % ophthalmic suspension Administer 1 drop to the right eye 4 (four) times a day      spironolactone (ALDACTONE) 25 mg tablet Take 50 mg (2 tablets) in a.m. and 25 mg (1 tablet) in p.m. 270 tablet 3    meclizine (ANTIVERT) 25 mg tablet Take 0.5 tablets (12.5 mg total) by mouth 3 (three) times a day as needed for dizziness (Patient not taking: Reported on 10/22/2024) 30 tablet 0    phenazopyridine (PYRIDIUM) 100 mg tablet Take 1 tablet (100 mg total) by mouth 3 (three) times a day as needed for bladder spasms (Patient not taking: Reported on 8/28/2024) 20 tablet 0     Current Facility-Administered Medications on File Prior to Visit   Medication Dose Route Frequency Provider Last Rate Last Admin    cyanocobalamin injection 1,000 mcg  1,000 mcg Intramuscular Q30 Days    1,000 mcg at 11/07/24 1355    cyanocobalamin injection 1,000 mcg  1,000 mcg Intramuscular Q30 Days    1,000 mcg at 02/10/25 1407      Past Medical History:    Diagnosis Date    Atrial fibrillation (HCC)     Esophageal stricture     Factor V Leiden mutation (HCC)     LAST ASSESSED: 8/3/16    Hypertension        Social History     Tobacco Use   Smoking Status Former    Current packs/day: 0.00    Types: Cigarettes    Quit date:     Years since quittin.2   Smokeless Tobacco Never     Family History   Problem Relation Age of Onset    Hypertension Mother     Cancer Father

## 2025-03-26 NOTE — ASSESSMENT & PLAN NOTE
Lab Results   Component Value Date    EGFR 31 02/25/2025    EGFR 32 12/09/2024    EGFR 39 09/16/2024    CREATININE 1.50 (H) 02/25/2025    CREATININE 1.47 (H) 12/09/2024    CREATININE 1.27 09/16/2024     -Patient notes blood pressures at home are very well-controlled on current medical regimen and does follow with nephrology team  -Can continue spironolactone 50 mg in a.m. and 25 mg in evening managed by nephrology  -Continue to monitor

## 2025-03-26 NOTE — ASSESSMENT & PLAN NOTE
-Currently on rate control strategy and appears rate controlled in the office today  -Will continue metoprolol succinate 25 mg daily along with diltiazem 240 mg daily  -Continue to monitor patient clinically.  -Patient will continue Eliquis 5 mg twice daily at this time however if renal function worsens will likely need to reduce to 2.5 mg twice daily at that time.

## 2025-03-26 NOTE — ASSESSMENT & PLAN NOTE
-Discussed with patient recommendations for sleep medicine evaluation which she politely declines but will let us know if she changes her mind  -Continue to monitor.

## 2025-03-26 NOTE — ASSESSMENT & PLAN NOTE
Lab Results   Component Value Date    EGFR 31 02/25/2025    EGFR 32 12/09/2024    EGFR 39 09/16/2024    CREATININE 1.50 (H) 02/25/2025    CREATININE 1.47 (H) 12/09/2024    CREATININE 1.27 09/16/2024   -Counseled on dietary and lifestyle modifications along with avoidance of nephrotoxic agents  -Patient will follow-up with nephrology team.

## 2025-03-26 NOTE — ASSESSMENT & PLAN NOTE
-Counseled patient on dietary and lifestyle modification along with need for weight reduction  -Discussed with patient recommendations for weight management evaluation however at this time she politely declines but will let us know if she changes her mind.

## 2025-03-26 NOTE — ASSESSMENT & PLAN NOTE
-Moderate on transthoracic echocardiogram December 2024  -Patient denies significant symptoms  -Patient will continue spironolactone 50 mg in morning and 25 mg in the evening  -Continue to monitor

## 2025-04-04 DIAGNOSIS — F32.9 CURRENT EPISODE OF MAJOR DEPRESSIVE DISORDER WITHOUT PRIOR EPISODE: ICD-10-CM

## 2025-04-04 DIAGNOSIS — T78.40XA ALLERGY, INITIAL ENCOUNTER: ICD-10-CM

## 2025-04-04 DIAGNOSIS — I10 ESSENTIAL HYPERTENSION: ICD-10-CM

## 2025-04-04 DIAGNOSIS — E78.5 HYPERLIPIDEMIA, UNSPECIFIED HYPERLIPIDEMIA TYPE: ICD-10-CM

## 2025-04-04 DIAGNOSIS — L03.115 CELLULITIS OF RIGHT LOWER EXTREMITY: ICD-10-CM

## 2025-04-04 RX ORDER — BUPROPION HYDROCHLORIDE 150 MG/1
150 TABLET ORAL DAILY
Qty: 30 TABLET | Refills: 0 | Status: SHIPPED | OUTPATIENT
Start: 2025-04-04

## 2025-04-04 RX ORDER — OLOPATADINE HYDROCHLORIDE 1 MG/ML
SOLUTION/ DROPS OPHTHALMIC
Qty: 5 ML | Refills: 1 | Status: SHIPPED | OUTPATIENT
Start: 2025-04-04

## 2025-04-04 RX ORDER — FENOFIBRATE 54 MG/1
54 TABLET ORAL DAILY
Qty: 30 TABLET | Refills: 0 | Status: SHIPPED | OUTPATIENT
Start: 2025-04-04

## 2025-04-04 RX ORDER — SPIRONOLACTONE 25 MG/1
TABLET ORAL
Qty: 270 TABLET | Refills: 1 | Status: SHIPPED | OUTPATIENT
Start: 2025-04-04

## 2025-04-07 RX ORDER — DOXYCYCLINE HYCLATE 100 MG
100 TABLET ORAL 2 TIMES DAILY
Qty: 14 TABLET | Refills: 0 | OUTPATIENT
Start: 2025-04-07 | End: 2025-04-14

## 2025-04-08 ENCOUNTER — APPOINTMENT (OUTPATIENT)
Dept: LAB | Facility: CLINIC | Age: 85
End: 2025-04-08
Payer: COMMERCIAL

## 2025-04-08 ENCOUNTER — NURSE TRIAGE (OUTPATIENT)
Age: 85
End: 2025-04-08

## 2025-04-08 DIAGNOSIS — R39.9 UTI SYMPTOMS: Primary | ICD-10-CM

## 2025-04-08 PROCEDURE — 81001 URINALYSIS AUTO W/SCOPE: CPT

## 2025-04-08 PROCEDURE — 87086 URINE CULTURE/COLONY COUNT: CPT

## 2025-04-08 PROCEDURE — 87186 SC STD MICRODIL/AGAR DIL: CPT

## 2025-04-08 PROCEDURE — 87077 CULTURE AEROBIC IDENTIFY: CPT

## 2025-04-08 NOTE — TELEPHONE ENCOUNTER
"FOLLOW UP: I ordered UA and urine culture. I reviewed ED precautions and encouraged water intake.    REASON FOR CONVERSATION: UTI symptoms    SYMPTOMS: Burning sensation, blood in urine, frequency, urgency, pain with urination    OTHER: Symptoms started yesterday. Rates pain 6-7/10. Currently on Eliquis. Denies fever, chills, or abdominal pain.    DISPOSITION: Order Lab Work (overriding See Within 2 Weeks in Office)    Reason for Disposition   All other urine symptoms    Answer Assessment - Initial Assessment Questions  1. SYMPTOM: \"What's the main symptom you're concerned about?\" (e.g., frequency, incontinence)      Burning sensation, blood in urine, frequency, urgency    2. ONSET: \"When did the symptoms start?\"      Yesterday    3. PAIN: \"Is there any pain?\" If Yes, ask: \"How bad is it?\" (Scale: 1-10; mild, moderate, severe)      6-7/10    4. CAUSE: \"What do you think is causing the symptoms?\"      UTI    5. OTHER SYMPTOMS: \"Do you have any other symptoms?\" (e.g., blood in urine, fever, flank pain, pain with urination)      Pain with urination    Protocols used: Urinary Symptoms-Adult-[place]    "

## 2025-04-08 NOTE — TELEPHONE ENCOUNTER
Spoke to patient, she asked pharm to send refill request for all medications. But she did not request this.

## 2025-04-09 ENCOUNTER — RESULTS FOLLOW-UP (OUTPATIENT)
Dept: UROLOGY | Facility: CLINIC | Age: 85
End: 2025-04-09

## 2025-04-09 DIAGNOSIS — R39.9 UTI SYMPTOMS: Primary | ICD-10-CM

## 2025-04-09 LAB
BACTERIA UR QL AUTO: ABNORMAL /HPF
BILIRUB UR QL STRIP: NEGATIVE
CLARITY UR: ABNORMAL
COLOR UR: ABNORMAL
GLUCOSE UR STRIP-MCNC: NEGATIVE MG/DL
HGB UR QL STRIP.AUTO: ABNORMAL
KETONES UR STRIP-MCNC: NEGATIVE MG/DL
LEUKOCYTE ESTERASE UR QL STRIP: ABNORMAL
NITRITE UR QL STRIP: NEGATIVE
NON-SQ EPI CELLS URNS QL MICRO: ABNORMAL /HPF
PH UR STRIP.AUTO: 6 [PH]
PROT UR STRIP-MCNC: ABNORMAL MG/DL
RBC #/AREA URNS AUTO: ABNORMAL /HPF
SP GR UR STRIP.AUTO: 1.01 (ref 1–1.03)
UROBILINOGEN UR STRIP-ACNC: <2 MG/DL
WBC #/AREA URNS AUTO: ABNORMAL /HPF
WBC CLUMPS # UR AUTO: PRESENT /UL

## 2025-04-09 RX ORDER — AMOXICILLIN 500 MG/1
500 CAPSULE ORAL EVERY 8 HOURS SCHEDULED
Qty: 21 CAPSULE | Refills: 0 | Status: SHIPPED | OUTPATIENT
Start: 2025-04-09 | End: 2025-04-16

## 2025-04-09 NOTE — TELEPHONE ENCOUNTER
----- Message from NANCY Iqbal sent at 4/9/2025  7:33 AM EDT -----  Please let patient know that her urine testing shows probable infection.  Urine culture still pending.  I did send a prescription for amoxicillin to her pharmacy.

## 2025-04-09 NOTE — RESULT ENCOUNTER NOTE
Please let patient know that her urine testing shows probable infection.  Urine culture still pending.  I did send a prescription for amoxicillin to her pharmacy.

## 2025-04-10 LAB
BACTERIA UR CULT: ABNORMAL
BACTERIA UR CULT: ABNORMAL

## 2025-04-11 NOTE — RESULT ENCOUNTER NOTE
Please the patient know that her urine culture shows low colony count of E. coli as well as Proteus, amoxicillin shows intermediate coverage.  Given low colony count recommend continuing as prescribed.  If persistent symptoms would recommend repeat urine testing.

## 2025-04-12 ENCOUNTER — OFFICE VISIT (OUTPATIENT)
Dept: URGENT CARE | Facility: CLINIC | Age: 85
End: 2025-04-12
Payer: COMMERCIAL

## 2025-04-12 VITALS
BODY MASS INDEX: 36.52 KG/M2 | RESPIRATION RATE: 20 BRPM | HEIGHT: 60 IN | DIASTOLIC BLOOD PRESSURE: 68 MMHG | HEART RATE: 72 BPM | TEMPERATURE: 97.6 F | OXYGEN SATURATION: 100 % | WEIGHT: 186 LBS | SYSTOLIC BLOOD PRESSURE: 151 MMHG

## 2025-04-12 DIAGNOSIS — L03.115 CELLULITIS OF RIGHT LOWER EXTREMITY: Primary | ICD-10-CM

## 2025-04-12 PROCEDURE — S9088 SERVICES PROVIDED IN URGENT: HCPCS

## 2025-04-12 PROCEDURE — 99213 OFFICE O/P EST LOW 20 MIN: CPT

## 2025-04-12 RX ORDER — DOXYCYCLINE 100 MG/1
100 TABLET ORAL 2 TIMES DAILY
Qty: 14 TABLET | Refills: 0 | Status: SHIPPED | OUTPATIENT
Start: 2025-04-12 | End: 2025-04-19

## 2025-04-12 NOTE — PATIENT INSTRUCTIONS
Please take Doxycyline twice daily for the next 7 days.    Doxycycline may cause your skin to be more sensitive to the sunlight than it is normally. Exposure to sunlight, even for short periods of time, may cause skin rash, itching, redness or other discoloration of the skin, or a severe sunburn. Please wear sunscreen and protective clothing when outside and in the sun.      If you notice any signs of infection including fever, swelling, redness, pain, and/or purulent drainage please follow up immediately with your PCP or proceed to the ED.      If your symptoms do not improve with our current treatment plan or worsen, please schedule an appointment with your PCP. If you develop any high or persistent fevers, chest pain, palpitations, shortness of breath, difficulty swallowing, decreased urine output, abdominal pain, dizziness or any other concerning symptoms, please proceed to the ED.

## 2025-04-12 NOTE — PROGRESS NOTES
[facility]        NAME: Jordyn Spivey is a 84 y.o. female  : 1940    MRN: [id number]  DATE: 2025  TIME: 11:19 AM    Assessment and Plan   Cellulitis of right lower extremity [L03.115]  1. Cellulitis of right lower extremity  doxycycline (ADOXA) 100 MG tablet        Discussed with patient symptoms appear to be cellulitis.  Patient has a history of right leg cellulitis treated with doxycycline.  Therefore, will treat with 7-day course.  Discussed signs of DVT with patient and instructed her to proceed to the ED if these occur.  Instructed patient to monitor for signs of increasing infection.  Instructed patient to follow-up with PCP for no improvement or worsening of symptoms.  Patient educated on red flag symptoms and when to proceed to the ED.  Patient agreeable and understands current treatment plan.     Patient Instructions     Patient Instructions   Please take Doxycyline twice daily for the next 7 days.    Doxycycline may cause your skin to be more sensitive to the sunlight than it is normally. Exposure to sunlight, even for short periods of time, may cause skin rash, itching, redness or other discoloration of the skin, or a severe sunburn. Please wear sunscreen and protective clothing when outside and in the sun.      If you notice any signs of infection including fever, swelling, redness, pain, and/or purulent drainage please follow up immediately with your PCP or proceed to the ED.      If your symptoms do not improve with our current treatment plan or worsen, please schedule an appointment with your PCP. If you develop any high or persistent fevers, chest pain, palpitations, shortness of breath, difficulty swallowing, decreased urine output, abdominal pain, dizziness or any other concerning symptoms, please proceed to the ED.       Follow up with PCP in 3-5 days.  Proceed to  ER if symptoms worsen.    Chief Complaint     Chief Complaint   Patient presents with   • cellultitis       Woke up today with rash on leg all around lower leg is hot to the touch.          History of Present Illness       84-year-old female presents to the clinic for evaluation of rash x 1 day.  Patient reports the rash is located on her right lower leg.  She reports she noticed it this morning however her daughter states she noticed it started 2 days ago.  Patient reports the rash is red and warm to the touch.  She reports some mild discomfort.  She reports she has chronic swelling in her right lower extremity and also reports a history of cellulitis.  She denies any fevers, chills, chest pain, shortness of breath, palpitations, nausea, vomiting, diarrhea, dizziness or lightheadedness.  On chart review, she was last treated in October 2020 for for right lower leg cellulitis with doxycycline.  Patient reports she is currently taking amoxicillin for UTI.  She states in October she also developed the same rash after she was taking amoxicillin.  Patient denies taking any OTC medications for symptoms.  She also denies applying any OTC treatments to the rash.  Of note, she does report a history of A-fib and she is currently on Eliquis.          Review of Systems   Review of Systems   Constitutional:  Negative for chills and fever.   HENT:  Negative for congestion, ear pain and sore throat.    Respiratory:  Negative for cough, chest tightness and shortness of breath.    Cardiovascular:  Negative for chest pain and palpitations.   Gastrointestinal:  Negative for diarrhea, nausea and vomiting.   Genitourinary:  Negative for decreased urine volume.   Musculoskeletal:  Negative for arthralgias and myalgias.   Skin:  Positive for color change (redness).   Neurological:  Negative for dizziness, light-headedness and headaches.         Current Medications       Current Outpatient Medications:   •  amoxicillin (AMOXIL) 500 mg capsule, Take 1 capsule (500 mg total) by mouth every 8 (eight) hours for 7 days, Disp: 21 capsule, Rfl:  0  •  apixaban (Eliquis) 5 mg, take 1 tablet by mouth twice a day, Disp: 180 tablet, Rfl: 1  •  buPROPion (WELLBUTRIN XL) 150 mg 24 hr tablet, take 1 tablet by mouth daily, Disp: 30 tablet, Rfl: 0  •  cetirizine (ZyrTEC) 10 mg tablet, Take 10 mg by mouth daily, Disp: , Rfl:   •  cyanocobalamin 1,000 mcg/mL, Inject 1 mL (1,000 mcg total) into a muscle every 30 (thirty) days, Disp: 10 mL, Rfl: 1  •  diltiazem (CARDIZEM CD) 300 mg 24 hr capsule, Take 1 capsule (300 mg total) by mouth daily, Disp: 90 capsule, Rfl: 2  •  doxycycline (ADOXA) 100 MG tablet, Take 1 tablet (100 mg total) by mouth 2 (two) times a day for 7 days, Disp: 14 tablet, Rfl: 0  •  ezetimibe (ZETIA) 10 mg tablet, Take 1 tablet (10 mg total) by mouth daily, Disp: 90 tablet, Rfl: 3  •  fenofibrate (TRICOR) 54 MG tablet, take 1 tablet by mouth once daily, Disp: 30 tablet, Rfl: 0  •  fluticasone (FLONASE) 50 mcg/act nasal spray, instill 1 spray into each nostril daily, Disp: 48 mL, Rfl: 1  •  olopatadine (PATANOL) 0.1 % ophthalmic solution, instill 1 drop into left eye twice a day, Disp: 5 mL, Rfl: 1  •  prednisoLONE acetate (PRED FORTE) 1 % ophthalmic suspension, Administer 1 drop to the right eye 4 (four) times a day, Disp: , Rfl:   •  spironolactone (ALDACTONE) 25 mg tablet, take 2 tablets by mouth IN THE MORNING and 1 tablet by mouth IN THE EVENING, Disp: 270 tablet, Rfl: 1  •  meclizine (ANTIVERT) 25 mg tablet, Take 0.5 tablets (12.5 mg total) by mouth 3 (three) times a day as needed for dizziness (Patient not taking: Reported on 4/12/2025), Disp: 30 tablet, Rfl: 0  •  phenazopyridine (PYRIDIUM) 100 mg tablet, Take 1 tablet (100 mg total) by mouth 3 (three) times a day as needed for bladder spasms (Patient not taking: Reported on 8/28/2024), Disp: 20 tablet, Rfl: 0    Current Facility-Administered Medications:   •  cyanocobalamin injection 1,000 mcg, 1,000 mcg, Intramuscular, Q30 Days, , 1,000 mcg at 11/07/24 1355  •  cyanocobalamin injection 1,000  mcg, 1,000 mcg, Intramuscular, Q30 Days, , 1,000 mcg at 02/10/25 1407    Current Allergies     Allergies as of 04/12/2025 - Reviewed 04/12/2025   Allergen Reaction Noted   • Aspirin  03/20/2018   • Icosapent ethyl (epa ethyl zeenat) (fish) Myalgia 04/15/2024   • Montelukast  03/25/2016   • Nsaids  03/25/2016   • Pravastatin  03/25/2016   • Simvastatin  03/25/2016   • Tolmetin  03/25/2016            The following portions of the patient's history were reviewed and updated as appropriate: allergies, current medications, past family history, past medical history, past social history, past surgical history and problem list.     Past Medical History:   Diagnosis Date   • Atrial fibrillation (HCC)    • Esophageal stricture    • Factor V Leiden mutation (HCC)     LAST ASSESSED: 8/3/16   • Hypertension        Past Surgical History:   Procedure Laterality Date   • CATARACT EXTRACTION, BILATERAL Bilateral    • REDUCTION MAMMAPLASTY Bilateral    • TONSILLECTOMY AND ADENOIDECTOMY     • TUBAL LIGATION Bilateral        Family History   Problem Relation Age of Onset   • Hypertension Mother    • Cancer Father          Medications have been verified.        Objective   /68   Pulse 72   Temp 97.6 °F (36.4 °C)   Resp 20   Ht 5' (1.524 m)   Wt 84.4 kg (186 lb)   SpO2 100%   BMI 36.33 kg/m²        Physical Exam     Physical Exam  Vitals and nursing note reviewed.   Constitutional:       Appearance: Normal appearance.   HENT:      Head: Normocephalic and atraumatic.   Cardiovascular:      Rate and Rhythm: Normal rate and regular rhythm.      Heart sounds: Normal heart sounds.   Pulmonary:      Effort: Pulmonary effort is normal.      Breath sounds: Normal breath sounds.   Musculoskeletal:      Right lower leg: Swelling (trace, nonpitting) present.        Legs:       Comments: Trace, nonpitting edema noted to the patient's right lower leg (pt reports this is chronic) with surrounding erythema and warmth most consistent with  cellulitis; leatha sign negative, no significant calf tenderness    Skin:     General: Skin is warm and dry.      Findings: Erythema present. No wound.   Neurological:      General: No focal deficit present.      Mental Status: She is alert.   Psychiatric:         Mood and Affect: Mood normal.         Behavior: Behavior normal.

## 2025-04-23 ENCOUNTER — CLINICAL SUPPORT (OUTPATIENT)
Dept: FAMILY MEDICINE CLINIC | Facility: CLINIC | Age: 85
End: 2025-04-23
Payer: COMMERCIAL

## 2025-04-23 DIAGNOSIS — E53.8 VITAMIN B 12 DEFICIENCY: Primary | ICD-10-CM

## 2025-04-23 PROCEDURE — 96372 THER/PROPH/DIAG INJ SC/IM: CPT

## 2025-04-23 RX ORDER — CYANOCOBALAMIN 1000 UG/ML
1000 INJECTION, SOLUTION INTRAMUSCULAR; SUBCUTANEOUS
Status: SHIPPED | OUTPATIENT
Start: 2025-04-23

## 2025-04-23 RX ADMIN — CYANOCOBALAMIN 1000 MCG: 1000 INJECTION, SOLUTION INTRAMUSCULAR; SUBCUTANEOUS at 14:06

## 2025-04-29 ENCOUNTER — OFFICE VISIT (OUTPATIENT)
Age: 85
End: 2025-04-29
Payer: COMMERCIAL

## 2025-04-29 VITALS — WEIGHT: 186 LBS | BODY MASS INDEX: 36.52 KG/M2 | HEIGHT: 60 IN

## 2025-04-29 DIAGNOSIS — L03.031 PARONYCHIA OF GREAT TOE OF RIGHT FOOT: Primary | ICD-10-CM

## 2025-04-29 PROCEDURE — 99204 OFFICE O/P NEW MOD 45 MIN: CPT

## 2025-04-29 PROCEDURE — 11730 AVULSION NAIL PLATE SIMPLE 1: CPT

## 2025-04-29 NOTE — PROGRESS NOTES
Name: Jordyn Spivey      : 1940      MRN: [id number]  Encounter Provider: Micky Starks DPM  Encounter Date: 2025   Encounter department: [facility] PODIATRY [facility]  :  Assessment & Plan  Paronychia of great toe of right foot           Plan:  Diagnosis and options discussed with patient.  Patient agreeable to the plan as stated below.  Total nail avulsion procedure performed, see procedure note.   Patient is to soak in warm water daily starting tomorrow followed by triple antibiotic/Aquafor/Vaseline dressing.   If there are any acute signs of infection (redness, swelling, purulent drainage, fever, chills), patient was instructed to go to the emergency department for evaluation.  Follow up in 2-3 weeks for recheck.    History of Present Illness   HPI  Jordyn Spivey is a 84 y.o. female who presents with pain in right great toenail.  Reports pain with pressure.  Reports having cellulitis right lower extremity about 2 weeks ago and had a round of antibiotics.  Is overall feeling well today with no recent fever, chills or malaise.  History obtained from: patient    Review of Systems  Current Outpatient Medications on File Prior to Visit   Medication Sig Dispense Refill    apixaban (Eliquis) 5 mg take 1 tablet by mouth twice a day 180 tablet 1    buPROPion (WELLBUTRIN XL) 150 mg 24 hr tablet take 1 tablet by mouth daily 30 tablet 0    cetirizine (ZyrTEC) 10 mg tablet Take 10 mg by mouth daily      cyanocobalamin 1,000 mcg/mL Inject 1 mL (1,000 mcg total) into a muscle every 30 (thirty) days 10 mL 1    diltiazem (CARDIZEM CD) 300 mg 24 hr capsule Take 1 capsule (300 mg total) by mouth daily 90 capsule 2    ezetimibe (ZETIA) 10 mg tablet Take 1 tablet (10 mg total) by mouth daily 90 tablet 3    fenofibrate (TRICOR) 54 MG tablet take 1 tablet by mouth once daily 30 tablet 0    fluticasone (FLONASE) 50 mcg/act nasal spray instill 1 spray into each nostril daily 48 mL 1    olopatadine  (PATANOL) 0.1 % ophthalmic solution instill 1 drop into left eye twice a day 5 mL 1    prednisoLONE acetate (PRED FORTE) 1 % ophthalmic suspension Administer 1 drop to the right eye 4 (four) times a day      spironolactone (ALDACTONE) 25 mg tablet take 2 tablets by mouth IN THE MORNING and 1 tablet by mouth IN THE EVENING 270 tablet 1    meclizine (ANTIVERT) 25 mg tablet Take 0.5 tablets (12.5 mg total) by mouth 3 (three) times a day as needed for dizziness (Patient not taking: Reported on 4/12/2025) 30 tablet 0    phenazopyridine (PYRIDIUM) 100 mg tablet Take 1 tablet (100 mg total) by mouth 3 (three) times a day as needed for bladder spasms (Patient not taking: Reported on 8/28/2024) 20 tablet 0     Current Facility-Administered Medications on File Prior to Visit   Medication Dose Route Frequency Provider Last Rate Last Admin    cyanocobalamin injection 1,000 mcg  1,000 mcg Intramuscular Q30 Days    1,000 mcg at 11/07/24 1355    cyanocobalamin injection 1,000 mcg  1,000 mcg Intramuscular Q30 Days    1,000 mcg at 02/10/25 1407    cyanocobalamin injection 1,000 mcg  1,000 mcg Intramuscular Q30 Days    1,000 mcg at 04/23/25 1406         Objective   Ht 5' (1.524 m)   Wt 84.4 kg (186 lb)   BMI 36.33 kg/m²      Physical Exam    Vascular:  -DP and PT pulses intact b/l  -Capillary refill time <2 sec b/l  -Digital hair growth: Present  -Skin temp: WNL    Neuro:  -Light sensation intact bilaterally  -Protective sensation intact bilaterally with 10/10 points felt with [place] Bright monofilament    MSK:  -Pain on palpation of dorsal aspect of R 1st digit  -No gross deformities noted   -Active range of motion lesser digits intact  -Manual muscle testing is 5/5 to all muscle compartments of the lower extremity  -Ankle dorsiflexion >10 degrees with knee extended, and knee flexed    Derm:  -central aspect of R 1st digit periungual tissue is erythematous, edematous, with mild serous drainage noted.  The proximal portion of  "the digital nail can be seen under lapping the periungual tissue.  This is consistent with onychocryptosis and surrounding paronychia  -No noted interdigital maceration, peeling, malodor  -No calluses noted on exam    Nail removal    Date/Time: 4/29/2025 11:00 AM    Performed by: Micky Starks DPM  Authorized by: Micky Starks DPM    Patient location:  Clinic  Indications / Diagnosis:  Ingrown nail  Universal Protocol:  procedure performed by consultantConsent: Verbal consent obtained.  Risks and benefits: risks, benefits and alternatives were discussed  Consent given by: patient  Time out: Immediately prior to procedure a \"time out\" was called to verify the correct patient, procedure, equipment, support staff and site/side marked as required.  Timeout called at: 4/29/2025 11:25 AM.  Patient understanding: patient states understanding of the procedure being performed    Location:     Foot:  R big toe  Pre-procedure details:     Skin preparation:  Betadine    Preparation: Patient was prepped and draped in the usual sterile fashion    Anesthesia (see MAR for exact dosages):     Anesthesia method:  Nerve block    Block needle gauge:  25 G    Block anesthetic:  Lidocaine 1% w/o epi    Block technique:  Digital Block    Block outcome:  Anesthesia achieved  Nail Removal:     Nail removed:  Complete    Nail bed sutured: no    Ingrown nail:     Wedge excision of skin: no      Nail matrix removed or ablated:  None  Post-procedure details:     Dressing:  4x4 sterile gauze, antibiotic ointment, gauze roll and petrolatum-impregnated gauze    Patient tolerance of procedure:  Tolerated well, no immediate complications  Comments:      Discussion with patient was completed today regarding diagnosis and potential etiologies as well as treatment options for this ingrown nail diagnosis.  Discussed how to avoid recurrence and possible treatment options if recurrence does occur.    Procedure:    The nail was freed up from the underlying " nailbed with a hemostat.  Subsequently it was removed in toto.  The underlying nailbed was evaluated and examined.  Dressings were applied with antibiotic ointment and dry dressing to the area.  Postoperative instructions were given to patient today.  Procedure was completed without incident.    Antibiotic ointment applied to border with bandage dressing  Pt instructed to keep dressing intact for 24 hours.  After this time use triple antibiotic ointment to the area and a dry dressing changed daily  Return to clinic in about 3 weeks for reevaluation.  If ingrown nail recurs can consider use of phenol at nail matrix.  If notice any redness, swelling, drainage, or excessive pain or signs of infection to notify the office sooner.  Procedure completed without incident.  Do not soak foot.

## 2025-05-01 ENCOUNTER — OFFICE VISIT (OUTPATIENT)
Dept: URGENT CARE | Facility: CLINIC | Age: 85
End: 2025-05-01
Payer: COMMERCIAL

## 2025-05-01 VITALS
DIASTOLIC BLOOD PRESSURE: 72 MMHG | OXYGEN SATURATION: 98 % | HEART RATE: 87 BPM | HEIGHT: 60 IN | WEIGHT: 186 LBS | RESPIRATION RATE: 18 BRPM | SYSTOLIC BLOOD PRESSURE: 138 MMHG | BODY MASS INDEX: 36.52 KG/M2 | TEMPERATURE: 98 F

## 2025-05-01 DIAGNOSIS — L03.115 CELLULITIS OF RIGHT LOWER EXTREMITY: Primary | ICD-10-CM

## 2025-05-01 PROCEDURE — 99213 OFFICE O/P EST LOW 20 MIN: CPT | Performed by: PHYSICIAN ASSISTANT

## 2025-05-01 PROCEDURE — S9088 SERVICES PROVIDED IN URGENT: HCPCS | Performed by: PHYSICIAN ASSISTANT

## 2025-05-01 RX ORDER — KETOROLAC TROMETHAMINE 5 MG/ML
SOLUTION OPHTHALMIC
COMMUNITY
Start: 2025-04-04

## 2025-05-01 RX ORDER — CEPHALEXIN 500 MG/1
500 CAPSULE ORAL EVERY 8 HOURS SCHEDULED
Qty: 21 CAPSULE | Refills: 0 | Status: SHIPPED | OUTPATIENT
Start: 2025-05-01 | End: 2025-05-08

## 2025-05-01 NOTE — PROGRESS NOTES
[facility]        NAME: Jordyn Spivey is a 84 y.o. female  : 1940    MRN: [id number]  DATE: May 1, 2025  TIME: 7:44 PM    Assessment and Plan   Cellulitis of right lower extremity [L03.115]  1. Cellulitis of right lower extremity  cephalexin (KEFLEX) 500 mg capsule            Patient Instructions       Follow up with treating podiatrist for further evaluation and treatment will start on antibiotics.    If tests have been performed at [facility], our office will contact you with results if changes need to be made to the care plan discussed with you at the visit.  You can review your full results on [facility].    Chief Complaint     Chief Complaint   Patient presents with    Wound Infection     RIGHT GREAT TOE NAIL REMOVED 2 DAYS AGO. NOT SURE WHEN IT STARTED DRAINING. Her daughter thinks it is infected.          History of Present Illness       Patient had toenail removed earlier in the week and over the last 2 days has noticed increase in pain redness and yellow drainage.  No fever or chills.  No red streaking.        Review of Systems   Review of Systems   All other systems reviewed and are negative.        Current Medications       Current Outpatient Medications:     apixaban (Eliquis) 5 mg, take 1 tablet by mouth twice a day, Disp: 180 tablet, Rfl: 1    buPROPion (WELLBUTRIN XL) 150 mg 24 hr tablet, take 1 tablet by mouth daily, Disp: 30 tablet, Rfl: 0    cephalexin (KEFLEX) 500 mg capsule, Take 1 capsule (500 mg total) by mouth every 8 (eight) hours for 7 days, Disp: 21 capsule, Rfl: 0    cetirizine (ZyrTEC) 10 mg tablet, Take 10 mg by mouth daily, Disp: , Rfl:     cyanocobalamin 1,000 mcg/mL, Inject 1 mL (1,000 mcg total) into a muscle every 30 (thirty) days, Disp: 10 mL, Rfl: 1    diltiazem (CARDIZEM CD) 300 mg 24 hr capsule, Take 1 capsule (300 mg total) by mouth daily, Disp: 90 capsule, Rfl: 2    ezetimibe (ZETIA) 10 mg tablet, Take 1 tablet (10 mg total) by mouth daily, Disp: 90  tablet, Rfl: 3    fenofibrate (TRICOR) 54 MG tablet, take 1 tablet by mouth once daily, Disp: 30 tablet, Rfl: 0    fluticasone (FLONASE) 50 mcg/act nasal spray, instill 1 spray into each nostril daily, Disp: 48 mL, Rfl: 1    ketorolac (ACULAR) 0.5 % ophthalmic solution, instill 1 drop in right eye four times daily, Disp: , Rfl:     olopatadine (PATANOL) 0.1 % ophthalmic solution, instill 1 drop into left eye twice a day, Disp: 5 mL, Rfl: 1    phenazopyridine (PYRIDIUM) 100 mg tablet, Take 1 tablet (100 mg total) by mouth 3 (three) times a day as needed for bladder spasms, Disp: 20 tablet, Rfl: 0    prednisoLONE acetate (PRED FORTE) 1 % ophthalmic suspension, Administer 1 drop to the right eye 4 (four) times a day, Disp: , Rfl:     spironolactone (ALDACTONE) 25 mg tablet, take 2 tablets by mouth IN THE MORNING and 1 tablet by mouth IN THE EVENING, Disp: 270 tablet, Rfl: 1    meclizine (ANTIVERT) 25 mg tablet, Take 0.5 tablets (12.5 mg total) by mouth 3 (three) times a day as needed for dizziness (Patient not taking: Reported on 4/12/2025), Disp: 30 tablet, Rfl: 0    Current Facility-Administered Medications:     cyanocobalamin injection 1,000 mcg, 1,000 mcg, Intramuscular, Q30 Days, , 1,000 mcg at 11/07/24 1355    cyanocobalamin injection 1,000 mcg, 1,000 mcg, Intramuscular, Q30 Days, , 1,000 mcg at 02/10/25 1407    cyanocobalamin injection 1,000 mcg, 1,000 mcg, Intramuscular, Q30 Days, , 1,000 mcg at 04/23/25 1406    Current Allergies     Allergies as of 05/01/2025 - Reviewed 05/01/2025   Allergen Reaction Noted    Aspirin  03/20/2018    Icosapent ethyl (epa ethyl zeenat) (fish) Myalgia 04/15/2024    Montelukast  03/25/2016    Nsaids  03/25/2016    Pravastatin  03/25/2016    Simvastatin  03/25/2016    Tolmetin  03/25/2016            The following portions of the patient's history were reviewed and updated as appropriate: allergies, current medications, past family history, past medical history, past social history,  past surgical history and problem list.     Past Medical History:   Diagnosis Date    Atrial fibrillation (HCC)     Esophageal stricture     Factor V Leiden mutation (HCC)     LAST ASSESSED: 8/3/16    Hypertension        Past Surgical History:   Procedure Laterality Date    CATARACT EXTRACTION, BILATERAL Bilateral     REDUCTION MAMMAPLASTY Bilateral     TONSILLECTOMY AND ADENOIDECTOMY      TUBAL LIGATION Bilateral        Family History   Problem Relation Age of Onset    Hypertension Mother     Cancer Father          Medications have been verified.        Objective   /72   Pulse 87   Temp 98 °F (36.7 °C)   Resp 18   Ht 5' (1.524 m)   Wt 84.4 kg (186 lb)   SpO2 98%   BMI 36.33 kg/m²   No LMP recorded. Patient is postmenopausal.       Physical Exam     Physical Exam  Vitals and nursing note reviewed.   Constitutional:       Appearance: Normal appearance. She is obese.   Cardiovascular:      Rate and Rhythm: Normal rate. Rhythm irregular.      Pulses: Normal pulses.      Heart sounds: Normal heart sounds.   Pulmonary:      Effort: Pulmonary effort is normal.      Breath sounds: Normal breath sounds.   Neurological:      Mental Status: She is alert.       Right great toe erythema to the MTP joint with purulence around the nailbed.  Neurovascularly intact.  Foot is warm.

## 2025-05-05 ENCOUNTER — TELEPHONE (OUTPATIENT)
Age: 85
End: 2025-05-05

## 2025-05-05 NOTE — TELEPHONE ENCOUNTER
Scheduled pt for this Thursday with Dr. Hill for check up; patient can only do appointments after 1:00pm.

## 2025-05-05 NOTE — TELEPHONE ENCOUNTER
Caller: Jordyn Spivey    Doctor and/or Office: Dr. Starks/Marcos    #: [phone]    Escalation: Care/Had ingrown nail procedure 4/29 and went to  on 5/1/cellulitis/given ABX. Calling today as toe is still very swollen and stiff/cannot bend it. Asking for call back to see if she is to be examined before her next scheduled appt. For this issue. Please call back and advise. Thanks

## 2025-05-06 DIAGNOSIS — E78.5 HYPERLIPIDEMIA, UNSPECIFIED HYPERLIPIDEMIA TYPE: ICD-10-CM

## 2025-05-06 DIAGNOSIS — I10 ESSENTIAL HYPERTENSION: ICD-10-CM

## 2025-05-06 DIAGNOSIS — F32.9 CURRENT EPISODE OF MAJOR DEPRESSIVE DISORDER WITHOUT PRIOR EPISODE: ICD-10-CM

## 2025-05-06 RX ORDER — FENOFIBRATE 54 MG/1
54 TABLET ORAL DAILY
Qty: 30 TABLET | Refills: 0 | Status: SHIPPED | OUTPATIENT
Start: 2025-05-06

## 2025-05-07 RX ORDER — SPIRONOLACTONE 25 MG/1
TABLET ORAL
Qty: 270 TABLET | Refills: 1 | Status: SHIPPED | OUTPATIENT
Start: 2025-05-07

## 2025-05-08 ENCOUNTER — OFFICE VISIT (OUTPATIENT)
Age: 85
End: 2025-05-08
Payer: COMMERCIAL

## 2025-05-08 VITALS — HEIGHT: 60 IN | BODY MASS INDEX: 36.52 KG/M2 | WEIGHT: 186 LBS

## 2025-05-08 DIAGNOSIS — L03.031 PARONYCHIA OF GREAT TOE OF RIGHT FOOT: Primary | ICD-10-CM

## 2025-05-08 PROCEDURE — 99213 OFFICE O/P EST LOW 20 MIN: CPT | Performed by: PODIATRIST

## 2025-05-08 RX ORDER — BUPROPION HYDROCHLORIDE 150 MG/1
150 TABLET ORAL DAILY
Qty: 90 TABLET | Refills: 0 | Status: SHIPPED | OUTPATIENT
Start: 2025-05-08

## 2025-05-08 NOTE — PROGRESS NOTES
Name: Jordyn Spivey      : 1940      MRN: [id number]  Encounter Provider: Yon Hill DPM  Encounter Date: 2025   [facility]  :  Assessment & Plan  Paronychia of great toe of right foot         - I discussed that some of her amplified pain might be referred from her back  - However the site seems to be rather dry and I think that her dryness might be a component of her pain she is to soak this in Epsom salts and warm water 15 minutes in the morning and 15 minutes in the evening cover this area with antibiotic ointment and Band-Aid  - We did discuss wound healing open versus closed  - Urgent care note reviewed and does not seem that he specified written instructions but per patient she was verbally told to leave this open to air    History of Present Illness   HPI  Jordyn Spivey is a 85 y.o. female who presents evaluation management of her right big toe she was told in urgent care to leave it open to air and so she followed her directions.  She notes continued pain today but she states that her pain is worse when she is at rest, worse with out of the shoe and work worse when she is like reclined with her feet elevated she does have some back problems      Review of Systems   Constitutional:  Negative for chills and fever.   HENT:  Negative for ear pain and sore throat.    Eyes:  Negative for pain and visual disturbance.   Respiratory:  Negative for cough and shortness of breath.    Cardiovascular:  Negative for chest pain and palpitations.   Gastrointestinal:  Negative for abdominal pain and vomiting.   Genitourinary:  Negative for dysuria and hematuria.   Musculoskeletal:  Negative for arthralgias and back pain.   Skin:  Negative for color change and rash.   Neurological:  Negative for seizures and syncope.   All other systems reviewed and are negative.         Objective   Ht 5' (1.524 m)   Wt 84.4 kg (186 lb)   BMI 36.33  kg/m²      Physical Exam  Vitals reviewed.   Constitutional:       Appearance: Normal appearance. She is obese.   HENT:      Head: Normocephalic and atraumatic.      Nose: Nose normal.      Mouth/Throat:      Mouth: Mucous membranes are moist.      Pharynx: Oropharynx is clear.   Eyes:      Pupils: Pupils are equal, round, and reactive to light.   Pulmonary:      Effort: Pulmonary effort is normal.   Skin:     Comments: Her nail removal site is very dry, minimal appreciable drainage, there is no signs of infection, no erythema no soft tissue swelling looks to be overall within normal limits but this does not coincide with her pain level positive paresthesias   Neurological:      Mental Status: She is alert.

## 2025-05-19 ENCOUNTER — OFFICE VISIT (OUTPATIENT)
Age: 85
End: 2025-05-19
Payer: COMMERCIAL

## 2025-05-19 ENCOUNTER — TELEPHONE (OUTPATIENT)
Age: 85
End: 2025-05-19

## 2025-05-19 VITALS — WEIGHT: 186 LBS | HEIGHT: 60 IN | BODY MASS INDEX: 36.52 KG/M2

## 2025-05-19 DIAGNOSIS — L03.031 PARONYCHIA OF GREAT TOE OF RIGHT FOOT: Primary | ICD-10-CM

## 2025-05-19 PROCEDURE — 99212 OFFICE O/P EST SF 10 MIN: CPT

## 2025-05-19 NOTE — TELEPHONE ENCOUNTER
Spoke with patient trying to get more information on what she was calling about. She stated she had an appt today but that it was cancelled due to provider having emergency sx. I explained that yes that's true the  Cancelled the afternoon but her appt was moved up not cancelled. She expressed verbal understanding and will be there. Thanks

## 2025-05-19 NOTE — TELEPHONE ENCOUNTER
Hello,    Please advise if a forced appointment can be accommodated for the patient:    Call back #: [phone]/daughter Elyse    Insurance: [employer]    Reason for appointment: 2 week follow up ingrown nail/I have nothing to offer until 6/12/CX 5/19 appt as Dr in Sx    Requested doctor and/or location: Dr Starks/Marcos      Thank you.

## 2025-05-19 NOTE — PROGRESS NOTES
Name: Jordyn Spivey      : 1940      MRN: [id number]  Encounter Provider: Micky Starks DPM  Encounter Date: 2025   Encounter department: [facility] PODIATRY [facility]  :  Assessment & Plan  Paronychia of great toe of right foot           Plan:  Diagnosis and options discussed with patient.  Patient agreeable to the plan as stated below.  Total nail avulsion procedure site with delayed healing, no acute clinical signs of infection.  Patient is to soak in warm water daily starting tomorrow followed by betadine/Aquafor/Vaseline dressing.   If there are any acute signs of infection (redness, swelling, purulent drainage, fever, chills), patient was instructed to go to the emergency department for evaluation.  Follow up in 1 week for recheck    History of Present Illness   HPI  Jordyn Spivey is a 85 y.o. female who presents for follow-up right great toenail procedure.  She reports localized pain to area of procedure site.  Patient was recently on antibiotics for increased redness and swelling to toe.  Overall she reports she is feeling well today with no recent fever chills or malaise.    History obtained from: patient    Review of Systems  Current Outpatient Medications on File Prior to Visit   Medication Sig Dispense Refill    apixaban (Eliquis) 5 mg take 1 tablet by mouth twice a day 180 tablet 1    buPROPion (WELLBUTRIN XL) 150 mg 24 hr tablet take 1 tablet by mouth daily 90 tablet 0    cetirizine (ZyrTEC) 10 mg tablet Take 10 mg by mouth in the morning.      cyanocobalamin 1,000 mcg/mL Inject 1 mL (1,000 mcg total) into a muscle every 30 (thirty) days 10 mL 1    diltiazem (CARDIZEM CD) 300 mg 24 hr capsule Take 1 capsule (300 mg total) by mouth daily 90 capsule 2    ezetimibe (ZETIA) 10 mg tablet Take 1 tablet (10 mg total) by mouth daily 90 tablet 3    fenofibrate (TRICOR) 54 MG tablet take 1 tablet by mouth once daily 30 tablet 0    fluticasone (FLONASE) 50 mcg/act nasal spray  instill 1 spray into each nostril daily 48 mL 1    ketorolac (ACULAR) 0.5 % ophthalmic solution       olopatadine (PATANOL) 0.1 % ophthalmic solution instill 1 drop into left eye twice a day 5 mL 1    phenazopyridine (PYRIDIUM) 100 mg tablet Take 1 tablet (100 mg total) by mouth 3 (three) times a day as needed for bladder spasms 20 tablet 0    prednisoLONE acetate (PRED FORTE) 1 % ophthalmic suspension Administer 1 drop to the right eye in the morning and 1 drop at noon and 1 drop in the evening and 1 drop before bedtime.      spironolactone (ALDACTONE) 25 mg tablet take 2 tablets by mouth IN THE MORNING and 1 tablet by mouth IN THE EVENING 270 tablet 1    meclizine (ANTIVERT) 25 mg tablet Take 0.5 tablets (12.5 mg total) by mouth 3 (three) times a day as needed for dizziness (Patient not taking: Reported on 4/12/2025) 30 tablet 0     Current Facility-Administered Medications on File Prior to Visit   Medication Dose Route Frequency Provider Last Rate Last Admin    cyanocobalamin injection 1,000 mcg  1,000 mcg Intramuscular Q30 Days    1,000 mcg at 11/07/24 1355    cyanocobalamin injection 1,000 mcg  1,000 mcg Intramuscular Q30 Days    1,000 mcg at 02/10/25 1407    cyanocobalamin injection 1,000 mcg  1,000 mcg Intramuscular Q30 Days    1,000 mcg at 04/23/25 1406         Objective   Ht 5' (1.524 m)   Wt 84.4 kg (186 lb)   BMI 36.33 kg/m²      Physical Exam    Vascular:  -DP and PT pulses intact b/l  -Capillary refill time <2 sec b/l  -Digital hair growth: Present  -Skin temp: WNL    Neuro:  -Light sensation intact bilaterally  -Protective sensation intact bilaterally with 10/10 points felt with [place] Bright monofilament    MSK:  -Pain on palpation of dorsal aspect of R 1st digit  -No gross deformities noted   -Active range of motion lesser digits intact  -Manual muscle testing is 5/5 to all muscle compartments of the lower extremity  -Ankle dorsiflexion >10 degrees with knee extended, and knee  flexed    Derm:  -Right great toe nailbed stable with no purulence, localized erythema noted, nail corners appear 100% granular, some scabbing noted dorsal nail  -No noted interdigital maceration, peeling, malodor  -No calluses noted on exam

## 2025-05-22 ENCOUNTER — OFFICE VISIT (OUTPATIENT)
Age: 85
End: 2025-05-22

## 2025-05-22 VITALS — HEIGHT: 60 IN | WEIGHT: 186 LBS | BODY MASS INDEX: 36.52 KG/M2

## 2025-05-22 DIAGNOSIS — L03.031 PARONYCHIA OF GREAT TOE OF RIGHT FOOT: Primary | ICD-10-CM

## 2025-05-23 NOTE — PROGRESS NOTES
Name: Jordyn Spivey      : 1940      MRN: [id number]  Encounter Provider: Micky Starks DPM  Encounter Date: 2025   Encounter department: [facility] PODIATRY [facility] [address]  :  Assessment & Plan  Paronychia of great toe of right foot           Plan:  Diagnosis and options discussed with patient.  Patient agreeable to the plan as stated below.  Total nail avulsion procedure site with delayed healing, no acute clinical signs of infection.  Patient is to soak in warm water daily starting tomorrow followed by betadine/Aquafor/Vaseline dressing.   If there are any acute signs of infection (redness, swelling, purulent drainage, fever, chills), patient was instructed to go to the emergency department for evaluation.  Follow up as needed    History of Present Illness   HPI  Jordyn Spivey is a 85 y.o. female who presents for follow-up right great toenail procedure.  She reports improvement in pain and redness to area of procedure site.  Patient was recently on antibiotics for increased redness and swelling to toe.  Overall she reports she is feeling well today with no recent fever chills or malaise.    History obtained from: patient    Review of Systems  Current Outpatient Medications on File Prior to Visit   Medication Sig Dispense Refill    apixaban (Eliquis) 5 mg take 1 tablet by mouth twice a day 180 tablet 1    buPROPion (WELLBUTRIN XL) 150 mg 24 hr tablet take 1 tablet by mouth daily 90 tablet 0    cetirizine (ZyrTEC) 10 mg tablet Take 10 mg by mouth in the morning.      cyanocobalamin 1,000 mcg/mL Inject 1 mL (1,000 mcg total) into a muscle every 30 (thirty) days 10 mL 1    diltiazem (CARDIZEM CD) 300 mg 24 hr capsule Take 1 capsule (300 mg total) by mouth daily 90 capsule 2    ezetimibe (ZETIA) 10 mg tablet Take 1 tablet (10 mg total) by mouth daily 90 tablet 3    fenofibrate (TRICOR) 54 MG tablet take 1 tablet by mouth once daily 30 tablet 0    fluticasone (FLONASE) 50 mcg/act nasal  spray instill 1 spray into each nostril daily 48 mL 1    ketorolac (ACULAR) 0.5 % ophthalmic solution       olopatadine (PATANOL) 0.1 % ophthalmic solution instill 1 drop into left eye twice a day 5 mL 1    phenazopyridine (PYRIDIUM) 100 mg tablet Take 1 tablet (100 mg total) by mouth 3 (three) times a day as needed for bladder spasms 20 tablet 0    prednisoLONE acetate (PRED FORTE) 1 % ophthalmic suspension Administer 1 drop to the right eye in the morning and 1 drop at noon and 1 drop in the evening and 1 drop before bedtime.      spironolactone (ALDACTONE) 25 mg tablet take 2 tablets by mouth IN THE MORNING and 1 tablet by mouth IN THE EVENING 270 tablet 1    meclizine (ANTIVERT) 25 mg tablet Take 0.5 tablets (12.5 mg total) by mouth 3 (three) times a day as needed for dizziness (Patient not taking: Reported on 4/12/2025) 30 tablet 0     Current Facility-Administered Medications on File Prior to Visit   Medication Dose Route Frequency Provider Last Rate Last Admin    cyanocobalamin injection 1,000 mcg  1,000 mcg Intramuscular Q30 Days    1,000 mcg at 11/07/24 1355    cyanocobalamin injection 1,000 mcg  1,000 mcg Intramuscular Q30 Days    1,000 mcg at 02/10/25 1407    cyanocobalamin injection 1,000 mcg  1,000 mcg Intramuscular Q30 Days    1,000 mcg at 04/23/25 1406         Objective   Ht 5' (1.524 m)   Wt 84.4 kg (186 lb)   BMI 36.33 kg/m²      Physical Exam    Vascular:  -DP and PT pulses intact b/l  -Capillary refill time <2 sec b/l  -Digital hair growth: Present  -Skin temp: WNL    Neuro:  -Light sensation intact bilaterally  -Protective sensation intact bilaterally with 10/10 points felt with [place] Bright monofilament    MSK:  -minimal pain on palpation of dorsal aspect of R 1st digit  -No gross deformities noted   -Active range of motion lesser digits intact  -Manual muscle testing is 5/5 to all muscle compartments of the lower extremity  -Ankle dorsiflexion >10 degrees with knee extended, and knee  flexed    Derm:  -Right great toe nailbed stable with no purulence, localized erythema noted, nail corners appear 100% granular, some scabbing noted dorsal nail  -No noted interdigital maceration, peeling, malodor  -No calluses noted on exam

## 2025-05-26 ENCOUNTER — OFFICE VISIT (OUTPATIENT)
Dept: URGENT CARE | Facility: CLINIC | Age: 85
End: 2025-05-26
Payer: COMMERCIAL

## 2025-05-26 VITALS
TEMPERATURE: 97.8 F | SYSTOLIC BLOOD PRESSURE: 130 MMHG | OXYGEN SATURATION: 99 % | DIASTOLIC BLOOD PRESSURE: 78 MMHG | RESPIRATION RATE: 18 BRPM | HEART RATE: 78 BPM

## 2025-05-26 DIAGNOSIS — R39.9 UTI SYMPTOMS: Primary | ICD-10-CM

## 2025-05-26 DIAGNOSIS — N30.00 ACUTE CYSTITIS WITHOUT HEMATURIA: ICD-10-CM

## 2025-05-26 LAB
SL AMB  POCT GLUCOSE, UA: ABNORMAL
SL AMB LEUKOCYTE ESTERASE,UA: ABNORMAL
SL AMB POCT BILIRUBIN,UA: ABNORMAL
SL AMB POCT BLOOD,UA: ABNORMAL
SL AMB POCT CLARITY,UA: ABNORMAL
SL AMB POCT COLOR,UA: ABNORMAL
SL AMB POCT KETONES,UA: ABNORMAL
SL AMB POCT NITRITE,UA: ABNORMAL
SL AMB POCT PH,UA: 5
SL AMB POCT SPECIFIC GRAVITY,UA: 1020
SL AMB POCT URINE PROTEIN: 30
SL AMB POCT UROBILINOGEN: 0.2

## 2025-05-26 PROCEDURE — 87186 SC STD MICRODIL/AGAR DIL: CPT | Performed by: NURSE PRACTITIONER

## 2025-05-26 PROCEDURE — 87077 CULTURE AEROBIC IDENTIFY: CPT | Performed by: NURSE PRACTITIONER

## 2025-05-26 PROCEDURE — 99214 OFFICE O/P EST MOD 30 MIN: CPT | Performed by: NURSE PRACTITIONER

## 2025-05-26 PROCEDURE — 87086 URINE CULTURE/COLONY COUNT: CPT | Performed by: NURSE PRACTITIONER

## 2025-05-26 PROCEDURE — 81002 URINALYSIS NONAUTO W/O SCOPE: CPT | Performed by: NURSE PRACTITIONER

## 2025-05-26 PROCEDURE — S9088 SERVICES PROVIDED IN URGENT: HCPCS | Performed by: NURSE PRACTITIONER

## 2025-05-26 RX ORDER — SULFAMETHOXAZOLE AND TRIMETHOPRIM 200; 40 MG/5ML; MG/5ML
10 SUSPENSION ORAL 2 TIMES DAILY
Qty: 100 ML | Refills: 0 | Status: SHIPPED | OUTPATIENT
Start: 2025-05-26 | End: 2025-05-31

## 2025-05-26 NOTE — PROGRESS NOTES
[facility]  Name: Jordyn Spivey      : 1940      MRN: [id number]  Encounter Provider: NANCY Carlisle  Encounter Date: 2025   Encounter department: [facility]  :  Assessment & Plan  UTI symptoms    Orders:    POCT urine dip    Urine culture; Future    sulfamethoxazole-trimethoprim (BACTRIM) 200-40 mg/5 mL suspension; Take 10 mL (80 mg of trimethoprim total) by mouth 2 (two) times a day for 5 days    Acute cystitis without hematuria    Orders:    sulfamethoxazole-trimethoprim (BACTRIM) 200-40 mg/5 mL suspension; Take 10 mL (80 mg of trimethoprim total) by mouth 2 (two) times a day for 5 days        Patient Instructions  Follow up with PCP in 3-5 days.  Proceed to  ER if symptoms worsen.    If tests are performed, our office will contact you with results only if changes need to made to the care plan discussed with you at the visit. You can review your full results on . [facility] MyChart.    Your urine shows bacteria.  You have been prescribed an antibiotic. You are to take all medication as prescribed.   You are to avoid alcohol and caffeine - they are bladder irritants.  Drink water.  Take tylenol or motrin for pain or fever.    You are to download [facility] mychart for the results in 3-5 days.   You will be notified if the antibiotic needs changed.  Follow up with your PCP in 2-3 days.   Go to the ED if symptoms worsen.      You have been prescribed sulfa suspension.  Drink lots of water with this medication.  Monitor the sun - this medication can make you more sensitive to the sun.      If tests have been performed at [facility], our office will contact you with results if changes need to be made to the care plan discussed with you at the visit.  You can review your full results on . [facility] MyChart.                Chief Complaint:   Chief Complaint   Patient presents with    Possible UTI     History of Present Illness   This is an 85 year old female who daughter  "brings to care now with c/o urinary frequency, \"hot urine\", pink urine since last night.  Denies fevers, chills, n/v/d, back pain, or dysuria.  She has not taken anything for her symptoms.  States last UTI 1 month ago.  Has a urologist but did not call.  PMH is listed and reviewed.           Review of Systems   Constitutional: Negative.    HENT: Negative.     Eyes: Negative.    Respiratory: Negative.     Cardiovascular: Negative.    Gastrointestinal: Negative.    Endocrine: Negative.    Genitourinary:  Positive for frequency, hematuria and urgency. Negative for dysuria.   Musculoskeletal: Negative.    Skin: Negative.    Allergic/Immunologic: Negative.    Neurological: Negative.    Hematological: Negative.    Psychiatric/Behavioral: Negative.       Past Medical History   Past Medical History[1]  Past Surgical History[2]  Family History[3]  she reports that she quit smoking about 37 years ago. Her smoking use included cigarettes. She has never used smokeless tobacco. She reports that she does not drink alcohol and does not use drugs.  Current Outpatient Medications   Medication Instructions    apixaban (ELIQUIS) 5 mg, Oral, 2 times daily    buPROPion (WELLBUTRIN XL) 150 mg, Oral, Daily    cetirizine (ZYRTEC) 10 mg, Daily    cyanocobalamin 1,000 mcg, Intramuscular, Every 30 days    diltiazem (CARDIZEM CD) 300 mg, Oral, Daily    ezetimibe (ZETIA) 10 mg, Oral, Daily    fenofibrate (TRICOR) 54 mg, Oral, Daily    fluticasone (FLONASE) 50 mcg/act nasal spray instill 1 spray into each nostril daily    ketorolac (ACULAR) 0.5 % ophthalmic solution     meclizine (ANTIVERT) 12.5 mg, Oral, 3 times daily PRN    olopatadine (PATANOL) 0.1 % ophthalmic solution instill 1 drop into left eye twice a day    phenazopyridine (PYRIDIUM) 100 mg, Oral, 3 times daily PRN    prednisoLONE acetate (PRED FORTE) 1 % ophthalmic suspension 1 drop, 4 times daily    spironolactone (ALDACTONE) 25 mg tablet take 2 tablets by mouth IN THE MORNING and 1 " "tablet by mouth IN THE EVENING    sulfamethoxazole-trimethoprim (BACTRIM) 200-40 mg/5 mL suspension 80 mg of trimethoprim, Oral, 2 times daily   Allergies[4]     Objective   /78   Pulse 78   Temp 97.8 °F (36.6 °C)   Resp 18   SpO2 99%      Physical Exam  Vitals and nursing note reviewed.   Constitutional:       General: She is not in acute distress.     Appearance: Normal appearance. She is normal weight. She is not ill-appearing, toxic-appearing or diaphoretic.   HENT:      Head: Normocephalic and atraumatic.      Nose: Nose normal.      Mouth/Throat:      Mouth: Mucous membranes are moist.     Eyes:      Extraocular Movements: Extraocular movements intact.       Cardiovascular:      Rate and Rhythm: Normal rate and regular rhythm.      Pulses: Normal pulses.      Heart sounds: Normal heart sounds.   Pulmonary:      Effort: Pulmonary effort is normal.      Breath sounds: Normal breath sounds.     Musculoskeletal:         General: Normal range of motion.      Cervical back: Normal range of motion.     Skin:     General: Skin is warm and dry.      Capillary Refill: Capillary refill takes less than 2 seconds.     Neurological:      General: No focal deficit present.      Mental Status: She is alert and oriented to person, place, and time.     Psychiatric:         Mood and Affect: Mood normal.         Behavior: Behavior normal.         Thought Content: Thought content normal.         Judgment: Judgment normal.         4/8/25  last ua cx  e coli 10-19,000  < 10,000 proteus species     UA cx was treated with amoxicillin (intermediate)  Pt was on cephalexin 5/1/25 for cellulitis of RLE    2/25/25  Cr 1.50 and GFR 31      Poct urine is brown in color with foul odor   Will give low dose sulfa suspension   Mod leuks, large blood   protein 30  ph 5  1.020               Portions of the record may have been created with voice recognition software.  Occasional wrong word or \"sound a like\" substitutions may have " occurred due to the inherent limitations of voice recognition software.  Read the chart carefully and recognize, using context, where substitutions have occurred.         [1]   Past Medical History:  Diagnosis Date    Atrial fibrillation (HCC)     Esophageal stricture     Factor V Leiden mutation (HCC)     LAST ASSESSED: 8/3/16    Hypertension    [2]   Past Surgical History:  Procedure Laterality Date    CATARACT EXTRACTION, BILATERAL Bilateral     REDUCTION MAMMAPLASTY Bilateral     TONSILLECTOMY AND ADENOIDECTOMY      TUBAL LIGATION Bilateral    [3]   Family History  Problem Relation Name Age of Onset    Hypertension Mother      Cancer Father     [4]   Allergies  Allergen Reactions    Aspirin     Icosapent Ethyl (Epa Ethyl Kelly) (Fish) Myalgia    Montelukast      Other reaction(s): Depression  Category: Adverse Reaction;   Other reaction(s): Depression  Category: Adverse Reaction;     Nsaids     Pravastatin      Category: Adverse Reaction;     Simvastatin      Category: Adverse Reaction;     Tolmetin

## 2025-05-26 NOTE — PATIENT INSTRUCTIONS
Your urine shows bacteria.  You have been prescribed an antibiotic. You are to take all medication as prescribed.   You are to avoid alcohol and caffeine - they are bladder irritants.  Drink water.  Take tylenol or motrin for pain or fever.    You are to download [facility] for the results in 3-5 days.   You will be notified if the antibiotic needs changed.  Follow up with your PCP in 2-3 days.   Go to the ED if symptoms worsen.      You have been prescribed sulfa suspension.  Drink lots of water with this medication.  Monitor the sun - this medication can make you more sensitive to the sun.      If tests have been performed at [facility], our office will contact you with results if changes need to be made to the care plan discussed with you at the visit.  You can review your full results on [facility] 1hart.

## 2025-05-29 LAB
BACTERIA UR CULT: ABNORMAL
BACTERIA UR CULT: ABNORMAL

## 2025-06-09 DIAGNOSIS — E78.5 HYPERLIPIDEMIA, UNSPECIFIED HYPERLIPIDEMIA TYPE: ICD-10-CM

## 2025-06-09 RX ORDER — FENOFIBRATE 54 MG/1
54 TABLET ORAL DAILY
Qty: 90 TABLET | Refills: 1 | Status: SHIPPED | OUTPATIENT
Start: 2025-06-09

## 2025-06-09 NOTE — TELEPHONE ENCOUNTER
Medication: frnofibrate 54mg    Dose/Frequency: 1 tablet daily    Quantity: 90    Pharmacy: [facility]    Office:   [x] PCP/Provider -   [] Speciality/Provider -     Does the patient have enough for 3 days?   [] Yes   [x] No - Send as HP to POD

## 2025-06-10 DIAGNOSIS — T78.40XA ALLERGY, INITIAL ENCOUNTER: ICD-10-CM

## 2025-06-10 DIAGNOSIS — J32.9 CHRONIC SINUSITIS, UNSPECIFIED LOCATION: ICD-10-CM

## 2025-06-10 DIAGNOSIS — R09.82 POST-NASAL DRIP: ICD-10-CM

## 2025-06-10 DIAGNOSIS — E78.5 HYPERLIPIDEMIA, UNSPECIFIED HYPERLIPIDEMIA TYPE: ICD-10-CM

## 2025-06-10 DIAGNOSIS — I48.91 ATRIAL FIBRILLATION WITH RVR (HCC): ICD-10-CM

## 2025-06-10 RX ORDER — OLOPATADINE HYDROCHLORIDE 1 MG/ML
1 SOLUTION OPHTHALMIC 2 TIMES DAILY
Qty: 5 ML | Refills: 0 | Status: SHIPPED | OUTPATIENT
Start: 2025-06-10

## 2025-06-10 RX ORDER — FLUTICASONE PROPIONATE 50 MCG
1 SPRAY, SUSPENSION (ML) NASAL DAILY
Qty: 48 ML | Refills: 0 | Status: SHIPPED | OUTPATIENT
Start: 2025-06-10

## 2025-06-10 RX ORDER — EZETIMIBE 10 MG/1
10 TABLET ORAL DAILY
Qty: 90 TABLET | Refills: 1 | Status: SHIPPED | OUTPATIENT
Start: 2025-06-10

## 2025-06-11 RX ORDER — PREDNISOLONE ACETATE 10 MG/ML
1 SUSPENSION/ DROPS OPHTHALMIC 4 TIMES DAILY
Qty: 5 ML | Refills: 0 | OUTPATIENT
Start: 2025-06-11

## 2025-06-11 RX ORDER — KETOROLAC TROMETHAMINE 5 MG/ML
SOLUTION OPHTHALMIC
Qty: 5 ML | Refills: 0 | OUTPATIENT
Start: 2025-06-11

## 2025-06-25 ENCOUNTER — RA CDI HCC (OUTPATIENT)
Dept: OTHER | Facility: HOSPITAL | Age: 85
End: 2025-06-25

## 2025-06-25 NOTE — TELEPHONE ENCOUNTER
-I was able to call and speak with patient.  Patient denies any chest pain, shortness of breath, anginal symptoms and states heart rates seem to be ranging in the 70s-120s beat per minute range.  She has been 100% compliant with oral anticoagulation and has not missed any doses.  Patient will continue Eliquis 5 mg twice daily.  I did discuss with her about cardioversion and even potential for ablation or alternative antiarrhythmic therapy however at this time patient wishes to trial up titration of diltiazem first and undergo ambulatory event monitoring as she was maintained on this well without issue.  Patient will undergo Zio patch monitor for 1 week and will increase diltiazem to 240 mg daily from current 180 mg daily regimen.  Counseled patient if she were to have any warning or alarm type symptoms she is to seek emergency medical care immediately.  Patient understanding was agreeable.  I have also sent message to office staff to move up patient's appointment.  
Spoke with patient. She will have Zio sent to her and apply at home.     Scheduled sooner appt on 12/20/24 in the PM   
No

## 2025-06-26 ENCOUNTER — OFFICE VISIT (OUTPATIENT)
Dept: URGENT CARE | Facility: CLINIC | Age: 85
End: 2025-06-26
Payer: COMMERCIAL

## 2025-06-26 VITALS
TEMPERATURE: 97.9 F | BODY MASS INDEX: 36.76 KG/M2 | SYSTOLIC BLOOD PRESSURE: 128 MMHG | RESPIRATION RATE: 18 BRPM | DIASTOLIC BLOOD PRESSURE: 80 MMHG | OXYGEN SATURATION: 97 % | WEIGHT: 188.2 LBS | HEART RATE: 89 BPM

## 2025-06-26 DIAGNOSIS — R35.0 URINARY FREQUENCY: ICD-10-CM

## 2025-06-26 DIAGNOSIS — N30.01 ACUTE CYSTITIS WITH HEMATURIA: Primary | ICD-10-CM

## 2025-06-26 LAB
SL AMB  POCT GLUCOSE, UA: NEGATIVE
SL AMB LEUKOCYTE ESTERASE,UA: ABNORMAL
SL AMB POCT BILIRUBIN,UA: ABNORMAL
SL AMB POCT BLOOD,UA: ABNORMAL
SL AMB POCT CLARITY,UA: ABNORMAL
SL AMB POCT COLOR,UA: ABNORMAL
SL AMB POCT KETONES,UA: NEGATIVE
SL AMB POCT NITRITE,UA: NEGATIVE
SL AMB POCT PH,UA: 6
SL AMB POCT SPECIFIC GRAVITY,UA: 1.01
SL AMB POCT URINE PROTEIN: 30
SL AMB POCT UROBILINOGEN: 0.2

## 2025-06-26 PROCEDURE — 99213 OFFICE O/P EST LOW 20 MIN: CPT

## 2025-06-26 PROCEDURE — 87086 URINE CULTURE/COLONY COUNT: CPT

## 2025-06-26 PROCEDURE — 81002 URINALYSIS NONAUTO W/O SCOPE: CPT

## 2025-06-26 PROCEDURE — S9088 SERVICES PROVIDED IN URGENT: HCPCS

## 2025-06-26 RX ORDER — CEPHALEXIN 500 MG/1
500 CAPSULE ORAL EVERY 12 HOURS SCHEDULED
Qty: 14 CAPSULE | Refills: 0 | Status: SHIPPED | OUTPATIENT
Start: 2025-06-26 | End: 2025-07-03

## 2025-06-26 RX ORDER — DILTIAZEM HYDROCHLORIDE 300 MG/1
1 CAPSULE, EXTENDED RELEASE ORAL DAILY
COMMUNITY
Start: 2025-06-19

## 2025-06-26 NOTE — PROGRESS NOTES
HCC coding opportunities    N18.32  GR     Chart Reviewed number of suggestions sent to Provider: 1     Patients Insurance     Medicare Insurance: [employer] Advantage

## 2025-06-26 NOTE — PROGRESS NOTES
[facility]        NAME: Jordyn Spivey is a 85 y.o. female  : 1940    MRN: [id number]  DATE: 2025  TIME: 11:51 AM    Assessment and Plan   Acute cystitis with hematuria [N30.01]  1. Acute cystitis with hematuria  cephalexin (KEFLEX) 500 mg capsule      2. Urinary frequency  POCT urine dip    Urine culture    Urine culture        Urine dip shows leukocytes, nitrates, protein and blood.  Will send urine culture.  Patient's CrCl 37.  Will treat empirically with Keflex twice daily for the next 7 days.  Recommend supportive care.  Instructed patient to rest and increase fluid intake.  Instructed patient to follow-up with her urologist given recurrent UTIs.  Instructed patient to follow-up with PCP for no improvement or worsening of symptoms.  Patient educated on red flag symptoms and when to proceed to the ED.  Patient agreeable and understands current treatment plan.     Patient Instructions     Patient Instructions   Please take Keflex twice daily directed for the next 7 days.  Please follow-up with your urologist given your recurrent UTIs.    Drink plenty of water and other fluids to help flush out bacteria and dilute your urine.    You may take OTC pain relievers such as Tylenol or Ibuprofen, and use a warm heating pad to help with your pain.  Avoid drinks that may irritate your bladder including coffee, alcohol, and citrus drinks.  Drinking cranberry juice or taking cranberry products may help reduce recurrent UTI's.     Antibiotics are medicines that treat bacterial infections by either killing bacteria or preventing them from growing. It is important to take the full course of your antibiotics as prescribed to kill the bacteria causing your infection. Stopping your antibiotics early could lead to reinfection and can also promote the spread of antibiotic resistant bacteria. Some antibiotics may cause certain side effects including: nausea, vomiting, diarrhea, bloating, indigestion,  belly pain, and/or loss of appetite. Eating yogurt with live and active cultures and/or taking a probiotic and maintaining a healthy diet can help to reduce some of these side effects.      Please follow up with PCP or proceed to the ER for worsening symptoms (fever, chills, back/pelvic pain, and increasing blood in urine).        Follow up with PCP in 3-5 days.  Proceed to  ER if symptoms worsen.    Chief Complaint     Chief Complaint   Patient presents with   • Possible UTI     Increased frequency, urgency and burning with urination past 2 days.  Noted blood in urine upon collection.         History of Present Illness       85-year-old female presents to the clinic for evaluation of urinary symptoms x 2 days.  Patient reports her urine felt hot yesterday.  She reports she has had burning with urination, urinary frequency, urinary urgency and pink-tinged urine.  She reports she has had a history of recurrent UTIs with her last UTI on 5/26/2025.  She was treated with Bactrim and states she felt much better up until yesterday.  She reports she has followed with urologist in the past however he did not recommend to do anything other than cranberry products.  She denies any other symptoms at this time including fevers, chills, chest pain, shortness of breath, nausea, vomiting, diarrhea, abdominal pain, pelvic pain, flank pain, dizziness or headaches.  Patient denies taking any OTC medications for symptoms at this time.  Of note, she does have a history of chronic kidney disease stage III.            Review of Systems   Review of Systems   Constitutional:  Negative for chills and fever.   Respiratory:  Negative for cough and shortness of breath.    Cardiovascular:  Negative for chest pain and palpitations.   Gastrointestinal:  Negative for abdominal pain, diarrhea, nausea and vomiting.   Genitourinary:  Positive for dysuria, frequency, hematuria and urgency. Negative for flank pain and pelvic pain.   Musculoskeletal:   Negative for arthralgias and myalgias.   Neurological:  Negative for dizziness and headaches.   All other systems reviewed and are negative.        Current Medications     Current Medications[1]    Current Allergies     Allergies as of 06/26/2025 - Reviewed 06/26/2025   Allergen Reaction Noted   • Aspirin  03/20/2018   • Icosapent ethyl (epa ethyl zeenat) (fish) Myalgia 04/15/2024   • Montelukast  03/25/2016   • Nsaids  03/25/2016   • Pravastatin  03/25/2016   • Simvastatin  03/25/2016   • Tolmetin  03/25/2016            The following portions of the patient's history were reviewed and updated as appropriate: allergies, current medications, past family history, past medical history, past social history, past surgical history and problem list.     Past Medical History[2]    Past Surgical History[3]    Family History[4]      Medications have been verified.        Objective   /80   Pulse 89   Temp 97.9 °F (36.6 °C)   Resp 18   Wt 85.4 kg (188 lb 3.2 oz)   SpO2 97%   BMI 36.76 kg/m²        Physical Exam     Physical Exam  Vitals and nursing note reviewed.   Constitutional:       Appearance: Normal appearance. She is not ill-appearing.   HENT:      Head: Normocephalic and atraumatic.     Cardiovascular:      Rate and Rhythm: Normal rate and regular rhythm.      Heart sounds: Normal heart sounds.   Pulmonary:      Effort: Pulmonary effort is normal.      Breath sounds: Normal breath sounds.   Abdominal:      General: There is no distension.      Palpations: Abdomen is soft.      Tenderness: There is no abdominal tenderness. There is no right CVA tenderness or left CVA tenderness.     Skin:     General: Skin is warm and dry.     Neurological:      General: No focal deficit present.      Mental Status: She is alert and oriented to person, place, and time.     Psychiatric:         Mood and Affect: Mood normal.         Behavior: Behavior normal.                          [1]    Current Outpatient Medications:   •   apixaban (ELIQUIS) 5 mg, Take 1 tablet (5 mg total) by mouth 2 (two) times a day, Disp: 180 tablet, Rfl: 1  •  buPROPion (WELLBUTRIN XL) 150 mg 24 hr tablet, take 1 tablet by mouth daily, Disp: 90 tablet, Rfl: 0  •  cephalexin (KEFLEX) 500 mg capsule, Take 1 capsule (500 mg total) by mouth every 12 (twelve) hours for 7 days, Disp: 14 capsule, Rfl: 0  •  cetirizine (ZyrTEC) 10 mg tablet, Take 10 mg by mouth in the morning., Disp: , Rfl:   •  cyanocobalamin 1,000 mcg/mL, Inject 1 mL (1,000 mcg total) into a muscle every 30 (thirty) days, Disp: 10 mL, Rfl: 1  •  diltiazem (CARDIZEM CD) 300 mg 24 hr capsule, Take 1 capsule (300 mg total) by mouth daily, Disp: 90 capsule, Rfl: 2  •  diltiazem (TIAZAC) 300 MG 24 hr capsule, Take 1 capsule by mouth in the morning, Disp: , Rfl:   •  ezetimibe (ZETIA) 10 mg tablet, Take 1 tablet (10 mg total) by mouth daily, Disp: 90 tablet, Rfl: 1  •  fenofibrate (TRICOR) 54 MG tablet, Take 1 tablet (54 mg total) by mouth daily, Disp: 90 tablet, Rfl: 1  •  fluticasone (FLONASE) 50 mcg/act nasal spray, 1 spray into each nostril daily, Disp: 48 mL, Rfl: 0  •  ketorolac (ACULAR) 0.5 % ophthalmic solution, , Disp: , Rfl:   •  olopatadine (PATANOL) 0.1 % ophthalmic solution, Administer 1 drop into the left eye 2 (two) times a day, Disp: 5 mL, Rfl: 0  •  phenazopyridine (PYRIDIUM) 100 mg tablet, Take 1 tablet (100 mg total) by mouth 3 (three) times a day as needed for bladder spasms, Disp: 20 tablet, Rfl: 0  •  prednisoLONE acetate (PRED FORTE) 1 % ophthalmic suspension, Administer 1 drop to the right eye in the morning and 1 drop at noon and 1 drop in the evening and 1 drop before bedtime., Disp: , Rfl:   •  spironolactone (ALDACTONE) 25 mg tablet, take 2 tablets by mouth IN THE MORNING and 1 tablet by mouth IN THE EVENING, Disp: 270 tablet, Rfl: 1  •  meclizine (ANTIVERT) 25 mg tablet, Take 0.5 tablets (12.5 mg total) by mouth 3 (three) times a day as needed for dizziness (Patient not taking:  Reported on 4/12/2025), Disp: 30 tablet, Rfl: 0    Current Facility-Administered Medications:   •  cyanocobalamin injection 1,000 mcg, 1,000 mcg, Intramuscular, Q30 Days, , 1,000 mcg at 11/07/24 1355  •  cyanocobalamin injection 1,000 mcg, 1,000 mcg, Intramuscular, Q30 Days, , 1,000 mcg at 02/10/25 1407  •  cyanocobalamin injection 1,000 mcg, 1,000 mcg, Intramuscular, Q30 Days, , 1,000 mcg at 04/23/25 1406[2]  Past Medical History:  Diagnosis Date   • Atrial fibrillation (HCC)    • Esophageal stricture    • Factor V Leiden mutation (HCC)     LAST ASSESSED: 8/3/16   • Hypertension    [3]  Past Surgical History:  Procedure Laterality Date   • CATARACT EXTRACTION, BILATERAL Bilateral    • REDUCTION MAMMAPLASTY Bilateral    • TONSILLECTOMY AND ADENOIDECTOMY     • TUBAL LIGATION Bilateral    [4]  Family History  Problem Relation Name Age of Onset   • Hypertension Mother     • Cancer Father

## 2025-06-26 NOTE — PATIENT INSTRUCTIONS
Please take Keflex twice daily directed for the next 7 days.  Please follow-up with your urologist given your recurrent UTIs.    Drink plenty of water and other fluids to help flush out bacteria and dilute your urine.    You may take OTC pain relievers such as Tylenol or Ibuprofen, and use a warm heating pad to help with your pain.  Avoid drinks that may irritate your bladder including coffee, alcohol, and citrus drinks.  Drinking cranberry juice or taking cranberry products may help reduce recurrent UTI's.     Antibiotics are medicines that treat bacterial infections by either killing bacteria or preventing them from growing. It is important to take the full course of your antibiotics as prescribed to kill the bacteria causing your infection. Stopping your antibiotics early could lead to reinfection and can also promote the spread of antibiotic resistant bacteria. Some antibiotics may cause certain side effects including: nausea, vomiting, diarrhea, bloating, indigestion, belly pain, and/or loss of appetite. Eating yogurt with live and active cultures and/or taking a probiotic and maintaining a healthy diet can help to reduce some of these side effects.      Please follow up with PCP or proceed to the ER for worsening symptoms (fever, chills, back/pelvic pain, and increasing blood in urine).

## 2025-06-27 LAB — BACTERIA UR CULT: NORMAL

## 2025-06-30 ENCOUNTER — APPOINTMENT (OUTPATIENT)
Dept: LAB | Facility: CLINIC | Age: 85
End: 2025-06-30
Attending: INTERNAL MEDICINE
Payer: COMMERCIAL

## 2025-06-30 DIAGNOSIS — I12.9 BENIGN HYPERTENSION WITH CKD (CHRONIC KIDNEY DISEASE) STAGE III (HCC): ICD-10-CM

## 2025-06-30 DIAGNOSIS — N18.30 BENIGN HYPERTENSION WITH CKD (CHRONIC KIDNEY DISEASE) STAGE III (HCC): ICD-10-CM

## 2025-06-30 DIAGNOSIS — R80.9 MICROALBUMINURIA: ICD-10-CM

## 2025-06-30 DIAGNOSIS — E55.9 VITAMIN D DEFICIENCY: ICD-10-CM

## 2025-06-30 DIAGNOSIS — E87.8 LOW BICARBONATE: ICD-10-CM

## 2025-06-30 DIAGNOSIS — N18.32 STAGE 3B CHRONIC KIDNEY DISEASE (HCC): ICD-10-CM

## 2025-06-30 LAB
25(OH)D3 SERPL-MCNC: 25 NG/ML (ref 30–100)
ANION GAP SERPL CALCULATED.3IONS-SCNC: 9 MMOL/L (ref 4–13)
BUN SERPL-MCNC: 42 MG/DL (ref 5–25)
CALCIUM SERPL-MCNC: 9.5 MG/DL (ref 8.4–10.2)
CHLORIDE SERPL-SCNC: 107 MMOL/L (ref 96–108)
CO2 SERPL-SCNC: 21 MMOL/L (ref 21–32)
CREAT SERPL-MCNC: 1.52 MG/DL (ref 0.6–1.3)
CREAT UR-MCNC: 73.4 MG/DL
ERYTHROCYTE [DISTWIDTH] IN BLOOD BY AUTOMATED COUNT: 14.1 % (ref 11.6–15.1)
GFR SERPL CREATININE-BSD FRML MDRD: 31 ML/MIN/1.73SQ M
GLUCOSE SERPL-MCNC: 103 MG/DL (ref 65–140)
HCT VFR BLD AUTO: 39 % (ref 34.8–46.1)
HGB BLD-MCNC: 12.3 G/DL (ref 11.5–15.4)
MCH RBC QN AUTO: 28.9 PG (ref 26.8–34.3)
MCHC RBC AUTO-ENTMCNC: 31.5 G/DL (ref 31.4–37.4)
MCV RBC AUTO: 92 FL (ref 82–98)
MICROALBUMIN UR-MCNC: 27.1 MG/L
MICROALBUMIN/CREAT 24H UR: 37 MG/G CREATININE (ref 0–30)
PHOSPHATE SERPL-MCNC: 3.4 MG/DL (ref 2.3–4.1)
PLATELET # BLD AUTO: 437 THOUSANDS/UL (ref 149–390)
PMV BLD AUTO: 9.2 FL (ref 8.9–12.7)
POTASSIUM SERPL-SCNC: 4.9 MMOL/L (ref 3.5–5.3)
PTH-INTACT SERPL-MCNC: 67.1 PG/ML (ref 12–88)
RBC # BLD AUTO: 4.25 MILLION/UL (ref 3.81–5.12)
SODIUM SERPL-SCNC: 137 MMOL/L (ref 135–147)
WBC # BLD AUTO: 8.16 THOUSAND/UL (ref 4.31–10.16)

## 2025-06-30 PROCEDURE — 85027 COMPLETE CBC AUTOMATED: CPT

## 2025-06-30 PROCEDURE — 83970 ASSAY OF PARATHORMONE: CPT

## 2025-06-30 PROCEDURE — 80048 BASIC METABOLIC PNL TOTAL CA: CPT

## 2025-06-30 PROCEDURE — 82306 VITAMIN D 25 HYDROXY: CPT

## 2025-06-30 PROCEDURE — 82043 UR ALBUMIN QUANTITATIVE: CPT

## 2025-06-30 PROCEDURE — 82570 ASSAY OF URINE CREATININE: CPT

## 2025-06-30 PROCEDURE — 84100 ASSAY OF PHOSPHORUS: CPT

## 2025-06-30 PROCEDURE — 36415 COLL VENOUS BLD VENIPUNCTURE: CPT

## 2025-07-01 ENCOUNTER — RESULTS FOLLOW-UP (OUTPATIENT)
Dept: OTHER | Facility: HOSPITAL | Age: 85
End: 2025-07-01

## 2025-07-01 NOTE — TELEPHONE ENCOUNTER
Creatinine stable 1.5 over last 6 months.  Vitamin D level remains lower.  Please confirm if she is taking any vitamin D supplements OTC.  If she is not taking any, she should start vitamin D3 1 tablet 2000 units daily.

## 2025-07-02 ENCOUNTER — OFFICE VISIT (OUTPATIENT)
Dept: FAMILY MEDICINE CLINIC | Facility: CLINIC | Age: 85
End: 2025-07-02
Payer: COMMERCIAL

## 2025-07-02 VITALS
OXYGEN SATURATION: 98 % | DIASTOLIC BLOOD PRESSURE: 64 MMHG | SYSTOLIC BLOOD PRESSURE: 110 MMHG | TEMPERATURE: 96.9 F | HEIGHT: 60 IN | HEART RATE: 91 BPM | BODY MASS INDEX: 36.52 KG/M2 | WEIGHT: 186 LBS

## 2025-07-02 DIAGNOSIS — R60.0 LOCALIZED EDEMA: ICD-10-CM

## 2025-07-02 DIAGNOSIS — Z00.00 MEDICARE ANNUAL WELLNESS VISIT, SUBSEQUENT: Primary | ICD-10-CM

## 2025-07-02 DIAGNOSIS — M79.671 RIGHT FOOT PAIN: ICD-10-CM

## 2025-07-02 DIAGNOSIS — G89.29 CHRONIC PAIN OF RIGHT KNEE: ICD-10-CM

## 2025-07-02 DIAGNOSIS — N39.45 CONTINUOUS LEAKAGE OF URINE: ICD-10-CM

## 2025-07-02 DIAGNOSIS — N18.32 CHRONIC KIDNEY DISEASE, STAGE 3B (HCC): ICD-10-CM

## 2025-07-02 DIAGNOSIS — M25.561 CHRONIC PAIN OF RIGHT KNEE: ICD-10-CM

## 2025-07-02 DIAGNOSIS — Z78.0 POST-MENOPAUSAL: ICD-10-CM

## 2025-07-02 DIAGNOSIS — M79.89 LEG SWELLING: ICD-10-CM

## 2025-07-02 DIAGNOSIS — Z23 ENCOUNTER FOR IMMUNIZATION: ICD-10-CM

## 2025-07-02 DIAGNOSIS — E53.8 VITAMIN B12 DEFICIENCY: ICD-10-CM

## 2025-07-02 DIAGNOSIS — E03.8 SUBCLINICAL HYPOTHYROIDISM: ICD-10-CM

## 2025-07-02 DIAGNOSIS — K86.1 CHRONIC PANCREATITIS, UNSPECIFIED PANCREATITIS TYPE (HCC): ICD-10-CM

## 2025-07-02 DIAGNOSIS — E78.1 HYPERTRIGLYCERIDEMIA: ICD-10-CM

## 2025-07-02 PROCEDURE — G2211 COMPLEX E/M VISIT ADD ON: HCPCS | Performed by: NURSE PRACTITIONER

## 2025-07-02 PROCEDURE — 99214 OFFICE O/P EST MOD 30 MIN: CPT | Performed by: NURSE PRACTITIONER

## 2025-07-02 PROCEDURE — G0439 PPPS, SUBSEQ VISIT: HCPCS | Performed by: NURSE PRACTITIONER

## 2025-07-02 NOTE — TELEPHONE ENCOUNTER
----- Message from Gonzalo Lan MD sent at 7/1/2025  6:23 PM EDT -----      ----- Message -----  From: Lab, Background User  Sent: 6/30/2025  11:10 AM EDT  To: Gonzaol Lan MD

## 2025-07-02 NOTE — TELEPHONE ENCOUNTER
Spoke with pt regarding the following message     Creatinine stable 1.5 over last 6 months.  Vitamin D level remains lower.  Please confirm if she is taking any vitamin D supplements OTC.  If she is not taking any, she should start vitamin D3 1 tablet 2000 units daily.     Pt verbalized understanding.

## 2025-07-02 NOTE — ASSESSMENT & PLAN NOTE
Lab Results   Component Value Date    EGFR 31 06/30/2025    EGFR 31 02/25/2025    EGFR 32 12/09/2024    CREATININE 1.52 (H) 06/30/2025    CREATININE 1.50 (H) 02/25/2025    CREATININE 1.47 (H) 12/09/2024     Continues to follow with nephrology.

## 2025-07-02 NOTE — PROGRESS NOTES
Name: Jordyn Spivey      : 1940      MRN: [id number]  Encounter Provider: NANCY Lee  Encounter Date: 2025   Encounter department: [facility]  :  Assessment & Plan  Encounter for immunization  Refused, counseled.        Chronic pancreatitis, unspecified pancreatitis type (HCC)  Denies any symptoms at this time.        Chronic kidney disease, stage 3b (HCC)  Lab Results   Component Value Date    EGFR 31 2025    EGFR 31 2025    EGFR 32 2024    CREATININE 1.52 (H) 2025    CREATININE 1.50 (H) 2025    CREATININE 1.47 (H) 2024     Continues to follow with nephrology.        Vitamin B12 deficiency    Orders:  •  Vitamin B12; Future  •  CBC and differential; Future    Subclinical hypothyroidism    Orders:  •  TSH, 3rd generation with Free T4 reflex; Future    Hypertriglyceridemia    Orders:  •  Comprehensive metabolic panel; Future  •  Lipid panel; Future    Post-menopausal    Orders:  •  DXA bone density spine hip and pelvis; Future    Leg swelling  Patient has localized swelling in the lower leg under the knee to about mid calf. Tender to palpation. No injury. No warmth or erythema. Ongoing since she had parenchyma of great toe removed. No ankle swelling or tenderness. Toe is still red and swollen. This does not appear to be cellulitis. Just was on Keflex for UTI.   Have x-rays and u/s completed as ordered.   Orders:  •  VAS VENOUS DUPLEX -LOWER LIMB UNILATERAL; Future  •  XR tibia fibula 2 vw right; Future    Right foot pain  States that she is getting shooting pain in her right great toe since seeing podiatry in May. Recommend f.u with podiatry.   Orders:  •  XR foot 3+ vw right; Future    Chronic pain of right knee  Intermittent right knee pain ongoing for years. Worsens with more activity.  Normally does not have swelling from this.   Orders:  •  XR knee 3 vw right non injury; Future    Localized edema    Orders:  •  VAS  VENOUS DUPLEX -LOWER LIMB UNILATERAL; Future    Medicare annual wellness visit, subsequent         Continuous leakage of urine  Will follow up with her urologist.          Depression Screening and Follow-up Plan: Patient was screened for depression during today's encounter. They screened negative with a PHQ-9 score of 0.      Falls Plan of Care: Recommended assistive device to help with gait and balance. Medications that increase falls were reviewed. Assessed feet and footwear. Vitamin D supplementation was recommended. Home safety education provided.       Preventive health issues were discussed with patient, and age appropriate screening tests were ordered as noted in patient's After Visit Summary. Personalized health advice and appropriate referrals for health education or preventive services given if needed, as noted in patient's After Visit Summary.    History of Present Illness     HPI   Patient Care Team:  NANCY Lee as PCP - General (Family Medicine)  Anselmo Mccullough DO as PCP - [facility]CP-[facility]  [facility]sel[facility]  Gonzalo Lan MD (Nephrology)  Mc Bermudez DO (Cardiology)  Trinidad Mendez PA-C as Physician Assistant (Vascular Surgery)  NANCY Clarke as Nurse Practitioner (Urology)    Review of Systems   Constitutional:  Negative for chills and fever.   HENT:  Negative for ear pain and sore throat.    Eyes:  Negative for pain and visual disturbance.   Respiratory:  Negative for cough and shortness of breath.    Cardiovascular:  Positive for leg swelling. Negative for chest pain and palpitations.   Gastrointestinal:  Negative for abdominal pain, constipation, diarrhea, nausea and vomiting.   Genitourinary:  Negative for dysuria and hematuria.   Musculoskeletal:  Negative for arthralgias and back pain.   Skin:  Negative for color change and rash.   Neurological:  Negative for seizures and syncope.   All other systems  reviewed and are negative.    Medical History Reviewed by provider this encounter:  Tobacco  Allergies  Meds  Problems  Med Hx  Surg Hx  Fam Hx       Annual Wellness Visit Questionnaire   Jordyn is here for her Subsequent Wellness visit. Last Medicare Wellness visit information reviewed, patient interviewed and updates made to the record today.      Health Risk Assessment:   Patient rates overall health as fair. Patient feels that their physical health rating is same. Patient is satisfied with their life. Eyesight was rated as same. Hearing was rated as same. Patient feels that their emotional and mental health rating is same. Patients states they are sometimes angry. Patient states they are always unusually tired/fatigued. Pain experienced in the last 7 days has been a lot. Patient's pain rating has been 7/10. Patient states that she has experienced no weight loss or gain in last 6 months.     Depression Screening:   PHQ-9 Score: 0      Fall Risk Screening:   In the past year, patient has experienced: history of falling in past year    Number of falls: 1  Injured during fall?: Yes    Feels unsteady when standing or walking?: Yes    Worried about falling?: No      Urinary Incontinence Screening:   Patient has leaked urine accidently in the last six months. Was going to get hair cut and put cane on ice and fell back and hit head     Home Safety:  Patient has trouble with stairs inside or outside of their home. Patient has working smoke alarms and has working carbon monoxide detector. Home safety hazards include: none. No stairs in the house     Medications:   Patient is currently taking over-the-counter supplements. OTC medications include: see medication list. Patient is able to manage medications.     Activities of Daily Living (ADLs)/Instrumental Activities of Daily Living (IADLs):   Walk and transfer into and out of bed and chair?: Yes  Dress and groom yourself?: Yes    Bathe or shower yourself?: Yes     Feed yourself? Yes  Do your laundry/housekeeping?: Yes  Manage your money, pay your bills and track your expenses?: Yes  Make your own meals?: Yes    Do your own shopping?: Yes    Previous Hospitalizations:   Any hospitalizations or ED visits within the last 12 months?: No      Advance Care Planning:   Living will: No    Durable POA for healthcare: No    Advanced directive: Yes    Advanced directive counseling given: Yes    ACP document given: Yes    Patient declined ACP directive: Yes    End of Life Decisions reviewed with patient: Yes    Provider agrees with end of life decisions: No      Preventive Screenings      Cardiovascular Screening:    General: Screening Not Indicated and History Lipid Disorder      Diabetes Screening:     General: Screening Current      Cervical Cancer Screening:    General: Screening Not Indicated      Lung Cancer Screening:     General: Screening Not Indicated    Immunizations:  - Immunizations due: Prevnar 20 and Zoster (Shingrix)    Social Drivers of Health     Financial Resource Strain: Low Risk  (1/27/2023)    Overall Financial Resource Strain (CARDIA)    • Difficulty of Paying Living Expenses: Not hard at all   Food Insecurity: No Food Insecurity (7/2/2025)    Nursing - Inadequate Food Risk Classification    • Worried About Running Out of Food in the Last Year: Never true    • Ran Out of Food in the Last Year: Never true   Transportation Needs: No Transportation Needs (7/2/2025)    PRAPARE - Transportation    • Lack of Transportation (Medical): No    • Lack of Transportation (Non-Medical): No   Housing Stability: Low Risk  (7/2/2025)    Housing Stability Vital Sign    • Unable to Pay for Housing in the Last Year: No    • Number of Times Moved in the Last Year: 0    • Homeless in the Last Year: No   Utilities: Not At Risk (7/2/2025)    [facility] Utilities    • Threatened with loss of utilities: No     No results found.    Objective   /64   Pulse 91   Temp (!) 96.9 °F (36.1 °C)    Ht 5' (1.524 m)   Wt 84.4 kg (186 lb)   SpO2 98%   BMI 36.33 kg/m²     Physical Exam  Vitals and nursing note reviewed.   Constitutional:       General: She is not in acute distress.     Appearance: Normal appearance. She is well-developed and normal weight.   HENT:      Head: Normocephalic and atraumatic.      Right Ear: Tympanic membrane, ear canal and external ear normal. There is no impacted cerumen.      Left Ear: Tympanic membrane, ear canal and external ear normal. There is no impacted cerumen.      Nose: Nose normal. No congestion or rhinorrhea.      Mouth/Throat:      Mouth: Mucous membranes are moist.      Pharynx: Oropharynx is clear. No oropharyngeal exudate or posterior oropharyngeal erythema.     Eyes:      General: No scleral icterus.        Right eye: No discharge.         Left eye: No discharge.      Extraocular Movements: Extraocular movements intact.      Conjunctiva/sclera: Conjunctivae normal.      Pupils: Pupils are equal, round, and reactive to light.       Cardiovascular:      Rate and Rhythm: Normal rate and regular rhythm.      Pulses: Normal pulses.      Heart sounds: Normal heart sounds. No murmur heard.  Pulmonary:      Effort: Pulmonary effort is normal. No respiratory distress.      Breath sounds: Normal breath sounds. No wheezing or rales.   Abdominal:      General: Abdomen is flat. Bowel sounds are normal. There is no distension.      Palpations: Abdomen is soft.      Tenderness: There is no abdominal tenderness. There is no guarding.     Musculoskeletal:         General: Swelling present. Normal range of motion.      Cervical back: Normal range of motion and neck supple. No rigidity or tenderness.      Right lower leg: No edema.      Left lower leg: No edema.        Legs:    Lymphadenopathy:      Cervical: No cervical adenopathy.     Skin:     General: Skin is warm and dry.      Capillary Refill: Capillary refill takes less than 2 seconds.      Findings: No bruising, erythema  or lesion.     Neurological:      General: No focal deficit present.      Mental Status: She is alert and oriented to person, place, and time. Mental status is at baseline.      Motor: No weakness.      Coordination: Coordination normal.      Gait: Gait normal.     Psychiatric:         Mood and Affect: Mood normal.         Behavior: Behavior normal.         Thought Content: Thought content normal.         Judgment: Judgment normal.

## 2025-07-08 ENCOUNTER — HOSPITAL ENCOUNTER (OUTPATIENT)
Dept: RADIOLOGY | Facility: HOSPITAL | Age: 85
Discharge: HOME | End: 2025-07-08
Payer: COMMERCIAL

## 2025-07-08 ENCOUNTER — HOSPITAL ENCOUNTER (OUTPATIENT)
Dept: NON INVASIVE DIAGNOSTICS | Facility: HOSPITAL | Age: 85
Discharge: HOME | End: 2025-07-08
Attending: NURSE PRACTITIONER
Payer: COMMERCIAL

## 2025-07-08 DIAGNOSIS — M79.671 RIGHT FOOT PAIN: ICD-10-CM

## 2025-07-08 DIAGNOSIS — G89.29 CHRONIC PAIN OF RIGHT KNEE: ICD-10-CM

## 2025-07-08 DIAGNOSIS — R60.0 LOCALIZED EDEMA: ICD-10-CM

## 2025-07-08 DIAGNOSIS — M25.561 CHRONIC PAIN OF RIGHT KNEE: ICD-10-CM

## 2025-07-08 DIAGNOSIS — M79.89 LEG SWELLING: ICD-10-CM

## 2025-07-08 PROCEDURE — 73630 X-RAY EXAM OF FOOT: CPT

## 2025-07-08 PROCEDURE — 73562 X-RAY EXAM OF KNEE 3: CPT

## 2025-07-08 PROCEDURE — 93971 EXTREMITY STUDY: CPT

## 2025-07-08 PROCEDURE — 93971 EXTREMITY STUDY: CPT | Performed by: SURGERY

## 2025-07-08 PROCEDURE — 73590 X-RAY EXAM OF LOWER LEG: CPT

## 2025-07-09 ENCOUNTER — TELEPHONE (OUTPATIENT)
Age: 85
End: 2025-07-09

## 2025-07-09 NOTE — TELEPHONE ENCOUNTER
Patient has pacenet so I am assume [facility] didn't run it through [employer] as well.    I will call [facility] and get it fixed.    Spoke to alanna at [facility]- it had to be ran through her pacenet. Comes down to 45 for a 3 month.    I spoke to minor and all is good

## 2025-07-09 NOTE — TELEPHONE ENCOUNTER
Received call from pt stating she had to switch her prescriptions to [facility] as her [facility] pharmacy closed.  Pt states her copay for Eliquis went from $15 at [facility] to over $400 at [facility].  Please assist with finding a more cost effective medication for the pt.  Pt states she only has 2 days left of the Eliquis.

## 2025-07-10 ENCOUNTER — TELEPHONE (OUTPATIENT)
Dept: NEPHROLOGY | Facility: CLINIC | Age: 85
End: 2025-07-10

## 2025-07-10 NOTE — TELEPHONE ENCOUNTER
Left msg with pt's daughter, Elyse, offering sooner appt with Dr. Lan on 7/10/25 at 10:30 in [place] if still available.

## 2025-07-14 ENCOUNTER — HOSPITAL ENCOUNTER (OUTPATIENT)
Dept: BONE DENSITY | Facility: HOSPITAL | Age: 85
Discharge: HOME | End: 2025-07-14
Attending: NURSE PRACTITIONER
Payer: COMMERCIAL

## 2025-07-14 VITALS — BODY MASS INDEX: 35.93 KG/M2 | HEIGHT: 60 IN | WEIGHT: 183 LBS

## 2025-07-14 DIAGNOSIS — Z78.0 POST-MENOPAUSAL: ICD-10-CM

## 2025-07-14 PROCEDURE — 77080 DXA BONE DENSITY AXIAL: CPT

## 2025-07-16 ENCOUNTER — HOSPITAL ENCOUNTER (OUTPATIENT)
Dept: ULTRASOUND IMAGING | Facility: HOSPITAL | Age: 85
Discharge: HOME | End: 2025-07-16
Attending: NURSE PRACTITIONER
Payer: COMMERCIAL

## 2025-07-16 DIAGNOSIS — R19.09 MASS OF RIGHT INGUINAL REGION: ICD-10-CM

## 2025-07-16 PROCEDURE — 76705 ECHO EXAM OF ABDOMEN: CPT

## 2025-07-22 DIAGNOSIS — I48.91 ATRIAL FIBRILLATION WITH RVR (HCC): ICD-10-CM

## 2025-07-22 NOTE — TELEPHONE ENCOUNTER
Medication: Diltiazem    Dose/Frequency: 300 mg     Quantity: 30 w/refills    Pharmacy: [facility]    Office:   [] PCP/Provider -   [x] Speciality/Provider -     Does the patient have enough for 3 days?   [] Yes   [x] No - Send as HP to POD   Pt states that [facility] keeps telling her that the script is delayed.

## 2025-07-24 RX ORDER — DILTIAZEM HYDROCHLORIDE 300 MG/1
300 CAPSULE, COATED, EXTENDED RELEASE ORAL DAILY
Qty: 90 CAPSULE | Refills: 1 | Status: SHIPPED | OUTPATIENT
Start: 2025-07-24

## 2025-08-07 ENCOUNTER — OFFICE VISIT (OUTPATIENT)
Dept: NEPHROLOGY | Facility: CLINIC | Age: 85
End: 2025-08-07
Payer: COMMERCIAL

## 2025-08-07 VITALS
HEIGHT: 60 IN | OXYGEN SATURATION: 99 % | BODY MASS INDEX: 36.2 KG/M2 | HEART RATE: 74 BPM | DIASTOLIC BLOOD PRESSURE: 66 MMHG | WEIGHT: 184.4 LBS | SYSTOLIC BLOOD PRESSURE: 128 MMHG

## 2025-08-07 DIAGNOSIS — E55.9 VITAMIN D DEFICIENCY: ICD-10-CM

## 2025-08-07 DIAGNOSIS — N18.32 CHRONIC KIDNEY DISEASE, STAGE 3B (HCC): ICD-10-CM

## 2025-08-07 DIAGNOSIS — R60.0 LEG EDEMA: ICD-10-CM

## 2025-08-07 DIAGNOSIS — R31.0 GROSS HEMATURIA: ICD-10-CM

## 2025-08-07 DIAGNOSIS — R80.9 MICROALBUMINURIA: ICD-10-CM

## 2025-08-07 DIAGNOSIS — N18.30 BENIGN HYPERTENSION WITH CKD (CHRONIC KIDNEY DISEASE) STAGE III (HCC): Primary | ICD-10-CM

## 2025-08-07 DIAGNOSIS — I12.9 BENIGN HYPERTENSION WITH CKD (CHRONIC KIDNEY DISEASE) STAGE III (HCC): Primary | ICD-10-CM

## 2025-08-07 PROBLEM — N18.9 CHRONIC KIDNEY DISEASE, UNSPECIFIED CKD STAGE: Status: RESOLVED | Noted: 2020-01-02 | Resolved: 2025-08-07

## 2025-08-07 PROCEDURE — 99214 OFFICE O/P EST MOD 30 MIN: CPT | Performed by: INTERNAL MEDICINE

## 2025-08-07 RX ORDER — SPIRONOLACTONE 25 MG/1
TABLET ORAL
Qty: 270 TABLET | Refills: 3 | Status: SHIPPED | OUTPATIENT
Start: 2025-08-07

## 2025-08-08 ENCOUNTER — TELEPHONE (OUTPATIENT)
Age: 85
End: 2025-08-08

## 2025-08-09 ENCOUNTER — OFFICE VISIT (OUTPATIENT)
Dept: URGENT CARE | Facility: CLINIC | Age: 85
End: 2025-08-09
Payer: COMMERCIAL

## 2025-08-09 ENCOUNTER — APPOINTMENT (OUTPATIENT)
Dept: LAB | Facility: CLINIC | Age: 85
End: 2025-08-09
Attending: NURSE PRACTITIONER
Payer: COMMERCIAL

## 2025-08-09 VITALS
SYSTOLIC BLOOD PRESSURE: 128 MMHG | HEART RATE: 83 BPM | WEIGHT: 184 LBS | TEMPERATURE: 97.7 F | BODY MASS INDEX: 36.12 KG/M2 | DIASTOLIC BLOOD PRESSURE: 84 MMHG | RESPIRATION RATE: 16 BRPM | HEIGHT: 60 IN | OXYGEN SATURATION: 97 %

## 2025-08-09 DIAGNOSIS — R31.0 GROSS HEMATURIA: ICD-10-CM

## 2025-08-09 DIAGNOSIS — N30.01 ACUTE CYSTITIS WITH HEMATURIA: Primary | ICD-10-CM

## 2025-08-09 DIAGNOSIS — R30.0 DYSURIA: ICD-10-CM

## 2025-08-09 LAB
SL AMB  POCT GLUCOSE, UA: ABNORMAL
SL AMB LEUKOCYTE ESTERASE,UA: ABNORMAL
SL AMB POCT BILIRUBIN,UA: ABNORMAL
SL AMB POCT BLOOD,UA: ABNORMAL
SL AMB POCT CLARITY,UA: ABNORMAL
SL AMB POCT COLOR,UA: ABNORMAL
SL AMB POCT KETONES,UA: ABNORMAL
SL AMB POCT NITRITE,UA: ABNORMAL
SL AMB POCT PH,UA: 7.5
SL AMB POCT SPECIFIC GRAVITY,UA: 1.02
SL AMB POCT URINE PROTEIN: ABNORMAL
SL AMB POCT UROBILINOGEN: 0.2

## 2025-08-09 PROCEDURE — 87086 URINE CULTURE/COLONY COUNT: CPT | Performed by: NURSE PRACTITIONER

## 2025-08-09 PROCEDURE — S9088 SERVICES PROVIDED IN URGENT: HCPCS | Performed by: NURSE PRACTITIONER

## 2025-08-09 PROCEDURE — 81002 URINALYSIS NONAUTO W/O SCOPE: CPT | Performed by: NURSE PRACTITIONER

## 2025-08-09 PROCEDURE — 99214 OFFICE O/P EST MOD 30 MIN: CPT | Performed by: NURSE PRACTITIONER

## 2025-08-09 PROCEDURE — 87186 SC STD MICRODIL/AGAR DIL: CPT | Performed by: NURSE PRACTITIONER

## 2025-08-09 PROCEDURE — 87077 CULTURE AEROBIC IDENTIFY: CPT | Performed by: NURSE PRACTITIONER

## 2025-08-09 RX ORDER — CEPHALEXIN 500 MG/1
500 CAPSULE ORAL EVERY 12 HOURS SCHEDULED
Qty: 14 CAPSULE | Refills: 0 | Status: SHIPPED | OUTPATIENT
Start: 2025-08-09 | End: 2025-08-16

## 2025-08-10 LAB — BACTERIA UR CULT: ABNORMAL

## 2025-08-11 LAB
BACTERIA UR CULT: ABNORMAL
BACTERIA UR CULT: ABNORMAL

## 2025-08-15 ENCOUNTER — APPOINTMENT (OUTPATIENT)
Dept: LAB | Facility: CLINIC | Age: 85
End: 2025-08-15
Payer: COMMERCIAL

## 2025-08-18 ENCOUNTER — RESULTS FOLLOW-UP (OUTPATIENT)
Dept: FAMILY MEDICINE CLINIC | Facility: CLINIC | Age: 85
End: 2025-08-18